# Patient Record
Sex: MALE | Race: WHITE | Employment: OTHER | URBAN - METROPOLITAN AREA
[De-identification: names, ages, dates, MRNs, and addresses within clinical notes are randomized per-mention and may not be internally consistent; named-entity substitution may affect disease eponyms.]

---

## 2017-03-30 ENCOUNTER — HOSPITAL ENCOUNTER (OUTPATIENT)
Dept: GENERAL RADIOLOGY | Age: 72
Discharge: HOME | End: 2017-03-30
Attending: FAMILY MEDICINE | Admitting: FAMILY MEDICINE

## 2017-03-30 DIAGNOSIS — M77.8 TENDINITIS OF WRIST: ICD-10-CM

## 2017-07-06 ENCOUNTER — HOSPITAL ENCOUNTER (OUTPATIENT)
Dept: MRI IMAGING | Age: 72
Discharge: HOME | End: 2017-07-06
Attending: FAMILY MEDICINE | Admitting: FAMILY MEDICINE

## 2017-07-06 DIAGNOSIS — M54.5 LOW BACK PAIN, UNSPECIFIED BACK PAIN LATERALITY, UNSPECIFIED CHRONICITY, WITH SCIATICA PRESENCE UNSPECIFIED: ICD-10-CM

## 2017-07-06 DIAGNOSIS — M54.50 LOW BACK PAIN: ICD-10-CM

## 2017-08-29 ENCOUNTER — HOSPITAL ENCOUNTER (OUTPATIENT)
Dept: OTHER | Age: 72
Discharge: HOME | End: 2017-08-29
Attending: ORTHOPAEDIC SURGERY | Admitting: ORTHOPAEDIC SURGERY

## 2018-08-16 ENCOUNTER — HOSPITAL ENCOUNTER (OUTPATIENT)
Dept: NEUROLOGY | Age: 73
Discharge: HOME | End: 2018-08-16
Attending: FAMILY MEDICINE | Admitting: FAMILY MEDICINE

## 2018-08-24 NOTE — PROCEDURES
[address] [place] [address], [address] [place] [address]                               ELECTROMYOGRAM REPORT    PATIENT NAME: Becki Santiago                      :        1945  MED REC NO:   [id number]                          ROOM:  ACCOUNT NO:   [de-identified]                          ADMIT DATE: 2018  PROVIDER:     Kyara Sanders MD    DATE OF EM2018    REFERRING PROVIDER:  Dr. Germain Cowart. CLINICAL PROBLEMS:  1. Pain and dysesthesia of both feet and legs. 2.  Pain, lower back; status post lumbar spinal surgery 1 year ago. IMPRESSION:  1. Moderate peripheral neuropathy with diffuse neurogenic EMG changes seen  in both lower extremity muscles, more pronounced in the distal muscle  groups. Nerve conduction velocities are reduced. Right as well as left  sural nerve sensory latencies were not obtainable; this is again suggestive  of peripheral neuropathy. 2.  EMG changes are somewhat more prominent in the lower lumbar paraspinal  muscles in L5-S1 region. Associated L5-S1 radiculopathy may need to be  considered.         Seb Vogel MD    D: 2018 17:46:19       T: 2018 17:46:55     [email or web address]  Job#: [id number]     Doc#: [id number]    CC:

## 2018-11-22 ENCOUNTER — APPOINTMENT (OUTPATIENT)
Dept: ULTRASOUND IMAGING | Age: 73
End: 2018-11-22
Payer: MEDICARE

## 2018-11-22 ENCOUNTER — APPOINTMENT (OUTPATIENT)
Dept: CT IMAGING | Age: 73
End: 2018-11-22
Payer: MEDICARE

## 2018-11-22 ENCOUNTER — APPOINTMENT (OUTPATIENT)
Dept: GENERAL RADIOLOGY | Age: 73
End: 2018-11-22
Payer: MEDICARE

## 2018-11-22 ENCOUNTER — HOSPITAL ENCOUNTER (EMERGENCY)
Age: 73
Discharge: HOME | End: 2018-11-23
Attending: EMERGENCY MEDICINE
Payer: MEDICARE

## 2018-11-22 DIAGNOSIS — R07.89 CHEST WALL PAIN: ICD-10-CM

## 2018-11-22 DIAGNOSIS — R07.9 CHEST PAIN, UNSPECIFIED TYPE: Primary | ICD-10-CM

## 2018-11-22 DIAGNOSIS — R06.00 DYSPNEA AND RESPIRATORY ABNORMALITIES: ICD-10-CM

## 2018-11-22 DIAGNOSIS — R06.89 DYSPNEA AND RESPIRATORY ABNORMALITIES: ICD-10-CM

## 2018-11-22 LAB
ALBUMIN SERPL-MCNC: 4.2 GM/DL (ref 3.4–5)
ALP BLD-CCNC: 95 IU/L (ref 40–129)
ALT SERPL-CCNC: 35 U/L (ref 10–40)
ANION GAP SERPL CALCULATED.3IONS-SCNC: 14 MMOL/L (ref 4–16)
AST SERPL-CCNC: 23 IU/L (ref 15–37)
BACTERIA: NEGATIVE /HPF
BASOPHILS ABSOLUTE: 0 K/CU MM
BASOPHILS RELATIVE PERCENT: 0.3 % (ref 0–1)
BILIRUB SERPL-MCNC: 0.1 MG/DL (ref 0–1)
BILIRUBIN URINE: NEGATIVE MG/DL
BLOOD, URINE: ABNORMAL
BUN BLDV-MCNC: 21 MG/DL (ref 6–23)
CALCIUM SERPL-MCNC: 8.5 MG/DL (ref 8.3–10.6)
CHLORIDE BLD-SCNC: 105 MMOL/L (ref 99–110)
CLARITY: CLEAR
CO2: 21 MMOL/L (ref 21–32)
COLOR: ABNORMAL
CREAT SERPL-MCNC: 1 MG/DL (ref 0.9–1.3)
DIFFERENTIAL TYPE: ABNORMAL
EOSINOPHILS ABSOLUTE: 0.1 K/CU MM
EOSINOPHILS RELATIVE PERCENT: 2 % (ref 0–3)
GFR AFRICAN AMERICAN: >60 ML/MIN/1.73M2
GFR NON-AFRICAN AMERICAN: >60 ML/MIN/1.73M2
GLUCOSE BLD-MCNC: 200 MG/DL (ref 70–99)
GLUCOSE, URINE: >500 MG/DL
HCT VFR BLD CALC: 42.2 % (ref 42–52)
HEMOGLOBIN: 13.2 GM/DL (ref 13.5–18)
IMMATURE NEUTROPHIL %: 0.3 % (ref 0–0.43)
KETONES, URINE: NEGATIVE MG/DL
LEUKOCYTE ESTERASE, URINE: ABNORMAL
LIPASE: 25 IU/L (ref 13–60)
LYMPHOCYTES ABSOLUTE: 1.1 K/CU MM
LYMPHOCYTES RELATIVE PERCENT: 17.3 % (ref 24–44)
MCH RBC QN AUTO: 29.1 PG (ref 27–31)
MCHC RBC AUTO-ENTMCNC: 31.3 % (ref 32–36)
MCV RBC AUTO: 93 FL (ref 78–100)
MONOCYTES ABSOLUTE: 0.5 K/CU MM
MONOCYTES RELATIVE PERCENT: 7.5 % (ref 0–4)
MUCUS: ABNORMAL HPF
NITRITE URINE, QUANTITATIVE: NEGATIVE
NUCLEATED RBC %: 0 %
PDW BLD-RTO: 13.3 % (ref 11.7–14.9)
PH, URINE: 5 (ref 5–8)
PLATELET # BLD: 196 K/CU MM (ref 140–440)
PMV BLD AUTO: 10.9 FL (ref 7.5–11.1)
POTASSIUM SERPL-SCNC: 4.1 MMOL/L (ref 3.5–5.1)
PRO-BNP: 137.6 PG/ML
PROTEIN UA: NEGATIVE MG/DL
RBC # BLD: 4.54 M/CU MM (ref 4.6–6.2)
RBC URINE: <1 /HPF (ref 0–3)
SEGMENTED NEUTROPHILS ABSOLUTE COUNT: 4.6 K/CU MM
SEGMENTED NEUTROPHILS RELATIVE PERCENT: 72.6 % (ref 36–66)
SODIUM BLD-SCNC: 140 MMOL/L (ref 135–145)
SPECIFIC GRAVITY UA: 1.02 (ref 1–1.03)
TOTAL CK: 161 IU/L (ref 38–174)
TOTAL IMMATURE NEUTOROPHIL: 0.02 K/CU MM
TOTAL NUCLEATED RBC: 0 K/CU MM
TOTAL PROTEIN: 6.9 GM/DL (ref 6.4–8.2)
TRICHOMONAS: ABNORMAL /HPF
TROPONIN T: <0.01 NG/ML
TROPONIN T: <0.01 NG/ML
UROBILINOGEN, URINE: NORMAL MG/DL (ref 0.2–1)
WBC # BLD: 6.4 K/CU MM (ref 4–10.5)
WBC UA: <1 /HPF (ref 0–2)

## 2018-11-22 PROCEDURE — 6370000000 HC RX 637 (ALT 250 FOR IP): Performed by: EMERGENCY MEDICINE

## 2018-11-22 PROCEDURE — 83690 ASSAY OF LIPASE: CPT

## 2018-11-22 PROCEDURE — 82550 ASSAY OF CK (CPK): CPT

## 2018-11-22 PROCEDURE — 85025 COMPLETE CBC W/AUTO DIFF WBC: CPT

## 2018-11-22 PROCEDURE — 36415 COLL VENOUS BLD VENIPUNCTURE: CPT

## 2018-11-22 PROCEDURE — 80053 COMPREHEN METABOLIC PANEL: CPT

## 2018-11-22 PROCEDURE — 96375 TX/PRO/DX INJ NEW DRUG ADDON: CPT

## 2018-11-22 PROCEDURE — 99285 EMERGENCY DEPT VISIT HI MDM: CPT

## 2018-11-22 PROCEDURE — 71045 X-RAY EXAM CHEST 1 VIEW: CPT

## 2018-11-22 PROCEDURE — 93971 EXTREMITY STUDY: CPT

## 2018-11-22 PROCEDURE — 81001 URINALYSIS AUTO W/SCOPE: CPT

## 2018-11-22 PROCEDURE — 84484 ASSAY OF TROPONIN QUANT: CPT

## 2018-11-22 PROCEDURE — 96374 THER/PROPH/DIAG INJ IV PUSH: CPT

## 2018-11-22 PROCEDURE — 83880 ASSAY OF NATRIURETIC PEPTIDE: CPT

## 2018-11-22 PROCEDURE — 6360000002 HC RX W HCPCS: Performed by: EMERGENCY MEDICINE

## 2018-11-22 PROCEDURE — 71275 CT ANGIOGRAPHY CHEST: CPT

## 2018-11-22 PROCEDURE — 6360000004 HC RX CONTRAST MEDICATION: Performed by: EMERGENCY MEDICINE

## 2018-11-22 PROCEDURE — 2580000003 HC RX 258: Performed by: EMERGENCY MEDICINE

## 2018-11-22 RX ORDER — ONDANSETRON 2 MG/ML
4 INJECTION INTRAMUSCULAR; INTRAVENOUS EVERY 30 MIN PRN
Status: DISCONTINUED | OUTPATIENT
Start: 2018-11-22 | End: 2018-11-23 | Stop reason: HOSPADM

## 2018-11-22 RX ORDER — ASPIRIN 81 MG/1
324 TABLET, CHEWABLE ORAL ONCE
Status: COMPLETED | OUTPATIENT
Start: 2018-11-22 | End: 2018-11-22

## 2018-11-22 RX ORDER — KETOROLAC TROMETHAMINE 30 MG/ML
30 INJECTION, SOLUTION INTRAMUSCULAR; INTRAVENOUS ONCE
Status: COMPLETED | OUTPATIENT
Start: 2018-11-23 | End: 2018-11-22

## 2018-11-22 RX ORDER — METOPROLOL TARTRATE 50 MG/1
25 TABLET, FILM COATED ORAL DAILY
COMMUNITY
End: 2020-10-21

## 2018-11-22 RX ORDER — TIZANIDINE 2 MG/1
2 TABLET ORAL 3 TIMES DAILY
Status: ON HOLD | COMMUNITY
End: 2020-08-26 | Stop reason: ALTCHOICE

## 2018-11-22 RX ORDER — MORPHINE SULFATE 4 MG/ML
4 INJECTION, SOLUTION INTRAMUSCULAR; INTRAVENOUS EVERY 30 MIN PRN
Status: DISCONTINUED | OUTPATIENT
Start: 2018-11-22 | End: 2018-11-23 | Stop reason: HOSPADM

## 2018-11-22 RX ORDER — NITROGLYCERIN 0.4 MG/1
0.4 TABLET SUBLINGUAL EVERY 5 MIN PRN
Status: COMPLETED | OUTPATIENT
Start: 2018-11-22 | End: 2018-11-22

## 2018-11-22 RX ORDER — 0.9 % SODIUM CHLORIDE 0.9 %
10 VIAL (ML) INJECTION
Status: COMPLETED | OUTPATIENT
Start: 2018-11-22 | End: 2018-11-22

## 2018-11-22 RX ADMIN — NITROGLYCERIN 0.4 MG: 0.4 TABLET, ORALLY DISINTEGRATING SUBLINGUAL at 20:03

## 2018-11-22 RX ADMIN — SODIUM CHLORIDE 10 ML: 9 INJECTION, SOLUTION INTRAMUSCULAR; INTRAVENOUS; SUBCUTANEOUS at 22:51

## 2018-11-22 RX ADMIN — MORPHINE SULFATE 4 MG: 4 INJECTION, SOLUTION INTRAMUSCULAR; INTRAVENOUS at 22:31

## 2018-11-22 RX ADMIN — ASPIRIN 81 MG 324 MG: 81 TABLET ORAL at 20:03

## 2018-11-22 RX ADMIN — NITROGLYCERIN 0.4 MG: 0.4 TABLET, ORALLY DISINTEGRATING SUBLINGUAL at 21:28

## 2018-11-22 RX ADMIN — NITROGLYCERIN 0.4 MG: 0.4 TABLET, ORALLY DISINTEGRATING SUBLINGUAL at 20:51

## 2018-11-22 RX ADMIN — MORPHINE SULFATE 4 MG: 4 INJECTION, SOLUTION INTRAMUSCULAR; INTRAVENOUS at 22:03

## 2018-11-22 RX ADMIN — IOPAMIDOL 75 ML: 755 INJECTION, SOLUTION INTRAVENOUS at 22:50

## 2018-11-22 RX ADMIN — ONDANSETRON 4 MG: 2 INJECTION INTRAMUSCULAR; INTRAVENOUS at 22:03

## 2018-11-22 RX ADMIN — KETOROLAC TROMETHAMINE 30 MG: 30 INJECTION, SOLUTION INTRAMUSCULAR at 23:59

## 2018-11-22 ASSESSMENT — PAIN DESCRIPTION - PAIN TYPE: TYPE: ACUTE PAIN

## 2018-11-22 ASSESSMENT — PAIN SCALES - GENERAL
PAINLEVEL_OUTOF10: 9
PAINLEVEL_OUTOF10: 8
PAINLEVEL_OUTOF10: 9
PAINLEVEL_OUTOF10: 7

## 2018-11-22 ASSESSMENT — PAIN DESCRIPTION - LOCATION: LOCATION: CHEST

## 2018-11-22 ASSESSMENT — PAIN DESCRIPTION - ORIENTATION: ORIENTATION: LOWER;LEFT

## 2018-11-22 ASSESSMENT — PAIN DESCRIPTION - DESCRIPTORS: DESCRIPTORS: SHARP

## 2018-11-23 VITALS
HEIGHT: 74 IN | DIASTOLIC BLOOD PRESSURE: 80 MMHG | HEART RATE: 65 BPM | RESPIRATION RATE: 10 BRPM | WEIGHT: 265 LBS | TEMPERATURE: 97.7 F | OXYGEN SATURATION: 98 % | SYSTOLIC BLOOD PRESSURE: 156 MMHG | BODY MASS INDEX: 34.01 KG/M2

## 2018-11-23 RX ORDER — NAPROXEN 500 MG/1
500 TABLET ORAL 2 TIMES DAILY
Qty: 60 TABLET | Refills: 0 | Status: ON HOLD | OUTPATIENT
Start: 2018-11-23 | End: 2020-08-26 | Stop reason: ALTCHOICE

## 2018-11-23 ASSESSMENT — ENCOUNTER SYMPTOMS
ALLERGIC/IMMUNOLOGIC NEGATIVE: 1
EYES NEGATIVE: 1
SHORTNESS OF BREATH: 1
GASTROINTESTINAL NEGATIVE: 1

## 2020-08-26 ENCOUNTER — HOSPITAL ENCOUNTER (INPATIENT)
Age: 75
LOS: 1 days | Discharge: HOME | DRG: 310 | End: 2020-08-27
Attending: EMERGENCY MEDICINE | Admitting: INTERNAL MEDICINE
Payer: MEDICARE

## 2020-08-26 ENCOUNTER — APPOINTMENT (OUTPATIENT)
Dept: ULTRASOUND IMAGING | Age: 75
DRG: 310 | End: 2020-08-26
Payer: MEDICARE

## 2020-08-26 ENCOUNTER — APPOINTMENT (OUTPATIENT)
Dept: GENERAL RADIOLOGY | Age: 75
DRG: 310 | End: 2020-08-26
Payer: MEDICARE

## 2020-08-26 PROBLEM — I48.91 ATRIAL FIBRILLATION WITH RVR (HCC): Status: ACTIVE | Noted: 2020-08-26

## 2020-08-26 PROBLEM — R42 POSTURAL DIZZINESS WITH NEAR SYNCOPE: Status: ACTIVE | Noted: 2020-08-26

## 2020-08-26 PROBLEM — R55 POSTURAL DIZZINESS WITH NEAR SYNCOPE: Status: ACTIVE | Noted: 2020-08-26

## 2020-08-26 PROBLEM — M54.2 ACUTE NECK PAIN: Status: ACTIVE | Noted: 2020-08-26

## 2020-08-26 LAB
ALBUMIN SERPL-MCNC: 4.5 GM/DL (ref 3.4–5)
ALP BLD-CCNC: 80 IU/L (ref 40–129)
ALT SERPL-CCNC: 29 U/L (ref 10–40)
AMPHETAMINES: NEGATIVE
ANION GAP SERPL CALCULATED.3IONS-SCNC: 12 MMOL/L (ref 4–16)
ANION GAP SERPL CALCULATED.3IONS-SCNC: 12 MMOL/L (ref 4–16)
APTT: 31.9 SECONDS (ref 25.1–37.1)
AST SERPL-CCNC: 28 IU/L (ref 15–37)
BARBITURATE SCREEN URINE: NEGATIVE
BASOPHILS ABSOLUTE: 0 K/CU MM
BASOPHILS RELATIVE PERCENT: 0.4 % (ref 0–1)
BENZODIAZEPINE SCREEN, URINE: NEGATIVE
BILIRUB SERPL-MCNC: 0.4 MG/DL (ref 0–1)
BUN BLDV-MCNC: 18 MG/DL (ref 6–23)
BUN BLDV-MCNC: 19 MG/DL (ref 6–23)
CALCIUM SERPL-MCNC: 8.5 MG/DL (ref 8.3–10.6)
CALCIUM SERPL-MCNC: 9 MG/DL (ref 8.3–10.6)
CANNABINOID SCREEN URINE: NEGATIVE
CHLORIDE BLD-SCNC: 103 MMOL/L (ref 99–110)
CHLORIDE BLD-SCNC: 103 MMOL/L (ref 99–110)
CHOLESTEROL: 174 MG/DL
CO2: 23 MMOL/L (ref 21–32)
CO2: 23 MMOL/L (ref 21–32)
COCAINE METABOLITE: NEGATIVE
CREAT SERPL-MCNC: 0.9 MG/DL (ref 0.9–1.3)
CREAT SERPL-MCNC: 1 MG/DL (ref 0.9–1.3)
DIFFERENTIAL TYPE: ABNORMAL
EOSINOPHILS ABSOLUTE: 0.1 K/CU MM
EOSINOPHILS RELATIVE PERCENT: 1 % (ref 0–3)
GFR AFRICAN AMERICAN: >60 ML/MIN/1.73M2
GFR AFRICAN AMERICAN: >60 ML/MIN/1.73M2
GFR NON-AFRICAN AMERICAN: >60 ML/MIN/1.73M2
GFR NON-AFRICAN AMERICAN: >60 ML/MIN/1.73M2
GLUCOSE BLD-MCNC: 140 MG/DL (ref 70–99)
GLUCOSE BLD-MCNC: 207 MG/DL (ref 70–99)
HCT VFR BLD CALC: 39.4 % (ref 42–52)
HCT VFR BLD CALC: 44.9 % (ref 42–52)
HDLC SERPL-MCNC: 44 MG/DL
HEMOGLOBIN: 12.8 GM/DL (ref 13.5–18)
HEMOGLOBIN: 14.8 GM/DL (ref 13.5–18)
IMMATURE NEUTROPHIL %: 0.4 % (ref 0–0.43)
INR BLD: 0.98 INDEX
LDL CHOLESTEROL DIRECT: 104 MG/DL
LIPASE: 27 IU/L (ref 13–60)
LV EF: 55 %
LVEF MODALITY: NORMAL
LYMPHOCYTES ABSOLUTE: 0.9 K/CU MM
LYMPHOCYTES RELATIVE PERCENT: 9.4 % (ref 24–44)
MAGNESIUM: 2.3 MG/DL (ref 1.8–2.4)
MCH RBC QN AUTO: 29.8 PG (ref 27–31)
MCH RBC QN AUTO: 29.9 PG (ref 27–31)
MCHC RBC AUTO-ENTMCNC: 32.5 % (ref 32–36)
MCHC RBC AUTO-ENTMCNC: 33 % (ref 32–36)
MCV RBC AUTO: 90.7 FL (ref 78–100)
MCV RBC AUTO: 91.6 FL (ref 78–100)
MONOCYTES ABSOLUTE: 0.7 K/CU MM
MONOCYTES RELATIVE PERCENT: 7.5 % (ref 0–4)
NUCLEATED RBC %: 0 %
OPIATES, URINE: NEGATIVE
OXYCODONE: NEGATIVE
PDW BLD-RTO: 13.4 % (ref 11.7–14.9)
PDW BLD-RTO: 13.4 % (ref 11.7–14.9)
PHENCYCLIDINE, URINE: NEGATIVE
PLATELET # BLD: 181 K/CU MM (ref 140–440)
PLATELET # BLD: 231 K/CU MM (ref 140–440)
PMV BLD AUTO: 10.9 FL (ref 7.5–11.1)
PMV BLD AUTO: 11.3 FL (ref 7.5–11.1)
POTASSIUM SERPL-SCNC: 3.7 MMOL/L (ref 3.5–5.1)
POTASSIUM SERPL-SCNC: 4.5 MMOL/L (ref 3.5–5.1)
PRO-BNP: 482 PG/ML
PROTHROMBIN TIME: 11.9 SECONDS (ref 11.7–14.5)
RBC # BLD: 4.3 M/CU MM (ref 4.6–6.2)
RBC # BLD: 4.95 M/CU MM (ref 4.6–6.2)
SEGMENTED NEUTROPHILS ABSOLUTE COUNT: 7.9 K/CU MM
SEGMENTED NEUTROPHILS RELATIVE PERCENT: 81.3 % (ref 36–66)
SODIUM BLD-SCNC: 138 MMOL/L (ref 135–145)
SODIUM BLD-SCNC: 138 MMOL/L (ref 135–145)
TOTAL IMMATURE NEUTOROPHIL: 0.04 K/CU MM
TOTAL NUCLEATED RBC: 0 K/CU MM
TOTAL PROTEIN: 6.9 GM/DL (ref 6.4–8.2)
TRIGL SERPL-MCNC: 225 MG/DL
TROPONIN T: 0.04 NG/ML
TROPONIN T: <0.01 NG/ML
TSH HIGH SENSITIVITY: 3.24 UIU/ML (ref 0.27–4.2)
WBC # BLD: 6.3 K/CU MM (ref 4–10.5)
WBC # BLD: 9.7 K/CU MM (ref 4–10.5)

## 2020-08-26 PROCEDURE — 93880 EXTRACRANIAL BILAT STUDY: CPT

## 2020-08-26 PROCEDURE — 93005 ELECTROCARDIOGRAM TRACING: CPT | Performed by: EMERGENCY MEDICINE

## 2020-08-26 PROCEDURE — 85730 THROMBOPLASTIN TIME PARTIAL: CPT

## 2020-08-26 PROCEDURE — 84484 ASSAY OF TROPONIN QUANT: CPT

## 2020-08-26 PROCEDURE — 2140000000 HC CCU INTERMEDIATE R&B

## 2020-08-26 PROCEDURE — 80053 COMPREHEN METABOLIC PANEL: CPT

## 2020-08-26 PROCEDURE — 85610 PROTHROMBIN TIME: CPT

## 2020-08-26 PROCEDURE — 83735 ASSAY OF MAGNESIUM: CPT

## 2020-08-26 PROCEDURE — 93306 TTE W/DOPPLER COMPLETE: CPT

## 2020-08-26 PROCEDURE — 6360000002 HC RX W HCPCS: Performed by: PHYSICIAN ASSISTANT

## 2020-08-26 PROCEDURE — 6370000000 HC RX 637 (ALT 250 FOR IP): Performed by: NURSE PRACTITIONER

## 2020-08-26 PROCEDURE — 85027 COMPLETE CBC AUTOMATED: CPT

## 2020-08-26 PROCEDURE — 71045 X-RAY EXAM CHEST 1 VIEW: CPT

## 2020-08-26 PROCEDURE — 80048 BASIC METABOLIC PNL TOTAL CA: CPT

## 2020-08-26 PROCEDURE — 83721 ASSAY OF BLOOD LIPOPROTEIN: CPT

## 2020-08-26 PROCEDURE — 80307 DRUG TEST PRSMV CHEM ANLYZR: CPT

## 2020-08-26 PROCEDURE — 2580000003 HC RX 258: Performed by: NURSE PRACTITIONER

## 2020-08-26 PROCEDURE — 83690 ASSAY OF LIPASE: CPT

## 2020-08-26 PROCEDURE — 93005 ELECTROCARDIOGRAM TRACING: CPT | Performed by: PHYSICIAN ASSISTANT

## 2020-08-26 PROCEDURE — 36415 COLL VENOUS BLD VENIPUNCTURE: CPT

## 2020-08-26 PROCEDURE — 6360000002 HC RX W HCPCS: Performed by: NURSE PRACTITIONER

## 2020-08-26 PROCEDURE — 84443 ASSAY THYROID STIM HORMONE: CPT

## 2020-08-26 PROCEDURE — 85025 COMPLETE CBC W/AUTO DIFF WBC: CPT

## 2020-08-26 PROCEDURE — 96372 THER/PROPH/DIAG INJ SC/IM: CPT

## 2020-08-26 PROCEDURE — 83880 ASSAY OF NATRIURETIC PEPTIDE: CPT

## 2020-08-26 PROCEDURE — 99285 EMERGENCY DEPT VISIT HI MDM: CPT

## 2020-08-26 PROCEDURE — 80061 LIPID PANEL: CPT

## 2020-08-26 PROCEDURE — 96374 THER/PROPH/DIAG INJ IV PUSH: CPT

## 2020-08-26 RX ORDER — METOPROLOL TARTRATE 50 MG/1
25 TABLET, FILM COATED ORAL 2 TIMES DAILY
Status: CANCELLED | OUTPATIENT
Start: 2020-08-26

## 2020-08-26 RX ORDER — ACETAMINOPHEN 650 MG/1
650 SUPPOSITORY RECTAL EVERY 6 HOURS PRN
Status: DISCONTINUED | OUTPATIENT
Start: 2020-08-26 | End: 2020-08-27 | Stop reason: HOSPADM

## 2020-08-26 RX ORDER — ATORVASTATIN CALCIUM 40 MG/1
40 TABLET, FILM COATED ORAL NIGHTLY
Status: DISCONTINUED | OUTPATIENT
Start: 2020-08-26 | End: 2020-08-27 | Stop reason: HOSPADM

## 2020-08-26 RX ORDER — METOPROLOL TARTRATE 50 MG/1
50 TABLET, FILM COATED ORAL DAILY
Status: DISCONTINUED | OUTPATIENT
Start: 2020-08-26 | End: 2020-08-27 | Stop reason: HOSPADM

## 2020-08-26 RX ORDER — POLYETHYLENE GLYCOL 3350 17 G/17G
17 POWDER, FOR SOLUTION ORAL DAILY PRN
Status: DISCONTINUED | OUTPATIENT
Start: 2020-08-26 | End: 2020-08-27 | Stop reason: HOSPADM

## 2020-08-26 RX ORDER — PROMETHAZINE HYDROCHLORIDE 25 MG/1
12.5 TABLET ORAL EVERY 6 HOURS PRN
Status: DISCONTINUED | OUTPATIENT
Start: 2020-08-26 | End: 2020-08-27 | Stop reason: HOSPADM

## 2020-08-26 RX ORDER — AMLODIPINE BESYLATE 5 MG/1
5 TABLET ORAL DAILY
Status: DISCONTINUED | OUTPATIENT
Start: 2020-08-26 | End: 2020-08-27 | Stop reason: HOSPADM

## 2020-08-26 RX ORDER — FENTANYL CITRATE 50 UG/ML
50 INJECTION, SOLUTION INTRAMUSCULAR; INTRAVENOUS ONCE
Status: COMPLETED | OUTPATIENT
Start: 2020-08-26 | End: 2020-08-26

## 2020-08-26 RX ORDER — HYDRALAZINE HYDROCHLORIDE 20 MG/ML
10 INJECTION INTRAMUSCULAR; INTRAVENOUS EVERY 6 HOURS PRN
Status: DISCONTINUED | OUTPATIENT
Start: 2020-08-26 | End: 2020-08-27 | Stop reason: HOSPADM

## 2020-08-26 RX ORDER — OMEPRAZOLE 20 MG/1
20 CAPSULE, DELAYED RELEASE ORAL DAILY
Status: ON HOLD | COMMUNITY
End: 2021-01-15 | Stop reason: HOSPADM

## 2020-08-26 RX ORDER — HYDROCODONE BITARTRATE AND ACETAMINOPHEN 5; 325 MG/1; MG/1
1 TABLET ORAL EVERY 6 HOURS PRN
Status: CANCELLED | OUTPATIENT
Start: 2020-08-26

## 2020-08-26 RX ORDER — FLUDROCORTISONE ACETATE 0.1 MG/1
0.1 TABLET ORAL DAILY
Status: DISCONTINUED | OUTPATIENT
Start: 2020-08-26 | End: 2020-08-26

## 2020-08-26 RX ORDER — ASPIRIN 81 MG/1
81 TABLET, CHEWABLE ORAL DAILY
Status: DISCONTINUED | OUTPATIENT
Start: 2020-08-27 | End: 2020-08-27 | Stop reason: HOSPADM

## 2020-08-26 RX ORDER — SODIUM CHLORIDE 0.9 % (FLUSH) 0.9 %
10 SYRINGE (ML) INJECTION EVERY 12 HOURS SCHEDULED
Status: DISCONTINUED | OUTPATIENT
Start: 2020-08-26 | End: 2020-08-27 | Stop reason: HOSPADM

## 2020-08-26 RX ORDER — NITROGLYCERIN 0.4 MG/1
0.4 TABLET SUBLINGUAL EVERY 5 MIN PRN
Status: DISCONTINUED | OUTPATIENT
Start: 2020-08-26 | End: 2020-08-27 | Stop reason: HOSPADM

## 2020-08-26 RX ORDER — ONDANSETRON 2 MG/ML
4 INJECTION INTRAMUSCULAR; INTRAVENOUS EVERY 6 HOURS PRN
Status: DISCONTINUED | OUTPATIENT
Start: 2020-08-26 | End: 2020-08-27 | Stop reason: HOSPADM

## 2020-08-26 RX ORDER — TIZANIDINE 2 MG/1
2 TABLET ORAL 3 TIMES DAILY
Status: DISCONTINUED | OUTPATIENT
Start: 2020-08-26 | End: 2020-08-26

## 2020-08-26 RX ORDER — SODIUM CHLORIDE 0.9 % (FLUSH) 0.9 %
10 SYRINGE (ML) INJECTION PRN
Status: DISCONTINUED | OUTPATIENT
Start: 2020-08-26 | End: 2020-08-27 | Stop reason: HOSPADM

## 2020-08-26 RX ORDER — ACETAMINOPHEN 325 MG/1
650 TABLET ORAL EVERY 6 HOURS PRN
Status: DISCONTINUED | OUTPATIENT
Start: 2020-08-26 | End: 2020-08-27 | Stop reason: HOSPADM

## 2020-08-26 RX ORDER — IBUPROFEN 400 MG/1
600 TABLET ORAL
Status: DISCONTINUED | OUTPATIENT
Start: 2020-08-26 | End: 2020-08-27 | Stop reason: HOSPADM

## 2020-08-26 RX ORDER — PANTOPRAZOLE SODIUM 40 MG/1
40 TABLET, DELAYED RELEASE ORAL
Status: DISCONTINUED | OUTPATIENT
Start: 2020-08-27 | End: 2020-08-27 | Stop reason: HOSPADM

## 2020-08-26 RX ORDER — TRAMADOL HYDROCHLORIDE 50 MG/1
50 TABLET ORAL EVERY 4 HOURS PRN
Status: DISCONTINUED | OUTPATIENT
Start: 2020-08-26 | End: 2020-08-27 | Stop reason: HOSPADM

## 2020-08-26 RX ORDER — CITALOPRAM 20 MG/1
20 TABLET ORAL DAILY
Status: DISCONTINUED | OUTPATIENT
Start: 2020-08-26 | End: 2020-08-27 | Stop reason: HOSPADM

## 2020-08-26 RX ADMIN — FENTANYL CITRATE 50 MCG: 50 INJECTION, SOLUTION INTRAMUSCULAR; INTRAVENOUS at 13:41

## 2020-08-26 RX ADMIN — ENOXAPARIN SODIUM 120 MG: 120 INJECTION SUBCUTANEOUS at 20:03

## 2020-08-26 RX ADMIN — IBUPROFEN 600 MG: 400 TABLET ORAL at 18:07

## 2020-08-26 RX ADMIN — AMLODIPINE BESYLATE 5 MG: 5 TABLET ORAL at 18:07

## 2020-08-26 RX ADMIN — ENOXAPARIN SODIUM 120 MG: 120 INJECTION SUBCUTANEOUS at 14:50

## 2020-08-26 RX ADMIN — ATORVASTATIN CALCIUM 40 MG: 40 TABLET, FILM COATED ORAL at 20:03

## 2020-08-26 RX ADMIN — TRAMADOL HYDROCHLORIDE 50 MG: 50 TABLET, FILM COATED ORAL at 20:03

## 2020-08-26 RX ADMIN — SODIUM CHLORIDE, PRESERVATIVE FREE 10 ML: 5 INJECTION INTRAVENOUS at 20:03

## 2020-08-26 ASSESSMENT — PAIN DESCRIPTION - PAIN TYPE
TYPE: ACUTE PAIN
TYPE: ACUTE PAIN

## 2020-08-26 ASSESSMENT — PAIN DESCRIPTION - LOCATION
LOCATION: HEAD
LOCATION: HEAD

## 2020-08-26 ASSESSMENT — PAIN SCALES - GENERAL
PAINLEVEL_OUTOF10: 7
PAINLEVEL_OUTOF10: 3
PAINLEVEL_OUTOF10: 6
PAINLEVEL_OUTOF10: 6
PAINLEVEL_OUTOF10: 8

## 2020-08-26 ASSESSMENT — PAIN DESCRIPTION - DESCRIPTORS: DESCRIPTORS: DULL;HEADACHE

## 2020-08-26 NOTE — ED PROVIDER NOTES
EMERGENCY DEPARTMENT ENCOUNTER        PCP: Conor Darling MD    CHIEF COMPLAINT    Chief Complaint   Patient presents with    Neck Pain    Fatigue    Nausea    Sweats     Of note, this patient was also evaluated by the attending physician, Dr. Scarlett Parker    HPI    Bianca Frazier is a 70 y.o. male who presents to the emergency department today after having a presyncopal episode, dizziness, neck pain, diaphoresis. Onset was earlier this afternoon/morning. 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 He states that he almost passed out but was able to sit down and regain his composure. He then stood up and became dizzy. He then called his wife who took him home, at home he continued not to feel well, finally decided to come to the emergency department. Denies  active chest pain, states that he has no significant history of ischemic heart disease, valvular heart disease or electrical issues. Patient does have a history of hypertension, unstable angina. Obstructive sleep apnea, COPD. REVIEW OF SYSTEMS    Constitutional:  Denies fever, chills. HENT:  Denies sore throat or ear pain   Cardiovascular:  See HPI. Respiratory:    Denies cough or hemoptysis    GI:  Denies abdominal pain, nausea, vomiting, or diarrhea  :  Denies any urinary symptoms  Musculoskeletal:  Denies back pain, no extremity pain, swelling or discoloration.   No pale or cold extremity  Skin:  Denies rash  Neurologic:  Denies changes in vision,  lightheadedness, dizziness, headache, focal weakness or sensory changes   Endocrine:  Denies polyuria or polydypsia   Lymphatic:  Denies swollen glands     All other review of systems are negative  See HPI and nursing notes for additional information         PAST MEDICAL & SURGICAL HISTORY    Past Medical History:   Diagnosis Date    Abnormal MRI, spine     per pt herniated disc    Arthritis     Gastritis     H/O 24 hour EKG monitoring 06/24/2002 06/24/2002 baseline rhythm appears to be normal with an average HR of 66 bpm, slowest HR recored at 51 bpm, fastest at 97 bpm only 10 isolated  PVC's and one couplet were recorded, supraventricular ectopic activity is present, but not clinically significant, no long pauses recognized.  H/O cardiovascular stress test 03/16/1999, 12/28/2001,03/14/2006, 03/31/2008, 11/19/2008, 09/07/2011 09/07/2011 EF 61%, no angina or ischemic EKG changes, noted with Lexiscan, inferior wall reperfusion, global function is intact. resting /85, resting HR 79    H/O complete electrocardiogram 03/05/2012 03/05/2012 on chart    H/O CT scan of chest 11/19/2008 11/19/2008 no evidence of PE, no acute thoracic aortic pathology, incidental 5mm pleural based nodular density superior lateral right middle lobe.  H/O Doppler ultrasound no anatomical abnormalities in the segment s studied on either side, doppler curves are normal in configuration and amplitude in those segments bilaterally, normal flow velocities and flow velocity ratios. normal antegrade flow in the vertebral arteries bilaterally    H/O Doppler ultrasound 08/08/2005 08/08/2005 no significant obstructive lesions noted in the extracranial carotid system,antegrade flow noted in the vertebral arteries. no significant atherosclerotic plaque noted in right or left  common arteryintimal thickening in right and left internal carotids, intimal thickening in left external carotid.      H/O echocardiogram 06/24/2002 EF 55-60% 06/19/2006 EF 50-55%,09/06/2011 EF 50-55%    09/06/2011 EF 50-55%, normal size left ventricle showing preserved global systolic  function and no regional wall motion abnormalities, left ventricle shows  moderate concentric hypertrophy and signs of diastolic dysfunction, mildly dilated atrium,, aortic valve is sclerotic but nonstenotic, trace mitral and tricuspid  regurg    H/O tilt table evaluation 07/05/2001 07/05/2001 head up tilt table test after one nitroglycerin pt became nauseous, diaphoretic, BP dropped systolic to 85 pulse in 79'D with complete loss of consciousness with  slight seizure activity before table brought to horizontal, happened within 8 minutes of nitro tablet utilization, pt. regained consciousness HR and BP  as soon as table was brought to horizontal, lopressor discontinued    Hiatal hernia     Hx of cardiac cath 03/17/1999, 11/19/2008 11/19/2008both the left main and circumflex are without significant disease, RCA is also without significant disease, LV gram shows normal size left ventricular cavity with normal global and regional left ventricular systolic function, no significant CAD, chest pain is probably non cardiac in etiology    Hypertension     Prolonged emergence from general anesthesia     Syncope and collapse     Unspecified sleep apnea     cpap    Wears glasses      Past Surgical History:   Procedure Laterality Date    BACK SURGERY      DIAGNOSTIC CARDIAC CATH LAB PROCEDURE  03/17/1999,11/19/2008 11/19/2008, left main,circumflex, and RCA are without any significant disease, LV gram shows normal size left ventricular cavity with normal global and  regional left ventricular systolic function, pt has no significant CAD.     HERNIA REPAIR      TONSILLECTOMY         CURRENT MEDICATIONS        ALLERGIES    No Known Allergies    SOCIAL & FAMILY HISTORY    Social History     Socioeconomic History    Marital status:      Spouse name: None    Number of children: 2    Years of education: None    Highest education level: None   Occupational History    Occupation: / instructor   Social Needs    Financial resource strain: None    Food insecurity     Worry: None     Inability: None    Transportation needs     Medical: None Non-medical: None   Tobacco Use    Smoking status: Former Smoker     Types: Cigarettes     Last attempt to quit: 1988     Years since quittin.6    Smokeless tobacco: Former User     Types: Chew    Tobacco comment: Quit chewing in 2014   Substance and Sexual Activity    Alcohol use: Yes     Comment: moderate,  coffee two cups daily    Drug use: No    Sexual activity: None   Lifestyle    Physical activity     Days per week: None     Minutes per session: None    Stress: None   Relationships    Social connections     Talks on phone: None     Gets together: None     Attends Jainism service: None     Active member of club or organization: None     Attends meetings of clubs or organizations: None     Relationship status: None    Intimate partner violence     Fear of current or ex partner: None     Emotionally abused: None     Physically abused: None     Forced sexual activity: None   Other Topics Concern    None   Social History Narrative    None     Family History   Problem Relation Age of Onset    Diabetes Mother     Heart Disease Mother     Other Mother         kidney stones    Cancer Mother         breast    Heart Disease Father     Diabetes Father        PHYSICAL EXAM    VITAL SIGNS: BP (!) 176/85   Pulse 74   Temp 97.7 °F (36.5 °C) (Oral)   Resp 16   Ht 6' 2\" (1.88 m)   Wt 260 lb (117.9 kg)   SpO2 100%   BMI 33.38 kg/m²    Constitutional:  Well developed, well nourished, no acute distress   HENT:  Atraumatic, moist mucus membranes  Neck/Lymphatics: supple, no JVD, no swollen nodes  Respiratory:  Nonlabored breathing. Lungs Clear, no retractions. Cardiovascular:  RRR, no murmurs/rubs/gallops. No carotid bruits or murmurs heard in carotids. No JVD  Vascular:   Radial and femoral pulses palpable and reveal no discernible inequality    GI:  Soft, nontender, normal bowel sounds. Evidence of a large vertical surgical scar into the abdomen.   Musculoskeletal:    There is no edema, asymmetry, or calf / thigh tenderness bilaterally. No cyanosis. No cool or pale-appearing limb.   Distal cap refill and pulses intact bilateral upper and lower extremities  Bilateral upper and lower extremity ROM intact without pain or obvious deficit  Integument:  Skin warm and dry, no petechiae   Neurologic:  Alert & oriented, normal speech    - Alert & oriented person, place, time, and situation, no speech difficulties or slurring.  - No obvious gross motor deficits  - Cranial nerves 2-12 grossly intact  - Negative meningeal signs.  - Sensation intact to light touch  - Strength 5/5 in upper and lower extremities bilaterally  - No truncal ataxia  Psych: Pleasant affect, no hallucinations    LABS:  Results for orders placed or performed during the hospital encounter of 08/26/20   CBC auto diff   Result Value Ref Range    WBC 9.7 4.0 - 10.5 K/CU MM    RBC 4.95 4.6 - 6.2 M/CU MM    Hemoglobin 14.8 13.5 - 18.0 GM/DL    Hematocrit 44.9 42 - 52 %    MCV 90.7 78 - 100 FL    MCH 29.9 27 - 31 PG    MCHC 33.0 32.0 - 36.0 %    RDW 13.4 11.7 - 14.9 %    Platelets 839 286 - 669 K/CU MM    MPV 10.9 7.5 - 11.1 FL    Differential Type AUTOMATED DIFFERENTIAL     Segs Relative 81.3 (H) 36 - 66 %    Lymphocytes % 9.4 (L) 24 - 44 %    Monocytes % 7.5 (H) 0 - 4 %    Eosinophils % 1.0 0 - 3 %    Basophils % 0.4 0 - 1 %    Segs Absolute 7.9 K/CU MM    Lymphocytes Absolute 0.9 K/CU MM    Monocytes Absolute 0.7 K/CU MM    Eosinophils Absolute 0.1 K/CU MM    Basophils Absolute 0.0 K/CU MM    Nucleated RBC % 0.0 %    Total Nucleated RBC 0.0 K/CU MM    Total Immature Neutrophil 0.04 K/CU MM    Immature Neutrophil % 0.4 0 - 0.43 %   CMP   Result Value Ref Range    Sodium 138 135 - 145 MMOL/L    Potassium 4.5 3.5 - 5.1 MMOL/L    Chloride 103 99 - 110 mMol/L    CO2 23 21 - 32 MMOL/L    BUN 18 6 - 23 MG/DL    CREATININE 1.0 0.9 - 1.3 MG/DL    Glucose 140 (H) 70 - 99 MG/DL    Calcium 9.0 8.3 - 10.6 MG/DL    Alb 4.5 3.4 - 5.0 GM/DL Total Protein 6.9 6.4 - 8.2 GM/DL    Total Bilirubin 0.4 0.0 - 1.0 MG/DL    ALT 29 10 - 40 U/L    AST 28 15 - 37 IU/L    Alkaline Phosphatase 80 40 - 129 IU/L    GFR Non-African American >60 >60 mL/min/1.73m2    GFR African American >60 >60 mL/min/1.73m2    Anion Gap 12 4 - 16   Troponin   Result Value Ref Range    Troponin T <0.010 <0.01 NG/ML   Brain Natriuretic Peptide   Result Value Ref Range    Pro-.0 (H) <300 PG/ML   Magnesium   Result Value Ref Range    Magnesium 2.3 1.8 - 2.4 mg/dl   Lipase   Result Value Ref Range    Lipase 27 13 - 60 IU/L   TSH without Reflex   Result Value Ref Range    TSH, High Sensitivity 3.240 0.270 - 4.20 uIu/ml   PT - INR   Result Value Ref Range    Protime 11.9 11.7 - 14.5 SECONDS    INR 0.98 INDEX   PTT   Result Value Ref Range    aPTT 31.9 25.1 - 37.1 SECONDS   EKG 12 Lead   Result Value Ref Range    Ventricular Rate 161 BPM    Atrial Rate 153 BPM    QRS Duration 72 ms    Q-T Interval 282 ms    QTc Calculation (Bazett) 461 ms    R Axis 0 degrees    T Axis 113 degrees    Diagnosis       Atrial fibrillation with rapid ventricular response  ST depression, consider subendocardial injury  Abnormal QRS-T angle, consider primary T wave abnormality  Abnormal ECG  When compared with ECG of 22-NOV-2018 19:47,  Atrial fibrillation has replaced Sinus rhythm  Vent.  rate has increased BY  93 BPM  ST now depressed in Anterolateral leads  Nonspecific T wave abnormality no longer evident in Inferior leads     EKG 12 Lead   Result Value Ref Range    Ventricular Rate 64 BPM    Atrial Rate 64 BPM    P-R Interval 176 ms    QRS Duration 80 ms    Q-T Interval 388 ms    QTc Calculation (Bazett) 400 ms    P Axis 0 degrees    R Axis 17 degrees    T Axis 20 degrees    Diagnosis       Normal sinus rhythm  Normal ECG  When compared with ECG of 26-AUG-2020 13:00,  No significant change was found         EKG Interpretation  Please see ED physician's note for EKG interpretation    RADIOLOGY/PROCEDURES Pre-syncope    3. Chest pain, unspecified type    4. Neck pain       Admission to the hospital    Comment: Please note this report has been produced using speech recognition software and may contain errors related to that system including errors in grammar, punctuation, and spelling, as well as words and phrases that may be inappropriate. If there are any questions or concerns please feel free to contact the dictating provider for clarification.       Jarvis Yip 411, PA  08/26/20 7632

## 2020-08-26 NOTE — H&P
History and Physical  Jaycee Lopez [facility]   Internal Medicine Hospitalist      Name:  Uriel Ricci /Age/Sex: 1945  (70 y.o. male)   MRN & CSN:  [id number] & [id number] Admission Date/Time: 2020 12:48 PM   Location:  [place] [address] PCP: Iglesia Nicole MD       Hospital Day: 1      Supervising Physician: Dr. Lemuel Olea     Chief Complaint: Neck Pain; Fatigue; Nausea; and Sweats     Assessment and Plan:   Uriel Ricci is a 70 y.o. morbidly obese male who presents with New onset atrial fibrillation ([address]     New onset Afib with RVR - EKG shows Afib RVR, rate 153, denied h/o Afib, CHADs-VAS score - 2         - with spontaneous conversion to sinus rhythm in ED         - VTach episode, once in ED, ED PA reported to Dr. Marisol Guerra          - admit inpatient, telemetry monitoring         - Cardiology, Dr. Marisol Guerra consulted in ED         - cont home dose Lopressor         - on Lovenox BID         - PT/INR in AM         - pending Echo, rpt ECG     Acute neck pain, ACS r/o - likely r/t Afib vs DDx: musculoskeletal pain, no known h/o CAD. - initial trop neg, Last Echo () EF 55%         - last cardiac cath          - Cardiology onboard         - trend trop         - on ASA, Lipitor, Lopressor         - pending Echo         - NPO after MN         - defer stress test to cardio     Postural dizziness with near syncope, likely r/t above - no chest pain, denied passing out, denied h/o vertigo.         - Neuro checks      - orthostatic BP and pulse         - OT/PT eval and treat         - pending VL dup carotid bilateral         - Fall precautions     H/o HTN - DBP >90         - c/w Norvasc, Lopressor         - prn hydralazine         - monitor BP trends     Morbid obesity - current BMI 33.38         - Health maintenance: exercise, lifestyle modification, and reduced caloric intake              Chronic Illnesses:          - COPD - breathing stable on room air         - THOMAS on CPAP at night - arthritis         - depression      Current diagnosis and plan of management discussed with the patient at the time of admission in lay language who agree to the above plan and disposition of admission for further care. All concerns and questions addressed. Patient assessment and plan in conjunction with supervising physician - Dr. Isidra Bernal, No caffeine, NPO after MN    DVT Prophylaxis [x] Lovenox, []  Heparin, [] SCDs, [] Ambulation  [] Long term AC   GI Prophylaxis [x] PPI,  [] H2 Blocker,  [] Carafate,  [] Diet,  [] No GI prophylaxis, N/A: patient is not under significant medical stress, non-ICU or is receiving a diet/tube feeds   Code Status  Full CODE   Disposition Patient requires continued admission due to New onset atrial fibrillation ([facility] [address] Discharge Plan: Patient plans to return home upon discharge. MDM [] Low, [] Moderate,[x]  High  Patient's risk as above due to:      [x] One or more chronic illnesses with mild exacerbation progression      [] Two or more stable chronic illnesses      [x] Undiagnosed new problem with uncertain prognosis      [] Elective major surgery      []Prescription drug management     History of Present Illness:     Principal Problem: Atrial fibrillation with RVR (HCC)  Genie Hamilton is a 70 y.o. morbidly obese male with past medical history of gastritis, hiatal hernia, COPD, THOMAS on CPAP, hypertension, arthritis, and depression presented to the ED with complaints of neck pain, fatigue, nausea without vomiting, and sweats. Patient reports that he was outside at the [employer] doing some measuring in the ground when he suddenly felt very dizzy worse with positioning followed by pain at the back of his neck, sweat, and nausea without vomiting. He denied history of vertigo, lightheadedness, and spinning sensation. Denied passing out and stated that he sat down the moment he felt dizzy.  Patient denies chest pain, palpitation, fever, chills, cough, and SOB or difficulty breathing. Denies any other symptoms including headache, paresthesias, abdominal pain, diarrhea, constipation, dysuria, hematuria, frequency or urgency, and B/L weakness. Upon interview, the patient provided the history as above. ED Course: Discussed case with ED physician prior to admission. ROS: Ten point ROS reviewed and negative, unless as noted above per HPI. Objective:   No intake or output data in the 24 hours ending 08/26/20 1528     Vitals: Temp/Oral 98.4  Vitals:    08/26/20 1447 08/26/20 1501 08/26/20 1506 08/26/20 1517   BP: (!) 131/95 (!) 148/103 (!) 151/91 (!) 140/91   Pulse: 67 70 64 65   Resp: 16 13 11 20   Temp:       SpO2: 98% 97% 98% 98%   Weight:       Height:         Physical Exam: 08/26/20    GEN  -Awake, alert, appearing morbidly obese male, sitting upright in bed, cooperative, able to give adequate history. Appears given age. EYES -Pupils are equally round. No vision changes. No scleral erythema, discharge, or conjunctivitis. HENT -MM are moist. Oral pharynx without exudates, no evidence of thrush. NECK -Supple, no apparent thyromegaly or masses. RESP -LS CTA equal bilat, no wheezes, rales or rhonchi. Symmetric chest movement. No respiratory distress noted. C/V  -S1/S2 auscultated. RRR without appreciable M/R/G. No JVD or carotid bruits. Peripheral pulses equal bilaterally and palpable. Peripheral pulses equal bilaterally and palpable. No peripheral edema. No reproducible chest wall tenderness. GI  -central obesity, Abdomen is soft, round, non-distended, no significant tenderness. No masses or guarding. + BS in all quadrants. Rectal exam deferred.   -Mendez catheter is not present. LYMPH  -No palpable cervical lymphadenopathy and no hepatosplenomegaly. No petechiae or ecchymoses. MS  -B/L extremities strong muscles strength. Full movements. No gross joint deformities.  No swelling, intact sensation symmetrical.   SKIN  -Normal coloration, left ventricle showing preserved global systolic  function and no regional wall motion abnormalities, left ventricle shows  moderate concentric hypertrophy and signs of diastolic dysfunction, mildly dilated atrium,, aortic valve is sclerotic but nonstenotic, trace mitral and tricuspid  regurg    H/O tilt table evaluation 07/05/2001 07/05/2001 head up tilt table test after one nitroglycerin pt became nauseous, diaphoretic, BP dropped systolic to 85 pulse in 46'C with complete loss of consciousness with  slight seizure activity before table brought to horizontal, happened within 8 minutes of nitro tablet utilization, pt. regained consciousness HR and BP  as soon as table was brought to horizontal, lopressor discontinued    Hiatal hernia     Hx of cardiac cath 03/17/1999, 11/19/2008 11/19/2008both the left main and circumflex are without significant disease, RCA is also without significant disease, LV gram shows normal size left ventricular cavity with normal global and regional left ventricular systolic function, no significant CAD, chest pain is probably non cardiac in etiology    Hypertension     Prolonged emergence from general anesthesia     Syncope and collapse     Unspecified sleep apnea     cpap    Wears glasses      Past Surgery History:  Patient  has a past surgical history that includes hernia repair; Diagnostic Cardiac Cath Lab Procedure (03/17/1999,11/19/2008); Tonsillectomy; and back surgery. Social History:    FAM HX: Assessed: family history includes Cancer in his mother; Diabetes in his father and mother; Heart Disease in his father and mother; Other in his mother.   Soc HX:   Social History     Socioeconomic History    Marital status:      Spouse name: None    Number of children: 2    Years of education: None    Highest education level: None   Occupational History    Occupation: / instructor   Social Needs    Financial resource strain: None    Food insecurity Worry: None     Inability: None    Transportation needs     Medical: None     Non-medical: None   Tobacco Use    Smoking status: Former Smoker     Types: Cigarettes     Last attempt to quit: 1988     Years since quittin.6    Smokeless tobacco: Former User     Types: Chew    Tobacco comment: Quit chewing in 2014   Substance and Sexual Activity    Alcohol use: Yes     Comment: moderate,  coffee two cups daily    Drug use: No    Sexual activity: None   Lifestyle    Physical activity     Days per week: None     Minutes per session: None    Stress: None   Relationships    Social connections     Talks on phone: None     Gets together: None     Attends [employer] service: None     Active member of club or organization: None     Attends meetings of clubs or organizations: None     Relationship status: None    Intimate partner violence     Fear of current or ex partner: None     Emotionally abused: None     Physically abused: None     Forced sexual activity: None   Other Topics Concern    None   Social History Narrative    None     TOBACCO:   reports that he quit smoking about 32 years ago. His smoking use included cigarettes. He has quit using smokeless tobacco.  His smokeless tobacco use included chew. ETOH:   reports current alcohol use. Drugs:  reports no history of drug use. Allergies: No Known Allergies  Medications:   Medications:    Infusions:   PRN Meds:   Prior to Admission Meds:  Prior to Admission medications    Medication Sig Start Date End Date Taking?  Authorizing Provider   naproxen (NAPROSYN) 500 MG tablet Take 1 tablet by mouth 2 times daily 18   Shi Rosen DO   naproxen (NAPROSYN) 500 MG tablet Take 1 tablet by mouth 2 times daily 18   Shi Rosen DO   metoprolol tartrate (LOPRESSOR) 50 MG tablet Take 50 mg by mouth daily Pt does not know the strength    Historical Provider, MD   tiZANidine (ZANAFLEX) 2 MG tablet Take 2 mg by mouth 3 times daily wave abnormality no longer evident in Inferior leads     Current Treatment Team:  Treatment Team: Attending Provider: Mary Forte DO; Registered Nurse: Karan Silva RN; Physician Assistant: SHAMA Baez; Consulting Physician: MD Shilpi Guillen APRN-BC [facility]  8/26/2020 3:28 PM      Electronically signed by MÓNICA Lozano CNP on 8/26/2020 at 3:28 PM

## 2020-08-26 NOTE — ED NOTES
19 second run of V-Tach with conversion to sinus rhythm. Pt denies any symptoms. Dejan SESAY notified and gives verbal order for repeat EKG.      Robert Izaguirre RN  08/26/20 1431

## 2020-08-26 NOTE — ED NOTES
Report called to BLAKE Morfin [facility].      Gweneth Hashimoto, RN  08/26/20 2622

## 2020-08-27 VITALS
BODY MASS INDEX: 32.07 KG/M2 | DIASTOLIC BLOOD PRESSURE: 81 MMHG | TEMPERATURE: 97.6 F | HEIGHT: 74 IN | OXYGEN SATURATION: 97 % | WEIGHT: 249.9 LBS | RESPIRATION RATE: 10 BRPM | SYSTOLIC BLOOD PRESSURE: 168 MMHG | HEART RATE: 53 BPM

## 2020-08-27 LAB
EKG ATRIAL RATE: 153 BPM
EKG ATRIAL RATE: 64 BPM
EKG ATRIAL RATE: 90 BPM
EKG DIAGNOSIS: NORMAL
EKG P AXIS: 0 DEGREES
EKG P AXIS: 7 DEGREES
EKG P-R INTERVAL: 172 MS
EKG P-R INTERVAL: 176 MS
EKG Q-T INTERVAL: 282 MS
EKG Q-T INTERVAL: 336 MS
EKG Q-T INTERVAL: 388 MS
EKG QRS DURATION: 68 MS
EKG QRS DURATION: 72 MS
EKG QRS DURATION: 80 MS
EKG QTC CALCULATION (BAZETT): 400 MS
EKG QTC CALCULATION (BAZETT): 411 MS
EKG QTC CALCULATION (BAZETT): 461 MS
EKG R AXIS: 0 DEGREES
EKG R AXIS: 17 DEGREES
EKG R AXIS: 3 DEGREES
EKG T AXIS: 113 DEGREES
EKG T AXIS: 20 DEGREES
EKG T AXIS: 26 DEGREES
EKG VENTRICULAR RATE: 161 BPM
EKG VENTRICULAR RATE: 64 BPM
EKG VENTRICULAR RATE: 90 BPM
ESTIMATED AVERAGE GLUCOSE: 131 MG/DL
HBA1C MFR BLD: 6.2 % (ref 4.2–6.3)

## 2020-08-27 PROCEDURE — 36415 COLL VENOUS BLD VENIPUNCTURE: CPT

## 2020-08-27 PROCEDURE — 97161 PT EVAL LOW COMPLEX 20 MIN: CPT

## 2020-08-27 PROCEDURE — 93010 ELECTROCARDIOGRAM REPORT: CPT | Performed by: INTERNAL MEDICINE

## 2020-08-27 PROCEDURE — 6360000002 HC RX W HCPCS: Performed by: NURSE PRACTITIONER

## 2020-08-27 PROCEDURE — 6370000000 HC RX 637 (ALT 250 FOR IP): Performed by: NURSE PRACTITIONER

## 2020-08-27 PROCEDURE — 94761 N-INVAS EAR/PLS OXIMETRY MLT: CPT

## 2020-08-27 PROCEDURE — 83036 HEMOGLOBIN GLYCOSYLATED A1C: CPT

## 2020-08-27 PROCEDURE — 2580000003 HC RX 258: Performed by: NURSE PRACTITIONER

## 2020-08-27 PROCEDURE — 97165 OT EVAL LOW COMPLEX 30 MIN: CPT

## 2020-08-27 PROCEDURE — 99222 1ST HOSP IP/OBS MODERATE 55: CPT | Performed by: INTERNAL MEDICINE

## 2020-08-27 RX ORDER — ATORVASTATIN CALCIUM 40 MG/1
40 TABLET, FILM COATED ORAL NIGHTLY
Qty: 30 TABLET | Refills: 0 | Status: SHIPPED | OUTPATIENT
Start: 2020-08-27 | End: 2020-10-21 | Stop reason: SDUPTHER

## 2020-08-27 RX ORDER — ASPIRIN 81 MG/1
81 TABLET, CHEWABLE ORAL DAILY
Qty: 30 TABLET | Refills: 0 | Status: SHIPPED | OUTPATIENT
Start: 2020-08-28

## 2020-08-27 RX ADMIN — ASPIRIN 81 MG CHEWABLE TABLET 81 MG: 81 TABLET CHEWABLE at 10:17

## 2020-08-27 RX ADMIN — METOPROLOL TARTRATE 50 MG: 50 TABLET, FILM COATED ORAL at 10:17

## 2020-08-27 RX ADMIN — IBUPROFEN 600 MG: 400 TABLET ORAL at 12:30

## 2020-08-27 RX ADMIN — ENOXAPARIN SODIUM 120 MG: 120 INJECTION SUBCUTANEOUS at 10:18

## 2020-08-27 RX ADMIN — AMLODIPINE BESYLATE 5 MG: 5 TABLET ORAL at 10:17

## 2020-08-27 RX ADMIN — SODIUM CHLORIDE, PRESERVATIVE FREE 10 ML: 5 INJECTION INTRAVENOUS at 10:18

## 2020-08-27 RX ADMIN — PANTOPRAZOLE SODIUM 40 MG: 40 TABLET, DELAYED RELEASE ORAL at 06:00

## 2020-08-27 RX ADMIN — TRAMADOL HYDROCHLORIDE 50 MG: 50 TABLET, FILM COATED ORAL at 06:03

## 2020-08-27 RX ADMIN — CITALOPRAM HYDROBROMIDE 20 MG: 20 TABLET ORAL at 10:17

## 2020-08-27 RX ADMIN — IBUPROFEN 600 MG: 400 TABLET ORAL at 10:17

## 2020-08-27 ASSESSMENT — PAIN SCALES - GENERAL
PAINLEVEL_OUTOF10: 5
PAINLEVEL_OUTOF10: 8
PAINLEVEL_OUTOF10: 8
PAINLEVEL_OUTOF10: 0
PAINLEVEL_OUTOF10: 0

## 2020-08-27 ASSESSMENT — PAIN DESCRIPTION - LOCATION: LOCATION: HEAD

## 2020-08-27 ASSESSMENT — PAIN DESCRIPTION - PAIN TYPE: TYPE: ACUTE PAIN

## 2020-08-27 NOTE — CONSULTS
limitations):  · Observation:  Pt seated in chair upon arrival, agreeable to therapy. · Vision:  Glasses  · Hearing:  [facility]  · Cardiopulmonary:  No O2 needs. · Cognition:  WFL, see OT note for further evaluation. Musculoskeletal  · ROM R/L:  [facility]  · Strength R/L:  5/5, no impairements in function and endurance noted. · Neuro:  [facility]    Mobility:  · Bed Mobility:  DNT  · Sit <> Stand:  MOD I  · Sitting Balance:  GOOD, no LOB noted. · Standing Balance: GOOD, no unsteadiness or LOB noted. · Gait:  ~400' with no AD, good rene, no unsteadiness or LOB noted, no SOB reported. [facility] 6 Clicks Inpatient Mobility:  AM-PAC Inpatient Mobility Raw Score : 24    Safety: Pt left in chair, call light within reach, RN notified, gait belt used. Assessment:  Pt is a 71 yo male admitted from home for new onset atrial fibrillation. At baseline, pt is independent with bed mobility, transfers and mobility in home and community. Pt currently presents with no deficits in strength, balance or activity tolerance and is functioning at baseline level. Pt does not require continued acute PT services, however, pt would benefit from increased activity with nursing to maintain independent level of function. Recommend discharge to home with assist PRN. Complexity:  Low  Prognosis:  Good, no significant barriers to participation at this time.   Plan Times per week: D/C  Equipment:  None    Goals:  Pt Goal: To return home    Recommendations for NURSING mobility:  Up to chair for all meals, standard commode for toileting, ambulate 3x/day    Time:   Time in:  0943  Time out: 0952  Timed treatment minutes: 0  Total time: 9    Electronically signed by:    Kareem Montalvo PT  8/27/2020, 10:47 AM

## 2020-08-27 NOTE — ED PROVIDER NOTES
%      Weight 08/26/20 1242 260 lb (117.9 kg)      Height 08/26/20 1242 6' 2\" (1.88 m)     My pulse ox interpretation is - normal    General appearance:  Patient is awake, alert, oriented. Following commands and answering questions. GCS is 15. Non-toxic in appearance. Skin:  Warm. Dry. Intact. No rashes, petechiae, purpura. Eye:  Pupils are equal, round, reactive. Extraocular movements intact. No nystagmus. No gaze deviation. Funduscopic exam reveals no papilledema. Disc margins are clear. No Ernie sign is evident. Head, ears, nose, mouth and throat:  Head is normocephalic, atraumatic. No external masses or lesions. No nasal drainage. Pharynx is clear, non-erythematous. Airway is patent. Mucous membranes are moist  Neck:  Supple. No nuchal rigidity. Trachea midline. No masses, thyromegaly or lymphadenopathy. No JVD. No carotid thrills or bruits. On examination of the cervical spine, there is no tenderness to palpation the midline. No step-offs or deformities. Extremity:  No clubbing, cyanosis, or edema. No joint swelling. Normal muscle tone. Full range of motion in extremities. Heart: Tachycardic rate and atrial fibrillation rhythm. Audible S1 & S2. No audible murmurs, rubs, or gallops. Perfusion:  Symmetric peripheral pulses. Brisk capillary refill. Respiratory:  Lungs clear to auscultation bilaterally. No rales, rhonchi or wheezes. Respirations nonlabored. Abdominal:  Soft. Nontender. Non distended. Normal bowel sounds. No midline pulsatile abdominal masses, thrills or bruits. Back:  No CVA tenderness to palpation. No midline tenderness to palpation. Neurological:  Alert and oriented times 3. No focal or lateralizing neurological deficits. No facial droop, slurred speech, aphasia, dysphasia. Cranial nerves II through XII are grossly intact. NIH stroke scale score is 0.  Sensation is intact light touch to 2 point discrimination in the C5-T1 dermatomal distribution of bilateral was performed on 8/26/2020 at [id number] demonstrating ventricular rate of 64 bpm in sinus rhythm. Observations are depressions. No signs of acute ischemia, infarct, high degree heart block. Intervals were all within normal limits. EKG is consistent compared to previous. Patient denies any associated symptoms. Results of laboratory and radiographic data along with differential diagnoses and treatment plan were discussed with the patient. Patient presents with acute presyncope. Symptoms concerning for paroxysmal atrial fibrillation with rapid ventricular response that converted to sinus rhythm with vagal pressure. Case also concerning for acute atypical chest pain. Given his symptoms and risk factors, recommend admission to the hospital for further monitoring and treatment as necessary. Patient is agreeable. Case is discussed with cardiologist on-call Dr. Manish Hernandez. Patient's [id number] score is at least 2. Dr. Manish Hernandez recommends dose of Lovenox. Recommends continuing home dose of lopressor, no recommendations for IV rate control measures as patient is currently in sinus rhythm. Dr. Manish Hernandez is agreeable with consultation upon admission. Patient's last echocardiogram was in 2015 at which time he had left ventricular ejection fraction 55%. Last cardiac cath with interpretation was in 2008 which was essentially unremarkable. Case is discussed with admitting hospitalist who is agreeable to treatment plan accepts admission. Patient is updated at bedside. All questions were answered. 324 mg chewable aspirin is given. Patient is admitted to the hospital in hemodynamically stable condition. Clinical Impressions:  1. Paroxysmal atrial fibrillation (HCC)    2. Pre-syncope    3. Chest pain, unspecified type    4. Neck pain        Procedures:  Vagal conversion of atrial fibrillation with rapid ventricular response to sinus rhythm. Critical Care Note:  Total critical care time provided today was 32 minutes. This excludes seperately billable procedures and family discussion time. Critical care time provided for obtaining history, conducting a physical exam, performing and monitoring interventions, ordering, collecting and interpreting tests, and establishing medical decision-making. There was a potential for life/limb threatening pathology requiring close evaluation and intervention with concern for patient decompensation. All diagnostic, treatment, and disposition decisions were made by myself in conjunction with the advanced practice provider. For all further details of the patient's emergency department visit, please see the advanced practice provider's documentation. Comment: Please note this report has been produced using speech recognition software and may contain errors related to that system including errors in grammar, punctuation, and spelling, as well as words and phrases that may be inappropriate. If there are any questions or concerns please feel free to contact the dictating provider for clarification.      Humaira Colon DO  08/27/20 2101

## 2020-08-27 NOTE — PROGRESS NOTES
Christiano Gold is a 70 y.o. male patient feels baseline    Current Facility-Administered Medications   Medication Dose Route Frequency Provider Last Rate Last Dose    amLODIPine (NORVASC) tablet 5 mg  5 mg Oral Daily MÓNICA Angulo CNP   5 mg at 08/26/20 1807    citalopram (CELEXA) tablet 20 mg  20 mg Oral Daily MÓNICA Anglin - EDGAR        metoprolol tartrate (LOPRESSOR) tablet 50 mg  50 mg Oral Daily MÓNICA Anglin - CNP        traMADol (ULTRAM) tablet 50 mg  50 mg Oral Q4H PRN MÓNICA Angulo - CNP   50 mg at 08/27/20 0603    enoxaparin (LOVENOX) injection 120 mg  1 mg/kg Subcutaneous BID MÓNICA Angulo CNP   120 mg at 08/26/20 2003    sodium chloride flush 0.9 % injection 10 mL  10 mL Intravenous 2 times per day MÓNICA Angulo CNP   10 mL at 08/26/20 2003    sodium chloride flush 0.9 % injection 10 mL  10 mL Intravenous PRN Xiomy Bobo APRN - CNP        acetaminophen (TYLENOL) tablet 650 mg  650 mg Oral Q6H PRN MÓNICA Angulo - CNP        Or    acetaminophen (TYLENOL) suppository 650 mg  650 mg Rectal Q6H PRN MÓNICA Angulo - CNP        polyethylene glycol (GLYCOLAX) packet 17 g  17 g Oral Daily PRN MÓNICA Angulo - EDGAR        promethazine (PHENERGAN) tablet 12.5 mg  12.5 mg Oral Q6H PRN MÓNICA Angulo CNP        Or    ondansetron (ZOFRAN) injection 4 mg  4 mg Intravenous Q6H PRN Xiomy Bobo APRN - CNP        atorvastatin (LIPITOR) tablet 40 mg  40 mg Oral Nightly MÓNICA Angulo - CNP   40 mg at 08/26/20 2003    aspirin chewable tablet 81 mg  81 mg Oral Daily Lexii Salinas APRN - EDGAR        pantoprazole (PROTONIX) tablet 40 mg  40 mg Oral QAM AC MÓNICA Anglin - CNP   40 mg at 08/27/20 0600    nitroGLYCERIN (NITROSTAT) SL tablet 0.4 mg  0.4 mg Sublingual Q5 Min PRN Xiomy King, APRN - CNP        hydrALAZINE (APRESOLINE) injection 10 mg  10 mg Intravenous Q6H PRN Xiomy Vanegaser, APRN - CNP        ibuprofen (ADVIL;MOTRIN) tablet 600 mg  600 mg Oral TID  Lexii Salinas, APRN - CNP   600 mg at 08/26/20 1807     No Known Allergies  Principal Problem:    New onset atrial fibrillation (HCC)  Active Problems:    Postural dizziness with near syncope    Acute neck pain  Resolved Problems:    * No resolved hospital problems. *    Blood pressure (!) 158/84, pulse 73, temperature 98.1 °F (36.7 °C), temperature source Oral, resp. rate 18, height 6' 2\" (1.88 m), weight 249 lb 14.4 oz (113.4 kg), SpO2 100 %. Subjective:  Symptoms:  Stable. Diet:  Adequate intake. Pain:  He reports no pain. Objective:  General Appearance:  Comfortable. Vital signs: (most recent): Blood pressure (!) 168/81, pulse 53, temperature 97.6 °F (36.4 °C), temperature source Oral, resp. rate 10, height 6' 2\" (1.88 m), weight 249 lb 14.4 oz (113.4 kg), SpO2 97 %. Vital signs are normal.    HEENT: Normal HEENT exam.    Lungs:  Normal effort. Heart: Bradycardia. Abdomen: Abdomen is soft. Extremities: Normal range of motion. Neurological: Patient is alert. Pupils:  Pupils are equal, round, and reactive to light. Skin:  Warm.       Assessment & Plan  Afib with RVR now in sinus  -echo 55%  -CXR neg  -UDS neg  -TSH wnl  Orthostatic vasovagal  -carotid doppler neg  Hyperglycemia  -check HbA1c 6.2 and dietary management  HTN  -CCB, BB  Morbid obesity  COPD  THOMAS        Discharge today and add Renetta Ontiveros MD  8/27/2020

## 2020-08-27 NOTE — CONSULTS
CARDIOLOGY CONSULT NOTE   Reason for consultation: afib    Referring physician:  Denna Merlin, MD     Primary care physician: Jazlyn Somers MD      Dear Dr. Angelia Worley  Thanks for the consult. History of present illness:Vaibhav is a 70 y. o.year old who  presents with dizziness, nausea and sweating and found to have afib RVR. HE HAD NORMAL LHC 5 yrs ago,he was on lopressor and florinef for syncope, he has not seen cardiology for a long time. He did not have palpitation or chest pain,. Patient felt dizziness, its intermittent,lasts for few seconds,for one day, its not associated with palpitations,it gets better with sitting,pateint is not sure if its from head movement or arm movement    PATIENT WAS IN AFIB IN ED AND THEN CONVERTED TO SINUS  Chief Complaint   Patient presents with    Neck Pain    Fatigue    Nausea    Sweats     Blood pressure, cholesterol, blood glucose and weight are well controlled. Past medical history:    has a past medical history of Abnormal MRI, spine, Arthritis, Gastritis, H/O 24 hour EKG monitoring, H/O cardiovascular stress test, H/O complete electrocardiogram, H/O CT scan of chest, H/O Doppler ultrasound, H/O Doppler ultrasound, H/O echocardiogram, H/O tilt table evaluation, Hiatal hernia, Hx of cardiac cath, Hypertension, Prolonged emergence from general anesthesia, Syncope and collapse, Unspecified sleep apnea, and Wears glasses. Past surgical history:   has a past surgical history that includes hernia repair; Diagnostic Cardiac Cath Lab Procedure (03/17/1999,11/19/2008); Tonsillectomy; and back surgery. Social History:   reports that he quit smoking about 32 years ago. His smoking use included cigarettes. He has quit using smokeless tobacco.  His smokeless tobacco use included chew. He reports current alcohol use. He reports that he does not use drugs.   Family history:   no family history of CAD, STROKE of DM    No Known Allergies    amLODIPine (NORVASC) tablet 5 mg, Daily  citalopram (CELEXA) tablet 20 mg, Daily  metoprolol tartrate (LOPRESSOR) tablet 50 mg, Daily  traMADol (ULTRAM) tablet 50 mg, Q4H PRN  enoxaparin (LOVENOX) injection 120 mg, BID  sodium chloride flush 0.9 % injection 10 mL, 2 times per day  sodium chloride flush 0.9 % injection 10 mL, PRN  acetaminophen (TYLENOL) tablet 650 mg, Q6H PRN    Or  acetaminophen (TYLENOL) suppository 650 mg, Q6H PRN  polyethylene glycol (GLYCOLAX) packet 17 g, Daily PRN  promethazine (PHENERGAN) tablet 12.5 mg, Q6H PRN    Or  ondansetron (ZOFRAN) injection 4 mg, Q6H PRN  atorvastatin (LIPITOR) tablet 40 mg, Nightly  aspirin chewable tablet 81 mg, Daily  pantoprazole (PROTONIX) tablet 40 mg, QAM AC  nitroGLYCERIN (NITROSTAT) SL tablet 0.4 mg, Q5 Min PRN  hydrALAZINE (APRESOLINE) injection 10 mg, Q6H PRN  ibuprofen (ADVIL;MOTRIN) tablet 600 mg, TID WC      Current Facility-Administered Medications   Medication Dose Route Frequency Provider Last Rate Last Dose    amLODIPine (NORVASC) tablet 5 mg  5 mg Oral Daily Juju Jacklyn, APRN - CNP   5 mg at 08/26/20 1807    citalopram (CELEXA) tablet 20 mg  20 mg Oral Daily Lexii Salinas APRN - CNP        metoprolol tartrate (LOPRESSOR) tablet 50 mg  50 mg Oral Daily MÓNICA Anglin - CNP        traMADol (ULTRAM) tablet 50 mg  50 mg Oral Q4H PRN Juju Bounds, APRN - CNP   50 mg at 08/27/20 0603    enoxaparin (LOVENOX) injection 120 mg  1 mg/kg Subcutaneous BID Juju Bounds, APRN - CNP   120 mg at 08/26/20 2003    sodium chloride flush 0.9 % injection 10 mL  10 mL Intravenous 2 times per day Juju Bounds, APRN - CNP   10 mL at 08/26/20 2003    sodium chloride flush 0.9 % injection 10 mL  10 mL Intravenous PRN Juju Bounds, APRN - CNP        acetaminophen (TYLENOL) tablet 650 mg  650 mg Oral Q6H PRN Juju Bounds, APRN - CNP        Or    acetaminophen (TYLENOL) suppository 650 mg  650 mg Rectal Q6H PRN Juju Bounds, APRN - CNP        polyethylene glycol (GLYCOLAX) packet 17 g  17 g Oral Daily PRN Annetta Rahul, APRN - CNP        promethazine (PHENERGAN) tablet 12.5 mg  12.5 mg Oral Q6H PRN Annetta Kay, APRN - CNP        Or    ondansetron (ZOFRAN) injection 4 mg  4 mg Intravenous Q6H PRN Annetta Kay, APRN - CNP        atorvastatin (LIPITOR) tablet 40 mg  40 mg Oral Nightly Annetta Rahul, APRN - CNP   40 mg at 08/26/20 2003    aspirin chewable tablet 81 mg  81 mg Oral Daily Lexii Salinas, APRN - CNP        pantoprazole (PROTONIX) tablet 40 mg  40 mg Oral QAM AC Lexii Salinas, APRN - CNP   40 mg at 08/27/20 0600    nitroGLYCERIN (NITROSTAT) SL tablet 0.4 mg  0.4 mg Sublingual Q5 Min PRN Annetta Kay, APRN - CNP        hydrALAZINE (APRESOLINE) injection 10 mg  10 mg Intravenous Q6H PRN Lexii Salinas, APRN - CNP        ibuprofen (ADVIL;MOTRIN) tablet 600 mg  600 mg Oral TID WC Lexii Salinas, APRN - CNP   600 mg at 08/26/20 1807     Review of Systems:   · Constitutional: No Fever or Weight Loss   · Eyes: No Decreased Vision  · ENT: No Headaches, Hearing Loss or Vertigo  · Cardiovascular: No chest pain, dyspnea on exertion, palpitations or loss of consciousness  · Respiratory: No cough or wheezing    · Gastrointestinal: No abdominal pain, appetite loss, blood in stools, constipation, diarrhea or heartburn  · Genitourinary: No dysuria, trouble voiding, or hematuria  · Musculoskeletal:  No gait disturbance, weakness or joint complaints  · Integumentary: No rash or pruritis  · Neurological: No TIA or stroke symptoms  · Psychiatric: No anxiety or depression  · Endocrine: No malaise, fatigue or temperature intolerance  · Hematologic/Lymphatic: No bleeding problems, blood clots or swollen lymph nodes  · Allergic/Immunologic: No nasal congestion or hives  All systems negative except as marked.      ·   ·      Physical Examination:    Vitals:    08/27/20 0426   BP: (!) 158/84   Pulse: 73   Resp: 18   Temp: 98.1 °F (36.7 °C)   SpO2:       Wt Readings from Last 3 Encounters:   08/27/20 249 lb 14.4 oz (113.4 kg)   11/22/18 265 lb (120.2 kg)   06/22/17 260 lb (117.9 kg)     Body mass index is 32.09 kg/m². General Appearance:  No distress, conversant    Constitutional:  Well developed, Well nourished, No acute distress, Non-toxic appearance. HENT:  Normocephalic, Atraumatic, Bilateral external ears normal, Oropharynx moist, No oral exudates, Nose normal. Neck- Normal range of motion, No tenderness, Supple, No stridor,no apical-carotid delay, no carotid bruit  Eyes:  PERRL, EOMI, Conjunctiva normal, No discharge. Respiratory:  Normal breath sounds, No respiratory distress, No wheezing, No chest tenderness. ,no use of accessory muscles, diaphragm movement is normal  Cardiovascular: (PMI) apex non displaced,no lifts no thrills, no s3,no s4, Normal heart rate, Normal rhythm, No murmurs, No rubs, No gallops. Carotid arteries pulse and amplitude are normal no bruit, no abdominal bruit noted ( normal abdominal aorta ausculation), femoral arteries pulse and amplitude are normal no bruit, pedal pulses are normal  GI:  Bowel sounds normal, Soft, No tenderness, No masses, No pulsatile masses, no hepatosplenomegally, no bruits  : External genitalia appear normal, No masses or lesions. No discharge. No CVA tenderness. Musculoskeletal:  Intact distal pulses, No edema, No tenderness, No cyanosis, No clubbing. Good range of motion in all major joints. No tenderness to palpation or major deformities noted. Back- No tenderness. Integument:  Warm, Dry, No erythema, No rash. Skin: no rash, no ulcers  Lymphatic:  No lymphadenopathy noted. Neurologic:  Alert & oriented x 3, Normal motor function, Normal sensory function, No focal deficits noted.    Psychiatric:  Affect normal, Judgment normal, Mood normal.   Lab Review   Recent Labs     08/26/20 1942   WBC 6.3   HGB 12.8*   HCT 39.4*         Recent Labs     08/26/20 1942      K 3.7      CO2 23   BUN 19 CREATININE 0.9     Recent Labs     08/26/20  1300   AST 28   ALT 29   BILITOT 0.4   ALKPHOS 80     No results for input(s): TROPONINI in the last 72 hours. Lab Results   Component Value Date    BNP 15 09/06/2011     Lab Results   Component Value Date    INR 0.98 08/26/2020    PROTIME 11.9 08/26/2020         EKG:afibm and then sinus    Chest Xray:    ECHO:nromal lVEF  Labs, echo, meds reviewed  Assessment: 70 y. o.year old with PMH of  has a past medical history of Abnormal MRI, spine, Arthritis, Gastritis, H/O 24 hour EKG monitoring, H/O cardiovascular stress test, H/O complete electrocardiogram, H/O CT scan of chest, H/O Doppler ultrasound, H/O Doppler ultrasound, H/O echocardiogram, H/O tilt table evaluation, Hiatal hernia, Hx of cardiac cath, Hypertension, Prolonged emergence from general anesthesia, Syncope and collapse, Unspecified sleep apnea, and Wears glasses. Recommendations:    1. Dizziness and sweating with afib RVR, NOW IN SINUS, echo is normal LVEDF, HAS dilated LA, CHADs vasc score is 2, will add xarelto and continue lopressor  2. HTN: not controlled, continue lopressor and stop florinef  3. H/o vasovagal syncope, now hypertensive, will d/c florinef for now  All labs, medications and tests reviewed, continue all other medications of all above medical condition listed as is.          Efrain Flores MD, 8/27/2020 7:26 AM

## 2020-08-27 NOTE — DISCHARGE SUMMARY
Physician Discharge Summary     Patient ID:  Nicho Prasad  [id number]  75 y.o.  1945    Admit date: 8/26/2020    Discharge date and time: No discharge date for patient encounter.      Admitting Physician: Fadumo Reza MD     Discharge Physician: ***    Admission Diagnoses: Neck pain [M54.2]  Paroxysmal atrial fibrillation ([address] [I48.0]  Pre-syncope [R55]  Atrial fibrillation with RVR ([address] [I48.91]  Chest pain, unspecified type [R07.9]    Discharge Diagnoses: ***    Admission Condition: {condition:32539}    Discharged Condition: {condition:61663}    Indication for Admission: ***    Hospital Course: ***    Consults: {consultation:77068}    Significant Diagnostic Studies: {diagnostics:42371}    Outstanding Order Results     Date and Time Order Name Status Description    8/26/2020 2214 VL DUP CAROTID BILATERAL Preliminary     8/26/2020 1457 EKG 12 Lead Preliminary     8/26/2020 1300 EKG 12 Lead Preliminary     8/26/2020 1239 EKG 12 Lead Preliminary           Treatments: {Tx:06195}    Discharge Exam:  {exam; complete normal and system select:19832}    Disposition: {disposition:57765}    Patient Instructions:      Medication List      START taking these medications    aspirin 81 MG chewable tablet  Take 1 tablet by mouth daily  Start taking on:  August 28, 2020     atorvastatin 40 MG tablet  Commonly known as:  LIPITOR  Take 1 tablet by mouth nightly     rivaroxaban 20 MG Tabs tablet  Commonly known as:  Xarelto  Take 1 tablet by mouth daily (with breakfast)        CONTINUE taking these medications    amLODIPine 5 MG tablet  Commonly known as:  NORVASC  Take 1 tablet by mouth daily     citalopram 20 MG tablet  Commonly known as:  CELEXA     metoprolol tartrate 50 MG tablet  Commonly known as:  LOPRESSOR     omeprazole 20 MG delayed release capsule  Commonly known as:  PRILOSEC     traMADol 50 MG tablet  Commonly known as:  ULTRAM        STOP taking these medications    diclofenac 75 MG EC tablet  Commonly known as:  VOLTAREN     fludrocortisone 0.1 MG tablet  Commonly known as:  FLORINEF     HYDROcodone-acetaminophen 5-325 MG per tablet  Commonly known as:  NORCO     naproxen 500 MG tablet  Commonly known as:  Naprosyn     tiZANidine 2 MG tablet  Commonly known as:  Sara Armenta           Where to Get Your Medications      You can get these medications from any pharmacy    Bring a paper prescription for each of these medications  · aspirin 81 MG chewable tablet  · atorvastatin 40 MG tablet  · rivaroxaban 20 MG Tabs tablet         Activity: {discharge activity:54621}  Diet: {diet:46417}  Wound Care: {wound care:21661}    Follow-up with *** in {0-10:97535} {units:11}.   Discharge time 30min  Signed:  Paty Rubalcava  8/27/2020  12:59 PM

## 2020-08-29 NOTE — DISCHARGE SUMMARY
Physician Discharge Summary     Patient ID:  Bradley Coreas  [id number]  53 y.o.  1945    Admit date: 8/26/2020    Discharge date and time: 8/27/2020  2:01 PM     Admitting Physician: Mariya Boogie MD     Discharge Physician: Miriam Reynolds    Admission Diagnoses: Neck pain [M54.2]  Paroxysmal atrial fibrillation ([address] [I48.0]  Pre-syncope [R55]  Atrial fibrillation with RVR ([address] [I48.91]  Chest pain, unspecified type [R07.9]    Discharge Diagnoses: Afib with RVR converted to SR with no intervetion                                         Vasovagal orthostasis                                         Hyperglycemia                                          HTN                                         OBesity                                          THOMAS    Admission Condition: good    Discharged Condition: good    Indication for Admission: Afib with RVR    Hospital Course:   70 y.o. morbidly obese male with past medical history of gastritis, hiatal hernia, COPD, THOMAS on CPAP, hypertension, arthritis, and depression presented to the ED with complaints of neck pain, fatigue, nausea without vomiting, and sweats. Patient reports that he was outside at the [employer] doing some measuring in the ground when he suddenly felt very dizzy worse with positioning followed by pain at the back of his neck, sweat, and nausea without vomiting. He denied history of vertigo, lightheadedness, and spinning sensation. Denied passing out and stated that he sat down the moment he felt dizzy. Patient denies chest pain, palpitation, fever, chills, cough, and SOB or difficulty breathing. Denies any other symptoms including headache, paresthesias, abdominal pain, diarrhea, constipation, dysuria, hematuria, frequency or urgency, and B/L weakness. He was admitted for Afib with RVR and then converted spontaneously to SR . He was continued on BB, CC and xarelto added. He had orthostasis and vasovagl and carotid doppler were negative.  Cardiology stopped florinef as had stable blood pressure. However did discuss with pt extensively prior to discharge if his BP drops < 100 or gets dizzy then to restart florinef and he understood and then to call PCP or cardiology. .   Consults: cardiology        Outstanding Order Results     No orders found from 7/28/2020 to 8/27/2020. Discharge Exam:  Vital signs: (most recent): Blood pressure (!) 168/81, pulse 53, temperature 97.6 °F (36.4 °C), temperature source Oral, resp. rate 10, height 6' 2\" (1.88 m), weight 249 lb 14.4 oz (113.4 kg), SpO2 97 %. Vital signs are normal.    HEENT: Normal HEENT exam.    Lungs:  Normal effort. Heart: Bradycardia. Abdomen: Abdomen is soft. Extremities: Normal range of motion. Neurological: Patient is alert. Pupils:  Pupils are equal, round, and reactive to light. Skin:  Warm.       Disposition: home    Patient Instructions:      Medication List      START taking these medications    aspirin 81 MG chewable tablet  Take 1 tablet by mouth daily     atorvastatin 40 MG tablet  Commonly known as:  LIPITOR  Take 1 tablet by mouth nightly     rivaroxaban 20 MG Tabs tablet  Commonly known as:  Xarelto  Take 1 tablet by mouth daily (with breakfast)        CONTINUE taking these medications    amLODIPine 5 MG tablet  Commonly known as:  NORVASC  Take 1 tablet by mouth daily     citalopram 20 MG tablet  Commonly known as:  CELEXA     metoprolol tartrate 50 MG tablet  Commonly known as:  LOPRESSOR     omeprazole 20 MG delayed release capsule  Commonly known as:  PRILOSEC     traMADol 50 MG tablet  Commonly known as:  ULTRAM        STOP taking these medications    diclofenac 75 MG EC tablet  Commonly known as:  VOLTAREN     fludrocortisone 0.1 MG tablet  Commonly known as:  FLORINEF     HYDROcodone-acetaminophen 5-325 MG per tablet  Commonly known as:  NORCO     naproxen 500 MG tablet  Commonly known as:  Naprosyn     tiZANidine 2 MG tablet  Commonly known as:  Nanette Manrique Where to Get Your Medications      You can get these medications from any pharmacy    Bring a paper prescription for each of these medications  · aspirin 81 MG chewable tablet  · atorvastatin 40 MG tablet  · rivaroxaban 20 MG Tabs tablet         Activity: activity as tolerated  Diet: cardiac diet  Wound Care: none needed    Follow-up with PCP  Discharge time 30min.     Ene Hope  8/29/2020  3:53 PM

## 2020-09-01 ENCOUNTER — NURSE ONLY (OUTPATIENT)
Dept: CARDIOLOGY CLINIC | Age: 75
End: 2020-09-01
Payer: MEDICARE

## 2020-09-01 ENCOUNTER — OFFICE VISIT (OUTPATIENT)
Dept: CARDIOLOGY CLINIC | Age: 75
End: 2020-09-01
Payer: MEDICARE

## 2020-09-01 VITALS
SYSTOLIC BLOOD PRESSURE: 134 MMHG | WEIGHT: 257.2 LBS | HEART RATE: 72 BPM | BODY MASS INDEX: 33.01 KG/M2 | DIASTOLIC BLOOD PRESSURE: 70 MMHG | HEIGHT: 74 IN

## 2020-09-01 PROCEDURE — 1036F TOBACCO NON-USER: CPT | Performed by: NURSE PRACTITIONER

## 2020-09-01 PROCEDURE — 3017F COLORECTAL CA SCREEN DOC REV: CPT | Performed by: NURSE PRACTITIONER

## 2020-09-01 PROCEDURE — G8427 DOCREV CUR MEDS BY ELIG CLIN: HCPCS | Performed by: NURSE PRACTITIONER

## 2020-09-01 PROCEDURE — G8417 CALC BMI ABV UP PARAM F/U: HCPCS | Performed by: NURSE PRACTITIONER

## 2020-09-01 PROCEDURE — 1111F DSCHRG MED/CURRENT MED MERGE: CPT | Performed by: NURSE PRACTITIONER

## 2020-09-01 PROCEDURE — 4040F PNEUMOC VAC/ADMIN/RCVD: CPT | Performed by: NURSE PRACTITIONER

## 2020-09-01 PROCEDURE — 93000 ELECTROCARDIOGRAM COMPLETE: CPT | Performed by: NURSE PRACTITIONER

## 2020-09-01 PROCEDURE — 99215 OFFICE O/P EST HI 40 MIN: CPT | Performed by: NURSE PRACTITIONER

## 2020-09-01 PROCEDURE — 1123F ACP DISCUSS/DSCN MKR DOCD: CPT | Performed by: NURSE PRACTITIONER

## 2020-09-01 RX ORDER — TAMSULOSIN HYDROCHLORIDE 0.4 MG/1
1 CAPSULE ORAL 2 TIMES DAILY
COMMUNITY
Start: 2020-08-11

## 2020-09-01 ASSESSMENT — ENCOUNTER SYMPTOMS
CONSTIPATION: 0
SHORTNESS OF BREATH: 0
COUGH: 0
ABDOMINAL PAIN: 0
DIARRHEA: 0
BLOOD IN STOOL: 0
VOMITING: 0
NAUSEA: 0
PHOTOPHOBIA: 0
SINUS PAIN: 0
COLOR CHANGE: 0

## 2020-09-01 NOTE — PROGRESS NOTES
[id number] Score for Atrial Fibrillation Stroke Risk   Risk   Factors  Component Value   C CHF No 0   H HTN No 0   A2 Age >= 76 No,  (80 y.o.) 0   D DM No 0   S2 Prior Stroke/TIA No 0   V Vascular Disease No 0   A Age 74-69 Yes,  (80 y.o.) 1   Sc Sex male 0    [id number]  Score  1   Score last updated 9/1/20 4:11 PM EDT    Click here for a link to the UpToDate guideline \"Atrial Fibrillation: Anticoagulation therapy to prevent embolization    Disclaimer: Risk Score calculation is dependent on accuracy of patient problem list and past encounter diagnosis.

## 2020-09-01 NOTE — PROGRESS NOTES
Applied @ 3:00 pm, 14 day extended holter w/monitor# [id number]  for Dx of irregular heart rate. Educated pt on proper holter usage; how to keep sx diary; & when to bring monitor back to office. Pt voiced understanding. Holter order,including monitor # time started, to front nurse's station in 's in-box.

## 2020-09-01 NOTE — LETTER
Oral Forte  1945  [id number]    Have you had any Chest Pain - No       Have you had any Shortness of Breath - Yes  If Yes - When at rest and on exertion      Have you had any dizziness - Yes  If Yes DO ORTHOSTATIC BP - when do you feel dizzy with position change   How long does it last seconds    Have you had any palpitations - No     Is the patient on any of the following medications - NONE  If Yes DO EKG    Do you have any edema - swelling in NONE    If Yes - CHECK TO SEE IF THE EDEMA IS PITTING      Do you have a surgery or procedure scheduled in the near future - No  If Yes- DO EKG      Ask patient [facility] if [facility] are not [facility]    Check to see if we have an E-MAIL on file for the patient    Check medication list thoroughly!!!  BE SURE TO ASK PATIENT IF THEY NEED MEDICATION REFILLS

## 2020-09-02 ENCOUNTER — TELEPHONE (OUTPATIENT)
Dept: CARDIOLOGY CLINIC | Age: 75
End: 2020-09-02

## 2020-09-02 NOTE — PROGRESS NOTES
[address]  Phone: [phone]    Fax [phone]                  Neeraj Naranjo MD, Serjio Davis MD, [address], MD, MD Clark Santos MD Moise Favorite, MD Marlynn Has, MÓNICA Verdin, MÓNICA Britton, MÓNICA Bailey, APRN    CARDIOLOGY  NOTE      9/1/2020    RE: Bianca Frazier  ([birth year])                               TO:  Dr. Conor Darling MD  The primary cardiologist is Dr. Stanford Dao    CC:   1. Paroxysmal atrial fibrillation [facility])      Patient denies all of the following:  Chest Pain  Palpitations  Shortness of Breath  Edema  Dizziness  Syncope      HPI: Thank you for involving me in taking care of your patient Bianca Frazier, who is a  70y.o. year old male with a history as listed above. Patient is  active and  does exercises regularly. Patient is  compliant with his medications. Patient is here to follow up on his hospitalization where he was found to be in atrial fibrillation RVR. He does not think his HR has been high since being in the ED. HE does state that he is extremely fatigued.      Vitals:    09/01/20 1413   BP: 134/70   Pulse: 72       Current Outpatient Medications   Medication Sig Dispense Refill    Lactobacillus (PROBIOTIC ACIDOPHILUS PO) Take by mouth daily      tamsulosin (FLOMAX) 0.4 MG capsule 1 tablet 2 times daily      apixaban (ELIQUIS) 5 MG TABS tablet Take 1 tablet by mouth 2 times daily 60 tablet 1    aspirin 81 MG chewable tablet Take 1 tablet by mouth daily 30 tablet 0    atorvastatin (LIPITOR) 40 MG tablet Take 1 tablet by mouth nightly 30 tablet 0    omeprazole (PRILOSEC) 20 MG delayed release capsule Take 20 mg by mouth daily      metoprolol tartrate (LOPRESSOR) 50 MG tablet Take 25 mg by mouth daily Pt does not know the strength       citalopram (CELEXA) 20 MG tablet 10 mg       traMADol (ULTRAM) 50 MG tablet        No abnormalities, left ventricle shows  moderate concentric hypertrophy and signs of diastolic dysfunction, mildly dilated atrium,, aortic valve is sclerotic but nonstenotic, trace mitral and tricuspid  regurg    H/O tilt table evaluation 2001 head up tilt table test after one nitroglycerin pt became nauseous, diaphoretic, BP dropped systolic to 85 pulse in 73'Z with complete loss of consciousness with  slight seizure activity before table brought to horizontal, happened within 8 minutes of nitro tablet utilization, pt. regained consciousness HR and BP  as soon as table was brought to horizontal, lopressor discontinued    Hiatal hernia     Hx of cardiac cath 1999, 2008oth the left main and circumflex are without significant disease, RCA is also without significant disease, LV gram shows normal size left ventricular cavity with normal global and regional left ventricular systolic function, no significant CAD, chest pain is probably non cardiac in etiology    Hypertension     Prolonged emergence from general anesthesia     Syncope and collapse     Unspecified sleep apnea     cpap    Wears glasses      Past Surgical History:   Procedure Laterality Date    BACK SURGERY      DIAGNOSTIC CARDIAC CATH LAB PROCEDURE  1999,2008, left main,circumflex, and RCA are without any significant disease, LV gram shows normal size left ventricular cavity with normal global and  regional left ventricular systolic function, pt has no significant CAD.     HERNIA REPAIR      TONSILLECTOMY       Family History   Problem Relation Age of Onset    Diabetes Mother     Heart Disease Mother     Other Mother         kidney stones    Cancer Mother         breast    Heart Disease Father     Diabetes Father      Social History     Tobacco Use    Smoking status: Former Smoker     Types: Cigarettes     Last attempt to quit: 1988     Years since quittin.6    Smokeless tobacco: Former User     Types: Chew    Tobacco comment: Quit chewing in Jan 2014   Substance Use Topics    Alcohol use: Yes     Comment: moderate,  coffee two cups daily        Review of Systems   Constitutional: Positive for fatigue. Negative for fever. HENT: Negative for ear pain and sinus pain. Eyes: Negative for photophobia and visual disturbance. Respiratory: Negative for cough and shortness of breath. Cardiovascular: Positive for palpitations. Negative for chest pain and leg swelling. Gastrointestinal: Negative for abdominal pain, blood in stool, constipation, diarrhea, nausea and vomiting. Endocrine: Negative for polyphagia and polyuria. Genitourinary: Negative for decreased urine volume and difficulty urinating. Musculoskeletal: Negative for arthralgias and gait problem. Skin: Negative for color change and wound. Neurological: Positive for dizziness. Negative for syncope, weakness, light-headedness and headaches. Psychiatric/Behavioral: Negative for agitation. The patient is not hyperactive. Objective:      Physical Exam:  /70 (Site: Right Upper Arm, Position: Sitting, Cuff Size: Large Adult)   Pulse 72   Ht 6' 2\" (1.88 m)   Wt 257 lb 3.2 oz (116.7 kg)   BMI 33.02 kg/m²   Wt Readings from Last 3 Encounters:   09/01/20 257 lb 3.2 oz (116.7 kg)   08/27/20 249 lb 14.4 oz (113.4 kg)   11/22/18 265 lb (120.2 kg)     Body mass index is 33.02 kg/m². Physical Exam  Vitals signs reviewed. Constitutional:       General: He is not in acute distress. Appearance: Normal appearance. He is obese. He is not ill-appearing. HENT:      Head: Normocephalic and atraumatic. Eyes:      Conjunctiva/sclera: Conjunctivae normal.      Pupils: Pupils are equal, round, and reactive to light. Neck:      Musculoskeletal: Neck supple. No muscular tenderness. Vascular: No carotid bruit. Cardiovascular:      Rate and Rhythm: Normal rate and regular rhythm. mother was on it. Eliquis samples given and prescription sent to pharmacy  Discussed with patient PAF and the need to determine how frequently he is atrial fibrillation and if his HR is elevated frequently requiring additional medication. Patient agreeable to wear extended holter monitor. Patient seen, interviewed and examined. Testing was reviewed. Patient is encouraged to exercise even a brisk walk for 30 minutes at least 3 to 4 times a week. Lifestyle and risk factor modificatons discussed. Various goals are discussed and questions answered. Continue current medications. Appropriate prescriptions are addressed. Questions answered and patient verbalizes understanding. Call for any problems, questions, or concerns.     Pt is to follow up in 1 months for Cardiac management    I have spent 45 minutes of face to face time with the patient with more than 50% spent counseling and coordinating care for atrial fibrillation, stroke prevention, and medication adherence    Electronically signed by MÓNICA Ruggiero CNP on 9/1/2020 at 10:56 PM

## 2020-09-02 NOTE — ASSESSMENT & PLAN NOTE
Patient was in the hospital for a few hours in atrial fibrillation RVR then converted back to sinus rhythm without treatment. Patient has been having dizzy spells over the last few years occasionally that he typically rests and has a snack and the dizziness goes away. Patient thought maybe his blood sugar was culprit of dizziness. Reviewed states that he has not been taking his xarelto due to cost of the medication. Encouraged patient to call the office if he has issues with his medication costs as we might be able to assist with paperwork to get medications more affordable. Patient would like to take Eliquis as he is more familiar with that medication as his mother was on it. Eliquis samples given and prescription sent to pharmacy  Discussed with patient PAF and the need to determine how frequently he is atrial fibrillation and if his HR is elevated frequently requiring additional medication. Patient agreeable to wear extended holter monitor.

## 2020-09-10 ENCOUNTER — TELEPHONE (OUTPATIENT)
Dept: CARDIOLOGY CLINIC | Age: 75
End: 2020-09-10

## 2020-09-23 PROCEDURE — 93228 REMOTE 30 DAY ECG REV/REPORT: CPT | Performed by: INTERNAL MEDICINE

## 2020-09-30 ENCOUNTER — TELEPHONE (OUTPATIENT)
Dept: CARDIOLOGY CLINIC | Age: 75
End: 2020-09-30

## 2020-09-30 ENCOUNTER — HOSPITAL ENCOUNTER (OUTPATIENT)
Age: 75
Discharge: HOME | End: 2020-09-30
Payer: MEDICARE

## 2020-09-30 ENCOUNTER — PROCEDURE VISIT (OUTPATIENT)
Dept: CARDIOLOGY CLINIC | Age: 75
End: 2020-09-30
Payer: MEDICARE

## 2020-09-30 ENCOUNTER — HOSPITAL ENCOUNTER (OUTPATIENT)
Age: 75
Setting detail: SPECIMEN
Discharge: HOME | End: 2020-09-30
Payer: MEDICARE

## 2020-09-30 ENCOUNTER — NURSE ONLY (OUTPATIENT)
Dept: CARDIOLOGY CLINIC | Age: 75
End: 2020-09-30

## 2020-09-30 ENCOUNTER — OFFICE VISIT (OUTPATIENT)
Dept: CARDIOLOGY CLINIC | Age: 75
End: 2020-09-30
Payer: MEDICARE

## 2020-09-30 VITALS
DIASTOLIC BLOOD PRESSURE: 80 MMHG | BODY MASS INDEX: 33.09 KG/M2 | HEART RATE: 60 BPM | HEIGHT: 74 IN | WEIGHT: 257.8 LBS | SYSTOLIC BLOOD PRESSURE: 138 MMHG

## 2020-09-30 PROBLEM — I47.29 NSVT (NONSUSTAINED VENTRICULAR TACHYCARDIA) (HCC): Status: ACTIVE | Noted: 2020-09-30

## 2020-09-30 PROBLEM — R06.02 SOB (SHORTNESS OF BREATH): Status: ACTIVE | Noted: 2020-09-30

## 2020-09-30 PROBLEM — R94.39 ABNORMAL NUCLEAR STRESS TEST: Status: ACTIVE | Noted: 2020-09-30

## 2020-09-30 LAB
ABO/RH: NORMAL
ANION GAP SERPL CALCULATED.3IONS-SCNC: 11 MMOL/L (ref 4–16)
ANTIBODY SCREEN: NEGATIVE
APTT: 33.5 SECONDS (ref 25.1–37.1)
BASOPHILS ABSOLUTE: 0 K/CU MM
BASOPHILS RELATIVE PERCENT: 0.6 % (ref 0–1)
BUN BLDV-MCNC: 15 MG/DL (ref 6–23)
CALCIUM SERPL-MCNC: 8.8 MG/DL (ref 8.3–10.6)
CHLORIDE BLD-SCNC: 104 MMOL/L (ref 99–110)
CO2: 25 MMOL/L (ref 21–32)
COMMENT: NORMAL
CREAT SERPL-MCNC: 0.9 MG/DL (ref 0.9–1.3)
DIFFERENTIAL TYPE: ABNORMAL
EOSINOPHILS ABSOLUTE: 0.2 K/CU MM
EOSINOPHILS RELATIVE PERCENT: 3.5 % (ref 0–3)
GFR AFRICAN AMERICAN: >60 ML/MIN/1.73M2
GFR NON-AFRICAN AMERICAN: >60 ML/MIN/1.73M2
GLUCOSE BLD-MCNC: 90 MG/DL (ref 70–99)
HCT VFR BLD CALC: 42.3 % (ref 42–52)
HEMOGLOBIN: 13.7 GM/DL (ref 13.5–18)
IMMATURE NEUTROPHIL %: 0.3 % (ref 0–0.43)
INR BLD: 1.11 INDEX
LV EF: 62 %
LVEF MODALITY: NORMAL
LYMPHOCYTES ABSOLUTE: 1.3 K/CU MM
LYMPHOCYTES RELATIVE PERCENT: 21.3 % (ref 24–44)
MCH RBC QN AUTO: 30 PG (ref 27–31)
MCHC RBC AUTO-ENTMCNC: 32.4 % (ref 32–36)
MCV RBC AUTO: 92.8 FL (ref 78–100)
MONOCYTES ABSOLUTE: 0.7 K/CU MM
MONOCYTES RELATIVE PERCENT: 11.1 % (ref 0–4)
NUCLEATED RBC %: 0 %
PDW BLD-RTO: 13 % (ref 11.7–14.9)
PLATELET # BLD: 185 K/CU MM (ref 140–440)
PMV BLD AUTO: 11.5 FL (ref 7.5–11.1)
POTASSIUM SERPL-SCNC: 4.7 MMOL/L (ref 3.5–5.1)
PROTHROMBIN TIME: 13.4 SECONDS (ref 11.7–14.5)
RBC # BLD: 4.56 M/CU MM (ref 4.6–6.2)
SEGMENTED NEUTROPHILS ABSOLUTE COUNT: 3.9 K/CU MM
SEGMENTED NEUTROPHILS RELATIVE PERCENT: 63.2 % (ref 36–66)
SODIUM BLD-SCNC: 140 MMOL/L (ref 135–145)
TOTAL IMMATURE NEUTOROPHIL: 0.02 K/CU MM
TOTAL NUCLEATED RBC: 0 K/CU MM
WBC # BLD: 6.2 K/CU MM (ref 4–10.5)

## 2020-09-30 PROCEDURE — 1123F ACP DISCUSS/DSCN MKR DOCD: CPT | Performed by: INTERNAL MEDICINE

## 2020-09-30 PROCEDURE — G8417 CALC BMI ABV UP PARAM F/U: HCPCS | Performed by: INTERNAL MEDICINE

## 2020-09-30 PROCEDURE — 93016 CV STRESS TEST SUPVJ ONLY: CPT | Performed by: INTERNAL MEDICINE

## 2020-09-30 PROCEDURE — 1036F TOBACCO NON-USER: CPT | Performed by: INTERNAL MEDICINE

## 2020-09-30 PROCEDURE — A9500 TC99M SESTAMIBI: HCPCS | Performed by: INTERNAL MEDICINE

## 2020-09-30 PROCEDURE — 85610 PROTHROMBIN TIME: CPT

## 2020-09-30 PROCEDURE — U0002 COVID-19 LAB TEST NON-CDC: HCPCS

## 2020-09-30 PROCEDURE — 93017 CV STRESS TEST TRACING ONLY: CPT | Performed by: INTERNAL MEDICINE

## 2020-09-30 PROCEDURE — 93018 CV STRESS TEST I&R ONLY: CPT | Performed by: INTERNAL MEDICINE

## 2020-09-30 PROCEDURE — 99214 OFFICE O/P EST MOD 30 MIN: CPT | Performed by: INTERNAL MEDICINE

## 2020-09-30 PROCEDURE — 4040F PNEUMOC VAC/ADMIN/RCVD: CPT | Performed by: INTERNAL MEDICINE

## 2020-09-30 PROCEDURE — G8427 DOCREV CUR MEDS BY ELIG CLIN: HCPCS | Performed by: INTERNAL MEDICINE

## 2020-09-30 PROCEDURE — 3017F COLORECTAL CA SCREEN DOC REV: CPT | Performed by: INTERNAL MEDICINE

## 2020-09-30 PROCEDURE — 78452 HT MUSCLE IMAGE SPECT MULT: CPT | Performed by: INTERNAL MEDICINE

## 2020-09-30 PROCEDURE — 80048 BASIC METABOLIC PNL TOTAL CA: CPT

## 2020-09-30 PROCEDURE — 86850 RBC ANTIBODY SCREEN: CPT

## 2020-09-30 PROCEDURE — 86900 BLOOD TYPING SEROLOGIC ABO: CPT

## 2020-09-30 PROCEDURE — 36415 COLL VENOUS BLD VENIPUNCTURE: CPT

## 2020-09-30 PROCEDURE — 85025 COMPLETE CBC W/AUTO DIFF WBC: CPT

## 2020-09-30 PROCEDURE — 86901 BLOOD TYPING SEROLOGIC RH(D): CPT

## 2020-09-30 PROCEDURE — 85730 THROMBOPLASTIN TIME PARTIAL: CPT

## 2020-09-30 NOTE — TELEPHONE ENCOUNTER
Patient given results of ABN NM in person during office visit after test and scheduled for C with Dr. Malinda Irby. Abnormal Study. Large area of significant inferior wall Ischemia. Normal LV function. LVEF is 62 %. Supervising physician Dr. Baldomero Car . Recommendations: Left Heart Cath with possible Intervention.

## 2020-09-30 NOTE — LETTER
[address] [address] [address] [id number]  Phone: [phone]  Fax: [phone]    Linda Lawrence MD        September 30, 2020     Darwin Menon, [address] [id number]    Patient: Lucita Sidhu  MR Number: [id number]  YOB: 1945  Date of Visit: 9/30/2020    Dear Dr. Darwin Menon:    Thank you for the request for consultation for [facility] to me for the evaluation of abnormal nuclear stress test. Below are the relevant portions of my assessment and plan of care. If you have questions, please do not hesitate to call me. I look forward to following Lissa Rodriguez along with you.     Sincerely,        Linda Lawrence MD

## 2020-09-30 NOTE — PROGRESS NOTES
OFFICE PROGRESS NOTES      Silvia Jimenez is a 70 y.o. male who has    CHIEF COMPLAINT AS FOLLOWS:  CHEST PAIN: Patient C/O chest pain suggestive of Cardiac ischemia. SOB:  C/O SOB along with chest pain. LEG EDEMA: No leg edema   PALPITATIONS: Denies any C/O Palpitations   DIZZINESS: No C/O Dizziness   SYNCOPE: None   OTHER:                                     HPI: Patient is here for F/U on his PAF & NSVT problems. Has abnormal Cardiolite. Sania Murray has the following history recorded in care path:  Patient Active Problem List    Diagnosis Date Noted    SOB (shortness of breath) 09/30/2020    Abnormal nuclear stress test 09/30/2020    NS[facility] (nonsustained ventricular tachycardia) [facility] 09/30/2020    Atrial fibrillation ([facility] 08/26/2020    Postural dizziness with near syncope 08/26/2020    Acute neck pain 08/26/2020    Unstable angina (HCC) 05/08/2015    Precordial pain     COPD (chronic obstructive pulmonary disease) ([facility] 07/07/2014    THOMAS on CPAP 05/06/2014     Current Outpatient Medications   Medication Sig Dispense Refill    apixaban (ELIQUIS) 5 MG TABS tablet Take 1 tablet by mouth 2 times daily 14 tablet 0    Lactobacillus (PROBIOTIC ACIDOPHILUS PO) Take by mouth daily      tamsulosin (FLOMAX) 0.4 MG capsule 1 tablet 2 times daily      apixaban (ELIQUIS) 5 MG TABS tablet Take 1 tablet by mouth 2 times daily 60 tablet 1    aspirin 81 MG chewable tablet Take 1 tablet by mouth daily 30 tablet 0    atorvastatin (LIPITOR) 40 MG tablet Take 1 tablet by mouth nightly 30 tablet 0    omeprazole (PRILOSEC) 20 MG delayed release capsule Take 20 mg by mouth daily      metoprolol tartrate (LOPRESSOR) 50 MG tablet Take 25 mg by mouth daily Pt does not know the strength       citalopram (CELEXA) 20 MG tablet 10 mg       traMADol (ULTRAM) 50 MG tablet        No current facility-administered medications for this visit.       Allergies: Patient has no known allergies. Past Medical History:   Diagnosis Date    Abnormal MRI, spine     per pt herniated disc    Arthritis     Gastritis     H/O 24 hour EKG monitoring 06/24/2002 06/24/2002 baseline rhythm appears to be normal with an average HR of 66 bpm, slowest HR recored at 51 bpm, fastest at 97 bpm only 10 isolated  PVC's and one couplet were recorded, supraventricular ectopic activity is present, but not clinically significant, no long pauses recognized.  H/O cardiovascular stress test 03/16/1999, 12/28/2001,03/14/2006, 03/31/2008, 11/19/2008, 09/07/2011 09/07/2011 EF 61%, no angina or ischemic EKG changes, noted with Lexiscan, inferior wall reperfusion, global function is intact. resting /85, resting HR 79    H/O complete electrocardiogram 03/05/2012 03/05/2012 on chart    H/O CT scan of chest 11/19/2008 11/19/2008 no evidence of PE, no acute thoracic aortic pathology, incidental 5mm pleural based nodular density superior lateral right middle lobe.  H/O Doppler ultrasound no anatomical abnormalities in the segment s studied on either side, doppler curves are normal in configuration and amplitude in those segments bilaterally, normal flow velocities and flow velocity ratios. normal antegrade flow in the vertebral arteries bilaterally    H/O Doppler ultrasound 08/08/2005 08/08/2005 no significant obstructive lesions noted in the extracranial carotid system,antegrade flow noted in the vertebral arteries. no significant atherosclerotic plaque noted in right or left  common arteryintimal thickening in right and left internal carotids, intimal thickening in left external carotid.      H/O echocardiogram 06/24/2002 EF 55-60% 06/19/2006 EF 50-55%,09/06/2011 EF 50-55%    09/06/2011 EF 50-55%, normal size left ventricle showing preserved global systolic  function and no regional wall motion abnormalities, left ventricle shows  moderate concentric hypertrophy and signs of diastolic dysfunction, mildly dilated atrium,, aortic valve is sclerotic but nonstenotic, trace mitral and tricuspid  regurg    H/O tilt table evaluation 2001 head up tilt table test after one nitroglycerin pt became nauseous, diaphoretic, BP dropped systolic to 85 pulse in 18'E with complete loss of consciousness with  slight seizure activity before table brought to horizontal, happened within 8 minutes of nitro tablet utilization, pt. regained consciousness HR and BP  as soon as table was brought to horizontal, lopressor discontinued    Hiatal hernia     Hx of cardiac cath 1999, 2008oth the left main and circumflex are without significant disease, RCA is also without significant disease, LV gram shows normal size left ventricular cavity with normal global and regional left ventricular systolic function, no significant CAD, chest pain is probably non cardiac in etiology    Hypertension     Prolonged emergence from general anesthesia     Syncope and collapse     Unspecified sleep apnea     cpap    Wears glasses      Past Surgical History:   Procedure Laterality Date    BACK SURGERY      DIAGNOSTIC CARDIAC CATH LAB PROCEDURE  1999,2008, left main,circumflex, and RCA are without any significant disease, LV gram shows normal size left ventricular cavity with normal global and  regional left ventricular systolic function, pt has no significant CAD.     HERNIA REPAIR      TONSILLECTOMY        As reviewed   Family History   Problem Relation Age of Onset    Diabetes Mother     Heart Disease Mother     Other Mother         kidney stones    Cancer Mother         breast    Heart Disease Father     Diabetes Father      Social History     Tobacco Use    Smoking status: Former Smoker     Types: Cigarettes     Last attempt to quit: 1988     Years since quittin.7    Smokeless tobacco: Former User     Types: Chew    Tobacco comment: Quit or organomegaly. Musculoskeletal - No significant edema. No joint deformities. No muscle wasting. Neurologic - Cranial nerves II through XII are grossly intact. There were no gross focal neurologic abnormalities.     Lab Review   Lab Results   Component Value Date    CKTOTAL 161 11/22/2018    CKTOTAL 228 10/05/2011    CKMB 2.8 10/05/2011    TROPONINT 0.043 08/26/2020    TROPONINT <0.010 08/26/2020     BNP:    Lab Results   Component Value Date    BNP 15 09/06/2011    PROBNP 482.0 08/26/2020    PROBNP 137.6 11/22/2018     PT/INR:    Lab Results   Component Value Date    INR 0.98 08/26/2020    INR 1.06 02/05/2013     Lab Results   Component Value Date    LABA1C 6.2 08/27/2020     Lab Results   Component Value Date    WBC 6.3 08/26/2020    WBC 9.7 08/26/2020    HCT 39.4 (L) 08/26/2020    HCT 44.9 08/26/2020    MCV 91.6 08/26/2020    MCV 90.7 08/26/2020     08/26/2020     08/26/2020     Lab Results   Component Value Date    CHOL 174 08/26/2020    CHOL 181 05/08/2015    TRIG 225 (H) 08/26/2020    TRIG 222 (H) 05/08/2015    HDL 44 08/26/2020    HDL 39 (L) 05/08/2015    LDLDIRECT 104 (H) 08/26/2020    LDLDIRECT 121 (H) 05/08/2015     Lab Results   Component Value Date    ALT 29 08/26/2020    ALT 35 11/22/2018    AST 28 08/26/2020    AST 23 11/22/2018     BMP:    Lab Results   Component Value Date     08/26/2020     08/26/2020    K 3.7 08/26/2020    K 4.5 08/26/2020     08/26/2020     08/26/2020    CO2 23 08/26/2020    CO2 23 08/26/2020    BUN 19 08/26/2020    BUN 18 08/26/2020    CREATININE 0.9 08/26/2020    CREATININE 1.0 08/26/2020     CMP:   Lab Results   Component Value Date     08/26/2020     08/26/2020    K 3.7 08/26/2020    K 4.5 08/26/2020     08/26/2020     08/26/2020    CO2 23 08/26/2020    CO2 23 08/26/2020    BUN 19 08/26/2020    BUN 18 08/26/2020    CREATININE 0.9 08/26/2020    CREATININE 1.0 08/26/2020    PROT 6.9 08/26/2020    PROT 6.9 Tolerating current medical regimen wellyes,                                 Does not tolerate medications well due to side effects                                See most recent Lab values in Labs section above. ARRHYTHMIAS: known                                Patient has H/O P. Atrial fibrillation                                He is rate controlled & on anticoagulation. OTHER RELEVANT DIAGNOSIS:as noted in patient's active problem list:  TESTS ORDERED: Left Heart cath possible PCI                                     All previously ordered tests reviewed. MEDICATIONS: CPM   Office f/u after procedure. Primary/secondary prevention is the goal by aggressive risk modification, healthy and therapeutic life style changes for cardiovascular risk reduction. Various goals are discussed and multiple questions answered.

## 2020-09-30 NOTE — PATIENT INSTRUCTIONS
CAD:Yes, has abnormal perfusion showing inferior wall ischemia. He has NSVT on his monitor. clinically stable. Patient is on optimal medical regimen ( see medication list above )  -     CORONARY ARTERY DISEASE: Patient is currently  mildly symptomatic from CAD. - changes in  treatment:   no           - Testing ordered:  yes, see below  [facility] classification: 3  [place] RISK SCORE:  [place] RISK SCORE:  HTN:well controlled on current medical regimen, see list above. - changes in  treatment:   no   CARDIOMYOPATHY: None known   CONGESTIVE HEART FAILURE: NO KNOWN HISTORY.    VHD: No significant VHD noted  DYSLIPIDEMIA: Patient's profile is at / near Goal.no                                HDL is low                                Tolerating current medical regimen wellyes,                                 Does not tolerate medications well due to side effects                                See most recent Lab values in Labs section above. ARRHYTHMIAS: known                                Patient has H/O P. Atrial fibrillation                                He is rate controlled & on anticoagulation. OTHER RELEVANT DIAGNOSIS:as noted in patient's active problem list:  TESTS ORDERED: Left Heart cath possible PCI                                     All previously ordered tests reviewed. MEDICATIONS: CPM   Office f/u after procedure. Primary/secondary prevention is the goal by aggressive risk modification, healthy and therapeutic life style changes for cardiovascular risk reduction. Various goals are discussed and multiple questions answered.

## 2020-09-30 NOTE — TELEPHONE ENCOUNTER
Pt here in office & educated on [facility] for Dx: ABN NM, scheduled for 10/5/20 @ 8 AM, w/arrival @ 6 AM, @ [facility]; risks explained; & consents signed. Pre-admission orders given to pt. COVID testing @ [facility] today Instructions given to pt to:  NPO after midnight night before procedure; Call hospital @ [phone] to pre-register; May take rest of morning meds. am of procedure; & pt voiced understanding. Copies of consent, pre-testing orders, & info. sheet scanned into chart.

## 2020-09-30 NOTE — PROGRESS NOTES
[id number] Score for Atrial Fibrillation Stroke Risk   Risk   Factors  Component Value   C CHF No 0   H HTN No 0   A2 Age >= 76 No,  (80 y.o.) 0   D DM No 0   S2 Prior Stroke/TIA No 0   V Vascular Disease No 0   A Age 74-69 Yes,  (80 y.o.) 1   Sc Sex male 0    [id number]  Score  1   Score last updated 9/30/20 7:11 PM EDT    Click here for a link to the UpToDate guideline \"Atrial Fibrillation: Anticoagulation therapy to prevent embolization    Disclaimer: Risk Score calculation is dependent on accuracy of patient problem list and past encounter diagnosis.

## 2020-10-01 LAB
SARS-COV-2: NOT DETECTED
SOURCE: NORMAL

## 2020-10-05 ENCOUNTER — HOSPITAL ENCOUNTER (OUTPATIENT)
Dept: CARDIAC CATH/INVASIVE PROCEDURES | Age: 75
Discharge: HOME | End: 2020-10-05
Attending: INTERNAL MEDICINE | Admitting: INTERNAL MEDICINE
Payer: MEDICARE

## 2020-10-05 VITALS
HEART RATE: 62 BPM | OXYGEN SATURATION: 100 % | DIASTOLIC BLOOD PRESSURE: 84 MMHG | RESPIRATION RATE: 20 BRPM | TEMPERATURE: 96.3 F | SYSTOLIC BLOOD PRESSURE: 154 MMHG | BODY MASS INDEX: 32.98 KG/M2 | HEIGHT: 74 IN | WEIGHT: 257 LBS

## 2020-10-05 LAB
LV EF: 53 %
LVEF MODALITY: NORMAL

## 2020-10-05 PROCEDURE — 93458 L HRT ARTERY/VENTRICLE ANGIO: CPT

## 2020-10-05 PROCEDURE — 6360000002 HC RX W HCPCS

## 2020-10-05 PROCEDURE — 2709999900 HC NON-CHARGEABLE SUPPLY

## 2020-10-05 PROCEDURE — 93458 L HRT ARTERY/VENTRICLE ANGIO: CPT | Performed by: INTERNAL MEDICINE

## 2020-10-05 PROCEDURE — 2500000003 HC RX 250 WO HCPCS

## 2020-10-05 PROCEDURE — 2580000003 HC RX 258: Performed by: INTERNAL MEDICINE

## 2020-10-05 PROCEDURE — 6370000000 HC RX 637 (ALT 250 FOR IP): Performed by: INTERNAL MEDICINE

## 2020-10-05 PROCEDURE — C1894 INTRO/SHEATH, NON-LASER: HCPCS

## 2020-10-05 PROCEDURE — 6360000004 HC RX CONTRAST MEDICATION

## 2020-10-05 RX ORDER — DIPHENHYDRAMINE HCL 25 MG
25 TABLET ORAL ONCE
Status: COMPLETED | OUTPATIENT
Start: 2020-10-05 | End: 2020-10-05

## 2020-10-05 RX ORDER — SODIUM CHLORIDE 9 MG/ML
INJECTION, SOLUTION INTRAVENOUS CONTINUOUS
Status: DISCONTINUED | OUTPATIENT
Start: 2020-10-05 | End: 2020-10-05 | Stop reason: HOSPADM

## 2020-10-05 RX ORDER — SODIUM CHLORIDE 0.9 % (FLUSH) 0.9 %
10 SYRINGE (ML) INJECTION EVERY 12 HOURS SCHEDULED
Status: DISCONTINUED | OUTPATIENT
Start: 2020-10-05 | End: 2020-10-05 | Stop reason: HOSPADM

## 2020-10-05 RX ORDER — SODIUM CHLORIDE 0.9 % (FLUSH) 0.9 %
10 SYRINGE (ML) INJECTION PRN
Status: DISCONTINUED | OUTPATIENT
Start: 2020-10-05 | End: 2020-10-05 | Stop reason: HOSPADM

## 2020-10-05 RX ORDER — SODIUM CHLORIDE 9 MG/ML
INJECTION, SOLUTION INTRAVENOUS CONTINUOUS
Status: DISCONTINUED | OUTPATIENT
Start: 2020-10-05 | End: 2020-10-05 | Stop reason: SDUPTHER

## 2020-10-05 RX ORDER — ACETAMINOPHEN 325 MG/1
650 TABLET ORAL EVERY 4 HOURS PRN
Status: DISCONTINUED | OUTPATIENT
Start: 2020-10-05 | End: 2020-10-05 | Stop reason: HOSPADM

## 2020-10-05 RX ORDER — DIAZEPAM 5 MG/1
5 TABLET ORAL ONCE
Status: COMPLETED | OUTPATIENT
Start: 2020-10-05 | End: 2020-10-05

## 2020-10-05 RX ADMIN — DIPHENHYDRAMINE HYDROCHLORIDE 25 MG: 25 TABLET ORAL at 06:44

## 2020-10-05 RX ADMIN — SODIUM CHLORIDE: 9 INJECTION, SOLUTION INTRAVENOUS at 06:44

## 2020-10-05 RX ADMIN — DIAZEPAM 5 MG: 5 TABLET ORAL at 06:44

## 2020-10-05 NOTE — H&P
2001 head up tilt table test after one nitroglycerin pt became nauseous, diaphoretic, BP dropped systolic to 85 pulse in 35'U with complete loss of consciousness with  slight seizure activity before table brought to horizontal, happened within 8 minutes of nitro tablet utilization, pt. regained consciousness HR and BP  as soon as table was brought to horizontal, lopressor discontinued    Hiatal hernia     History of nuclear stress test 2020    History of nuclear stress test 2020    History of nuclear stress test 2020    EF 62%, Large area of significant inferior wall ischemia    Hx of cardiac cath 1999, 2008oth the left main and circumflex are without significant disease, RCA is also without significant disease, LV gram shows normal size left ventricular cavity with normal global and regional left ventricular systolic function, no significant CAD, chest pain is probably non cardiac in etiology    Hypertension     Prolonged emergence from general anesthesia     Syncope and collapse     Unspecified sleep apnea     cpap    Wears glasses      Past Surgical History:   Procedure Laterality Date    BACK SURGERY      DIAGNOSTIC CARDIAC CATH LAB PROCEDURE  1999,2008, left main,circumflex, and RCA are without any significant disease, LV gram shows normal size left ventricular cavity with normal global and  regional left ventricular systolic function, pt has no significant CAD.     HERNIA REPAIR      TONSILLECTOMY       Family History   Problem Relation Age of Onset    Diabetes Mother     Heart Disease Mother     Other Mother         kidney stones    Cancer Mother         breast    Heart Disease Father     Diabetes Father      Social History     Tobacco Use    Smoking status: Former Smoker     Types: Cigarettes     Last attempt to quit: 1988     Years since quittin.7    Smokeless tobacco: Former User Types: Chew    Tobacco comment: Quit chewing in Jan 2014   Substance Use Topics    Alcohol use: Yes     Comment: moderate,  coffee two cups daily   Beer on the weekends      Review of systems:  HEENT: Neg  Card:Chest pain   GI;Neg  : Neg  Neuro: Neg  Psych: Neg  Derm: Neg  MS; Neg  All: Documented  Constitutional: Neg    Objective:  /80   Pulse 60   Ht 6' 2\" (1.88 m)   Wt 257 lb 12.8 oz (116.9 kg)   BMI 33.10 kg/m²     Wt Readings from Last 3 Encounters:   09/30/20 257 lb 12.8 oz (116.9 kg)   09/01/20 257 lb 3.2 oz (116.7 kg)   08/27/20 249 lb 14.4 oz (113.4 kg)     GENERAL - Alert, oriented, pleasant, in no apparent distress. HEENT - Unremarkable. Neck - Supple. No jugular venous distention noted. No carotid bruits. Cardiovascular - Normal S1 and S2 without obvious murmur or gallop. Extremities - No cyanosis, clubbing, or significant edema. Pulmonary - No respiratory distress. No wheezes or rales. Abdomen - No masses, tenderness, or organomegaly. Musculoskeletal - No significant edema. Neurologic - Cranial nerves II through XII are grossly intact. There were no gross focal neurologic abnormalities.     Lab Review   Lab Results   Component Value Date    CKTOTAL 161 11/22/2018    CKMB 2.8 10/05/2011    TROPONINI <0.006 10/05/2011     BNP:    Lab Results   Component Value Date    BNP 15 09/06/2011     PT/INR:  No results found for: pickrset [facility]  Lab Results   Component Value Date    LABA1C 6.2 08/27/2020     Lab Results   Component Value Date    CHOL 174 08/26/2020    TRIG 225 (H) 08/26/2020    HDL 44 08/26/2020    LDLDIRECT 104 (H) 08/26/2020     Lab Results   Component Value Date    ALT 29 08/26/2020    AST 28 08/26/2020     BMP:    Lab Results   Component Value Date     09/30/2020    K 4.7 09/30/2020     09/30/2020    CO2 25 09/30/2020    BUN 15 09/30/2020     CMP:   Lab Results   Component Value Date     09/30/2020    K 4.7 09/30/2020     09/30/2020    CO2 25 09/30/2020    BUN 15 09/30/2020    PROT 6.9 08/26/2020    PROT 7.1 02/05/2013     TSH:  No results found for: TSH      Impression:    Patient Active Problem List   Diagnosis    THOMAS on CPAP    COPD (chronic obstructive pulmonary disease) (HCC)    Precordial pain    Unstable angina (HCC)    Atrial fibrillation (HCC)    Postural dizziness with near syncope    Acute neck pain    SOB (shortness of breath)    Abnormal nuclear stress test    NSVT (nonsustained ventricular tachycardia) ([facility] [address]       Plan:  Left Heart Cath possible PCI. Informed consent obtained. ASA & Mallampati 2:2  Further recommendations to be based on Cath findings.

## 2020-10-07 NOTE — TELEPHONE ENCOUNTER
Patient assistance forms refaxed to [employer] as they have not been received by [employer]. Awaiting response.

## 2020-10-08 NOTE — TELEPHONE ENCOUNTER
Spoke with rep at [facility] patient assistance foundation. The application has been received and they will advise once it has been processed. She advised that if we have not heard back to call back in 5 business days.

## 2020-10-21 ENCOUNTER — OFFICE VISIT (OUTPATIENT)
Dept: CARDIOLOGY CLINIC | Age: 75
End: 2020-10-21
Payer: MEDICARE

## 2020-10-21 VITALS
BODY MASS INDEX: 33.65 KG/M2 | HEART RATE: 66 BPM | HEIGHT: 74 IN | WEIGHT: 262.2 LBS | SYSTOLIC BLOOD PRESSURE: 124 MMHG | DIASTOLIC BLOOD PRESSURE: 78 MMHG

## 2020-10-21 PROCEDURE — 3017F COLORECTAL CA SCREEN DOC REV: CPT | Performed by: INTERNAL MEDICINE

## 2020-10-21 PROCEDURE — 1036F TOBACCO NON-USER: CPT | Performed by: INTERNAL MEDICINE

## 2020-10-21 PROCEDURE — 99214 OFFICE O/P EST MOD 30 MIN: CPT | Performed by: INTERNAL MEDICINE

## 2020-10-21 PROCEDURE — G8427 DOCREV CUR MEDS BY ELIG CLIN: HCPCS | Performed by: INTERNAL MEDICINE

## 2020-10-21 PROCEDURE — G8417 CALC BMI ABV UP PARAM F/U: HCPCS | Performed by: INTERNAL MEDICINE

## 2020-10-21 PROCEDURE — 1123F ACP DISCUSS/DSCN MKR DOCD: CPT | Performed by: INTERNAL MEDICINE

## 2020-10-21 PROCEDURE — 4040F PNEUMOC VAC/ADMIN/RCVD: CPT | Performed by: INTERNAL MEDICINE

## 2020-10-21 PROCEDURE — G8484 FLU IMMUNIZE NO ADMIN: HCPCS | Performed by: INTERNAL MEDICINE

## 2020-10-21 RX ORDER — ATORVASTATIN CALCIUM 40 MG/1
40 TABLET, FILM COATED ORAL NIGHTLY
Qty: 30 TABLET | Refills: 0 | Status: SHIPPED | OUTPATIENT
Start: 2020-10-21 | End: 2020-11-24

## 2020-10-21 RX ORDER — GABAPENTIN 300 MG/1
300 CAPSULE ORAL 3 TIMES DAILY
COMMUNITY

## 2020-10-21 RX ORDER — AMLODIPINE BESYLATE 5 MG/1
5 TABLET ORAL DAILY
COMMUNITY
End: 2020-10-21

## 2020-10-21 RX ORDER — METOPROLOL SUCCINATE 50 MG/1
50 TABLET, EXTENDED RELEASE ORAL DAILY
Qty: 30 TABLET | Refills: 3 | Status: ON HOLD | OUTPATIENT
Start: 2020-10-21 | End: 2020-11-09 | Stop reason: HOSPADM

## 2020-10-21 NOTE — PROGRESS NOTES
citalopram (CELEXA) 20 MG tablet 10 mg       traMADol (ULTRAM) 50 MG tablet        No current facility-administered medications for this visit. Allergies: Patient has no known allergies. Past Medical History:   Diagnosis Date    Abnormal MRI, spine     per pt herniated disc    Arthritis     Gastritis     H/O 24 hour EKG monitoring 06/24/2002 06/24/2002 baseline rhythm appears to be normal with an average HR of 66 bpm, slowest HR recored at 51 bpm, fastest at 97 bpm only 10 isolated  PVC's and one couplet were recorded, supraventricular ectopic activity is present, but not clinically significant, no long pauses recognized.  H/O cardiovascular stress test 03/16/1999, 12/28/2001,03/14/2006, 03/31/2008, 11/19/2008, 09/07/2011 09/07/2011 EF 61%, no angina or ischemic EKG changes, noted with Lexiscan, inferior wall reperfusion, global function is intact. resting /85, resting HR 79    H/O complete electrocardiogram 03/05/2012 03/05/2012 on chart    H/O CT scan of chest 11/19/2008 11/19/2008 no evidence of PE, no acute thoracic aortic pathology, incidental 5mm pleural based nodular density superior lateral right middle lobe.  H/O Doppler ultrasound no anatomical abnormalities in the segment s studied on either side, doppler curves are normal in configuration and amplitude in those segments bilaterally, normal flow velocities and flow velocity ratios. normal antegrade flow in the vertebral arteries bilaterally    H/O Doppler ultrasound 08/08/2005 08/08/2005 no significant obstructive lesions noted in the extracranial carotid system,antegrade flow noted in the vertebral arteries. no significant atherosclerotic plaque noted in right or left  common arteryintimal thickening in right and left internal carotids, intimal thickening in left external carotid.      H/O echocardiogram 06/24/2002 EF 55-60% 06/19/2006 EF 50-55%,09/06/2011 EF 50-55%    09/06/2011 EF 50-55%, normal size left ventricle showing preserved global systolic  function and no regional wall motion abnormalities, left ventricle shows  moderate concentric hypertrophy and signs of diastolic dysfunction, mildly dilated atrium,, aortic valve is sclerotic but nonstenotic, trace mitral and tricuspid  regurg    H/O tilt table evaluation 07/05/2001 07/05/2001 head up tilt table test after one nitroglycerin pt became nauseous, diaphoretic, BP dropped systolic to 85 pulse in 50'O with complete loss of consciousness with  slight seizure activity before table brought to horizontal, happened within 8 minutes of nitro tablet utilization, pt. regained consciousness HR and BP  as soon as table was brought to horizontal, lopressor discontinued    Hiatal hernia     History of cardiac cath 10/05/2020    No significant CAD. LAD diffusely diseased as it is very small in caliber. LVEF is 50 to 55 %.  History of nuclear stress test 09/30/2020    EF 62%, Large area of significant inferior wall ischemia    Hx of cardiac cath 03/17/1999, 11/19/2008 11/19/2008both the left main and circumflex are without significant disease, RCA is also without significant disease, LV gram shows normal size left ventricular cavity with normal global and regional left ventricular systolic function, no significant CAD, chest pain is probably non cardiac in etiology    Hypertension     Prolonged emergence from general anesthesia     Syncope and collapse     Unspecified sleep apnea     cpap    Wears glasses      Past Surgical History:   Procedure Laterality Date    BACK SURGERY      DIAGNOSTIC CARDIAC CATH LAB PROCEDURE  03/17/1999,11/19/2008 11/19/2008, left main,circumflex, and RCA are without any significant disease, LV gram shows normal size left ventricular cavity with normal global and  regional left ventricular systolic function, pt has no significant CAD.     HERNIA REPAIR      TONSILLECTOMY        As reviewed   Family History   Problem Relation Age of Onset    Diabetes Mother     Heart Disease Mother     Other Mother         kidney stones    Cancer Mother         breast    Heart Disease Father     Diabetes Father      Social History     Tobacco Use    Smoking status: Former Smoker     Types: Cigarettes     Last attempt to quit: 1988     Years since quittin.8    Smokeless tobacco: Former User     Types: Chew    Tobacco comment: Quit chewing in 2014   Substance Use Topics    Alcohol use: Yes     Comment: moderate,  coffee two cups daily   Beer on the weekends      Review of Systems:    Constitutional: Negative for diaphoresis and fatigue  Psychological:Negative for anxiety or depression  HENT: Negative for headaches, nasal congestion, sinus pain or vertigo  Eyes: Negative for visual disturbance. Endocrine: Negative for polydipsia/polyuria  Respiratory: Negative for shortness of breath  Cardiovascular: Negative for chest pain, dyspnea on exertion, claudication, edema, irregular heartbeat, murmur, palpitations or shortness of breath  Gastrointestinal: Negative for abdominal pain or heartburn  Genito-Urinary: Negative for urinary frequency/urgency  Musculoskeletal: Negative for muscle pain, muscular weakness, negative for pain in arm and leg or swelling in foot and leg  Neurological: Negative for dizziness, headaches, memory loss, numbness/tingling, visual changes, syncope  Dermatological: Negative for rash    Objective:  /78   Pulse 66   Ht 6' 2\" (1.88 m)   Wt 262 lb 3.2 oz (118.9 kg)   BMI 33.66 kg/m²   Wt Readings from Last 3 Encounters:   10/21/20 262 lb 3.2 oz (118.9 kg)   10/05/20 257 lb (116.6 kg)   20 257 lb 12.8 oz (116.9 kg)     Body mass index is 33.66 kg/m². No flowsheet data found. Vitals:    10/21/20 0902   BP: 124/78   Pulse: 66   Weight: 262 lb 3.2 oz (118.9 kg)   Height: 6' 2\" (1.88 m)      GENERAL - Alert, oriented, pleasant, in no apparent distress.   EYES: No jaundice, no conjunctival pallor. SKIN: It is warm & dry. No rashes. No Echhymosis    HEENT - No clinically significant abnormalities seen. Neck - Supple. No jugular venous distention noted. No carotid bruits. Cardiovascular - Normal S1 and S2 without obvious murmur or gallop. Extremities - No cyanosis, clubbing, or significant edema. Pulmonary - No respiratory distress. No wheezes or rales. Abdomen - No masses, tenderness, or organomegaly. Musculoskeletal - No significant edema. No joint deformities. No muscle wasting. Neurologic - Cranial nerves II through XII are grossly intact. There were no gross focal neurologic abnormalities.     Lab Review   Lab Results   Component Value Date    CKTOTAL 161 11/22/2018    CKTOTAL 228 10/05/2011    CKMB 2.8 10/05/2011    TROPONINT 0.043 08/26/2020    TROPONINT <0.010 08/26/2020     BNP:    Lab Results   Component Value Date    BNP 15 09/06/2011    PROBNP 482.0 08/26/2020    PROBNP 137.6 11/22/2018     PT/INR:    Lab Results   Component Value Date    INR 1.11 09/30/2020    INR 0.98 08/26/2020     Lab Results   Component Value Date    LABA1C 6.2 08/27/2020     Lab Results   Component Value Date    WBC 6.2 09/30/2020    WBC 6.3 08/26/2020    HCT 42.3 09/30/2020    HCT 39.4 (L) 08/26/2020    MCV 92.8 09/30/2020    MCV 91.6 08/26/2020     09/30/2020     08/26/2020     Lab Results   Component Value Date    CHOL 174 08/26/2020    CHOL 181 05/08/2015    TRIG 225 (H) 08/26/2020    TRIG 222 (H) 05/08/2015    HDL 44 08/26/2020    HDL 39 (L) 05/08/2015    LDLDIRECT 104 (H) 08/26/2020    LDLDIRECT 121 (H) 05/08/2015     Lab Results   Component Value Date    ALT 29 08/26/2020    ALT 35 11/22/2018    AST 28 08/26/2020    AST 23 11/22/2018     BMP:    Lab Results   Component Value Date     09/30/2020     08/26/2020    K 4.7 09/30/2020    K 3.7 08/26/2020     09/30/2020     08/26/2020    CO2 25 09/30/2020    CO2 23 08/26/2020    BUN 15 09/30/2020    BUN 19 08/26/2020    CREATININE 0.9 09/30/2020    CREATININE 0.9 08/26/2020     CMP:   Lab Results   Component Value Date     09/30/2020     08/26/2020    K 4.7 09/30/2020    K 3.7 08/26/2020     09/30/2020     08/26/2020    CO2 25 09/30/2020    CO2 23 08/26/2020    BUN 15 09/30/2020    BUN 19 08/26/2020    CREATININE 0.9 09/30/2020    CREATININE 0.9 08/26/2020    PROT 6.9 08/26/2020    PROT 6.9 11/22/2018    PROT 7.1 02/05/2013    PROT 6.9 02/10/2012     TSH:    Lab Results   Component Value Date    TSHHS 3.240 08/26/2020       QUALITY MEASURES REVIEWED:  1.CAD:Patient is taking anti platelet agent:Yes  2. DYSLIPIDEMIA: Patient is on cholesterol lowering medication:Yes  3. Beta-Blocker therapy for CAD, if prior Myocardial Infarction:Yes  4. Atrial fibrillation & anticoagulation therapy Yes  5. Discussed weight management strategies. Impression:    1. THOMAS on CPAP    2. Paroxysmal atrial fibrillation ([facility])    3. SOB (shortness of breath)    4. NSVT (nonsustained ventricular tachycardia) ([facility])       Patient Active Problem List   Diagnosis Code    THOMAS on CPAP G47.33, Z99.89    COPD (chronic obstructive pulmonary disease) ([facility]) J44.9    Precordial pain R07.2    Unstable angina ([facility]) I20.0    Atrial fibrillation ([facility]) I48.91    Postural dizziness with near syncope R42, R55    Acute neck pain M54.2    SOB (shortness of breath) R06.02    Abnormal nuclear stress test R94.39    NSVT (nonsustained ventricular tachycardia) ([facility]) I47.2       Assessment & Plan:    CAD: None significant except LAD is small possibly diffusely diseased. HTN:well controlled on current medical regimen, see list above.               - changes in  treatment:   no   CARDIOMYOPATHY: None known   CONGESTIVE HEART FAILURE: NO KNOWN HISTORY.    VHD: No significant VHD noted  DYSLIPIDEMIA: Patient's profile is at / near Goal.no                                HDL is low                                Tolerating current medical regimen wellyes,                                 Does not tolerate medications well due to side effects                                See most recent Lab values in Labs section above. ARRHYTHMIAS: known                                Patient has H/O P. Atrial fibrillation                                He is rate controlled & on anticoagulation. OTHER RELEVANT DIAGNOSIS:as noted in patient's active problem list:  TESTS ORDERED:None this visit                                     All previously ordered tests reviewed. MEDICATIONS: discontinue Amlodipine. Increase Toprol XL to 50 mg daily. Office f/u three months. Primary/secondary prevention is the goal by aggressive risk modification, healthy and therapeutic life style changes for cardiovascular risk reduction.  Various goals are discussed and multiple questions answered.

## 2020-10-21 NOTE — LETTER
[facility]  1945  [id number]    Have you had any Chest Pain - No      Have you had any Shortness of Breath - Yes  If Yes - When on exertion    Have you had any dizziness - No      Have you had any palpitations - Yes  If Yes DO EKG - Do you feel your heart fluttering  How long does it last - minutes       Do you have any edema - No    Do you have a surgery or procedure scheduled in the near future - No

## 2020-10-21 NOTE — LETTER
[facility]  [address] [id number]  Phone: [phone]  Fax: [phone]    Rhianna Segal MD        October 21, 2020     Naeem Orozco, [address]  [address] [place] [id number]    Patient: Asia Lozano  MR Number: [id number]  YOB: 1945  Date of Visit: 10/21/2020    Dear Dr. Naeem Orozco:    Thank you for the request for consultation for Destiny Pandya to me for the evaluation of PAF. Below are the relevant portions of my assessment and plan of care. If you have questions, please do not hesitate to call me. I look forward to following Judy Gandhi along with you.     Sincerely,        Rhianna Segal MD

## 2020-10-21 NOTE — PROGRESS NOTES
[id number] Score for Atrial Fibrillation Stroke Risk   Risk   Factors  Component Value   C CHF No 0   H HTN No 0   A2 Age >= 75 No,  (80 y.o.) 0   D DM No 0   S2 Prior Stroke/TIA No 0   V Vascular Disease No 0   A Age 74-69 Yes,  (80 y.o.) 1   Sc Sex male 0    [id number]  Score  1   Score last updated 10/21/20 8:59 AM EDT

## 2020-10-21 NOTE — PATIENT INSTRUCTIONS
CAD: None significant except LAD is small possibly diffusely diseased. HTN:well controlled on current medical regimen, see list above. - changes in  treatment:   no   CARDIOMYOPATHY: None known   CONGESTIVE HEART FAILURE: NO KNOWN HISTORY.    VHD: No significant VHD noted  DYSLIPIDEMIA: Patient's profile is at / near Goal.no                                HDL is low                                Tolerating current medical regimen wellyes,                                 Does not tolerate medications well due to side effects                                See most recent Lab values in Labs section above. ARRHYTHMIAS: known                                Patient has H/O P. Atrial fibrillation                                He is rate controlled & on anticoagulation. OTHER RELEVANT DIAGNOSIS:as noted in patient's active problem list:  TESTS ORDERED:None this visit                                     All previously ordered tests reviewed. MEDICATIONS: discontinue Amlodipine. Increase Toprol XL to 50 mg daily. Office f/u three months. Primary/secondary prevention is the goal by aggressive risk modification, healthy and therapeutic life style changes for cardiovascular risk reduction. Various goals are discussed and multiple questions answered.

## 2020-10-22 NOTE — TELEPHONE ENCOUNTER
Spoke with Rep at [facility] patient assistance.   They have received all information and will forward it on to the final determination team.

## 2020-11-05 ENCOUNTER — TELEPHONE (OUTPATIENT)
Dept: CARDIOLOGY CLINIC | Age: 75
End: 2020-11-05

## 2020-11-05 NOTE — TELEPHONE ENCOUNTER
Patient has been denied for patient assistance as he has not met his out of pocket expense for the year. Patient will need samples and can reapply in 2021 once out of pocket expense has been met.

## 2020-11-05 NOTE — TELEPHONE ENCOUNTER
Patient requests samples of Eliquis as he has been denied for patient assistance. Patient requests via note that he dropped off and requests a call back once ready. [phone]. Eliquis 5 mg BID.

## 2020-11-07 ENCOUNTER — APPOINTMENT (OUTPATIENT)
Dept: GENERAL RADIOLOGY | Age: 75
DRG: 309 | End: 2020-11-07
Payer: MEDICARE

## 2020-11-07 ENCOUNTER — HOSPITAL ENCOUNTER (INPATIENT)
Age: 75
LOS: 2 days | Discharge: HOME | DRG: 309 | End: 2020-11-09
Attending: EMERGENCY MEDICINE | Admitting: HOSPITALIST
Payer: MEDICARE

## 2020-11-07 PROBLEM — I48.91 A-FIB (HCC): Status: ACTIVE | Noted: 2020-11-07

## 2020-11-07 LAB
ALBUMIN SERPL-MCNC: 4.5 GM/DL (ref 3.4–5)
ALP BLD-CCNC: 101 IU/L (ref 40–129)
ALT SERPL-CCNC: 31 U/L (ref 10–40)
ANION GAP SERPL CALCULATED.3IONS-SCNC: 16 MMOL/L (ref 4–16)
APTT: 34.5 SECONDS (ref 25.1–37.1)
AST SERPL-CCNC: 24 IU/L (ref 15–37)
BACTERIA: NEGATIVE /HPF
BASOPHILS ABSOLUTE: 0 K/CU MM
BASOPHILS RELATIVE PERCENT: 0.4 % (ref 0–1)
BILIRUB SERPL-MCNC: 0.3 MG/DL (ref 0–1)
BILIRUBIN URINE: NEGATIVE MG/DL
BLOOD, URINE: NEGATIVE
BUN BLDV-MCNC: 26 MG/DL (ref 6–23)
CALCIUM SERPL-MCNC: 9.1 MG/DL (ref 8.3–10.6)
CHLORIDE BLD-SCNC: 98 MMOL/L (ref 99–110)
CLARITY: CLEAR
CO2: 24 MMOL/L (ref 21–32)
COLOR: YELLOW
CREAT SERPL-MCNC: 1.2 MG/DL (ref 0.9–1.3)
DIFFERENTIAL TYPE: ABNORMAL
EKG ATRIAL RATE: 110 BPM
EKG ATRIAL RATE: 202 BPM
EKG DIAGNOSIS: NORMAL
EKG DIAGNOSIS: NORMAL
EKG Q-T INTERVAL: 264 MS
EKG Q-T INTERVAL: 278 MS
EKG QRS DURATION: 70 MS
EKG QRS DURATION: 72 MS
EKG QTC CALCULATION (BAZETT): 398 MS
EKG QTC CALCULATION (BAZETT): 459 MS
EKG R AXIS: -13 DEGREES
EKG R AXIS: -8 DEGREES
EKG T AXIS: -10 DEGREES
EKG T AXIS: 179 DEGREES
EKG VENTRICULAR RATE: 137 BPM
EKG VENTRICULAR RATE: 164 BPM
EOSINOPHILS ABSOLUTE: 0.1 K/CU MM
EOSINOPHILS RELATIVE PERCENT: 0.7 % (ref 0–3)
ESTIMATED AVERAGE GLUCOSE: 140 MG/DL
GFR AFRICAN AMERICAN: >60 ML/MIN/1.73M2
GFR NON-AFRICAN AMERICAN: 59 ML/MIN/1.73M2
GLUCOSE BLD-MCNC: 209 MG/DL (ref 70–99)
GLUCOSE, URINE: 50 MG/DL
HBA1C MFR BLD: 6.5 % (ref 4.2–6.3)
HCT VFR BLD CALC: 50.5 % (ref 42–52)
HEMOGLOBIN: 15.8 GM/DL (ref 13.5–18)
IMMATURE NEUTROPHIL %: 0.6 % (ref 0–0.43)
INR BLD: 1.11 INDEX
KETONES, URINE: NEGATIVE MG/DL
LACTATE: 2.9 MMOL/L (ref 0.4–2)
LEUKOCYTE ESTERASE, URINE: NEGATIVE
LYMPHOCYTES ABSOLUTE: 2.1 K/CU MM
LYMPHOCYTES RELATIVE PERCENT: 20.5 % (ref 24–44)
MAGNESIUM: 2.1 MG/DL (ref 1.8–2.4)
MCH RBC QN AUTO: 29.2 PG (ref 27–31)
MCHC RBC AUTO-ENTMCNC: 31.3 % (ref 32–36)
MCV RBC AUTO: 93.2 FL (ref 78–100)
MONOCYTES ABSOLUTE: 1.1 K/CU MM
MONOCYTES RELATIVE PERCENT: 11 % (ref 0–4)
MUCUS: ABNORMAL HPF
NITRITE URINE, QUANTITATIVE: NEGATIVE
NUCLEATED RBC %: 0 %
PDW BLD-RTO: 13.5 % (ref 11.7–14.9)
PH, URINE: 5 (ref 5–8)
PLATELET # BLD: 311 K/CU MM (ref 140–440)
PMV BLD AUTO: 11.1 FL (ref 7.5–11.1)
POTASSIUM SERPL-SCNC: 3.8 MMOL/L (ref 3.5–5.1)
PRO-BNP: 4306 PG/ML
PROTEIN UA: NEGATIVE MG/DL
PROTHROMBIN TIME: 13.4 SECONDS (ref 11.7–14.5)
RBC # BLD: 5.42 M/CU MM (ref 4.6–6.2)
RBC URINE: ABNORMAL /HPF (ref 0–3)
SEGMENTED NEUTROPHILS ABSOLUTE COUNT: 6.9 K/CU MM
SEGMENTED NEUTROPHILS RELATIVE PERCENT: 66.8 % (ref 36–66)
SODIUM BLD-SCNC: 138 MMOL/L (ref 135–145)
SPECIFIC GRAVITY UA: 1.02 (ref 1–1.03)
SQUAMOUS EPITHELIAL: <1 /HPF
TOTAL CK: 78 IU/L (ref 38–174)
TOTAL IMMATURE NEUTOROPHIL: 0.06 K/CU MM
TOTAL NUCLEATED RBC: 0 K/CU MM
TOTAL PROTEIN: 7.6 GM/DL (ref 6.4–8.2)
TRICHOMONAS: ABNORMAL /HPF
TROPONIN T: <0.01 NG/ML
TROPONIN T: <0.01 NG/ML
TSH HIGH SENSITIVITY: 3.23 UIU/ML (ref 0.27–4.2)
UROBILINOGEN, URINE: NORMAL MG/DL (ref 0.2–1)
WBC # BLD: 10.3 K/CU MM (ref 4–10.5)
WBC UA: ABNORMAL /HPF (ref 0–2)

## 2020-11-07 PROCEDURE — 2580000003 HC RX 258: Performed by: EMERGENCY MEDICINE

## 2020-11-07 PROCEDURE — 2500000003 HC RX 250 WO HCPCS: Performed by: EMERGENCY MEDICINE

## 2020-11-07 PROCEDURE — 99223 1ST HOSP IP/OBS HIGH 75: CPT | Performed by: INTERNAL MEDICINE

## 2020-11-07 PROCEDURE — 93010 ELECTROCARDIOGRAM REPORT: CPT | Performed by: INTERNAL MEDICINE

## 2020-11-07 PROCEDURE — 83036 HEMOGLOBIN GLYCOSYLATED A1C: CPT

## 2020-11-07 PROCEDURE — 85610 PROTHROMBIN TIME: CPT

## 2020-11-07 PROCEDURE — 2140000000 HC CCU INTERMEDIATE R&B

## 2020-11-07 PROCEDURE — 99285 EMERGENCY DEPT VISIT HI MDM: CPT

## 2020-11-07 PROCEDURE — APPNB30 APP NON BILLABLE TIME 0-30 MINS: Performed by: NURSE PRACTITIONER

## 2020-11-07 PROCEDURE — 80053 COMPREHEN METABOLIC PANEL: CPT

## 2020-11-07 PROCEDURE — 85025 COMPLETE CBC W/AUTO DIFF WBC: CPT

## 2020-11-07 PROCEDURE — 83605 ASSAY OF LACTIC ACID: CPT

## 2020-11-07 PROCEDURE — 2500000003 HC RX 250 WO HCPCS: Performed by: HOSPITALIST

## 2020-11-07 PROCEDURE — 93005 ELECTROCARDIOGRAM TRACING: CPT | Performed by: EMERGENCY MEDICINE

## 2020-11-07 PROCEDURE — 84443 ASSAY THYROID STIM HORMONE: CPT

## 2020-11-07 PROCEDURE — 83880 ASSAY OF NATRIURETIC PEPTIDE: CPT

## 2020-11-07 PROCEDURE — 71045 X-RAY EXAM CHEST 1 VIEW: CPT

## 2020-11-07 PROCEDURE — 94761 N-INVAS EAR/PLS OXIMETRY MLT: CPT

## 2020-11-07 PROCEDURE — 96375 TX/PRO/DX INJ NEW DRUG ADDON: CPT

## 2020-11-07 PROCEDURE — 6370000000 HC RX 637 (ALT 250 FOR IP): Performed by: EMERGENCY MEDICINE

## 2020-11-07 PROCEDURE — 83735 ASSAY OF MAGNESIUM: CPT

## 2020-11-07 PROCEDURE — 85730 THROMBOPLASTIN TIME PARTIAL: CPT

## 2020-11-07 PROCEDURE — 82550 ASSAY OF CK (CPK): CPT

## 2020-11-07 PROCEDURE — 36415 COLL VENOUS BLD VENIPUNCTURE: CPT

## 2020-11-07 PROCEDURE — 96374 THER/PROPH/DIAG INJ IV PUSH: CPT

## 2020-11-07 PROCEDURE — 84484 ASSAY OF TROPONIN QUANT: CPT

## 2020-11-07 PROCEDURE — 6370000000 HC RX 637 (ALT 250 FOR IP): Performed by: HOSPITALIST

## 2020-11-07 PROCEDURE — 2580000003 HC RX 258: Performed by: HOSPITALIST

## 2020-11-07 PROCEDURE — 81001 URINALYSIS AUTO W/SCOPE: CPT

## 2020-11-07 RX ORDER — TAMSULOSIN HYDROCHLORIDE 0.4 MG/1
0.4 CAPSULE ORAL DAILY
Status: DISCONTINUED | OUTPATIENT
Start: 2020-11-07 | End: 2020-11-09 | Stop reason: HOSPADM

## 2020-11-07 RX ORDER — PANTOPRAZOLE SODIUM 40 MG/1
40 TABLET, DELAYED RELEASE ORAL
Status: DISCONTINUED | OUTPATIENT
Start: 2020-11-08 | End: 2020-11-09 | Stop reason: HOSPADM

## 2020-11-07 RX ORDER — GABAPENTIN 300 MG/1
300 CAPSULE ORAL 3 TIMES DAILY
Status: DISCONTINUED | OUTPATIENT
Start: 2020-11-07 | End: 2020-11-09 | Stop reason: HOSPADM

## 2020-11-07 RX ORDER — 0.9 % SODIUM CHLORIDE 0.9 %
1000 INTRAVENOUS SOLUTION INTRAVENOUS ONCE
Status: COMPLETED | OUTPATIENT
Start: 2020-11-07 | End: 2020-11-07

## 2020-11-07 RX ORDER — PROMETHAZINE HYDROCHLORIDE 25 MG/1
12.5 TABLET ORAL EVERY 6 HOURS PRN
Status: DISCONTINUED | OUTPATIENT
Start: 2020-11-07 | End: 2020-11-09 | Stop reason: HOSPADM

## 2020-11-07 RX ORDER — FENTANYL CITRATE 50 UG/ML
25 INJECTION, SOLUTION INTRAMUSCULAR; INTRAVENOUS
Status: DISCONTINUED | OUTPATIENT
Start: 2020-11-07 | End: 2020-11-09 | Stop reason: HOSPADM

## 2020-11-07 RX ORDER — DILTIAZEM HYDROCHLORIDE 5 MG/ML
10 INJECTION INTRAVENOUS ONCE
Status: COMPLETED | OUTPATIENT
Start: 2020-11-07 | End: 2020-11-07

## 2020-11-07 RX ORDER — ASPIRIN 81 MG/1
81 TABLET, CHEWABLE ORAL DAILY
Status: DISCONTINUED | OUTPATIENT
Start: 2020-11-07 | End: 2020-11-09 | Stop reason: HOSPADM

## 2020-11-07 RX ORDER — METOPROLOL SUCCINATE 50 MG/1
50 TABLET, EXTENDED RELEASE ORAL DAILY
Status: DISCONTINUED | OUTPATIENT
Start: 2020-11-08 | End: 2020-11-09

## 2020-11-07 RX ORDER — ONDANSETRON 2 MG/ML
4 INJECTION INTRAMUSCULAR; INTRAVENOUS EVERY 6 HOURS PRN
Status: DISCONTINUED | OUTPATIENT
Start: 2020-11-07 | End: 2020-11-09 | Stop reason: HOSPADM

## 2020-11-07 RX ORDER — POLYETHYLENE GLYCOL 3350 17 G/17G
17 POWDER, FOR SOLUTION ORAL DAILY PRN
Status: DISCONTINUED | OUTPATIENT
Start: 2020-11-07 | End: 2020-11-09 | Stop reason: HOSPADM

## 2020-11-07 RX ORDER — ONDANSETRON 2 MG/ML
4 INJECTION INTRAMUSCULAR; INTRAVENOUS EVERY 30 MIN PRN
Status: DISCONTINUED | OUTPATIENT
Start: 2020-11-07 | End: 2020-11-09 | Stop reason: HOSPADM

## 2020-11-07 RX ORDER — ACETAMINOPHEN 650 MG/1
650 SUPPOSITORY RECTAL EVERY 6 HOURS PRN
Status: DISCONTINUED | OUTPATIENT
Start: 2020-11-07 | End: 2020-11-09 | Stop reason: HOSPADM

## 2020-11-07 RX ORDER — ATORVASTATIN CALCIUM 40 MG/1
40 TABLET, FILM COATED ORAL NIGHTLY
Status: DISCONTINUED | OUTPATIENT
Start: 2020-11-07 | End: 2020-11-09 | Stop reason: HOSPADM

## 2020-11-07 RX ORDER — ASPIRIN 81 MG/1
324 TABLET, CHEWABLE ORAL ONCE
Status: COMPLETED | OUTPATIENT
Start: 2020-11-07 | End: 2020-11-07

## 2020-11-07 RX ORDER — SODIUM CHLORIDE 0.9 % (FLUSH) 0.9 %
10 SYRINGE (ML) INJECTION PRN
Status: DISCONTINUED | OUTPATIENT
Start: 2020-11-07 | End: 2020-11-09 | Stop reason: HOSPADM

## 2020-11-07 RX ORDER — METOPROLOL TARTRATE 5 MG/5ML
5 INJECTION INTRAVENOUS ONCE
Status: COMPLETED | OUTPATIENT
Start: 2020-11-07 | End: 2020-11-07

## 2020-11-07 RX ORDER — DILTIAZEM HYDROCHLORIDE 5 MG/ML
10 INJECTION INTRAVENOUS ONCE
Status: DISCONTINUED | OUTPATIENT
Start: 2020-11-07 | End: 2020-11-07

## 2020-11-07 RX ORDER — SODIUM CHLORIDE 0.9 % (FLUSH) 0.9 %
10 SYRINGE (ML) INJECTION EVERY 12 HOURS SCHEDULED
Status: DISCONTINUED | OUTPATIENT
Start: 2020-11-07 | End: 2020-11-09 | Stop reason: HOSPADM

## 2020-11-07 RX ORDER — ACETAMINOPHEN 325 MG/1
650 TABLET ORAL EVERY 6 HOURS PRN
Status: DISCONTINUED | OUTPATIENT
Start: 2020-11-07 | End: 2020-11-09 | Stop reason: HOSPADM

## 2020-11-07 RX ORDER — TRAMADOL HYDROCHLORIDE 50 MG/1
50 TABLET ORAL EVERY 6 HOURS PRN
Status: DISCONTINUED | OUTPATIENT
Start: 2020-11-07 | End: 2020-11-09 | Stop reason: HOSPADM

## 2020-11-07 RX ADMIN — METOPROLOL TARTRATE 5 MG: 5 INJECTION, SOLUTION INTRAVENOUS at 06:45

## 2020-11-07 RX ADMIN — DEXTROSE MONOHYDRATE 5 MG/HR: 50 INJECTION, SOLUTION INTRAVENOUS at 07:14

## 2020-11-07 RX ADMIN — SODIUM CHLORIDE, PRESERVATIVE FREE 10 ML: 5 INJECTION INTRAVENOUS at 21:14

## 2020-11-07 RX ADMIN — GABAPENTIN 300 MG: 300 CAPSULE ORAL at 21:13

## 2020-11-07 RX ADMIN — ATORVASTATIN CALCIUM 40 MG: 40 TABLET, FILM COATED ORAL at 21:14

## 2020-11-07 RX ADMIN — ASPIRIN 81 MG CHEWABLE TABLET 324 MG: 81 TABLET CHEWABLE at 06:41

## 2020-11-07 RX ADMIN — SODIUM CHLORIDE 1000 ML: 9 INJECTION, SOLUTION INTRAVENOUS at 07:30

## 2020-11-07 RX ADMIN — APIXABAN 5 MG: 5 TABLET, FILM COATED ORAL at 21:13

## 2020-11-07 RX ADMIN — DEXTROSE MONOHYDRATE 10 MG/HR: 50 INJECTION, SOLUTION INTRAVENOUS at 17:17

## 2020-11-07 RX ADMIN — DILTIAZEM HYDROCHLORIDE 10 MG: 5 INJECTION INTRAVENOUS at 07:06

## 2020-11-07 RX ADMIN — SODIUM CHLORIDE 1000 ML: 9 INJECTION, SOLUTION INTRAVENOUS at 06:39

## 2020-11-07 ASSESSMENT — ENCOUNTER SYMPTOMS
EYES NEGATIVE: 1
ALLERGIC/IMMUNOLOGIC NEGATIVE: 1
SHORTNESS OF BREATH: 1
NAUSEA: 1

## 2020-11-07 ASSESSMENT — PAIN SCALES - GENERAL
PAINLEVEL_OUTOF10: 5
PAINLEVEL_OUTOF10: 0

## 2020-11-07 NOTE — ED NOTES
Assumed care of patient, he is resting, IV Cardizem infusing, no distress noted.        Ro Vanessa, RN  11/07/20 Jemima Valladares, BLAKE  11/07/20 0905

## 2020-11-07 NOTE — ED NOTES
Patient took his morning medications prior to coming to the ED this morning.        Jaci Robb RN  11/07/20 1012

## 2020-11-07 NOTE — ED PROVIDER NOTES
[facility]      TRIAGE CHIEF COMPLAINT:   Fatigue; Shortness of Breath; and Other (fluttering in his chest)      [place]:  Cassandra Payne is a 70 y.o. male that presents with complaint of worsening chest pain shortness of breath palpitations for the past couple days. History of A. fib he is on Eliquis he has been off and started back yesterday. States that his chest pain comes and goes left side it does not radiate anywhere else. Denies any fevers vomiting he has had some nausea some diaphoresis fatigue. No abdominal pain no swelling no rash. EKG on arrival read ST elevation MI patient just had a heart cath in October denies other questions or concerns. States no stents at that time. REVIEW OF SYSTEMS:  At least 10 systems reviewed and otherwise acutely negative except as in the [address] Review of Systems   Constitutional: Positive for diaphoresis and fatigue. HENT: Negative. Eyes: Negative. Respiratory: Positive for shortness of breath. Cardiovascular: Positive for chest pain and palpitations. Gastrointestinal: Positive for nausea. Endocrine: Negative. Genitourinary: Negative. Musculoskeletal: Negative. Skin: Negative. Allergic/Immunologic: Negative. Neurological: Negative. Hematological: Negative. Psychiatric/Behavioral: Negative. All other systems reviewed and are negative. Past Medical History:   Diagnosis Date    Abnormal MRI, spine     per pt herniated disc    Arthritis     Gastritis     H/O 24 hour EKG monitoring 06/24/2002 06/24/2002 baseline rhythm appears to be normal with an average HR of 66 bpm, slowest HR recored at 51 bpm, fastest at 97 bpm only 10 isolated  PVC's and one couplet were recorded, supraventricular ectopic activity is present, but not clinically significant, no long pauses recognized.     H/O cardiovascular stress test 03/16/1999, 12/28/2001,03/14/2006, 03/31/2008, 11/19/2008, 09/07/2011 09/07/2011 EF 61%, no angina or ischemic EKG changes, noted with Lexiscan, inferior wall reperfusion, global function is intact. resting /85, resting HR 79    H/O complete electrocardiogram 03/05/2012 03/05/2012 on chart    H/O CT scan of chest 11/19/2008 11/19/2008 no evidence of PE, no acute thoracic aortic pathology, incidental 5mm pleural based nodular density superior lateral right middle lobe.  H/O Doppler ultrasound no anatomical abnormalities in the segment s studied on either side, doppler curves are normal in configuration and amplitude in those segments bilaterally, normal flow velocities and flow velocity ratios. normal antegrade flow in the vertebral arteries bilaterally    H/O Doppler ultrasound 08/08/2005 08/08/2005 no significant obstructive lesions noted in the extracranial carotid system,antegrade flow noted in the vertebral arteries. no significant atherosclerotic plaque noted in right or left  common arteryintimal thickening in right and left internal carotids, intimal thickening in left external carotid.      H/O echocardiogram 06/24/2002 EF 55-60% 06/19/2006 EF 50-55%,09/06/2011 EF 50-55%    09/06/2011 EF 50-55%, normal size left ventricle showing preserved global systolic  function and no regional wall motion abnormalities, left ventricle shows  moderate concentric hypertrophy and signs of diastolic dysfunction, mildly dilated atrium,, aortic valve is sclerotic but nonstenotic, trace mitral and tricuspid  regurg    H/O tilt table evaluation 07/05/2001 07/05/2001 head up tilt table test after one nitroglycerin pt became nauseous, diaphoretic, BP dropped systolic to 85 pulse in 79'X with complete loss of consciousness with  slight seizure activity before table brought to horizontal, happened within 8 minutes of nitro tablet utilization, pt. regained consciousness HR and BP  as soon as table was brought to horizontal, lopressor discontinued    Hiatal hernia     History of cardiac cath 10/05/2020    No significant CAD. LAD diffusely diseased as it is very small in caliber. LVEF is 50 to 55 %.  History of nuclear stress test 2020    EF 62%, Large area of significant inferior wall ischemia    Hx of cardiac cath 1999, 2008oth the left main and circumflex are without significant disease, RCA is also without significant disease, LV gram shows normal size left ventricular cavity with normal global and regional left ventricular systolic function, no significant CAD, chest pain is probably non cardiac in etiology    Hypertension     Prolonged emergence from general anesthesia     Syncope and collapse     Unspecified sleep apnea     cpap    Wears glasses      Past Surgical History:   Procedure Laterality Date    BACK SURGERY      DIAGNOSTIC CARDIAC CATH LAB PROCEDURE  1999,2008, left main,circumflex, and RCA are without any significant disease, LV gram shows normal size left ventricular cavity with normal global and  regional left ventricular systolic function, pt has no significant CAD.     HERNIA REPAIR      TONSILLECTOMY       Family History   Problem Relation Age of Onset    Diabetes Mother     Heart Disease Mother     Other Mother         kidney stones    Cancer Mother         breast    Heart Disease Father     Diabetes Father      Social History     Socioeconomic History    Marital status:      Spouse name: Not on file    Number of children: 2    Years of education: Not on file    Highest education level: Not on file   Occupational History    Occupation: / instructor   Social Needs    Financial resource strain: Not on file    Food insecurity     Worry: Not on file     Inability: Not on file    Transportation needs     Medical: Not on file     Non-medical: Not on file   Tobacco Use    Smoking status: Former Smoker     Types: Cigarettes     Last attempt to quit: 1988     Years since quittin.8  Smokeless tobacco: Former User     Types: Chew    Tobacco comment: Quit chewing in Jan 2014   Substance and Sexual Activity    Alcohol use: Yes     Comment: moderate,  coffee two cups daily   Beer on the weekends    Drug use: No    Sexual activity: Not on file   Lifestyle    Physical activity     Days per week: Not on file     Minutes per session: Not on file    Stress: Not on file   Relationships    Social connections     Talks on phone: Not on file     Gets together: Not on file     Attends Mosque service: Not on file     Active member of club or organization: Not on file     Attends meetings of clubs or organizations: Not on file     Relationship status: Not on file    Intimate partner violence     Fear of current or ex partner: Not on file     Emotionally abused: Not on file     Physically abused: Not on file     Forced sexual activity: Not on file   Other Topics Concern    Not on file   Social History Narrative    Not on file     Current Facility-Administered Medications   Medication Dose Route Frequency Provider Last Rate Last Dose    0.9 % sodium chloride bolus  1,000 mL Intravenous Once Ladene Tiffanie, DO 1,000 mL/hr at 11/07/20 0639 1,000 mL at 11/07/20 0639    0.9 % sodium chloride bolus  1,000 mL Intravenous Once Ladene Tiffanie, DO        fentaNYL (SUBLIMAZE) injection 25 mcg  25 mcg Intravenous Q1H PRN Ladene Tiffanie, DO        ondansetron (ZOFRAN) injection 4 mg  4 mg Intravenous Q30 Min PRN Ladene Tiffanie, DO        dilTIAZem 100 mg in dextrose 5 % 100 mL infusion (ADD-[place])  5 mg/hr Intravenous Continuous Ladene Tiffanie, DO 5 mL/hr at 11/07/20 0714 5 mg/hr at 11/07/20 4464     Current Outpatient Medications   Medication Sig Dispense Refill    apixaban (ELIQUIS) 5 MG TABS tablet Take 1 tablet by mouth 2 times daily 42 tablet 0    gabapentin (NEURONTIN) 300 MG capsule Take 300 mg by mouth 3 times daily.       atorvastatin (LIPITOR) 40 MG tablet Take 1 tablet by mouth nightly 30 tablet 0    metoprolol succinate (TOPROL XL) 50 MG extended release tablet Take 1 tablet by mouth daily 30 tablet 3    Lactobacillus (PROBIOTIC ACIDOPHILUS PO) Take by mouth daily      tamsulosin (FLOMAX) 0.4 MG capsule 1 tablet 2 times daily      apixaban (ELIQUIS) 5 MG TABS tablet Take 1 tablet by mouth 2 times daily 60 tablet 1    aspirin 81 MG chewable tablet Take 1 tablet by mouth daily 30 tablet 0    omeprazole (PRILOSEC) 20 MG delayed release capsule Take 20 mg by mouth daily      citalopram (CELEXA) 20 MG tablet 10 mg       traMADol (ULTRAM) 50 MG tablet         No Known Allergies  Current Facility-Administered Medications   Medication Dose Route Frequency Provider Last Rate Last Dose    0.9 % sodium chloride bolus  1,000 mL Intravenous Once [employer], DO 1,000 mL/hr at 11/07/20 0639 1,000 mL at 11/07/20 0639    0.9 % sodium chloride bolus  1,000 mL Intravenous Once [employer] ponUp, DO        fentaNYL (SUBLIMAZE) injection 25 mcg  25 mcg Intravenous Q1H PRN [employer]axis, DO        ondansetron (ZOFRAN) injection 4 mg  4 mg Intravenous Q30 Min PRN [employer] ponUp, DO        dilTIAZem 100 mg in dextrose 5 % 100 mL infusion (ADD-[place])  5 mg/hr Intravenous Continuous Visteon ponUp, DO 5 mL/hr at 11/07/20 0714 5 mg/hr at 11/07/20 5326     Current Outpatient Medications   Medication Sig Dispense Refill    apixaban (ELIQUIS) 5 MG TABS tablet Take 1 tablet by mouth 2 times daily 42 tablet 0    gabapentin (NEURONTIN) 300 MG capsule Take 300 mg by mouth 3 times daily.       atorvastatin (LIPITOR) 40 MG tablet Take 1 tablet by mouth nightly 30 tablet 0    metoprolol succinate (TOPROL XL) 50 MG extended release tablet Take 1 tablet by mouth daily 30 tablet 3    Lactobacillus (PROBIOTIC ACIDOPHILUS PO) Take by mouth daily      tamsulosin (FLOMAX) 0.4 MG capsule 1 tablet 2 times daily      apixaban (ELIQUIS) 5 MG TABS tablet Take 1 tablet by mouth 2 times daily 60 tablet 1    aspirin 81 MG chewable tablet Take 1 tablet by mouth daily 30 tablet 0    omeprazole (PRILOSEC) 20 MG delayed release capsule Take 20 mg by mouth daily      citalopram (CELEXA) 20 MG tablet 10 mg       traMADol (ULTRAM) 50 MG tablet          Nursing Notes Reviewed    VITAL SIGNS:  ED Triage Vitals   Enc Vitals Group      BP       Pulse       Resp       Temp       Temp src       SpO2       Weight       Height       Head Circumference       Peak Flow       Pain Score       Pain Loc       Pain Edu? Excl. in [address]? PHYSICAL EXAM:  Physical Exam  Vitals signs and nursing note reviewed. Constitutional:       General: He is not in acute distress. Appearance: He is well-developed and well-groomed. He is ill-appearing. He is not toxic-appearing or diaphoretic. HENT:      Head: Normocephalic and atraumatic. Right Ear: External ear normal.      Left Ear: External ear normal.      Nose: No congestion or rhinorrhea. Eyes:      General: No scleral icterus. Right eye: No discharge. Left eye: No discharge. Extraocular Movements: Extraocular movements intact. Conjunctiva/sclera: Conjunctivae normal.   Neck:      Musculoskeletal: Normal range of motion. No neck rigidity. Cardiovascular:      Rate and Rhythm: Regular rhythm. Tachycardia present. Pulses: Normal pulses. Heart sounds: Normal heart sounds. No murmur. No friction rub. No gallop. Pulmonary:      Effort: Pulmonary effort is normal. No tachypnea or respiratory distress. Breath sounds: Normal breath sounds. No stridor. No wheezing, rhonchi or rales. Abdominal:      General: Bowel sounds are normal. There is no distension. There are no signs of injury. Palpations: Abdomen is soft. There is no mass or pulsatile mass. Tenderness: There is no abdominal tenderness. There is no guarding or rebound. Negative signs include Smith's sign, Rovsing's sign and McBurney's sign.       Hernia: No hernia is present. Musculoskeletal: Normal range of motion. General: No swelling, tenderness, deformity or signs of injury. Right lower leg: No edema. Left lower leg: No edema. Skin:     General: Skin is warm. Coloration: Skin is not jaundiced or pale. Findings: No bruising, erythema, lesion or rash. Neurological:      General: No focal deficit present. Mental Status: He is alert and oriented to person, place, and time. GCS: GCS eye subscore is 4. GCS verbal subscore is 5. GCS motor subscore is 6. Cranial Nerves: Cranial nerves are intact. No cranial nerve deficit, dysarthria or facial asymmetry. Sensory: Sensation is intact. Motor: Motor function is intact. No weakness, tremor, atrophy, abnormal muscle tone or seizure activity. Coordination: Coordination is intact. Psychiatric:         Mood and Affect: Mood normal.         Behavior: Behavior normal. Behavior is cooperative. Thought Content:  Thought content normal.         Judgment: Judgment normal.           I have reviewed andinterpreted all of the currently available lab results from this visit (if applicable):    Results for orders placed or performed during the hospital encounter of 11/07/20   CBC Auto Differential   Result Value Ref Range    WBC 10.3 4.0 - 10.5 K/CU MM    RBC 5.42 4.6 - 6.2 M/CU MM    Hemoglobin 15.8 13.5 - 18.0 GM/DL    Hematocrit 50.5 42 - 52 %    MCV 93.2 78 - 100 FL    MCH 29.2 27 - 31 PG    MCHC 31.3 (L) 32.0 - 36.0 %    RDW 13.5 11.7 - 14.9 %    Platelets 558 192 - 365 K/CU MM    MPV 11.1 7.5 - 11.1 FL    Differential Type AUTOMATED DIFFERENTIAL     Segs Relative 66.8 (H) 36 - 66 %    Lymphocytes % 20.5 (L) 24 - 44 %    Monocytes % 11.0 (H) 0 - 4 %    Eosinophils % 0.7 0 - 3 %    Basophils % 0.4 0 - 1 %    Segs Absolute 6.9 K/CU MM    Lymphocytes Absolute 2.1 K/CU MM    Monocytes Absolute 1.1 K/CU MM    Eosinophils Absolute 0.1 K/CU MM    Basophils Absolute 0.0 K/CU MM Nucleated RBC % 0.0 %    Total Nucleated RBC 0.0 K/CU MM    Total Immature Neutrophil 0.06 K/CU MM    Immature Neutrophil % 0.6 (H) 0 - 0.43 %   CMP   Result Value Ref Range    Sodium 138 135 - 145 MMOL/L    Potassium 3.8 3.5 - 5.1 MMOL/L    Chloride 98 (L) 99 - 110 mMol/L    CO2 24 21 - 32 MMOL/L    BUN 26 (H) 6 - 23 MG/DL    CREATININE 1.2 0.9 - 1.3 MG/DL    Glucose 209 (H) 70 - 99 MG/DL    Calcium 9.1 8.3 - 10.6 MG/DL    Alb 4.5 3.4 - 5.0 GM/DL    Total Protein 7.6 6.4 - 8.2 GM/DL    Total Bilirubin 0.3 0.0 - 1.0 MG/DL    ALT 31 10 - 40 U/L    AST 24 15 - 37 IU/L    Alkaline Phosphatase 101 40 - 129 IU/L    GFR Non- 59 (L) >60 mL/min/1.73m2    GFR African American >60 >60 mL/min/1.73m2    Anion Gap 16 4 - 16   Lactic Acid, Plasma   Result Value Ref Range    Lactate 2.9 (HH) 0.4 - 2.0 mMOL/L   Troponin   Result Value Ref Range    Troponin T <0.010 <0.01 NG/ML   CK   Result Value Ref Range    Total CK 78 38 - 174 IU/L   Brain Natriuretic Peptide   Result Value Ref Range    Pro-BNP 4,306 (H) <300 PG/ML   TSH without Reflex   Result Value Ref Range    TSH, High Sensitivity 3.230 0.270 - 4.20 uIu/ml   Magnesium   Result Value Ref Range    Magnesium 2.1 1.8 - 2.4 mg/dl   Protime/INR & PTT   Result Value Ref Range    Protime 13.4 11.7 - 14.5 SECONDS    INR 1.11 INDEX    aPTT 34.5 25.1 - 37.1 SECONDS   EKG 12 Lead   Result Value Ref Range    Ventricular Rate 137 BPM    Atrial Rate 110 BPM    QRS Duration 72 ms    Q-T Interval 264 ms    QTc Calculation (Bazett) 398 ms    R Axis -8 degrees    T Axis -10 degrees    Diagnosis       Atrial fibrillation with rapid ventricular response  Abnormal ECG  When compared with ECG of 07-NOV-2020 06:19,  Atrial fibrillation has replaced Sinus rhythm  ST no longer depressed in Anterior leads  Nonspecific T wave abnormality, worse in Inferior leads          Radiographs (if obtained):  [] The following radiograph was interpreted by myself in the absence of a radiologist:  [x] Radiologist's Report Reviewed:    CXR    EKG (if obtained): (All EKG's are interpreted by myself in the absence of a cardiologist)    12 lead EKG per my interpretation #1:  Supraventricular tachycardia 164 (STEMI but I don't think so)  Axis is   Normal  QTc is  459  There is no specific T wave changes appreciated. There is specific ST wave changes appreciated. ST depression V2 through V6  Prior EKG to compare with was not available     12 lead EKG per my interpretation #2:  Atrial Fibrillation 137  Axis is   Left axis deviation  QTc is  398  There is no specific T wave changes appreciated. There is no specific ST wave changes appreciated. Prior EKG to compare with was available and different than previous      Total critical care time today provided was 31 minutes. This excludes seperately billable procedure. Critical care time provided for reviewing labs, images, giving fluids, oxygen, lopressor, cardizem, consulting cardiology, consulting medicine that required close evaluation and/or intervention with concern for patient decompensation. MDM:    Here chest pain shortness of breath palpitations nausea lightheadedness. Again symptoms started past couple days getting worse. He has a history of A. fib he is on Eliquis he was off for couple weeks but then started back yesterday. He appears well on arrival he is tachycardic in the 160 range it is bouncing around EKG read SVT, STEMI on arrival I did call   A STEMI alert patient does appear well I did send EKG to  cardiology who agrees this is not a STEMI likely rate related would like me to give Lopressor or Cardizem for possible A. fib RVR or SVT. Patient will be given fluids pain nausea medicine oxygen will try Lopressor repeat EKG will admit to hospital medicine patient otherwise is well-appearing work-up performed patient given Lopressor which did work temporarily but back into A. fib RVR. Second EKG does show A. fib.   Patient put on Cardizem bolus and drip doing much better. Given fluids work-up otherwise negative will admit patient to hospital medicine again I did talk to cardiology and hospital medicine seems stable. CLINICAL IMPRESSION:  Final diagnoses:   Chest pain, unspecified type   Dyspnea, unspecified type   Palpitations   Nausea   Atrial fibrillation with RVR ([address]       (Please note that portions of this note may have been completed with a voice recognition program. Efforts were made to edit the dictations but occasionally words aremis-transcribed.)    DISPOSITION REFERRAL (if applicable):  No follow-up provider specified.     DISPOSITION MEDICATIONS (if applicable):  New Prescriptions    No medications on file          Annabel Saman, [facility],   11/07/20 2780

## 2020-11-07 NOTE — CONSULTS
CARDIOLOGY CONSULT NOTE     Reason for consultation:  Atrial fib with RVR     Referring physician:  Claudio Peck MD     Primary care physician: Jaquan Lopez MD      Dear  Dr. Claudio Peck MD   Thanks for the consult. Chief Complaints :  Chief Complaint   Patient presents with    Fatigue    Shortness of Breath    Other     fluttering in his chest        History of present illness:Vaibhav is a 70 y. o.year old gentleman with a history of hypertension and atrial fibrillation who presents with dizziness and lightheadedness. He notes that yesterday when he was getting up walking around he felt a little dizzy and lightheaded. He felt that his balance was off. He checked his heart rate and noticed it would range anywhere between 45 up to the 60s. He well-balanced diet. He did get up this morning take get a shower to get ready for work and felt again lightheaded and dizziness and decided to present to emergency room. EKG in the emergency room showed atrial flutter with RVR, hence cardiology was consulted. Patient was admitted back in August and noted to be in atrial fib. At that time he converted to sinus rhythm. Echocardiogram  showed mild LVH mildly dilated LA. Left ventricular systolic function was normal estimated at 55%      Stress test September 30, 2020 showed large area of significant inferior wall ischemia. He underwent a left heart catheterization with Dr. Jessica Chaudhry. Heart cath showed no significant CAD. The LAD was diffusely diseased and is described is a very small caliber vessel.   EF was noted to 50 to 55%      Past medical history:    has a past medical history of Abnormal MRI, spine, Arthritis, Gastritis, H/O 24 hour EKG monitoring, H/O cardiovascular stress test, H/O complete electrocardiogram, H/O CT scan of chest, H/O Doppler ultrasound, H/O Doppler ultrasound, H/O echocardiogram, H/O tilt table evaluation, Hiatal hernia, History of cardiac cath, History of nuclear stress test, Hx of cardiac cath, Hypertension, Prolonged emergence from general anesthesia, Syncope and collapse, Unspecified sleep apnea, and Wears glasses. Past surgical history:   has a past surgical history that includes hernia repair; Diagnostic Cardiac Cath Lab Procedure (03/17/1999,11/19/2008); Tonsillectomy; and back surgery. Social History:   reports that he quit smoking about 32 years ago. His smoking use included cigarettes. He has quit using smokeless tobacco.  His smokeless tobacco use included chew. He reports current alcohol use. He reports that he does not use drugs.   Family history:   no family history of CAD, STROKE of DM at early age    No Known Allergies    fentaNYL (SUBLIMAZE) injection 25 mcg, Q1H PRN  ondansetron (ZOFRAN) injection 4 mg, Q30 Min PRN  dilTIAZem 100 mg in dextrose 5 % 100 mL infusion (ADD-[place]), Continuous  apixaban (ELIQUIS) tablet 5 mg, BID  aspirin chewable tablet 81 mg, Daily  atorvastatin (LIPITOR) tablet 40 mg, Nightly  gabapentin (NEURONTIN) capsule 300 mg, TID  [START ON 11/8/2020] metoprolol succinate (TOPROL XL) extended release tablet 50 mg, Daily  [START ON 11/8/2020] pantoprazole (PROTONIX) tablet 40 mg, QAM AC  tamsulosin (FLOMAX) capsule 0.4 mg, Daily  traMADol (ULTRAM) tablet 50 mg, Q6H PRN  sodium chloride flush 0.9 % injection 10 mL, 2 times per day  sodium chloride flush 0.9 % injection 10 mL, PRN  acetaminophen (TYLENOL) tablet 650 mg, Q6H PRN    Or  acetaminophen (TYLENOL) suppository 650 mg, Q6H PRN  polyethylene glycol (GLYCOLAX) packet 17 g, Daily PRN  promethazine (PHENERGAN) tablet 12.5 mg, Q6H PRN    Or  ondansetron (ZOFRAN) injection 4 mg, Q6H PRN      Current Facility-Administered Medications   Medication Dose Route Frequency Provider Last Rate Last Dose    fentaNYL (SUBLIMAZE) injection 25 mcg  25 mcg Intravenous Q1H PRN Kiran Reynolds DO        ondansetron (ZOFRAN) injection 4 mg  4 mg Intravenous Q30 Min PRN Kiran Reynolds DO        dilTIAZem 100 mg in dextrose 5 % 100 mL infusion (ADD-[place])  5 mg/hr Intravenous Continuous Render MD Sebastian 10 mL/hr at 11/07/20 0900 10 mg/hr at 11/07/20 0900    apixaban (ELIQUIS) tablet 5 mg  5 mg Oral BID Render MD Sebastian        aspirin chewable tablet 81 mg  81 mg Oral Daily Render MD Sebastian        atorvastatin (LIPITOR) tablet 40 mg  40 mg Oral Nightly Render MD Sebastian        gabapentin (NEURONTIN) capsule 300 mg  300 mg Oral TID Render MD Sebastian       [facility] [START ON 11/8/2020] metoprolol succinate (TOPROL XL) extended release tablet 50 mg  50 mg Oral Daily Render MD Sebastian       [facility] [START ON 11/8/2020] pantoprazole (PROTONIX) tablet 40 mg  40 mg Oral QAM AC Render MD Sebastian        tamsulosin (FLOMAX) capsule 0.4 mg  0.4 mg Oral Daily Render MD Sebastian        traMADol (ULTRAM) tablet 50 mg  50 mg Oral Q6H PRN Render MD Sebastian        sodium chloride flush 0.9 % injection 10 mL  10 mL Intravenous 2 times per day Render MD Sebastian        sodium chloride flush 0.9 % injection 10 mL  10 mL Intravenous PRN Render MD Sebastian        acetaminophen (TYLENOL) tablet 650 mg  650 mg Oral Q6H PRN Render MD Sebastian        Or    acetaminophen (TYLENOL) suppository 650 mg  650 mg Rectal Q6H PRN Render MD Sebastian        polyethylene glycol (GLYCOLAX) packet 17 g  17 g Oral Daily PRN Render MD Sebastian        promethazine (PHENERGAN) tablet 12.5 mg  12.5 mg Oral Q6H PRN Render MD Sebastian        Or    ondansetron (ZOFRAN) injection 4 mg  4 mg Intravenous Q6H PRN Render MD Sebastian         Review of Systems:   · Constitutional: No Fever or Weight Loss   · Eyes: No Decreased Vision  · ENT: No Headaches, Hearing Loss or Vertigo  · Cardiovascular: As per HPI  · Respiratory: As per HPI  · Gastrointestinal: No abdominal pain, appetite loss, blood in stools, constipation, diarrhea or heartburn  · Genitourinary: No dysuria, trouble voiding, or hematuria  · Musculoskeletal:  No gait disturbance, +weakness  · Integumentary: No rash or pruritis  · Neurological: No TIA or stroke symptoms- dizzy lightheaded   · Psychiatric: No anxiety or depression  · Endocrine: No malaise, fatigue or temperature intolerance  · Hematologic/Lymphatic: No bleeding problems, blood clots or swollen lymph nodes  · Allergic/Immunologic: No nasal congestion or hives  All systems negative except as marked. Physical Examination:    Vitals:    11/07/20 1000 11/07/20 1044 11/07/20 1100 11/07/20 1131   BP: (!) 128/94  123/80    Pulse: 91  113    Resp: 17 19 16    Temp:    97.5 °F (36.4 °C)   TempSrc:    Oral   SpO2: 95% 96% 95%    Weight:    253 lb (114.8 kg)   Height:    6' 2\" (1.88 m)       General Appearance:  No distress, conversant  Constitutional:  Well developed, Well nourished, No acute distress, Non-toxic appearance. HENT:  Normocephalic, Atraumatic, Bilateral external ears normal, Oropharynx moist, No oral exudates, Nose normal.   Neck- trachea is midline    Lymphatics: no palpable lymph nodes  Eyes:  PERRL, Conjunctiva normal, No discharge. Respiratory:  Normal breath sounds, No respiratory distress, No wheezing, No chest tenderness, no use of accessory muscles  Cardiovascular: (PMI):  IRRR, S1 and S2 audible, no murmur appreciated, JVD not noted,  apex non displaced, no lifts, no thrills, edema to lower legs Absent   Abdomen /GI: Soft, No tenderness, No masses, No gross visceromegaly   :  No costovertebral angle tenderness   Musculoskeletal:   no tenderness, no deformities.  Back- no tenderness  Integument:  Well hydrated, no rash   Lymphatic:  No lymphadenopathy noted   Neurologic:  Alert & oriented x 3, CN 2-12 grossly normal      Medical decision making and Data review:    Lab Review   Recent Labs     11/07/20  0630   WBC 10.3   HGB 15.8   HCT 50.5         Recent Labs     11/07/20  0630      K 3.8   CL 98*   CO2 24   BUN 26*   CREATININE 1.2     Recent Labs     11/07/20  0630   AST 24   ALT 31   BILITOT 0.3   ALKPHOS 101     Recent Labs     11/07/20  0637   TROPONINT <0.010 Recent Labs     11/07/20  0630   PROBNP 4,306*     Lab Results   Component Value Date    INR 1.11 11/07/2020    PROTIME 13.4 11/07/2020       EKG:atrial fib with RVR     ECHO: 08/26/2020  Summary   Left ventricular systolic function is normal.   Ejection fraction is visually estimated at 55%. Mild left ventricular hypertrophy. Mildly dilated left atrium. No evidence of any pericardial effusion    Chest Xray:  Xr Chest Portable    Result Date: 11/7/2020  EXAMINATION: ONE XRAY VIEW OF THE CHEST 11/7/2020 6:44 am COMPARISON: Chest radiograph August 26, 2020 HISTORY: ORDERING SYSTEM PROVIDED HISTORY: CP/dyspnea TECHNOLOGIST PROVIDED HISTORY: Reason for exam:->CP/dyspnea Reason for Exam: CP/dyspnea Acuity: Unknown Type of Exam: Initial FINDINGS: The lungs are without acute focal process. There is no effusion or pneumothorax. The cardiomediastinal silhouette is without acute process. The osseous structures are without acute process. No acute process. All labs, medications and tests reviewed by myself including data  from outside source , patient and available family . Continue all other medications of all above medical condition listed as is. Impression:  Active Problems:    A-fib (HCC)  Resolved Problems:    * No resolved hospital problems. *      Assessment: 70 y. o.year old with PMH of  has a past medical history of Abnormal MRI, spine, Arthritis, Gastritis, H/O 24 hour EKG monitoring, H/O cardiovascular stress test, H/O complete electrocardiogram, H/O CT scan of chest, H/O Doppler ultrasound, H/O Doppler ultrasound, H/O echocardiogram, H/O tilt table evaluation, Hiatal hernia, History of cardiac cath, History of nuclear stress test, Hx of cardiac cath, Hypertension, Prolonged emergence from general anesthesia, Syncope and collapse, Unspecified sleep apnea, and Wears glasses. Plan and Recommendations:  1.  Atrial fib with RVR started on Cardizem drip  Patient has a history of having atrial fib.  He has not taken his Eliquis for the last week to week and a half. We will continue with rate control at this time, can consider MARIBEL with cardioversion if patient does not convert -continue with beta-blocker  2. Dizziness-check orthostatics; may be secondary to A. fib RVR  3. Hypertension- controlled   4. CAD- no significant disease left heart cath LAD small vessel with possible diffuse disease- medical management - ASA and statin  5. THOMAS- recommend to use CPAP    [id number] Score 2 based on age and sex        Thank you  much for consult and giving us the opportunity in contributing in the care of this patient. Please feel free to call me for any questions.        Elsa Brown, APRN - CNP, 11/7/2020 1:58 PM

## 2020-11-07 NOTE — PROGRESS NOTES
Physician Progress Note      PATIENT:               Lety Barnes  CSN #:                  [id number]  :                       1945  ADMIT DATE:       2020 6:26 AM  DISCH DATE:  RESPONDING  PROVIDER #:        Felipe Vital MD          QUERY TEXT:    Hospitalists,    Patient admitted with chest pain & palpitation, noted to have atrial   fibrillation. If possible, please document in progress notes and discharge   summary further specificity regarding the type of atrial fibrillation: The medical record reflects the following:  Risk Factors: CAD  Clinical Indicators: documentation of a-fib w/ RVR  Treatment: Cardizem drip, Eliquis    Thank you,  Uriah Simmons RN CDS    Chronic: nonspecific term that could be referring to paroxysmal, persistent,   or permanent  Longstanding persistent: persistent and continuous, lasting > 1 year. Paroxysmal - self-terminating or intermittent; resolves with or without   intervention within 7 days of onset; may recur with various frequency. Persistent - Fails to terminate within 7 days; Often requires meds or   cardioversion to restore to NSR. Permanent - longstanding & persistent; Medication has been ineffective in   restoring NSR &/or cardioversion is contraindicated    Definitions per MS-DRG Training Guide and Quick Reference Guide, [facility] 112 5   Diseases and Disorders of the Circulatory System; 2019; K121. Software content   from the K121? Advanced CDI Transformation Program  Options provided:  -- Paroxysmal Atrial Fibrillation  -- Longstanding Persistent Atrial Fibrillation  -- Permanent Atrial Fibrillation  -- Persistent Atrial Fibrillation  -- Chronic Atrial Fibrillation, unspecified  -- Other - I will add my own diagnosis  -- Disagree - Not applicable / Not valid  -- Disagree - Clinically unable to determine / Unknown  -- Refer to Clinical Documentation Reviewer    PROVIDER RESPONSE TEXT:    This patient has paroxysmal atrial fibrillation.     Query created by: Jackye Favre on 11/7/2020 11:51 AM      Electronically signed by:  Neal Lua MD 11/7/2020 11:54 AM

## 2020-11-07 NOTE — ED TRIAGE NOTES
Pt presents to ED c/o SOB, fluttering in his chest, and fatigue. Pt states this has been going on for the past couple days. Pt rates pain 3/10. Pt is alert and oriented.

## 2020-11-07 NOTE — CONSULTS
CARDIOLOGY CONSULT NOTE   Reason for consultation:  afib    Referring physician:  Nancy Cotter MD     Primary care physician: Katraina Hansen MD      Dear  Dr. Nancy Cotter MD   Thanks for the consult. Chief Complaints :  Chief Complaint   Patient presents with    Fatigue    Shortness of Breath    Other     fluttering in his chest        History of present illness:Vaibhav is a 70 y. o.year old who presents with primarily complaining of feeling dizzy lightheaded heart fluttering he was noted to be in atrial fibrillation in the emergency department heart rate up to 160s 170s. He says at home he was getting lightheaded and dizzy. He does have history of atrial fibrillation but did miss his anticoagulation due to nonavailability recently. Cardiac cath last month showed diffusely small LAD but no significant obstructive disease. Echo shows normal EF with no valvular disease  Past medical history:    has a past medical history of Abnormal MRI, spine, Arthritis, Gastritis, H/O 24 hour EKG monitoring, H/O cardiovascular stress test, H/O complete electrocardiogram, H/O CT scan of chest, H/O Doppler ultrasound, H/O Doppler ultrasound, H/O echocardiogram, H/O tilt table evaluation, Hiatal hernia, History of cardiac cath, History of nuclear stress test, Hx of cardiac cath, Hypertension, Prolonged emergence from general anesthesia, Syncope and collapse, Unspecified sleep apnea, and Wears glasses. Past surgical history:   has a past surgical history that includes hernia repair; Diagnostic Cardiac Cath Lab Procedure (03/17/1999,11/19/2008); Tonsillectomy; and back surgery. Social History:   reports that he quit smoking about 32 years ago. His smoking use included cigarettes. He has quit using smokeless tobacco.  His smokeless tobacco use included chew. He reports current alcohol use. He reports that he does not use drugs.   Family history:   no family history of CAD, STROKE of DM at early age    No Known Allergies    fentaNYL (SUBLIMAZE) injection 25 mcg, Q1H PRN  ondansetron (ZOFRAN) injection 4 mg, Q30 Min PRN  dilTIAZem 100 mg in dextrose 5 % 100 mL infusion (ADD-[place]), Continuous  apixaban (ELIQUIS) tablet 5 mg, BID  aspirin chewable tablet 81 mg, Daily  atorvastatin (LIPITOR) tablet 40 mg, Nightly  gabapentin (NEURONTIN) capsule 300 mg, TID  [START ON 11/8/2020] metoprolol succinate (TOPROL XL) extended release tablet 50 mg, Daily  [START ON 11/8/2020] pantoprazole (PROTONIX) tablet 40 mg, QAM AC  tamsulosin (FLOMAX) capsule 0.4 mg, Daily  traMADol (ULTRAM) tablet 50 mg, Q6H PRN  sodium chloride flush 0.9 % injection 10 mL, 2 times per day  sodium chloride flush 0.9 % injection 10 mL, PRN  acetaminophen (TYLENOL) tablet 650 mg, Q6H PRN    Or  acetaminophen (TYLENOL) suppository 650 mg, Q6H PRN  polyethylene glycol (GLYCOLAX) packet 17 g, Daily PRN  promethazine (PHENERGAN) tablet 12.5 mg, Q6H PRN    Or  ondansetron (ZOFRAN) injection 4 mg, Q6H PRN      Current Facility-Administered Medications   Medication Dose Route Frequency Provider Last Rate Last Dose    fentaNYL (SUBLIMAZE) injection 25 mcg  25 mcg Intravenous Q1H PRN [place], DO        ondansetron (ZOFRAN) injection 4 mg  4 mg Intravenous Q30 Min PRN [place], DO        dilTIAZem 100 mg in dextrose 5 % 100 mL infusion (ADD-[place])  5 mg/hr Intravenous Continuous Yaniv Craft MD 10 mL/hr at 11/07/20 1717 10 mg/hr at 11/07/20 1717    apixaban (ELIQUIS) tablet 5 mg  5 mg Oral BID Yaniv Craft MD        aspirin chewable tablet 81 mg  81 mg Oral Daily Yaniv Craft MD        atorvastatin (LIPITOR) tablet 40 mg  40 mg Oral Nightly Yaniv Craft MD        gabapentin (NEURONTIN) capsule 300 mg  300 mg Oral TID MD Flory Acosta [START ON 11/8/2020] metoprolol succinate (TOPROL XL) extended release tablet 50 mg  50 mg Oral Daily Yaniv Craft MD        [START ON 11/8/2020] pantoprazole (PROTONIX) tablet 40 mg  40 mg Oral MARCO Lechuga distress, conversant    Constitutional:  Well developed, Well nourished, No acute distress, Non-toxic appearance. HENT:  Normocephalic, Atraumatic, Bilateral external ears normal, Oropharynx moist, No oral exudates, Nose normal. Neck- Normal range of motion, No tenderness, Supple, No stridor,no apical-carotid delay  Lymphatics : no palpable lymph nodes  Eyes:  PERRL, EOMI, Conjunctiva normal, No discharge. Respiratory:  Normal breath sounds, No respiratory distress, No wheezing, No chest tenderness. ,no use of accessory muscles, crackles Absent   Cardiovascular: (PMI) apex non displaced,no lifts no thrills, ankle swelling Absent  , 1+, s1 and s2 audible,Murmur. Absent , JVD not noted    Abdomen /GI:  Bowel sounds normal, Soft, No tenderness, No masses, No gross visceromegaly   :  No costovertebral angle tenderness   Musculoskeletal:  No edema, no tenderness, no deformities.  Back- no tenderness  Integument:  Well hydrated, no rash   Lymphatic:  No lymphadenopathy noted   Neurologic:  Alert & oriented x 3, CN 2-12 normal, normal motor function, normal sensory function, no focal deficits noted           Medical decision making and Data review:    Lab Review   Recent Labs     11/07/20  0630   WBC 10.3   HGB 15.8   HCT 50.5         Recent Labs     11/07/20  0630      K 3.8   CL 98*   CO2 24   BUN 26*   CREATININE 1.2     Recent Labs     11/07/20  0630   AST 24   ALT 31   BILITOT 0.3   ALKPHOS 101     Recent Labs     11/07/20  0637 11/07/20  1359   TROPONINT <0.010 <0.010       Recent Labs     11/07/20  0630   PROBNP 4,306*     Lab Results   Component Value Date    INR 1.11 11/07/2020    PROTIME 13.4 11/07/2020       EKG: (reviewed by myself)    ECHO:(reviewed by myself)    Chest Xray:(reviewed by myself)  Xr Chest Portable    Result Date: 11/7/2020  EXAMINATION: ONE XRAY VIEW OF THE CHEST 11/7/2020 6:44 am COMPARISON: Chest radiograph August 26, 2020 HISTORY: ORDERING SYSTEM PROVIDED HISTORY: CP/dyspnea TECHNOLOGIST PROVIDED HISTORY: Reason for exam:->CP/dyspnea Reason for Exam: CP/dyspnea Acuity: Unknown Type of Exam: Initial FINDINGS: The lungs are without acute focal process. There is no effusion or pneumothorax. The cardiomediastinal silhouette is without acute process. The osseous structures are without acute process. No acute process. All labs, medications and tests reviewed by myself including data  from outside source , patient and available family . Continue all other medications of all above medical condition listed as is. Impression:  Active Problems:    A-fib (HCC)  Resolved Problems:    * No resolved hospital problems. *      Assessment: 70 y. o.year old with PMH of  has a past medical history of Abnormal MRI, spine, Arthritis, Gastritis, H/O 24 hour EKG monitoring, H/O cardiovascular stress test, H/O complete electrocardiogram, H/O CT scan of chest, H/O Doppler ultrasound, H/O Doppler ultrasound, H/O echocardiogram, H/O tilt table evaluation, Hiatal hernia, History of cardiac cath, History of nuclear stress test, Hx of cardiac cath, Hypertension, Prolonged emergence from general anesthesia, Syncope and collapse, Unspecified sleep apnea, and Wears glasses. Plan and Recommendations:    Start Cardizem drip will titrate accordingly restart Eliquis has been missing doses at home he will need AMRIBEL cardioversion if he does not convert to sinus rhythm  Check orthostatic for dizziness  Mild coronary artery disease continue medical management  DVT prophylaxis if no contraindication  6. Dyslipidemia: continue statins           Thank you  much for consult and giving us the opportunity in contributing in the care of this patient. Please feel free to call me for any questions.        Placido Aguilar MD, 11/7/2020 5:55 PM

## 2020-11-07 NOTE — H&P
Department of Internal Medicine  Hospitalist  Attending History and Physical      CHIEF COMPLAINT:  Lightheadedness    Reason for Admission: Afib with RVR    History Obtained From:  patient    HISTORY OF PRESENT ILLNESS:      The patient is a 70 y.o. male with significant past medical history of CAD, Afib with recent cath 10/5 and sowed no significant CAD. He states he has been feeling fluttering of his heart since Thursday on and off and yesterday started to get lightheaded with nausea. No chest pain. Has a loose cough but no fevers. He recently restarted his eliquis yesterday as was waiting for these meds as was out for a week. He did take his BB. Past Medical History:        Diagnosis Date    Abnormal MRI, spine     per pt herniated disc    Arthritis     Gastritis     H/O 24 hour EKG monitoring 06/24/2002 06/24/2002 baseline rhythm appears to be normal with an average HR of 66 bpm, slowest HR recored at 51 bpm, fastest at 97 bpm only 10 isolated  PVC's and one couplet were recorded, supraventricular ectopic activity is present, but not clinically significant, no long pauses recognized.  H/O cardiovascular stress test 03/16/1999, 12/28/2001,03/14/2006, 03/31/2008, 11/19/2008, 09/07/2011 09/07/2011 EF 61%, no angina or ischemic EKG changes, noted with Lexiscan, inferior wall reperfusion, global function is intact. resting /85, resting HR 79    H/O complete electrocardiogram 03/05/2012 03/05/2012 on chart    H/O CT scan of chest 11/19/2008 11/19/2008 no evidence of PE, no acute thoracic aortic pathology, incidental 5mm pleural based nodular density superior lateral right middle lobe.  H/O Doppler ultrasound no anatomical abnormalities in the segment s studied on either side, doppler curves are normal in configuration and amplitude in those segments bilaterally, normal flow velocities and flow velocity ratios.  normal antegrade flow in the vertebral arteries bilaterally    H/O Doppler ultrasound 08/08/2005 08/08/2005 no significant obstructive lesions noted in the extracranial carotid system,antegrade flow noted in the vertebral arteries. no significant atherosclerotic plaque noted in right or left  common arteryintimal thickening in right and left internal carotids, intimal thickening in left external carotid.  H/O echocardiogram 06/24/2002 EF 55-60% 06/19/2006 EF 50-55%,09/06/2011 EF 50-55%    09/06/2011 EF 50-55%, normal size left ventricle showing preserved global systolic  function and no regional wall motion abnormalities, left ventricle shows  moderate concentric hypertrophy and signs of diastolic dysfunction, mildly dilated atrium,, aortic valve is sclerotic but nonstenotic, trace mitral and tricuspid  regurg    H/O tilt table evaluation 07/05/2001 07/05/2001 head up tilt table test after one nitroglycerin pt became nauseous, diaphoretic, BP dropped systolic to 85 pulse in 20'A with complete loss of consciousness with  slight seizure activity before table brought to horizontal, happened within 8 minutes of nitro tablet utilization, pt. regained consciousness HR and BP  as soon as table was brought to horizontal, lopressor discontinued    Hiatal hernia     History of cardiac cath 10/05/2020    No significant CAD. LAD diffusely diseased as it is very small in caliber. LVEF is 50 to 55 %.     History of nuclear stress test 09/30/2020    EF 62%, Large area of significant inferior wall ischemia    Hx of cardiac cath 03/17/1999, 11/19/2008 11/19/2008both the left main and circumflex are without significant disease, RCA is also without significant disease, LV gram shows normal size left ventricular cavity with normal global and regional left ventricular systolic function, no significant CAD, chest pain is probably non cardiac in etiology    Hypertension     Prolonged emergence from general anesthesia     Syncope and collapse     Unspecified sleep apnea     cpap    Wears glasses      Past Surgical History:        Procedure Laterality Date    BACK SURGERY      DIAGNOSTIC CARDIAC CATH LAB PROCEDURE  03/17/1999,11/19/2008 11/19/2008, left main,circumflex, and RCA are without any significant disease, LV gram shows normal size left ventricular cavity with normal global and  regional left ventricular systolic function, pt has no significant CAD.  HERNIA REPAIR      TONSILLECTOMY       I  Medications Prior to Admission:    No current facility-administered medications on file prior to encounter. Current Outpatient Medications on File Prior to Encounter   Medication Sig Dispense Refill    apixaban (ELIQUIS) 5 MG TABS tablet Take 1 tablet by mouth 2 times daily 42 tablet 0    gabapentin (NEURONTIN) 300 MG capsule Take 300 mg by mouth 3 times daily.  atorvastatin (LIPITOR) 40 MG tablet Take 1 tablet by mouth nightly 30 tablet 0    metoprolol succinate (TOPROL XL) 50 MG extended release tablet Take 1 tablet by mouth daily 30 tablet 3    Lactobacillus (PROBIOTIC ACIDOPHILUS PO) Take by mouth daily      tamsulosin (FLOMAX) 0.4 MG capsule 1 tablet 2 times daily      apixaban (ELIQUIS) 5 MG TABS tablet Take 1 tablet by mouth 2 times daily 60 tablet 1    aspirin 81 MG chewable tablet Take 1 tablet by mouth daily 30 tablet 0    omeprazole (PRILOSEC) 20 MG delayed release capsule Take 20 mg by mouth daily      citalopram (CELEXA) 20 MG tablet 10 mg       traMADol (ULTRAM) 50 MG tablet            Allergies:  Patient has no known allergies.     Social History:   Social History     Socioeconomic History    Marital status:      Spouse name: Not on file    Number of children: 2    Years of education: Not on file    Highest education level: Not on file   Occupational History    Occupation: / instructor   Social Needs    Financial resource strain: Not on file    Food insecurity     Worry: Not on file     Inability: Not on file   [employer] needs Medical: Not on file     Non-medical: Not on file   Tobacco Use    Smoking status: Former Smoker     Types: Cigarettes     Last attempt to quit: 1988     Years since quittin.8    Smokeless tobacco: Former User     Types: Chew    Tobacco comment: Quit chewing in 2014   Substance and Sexual Activity    Alcohol use: Yes     Comment: moderate,  coffee two cups daily   Beer on the weekends    Drug use: No    Sexual activity: Not on file   Lifestyle    Physical activity     Days per week: Not on file     Minutes per session: Not on file    Stress: Not on file   Relationships    Social connections     Talks on phone: Not on file     Gets together: Not on file     Attends Jew service: Not on file     Active member of club or organization: Not on file     Attends meetings of clubs or organizations: Not on file     Relationship status: Not on file    Intimate partner violence     Fear of current or ex partner: Not on file     Emotionally abused: Not on file     Physically abused: Not on file     Forced sexual activity: Not on file   Other Topics Concern    Not on file   Social History Narrative    Not on file         Family History:   Family History   Problem Relation Age of Onset    Diabetes Mother     Heart Disease Mother     Other Mother         kidney stones    Cancer Mother         breast    Heart Disease Father     Diabetes Father        REVIEW OF SYSTEMS:  CONSTITUTIONAL:  positive for  fatigue  EYES:  negative  HEENT:  negative  RESPIRATORY:  positive for  dry cough  CARDIOVASCULAR:  positive for  palpitations  GASTROINTESTINAL:  negative  ALLERGIC/IMMUNOLOGIC:  negative  ENDOCRINE:  negative  MUSCULOSKELETAL:  negative  NEUROLOGICAL:  negative  BEHAVIOR/PSYCH:  negative  PHYSICAL EXAM:    Vitals:  /86   Pulse 168   Temp 97.9 °F (36.6 °C) (Oral)   Resp 13   Ht 6' 2\" (1.88 m)   Wt 260 lb (117.9 kg)   SpO2 98%   BMI 33.38 kg/m²     CONSTITUTIONAL:  awake  EYES:  lids and lashes normal  ENT:  normocepalic, without obvious abnormality  LUNGS:  No increased work of breathing, good air exchange, clear to auscultation bilaterally, no crackles or wheezing  CARDIOVASCULAR:  Tachy irregular and on cardiazem drip  ABDOMEN:  No scars, normal bowel sounds, soft, non-distended, non-tender, no masses palpated, no hepatosplenomegally  MUSCULOSKELETAL:  full range of motion noted  NEUROLOGIC:  Awake, alert, oriented to name, place and time. Cranial nerves II-XII are grossly intact. Motor is 5 out of 5 bilaterally. Benedetta Ou   SKIN:  no bruising or bleeding    DATA:  CXR NAD   AFib with RVR  ASSESSMENT AND PLAN:    aFib with RVR  -cardiazem drip, eliquis  -BB and start tomorrow or later today  TSH wnl  -serial trop  Hyperglycemia  -check HbA1c  CAD  -ASA, statin

## 2020-11-08 LAB
ANION GAP SERPL CALCULATED.3IONS-SCNC: 15 MMOL/L (ref 4–16)
BUN BLDV-MCNC: 19 MG/DL (ref 6–23)
CALCIUM SERPL-MCNC: 8.5 MG/DL (ref 8.3–10.6)
CHLORIDE BLD-SCNC: 105 MMOL/L (ref 99–110)
CO2: 20 MMOL/L (ref 21–32)
CREAT SERPL-MCNC: 0.7 MG/DL (ref 0.9–1.3)
GFR AFRICAN AMERICAN: >60 ML/MIN/1.73M2
GFR NON-AFRICAN AMERICAN: >60 ML/MIN/1.73M2
GLUCOSE BLD-MCNC: 131 MG/DL (ref 70–99)
HCT VFR BLD CALC: 45.7 % (ref 42–52)
HEMOGLOBIN: 14.8 GM/DL (ref 13.5–18)
INR BLD: 1.36 INDEX
MCH RBC QN AUTO: 30.3 PG (ref 27–31)
MCHC RBC AUTO-ENTMCNC: 32.4 % (ref 32–36)
MCV RBC AUTO: 93.5 FL (ref 78–100)
PDW BLD-RTO: 13.4 % (ref 11.7–14.9)
PLATELET # BLD: 211 K/CU MM (ref 140–440)
PMV BLD AUTO: 11 FL (ref 7.5–11.1)
POTASSIUM SERPL-SCNC: 4.6 MMOL/L (ref 3.5–5.1)
PROTHROMBIN TIME: 16.5 SECONDS (ref 11.7–14.5)
RBC # BLD: 4.89 M/CU MM (ref 4.6–6.2)
SODIUM BLD-SCNC: 140 MMOL/L (ref 135–145)
WBC # BLD: 7.1 K/CU MM (ref 4–10.5)

## 2020-11-08 PROCEDURE — 6370000000 HC RX 637 (ALT 250 FOR IP): Performed by: INTERNAL MEDICINE

## 2020-11-08 PROCEDURE — 99233 SBSQ HOSP IP/OBS HIGH 50: CPT | Performed by: INTERNAL MEDICINE

## 2020-11-08 PROCEDURE — 80048 BASIC METABOLIC PNL TOTAL CA: CPT

## 2020-11-08 PROCEDURE — 2500000003 HC RX 250 WO HCPCS: Performed by: HOSPITALIST

## 2020-11-08 PROCEDURE — 2580000003 HC RX 258: Performed by: NURSE PRACTITIONER

## 2020-11-08 PROCEDURE — 85027 COMPLETE CBC AUTOMATED: CPT

## 2020-11-08 PROCEDURE — 94761 N-INVAS EAR/PLS OXIMETRY MLT: CPT

## 2020-11-08 PROCEDURE — 36415 COLL VENOUS BLD VENIPUNCTURE: CPT

## 2020-11-08 PROCEDURE — 6370000000 HC RX 637 (ALT 250 FOR IP): Performed by: HOSPITALIST

## 2020-11-08 PROCEDURE — 85610 PROTHROMBIN TIME: CPT

## 2020-11-08 PROCEDURE — 6360000002 HC RX W HCPCS: Performed by: NURSE PRACTITIONER

## 2020-11-08 PROCEDURE — 94660 CPAP INITIATION&MGMT: CPT

## 2020-11-08 PROCEDURE — 2580000003 HC RX 258: Performed by: HOSPITALIST

## 2020-11-08 PROCEDURE — 2140000000 HC CCU INTERMEDIATE R&B

## 2020-11-08 PROCEDURE — APPSS45 APP SPLIT SHARED TIME 31-45 MINUTES: Performed by: NURSE PRACTITIONER

## 2020-11-08 PROCEDURE — 2500000003 HC RX 250 WO HCPCS: Performed by: INTERNAL MEDICINE

## 2020-11-08 RX ORDER — UREA 10 %
2 LOTION (ML) TOPICAL NIGHTLY PRN
Status: DISCONTINUED | OUTPATIENT
Start: 2020-11-08 | End: 2020-11-09 | Stop reason: HOSPADM

## 2020-11-08 RX ORDER — LORAZEPAM 0.5 MG/1
0.5 TABLET ORAL EVERY 6 HOURS PRN
Status: DISCONTINUED | OUTPATIENT
Start: 2020-11-08 | End: 2020-11-09 | Stop reason: HOSPADM

## 2020-11-08 RX ADMIN — ATORVASTATIN CALCIUM 40 MG: 40 TABLET, FILM COATED ORAL at 20:34

## 2020-11-08 RX ADMIN — DEXTROSE MONOHYDRATE 5 MG/HR: 50 INJECTION, SOLUTION INTRAVENOUS at 23:32

## 2020-11-08 RX ADMIN — GABAPENTIN 300 MG: 300 CAPSULE ORAL at 20:34

## 2020-11-08 RX ADMIN — GABAPENTIN 300 MG: 300 CAPSULE ORAL at 10:55

## 2020-11-08 RX ADMIN — TAMSULOSIN HYDROCHLORIDE 0.4 MG: 0.4 CAPSULE ORAL at 10:55

## 2020-11-08 RX ADMIN — DEXTROSE MONOHYDRATE 10 MG/HR: 50 INJECTION, SOLUTION INTRAVENOUS at 10:54

## 2020-11-08 RX ADMIN — METOPROLOL SUCCINATE 50 MG: 50 TABLET, EXTENDED RELEASE ORAL at 10:55

## 2020-11-08 RX ADMIN — Medication 2 MG: at 23:51

## 2020-11-08 RX ADMIN — SODIUM CHLORIDE, PRESERVATIVE FREE 10 ML: 5 INJECTION INTRAVENOUS at 20:35

## 2020-11-08 RX ADMIN — APIXABAN 5 MG: 5 TABLET, FILM COATED ORAL at 20:34

## 2020-11-08 RX ADMIN — AMIODARONE HYDROCHLORIDE 1 MG/MIN: 50 INJECTION, SOLUTION INTRAVENOUS at 14:29

## 2020-11-08 RX ADMIN — AMIODARONE HYDROCHLORIDE 0.5 MG/MIN: 50 INJECTION, SOLUTION INTRAVENOUS at 23:32

## 2020-11-08 RX ADMIN — LORAZEPAM 0.5 MG: 0.5 TABLET ORAL at 14:05

## 2020-11-08 RX ADMIN — ASPIRIN 81 MG: 81 TABLET, CHEWABLE ORAL at 10:55

## 2020-11-08 RX ADMIN — AMIODARONE HYDROCHLORIDE 150 MG: 50 INJECTION, SOLUTION INTRAVENOUS at 14:05

## 2020-11-08 RX ADMIN — TRAMADOL HYDROCHLORIDE 50 MG: 50 TABLET, FILM COATED ORAL at 04:06

## 2020-11-08 RX ADMIN — PANTOPRAZOLE SODIUM 40 MG: 40 TABLET, DELAYED RELEASE ORAL at 06:20

## 2020-11-08 RX ADMIN — GABAPENTIN 300 MG: 300 CAPSULE ORAL at 14:05

## 2020-11-08 RX ADMIN — DEXTROSE MONOHYDRATE 10 MG/HR: 50 INJECTION, SOLUTION INTRAVENOUS at 01:46

## 2020-11-08 RX ADMIN — APIXABAN 5 MG: 5 TABLET, FILM COATED ORAL at 10:55

## 2020-11-08 ASSESSMENT — PAIN SCALES - GENERAL
PAINLEVEL_OUTOF10: 0
PAINLEVEL_OUTOF10: 5
PAINLEVEL_OUTOF10: 0

## 2020-11-08 ASSESSMENT — PAIN DESCRIPTION - DESCRIPTORS: DESCRIPTORS: HEADACHE

## 2020-11-08 ASSESSMENT — PAIN DESCRIPTION - PAIN TYPE: TYPE: ACUTE PAIN

## 2020-11-08 ASSESSMENT — PAIN DESCRIPTION - LOCATION: LOCATION: HEAD

## 2020-11-08 NOTE — PROGRESS NOTES
Hospitalist Progress Note      Name:  Kim Garrett /Age/Sex: 1945  (70 y.o. male)   MRN & CSN:  [id number] & [id number] Admission Date/Time: 2020  6:26 AM   Location:  [facility]/3117 PCP: Mckenzie Mccoy MD         Hospital Day: 2    Assessment and Plan:   82-year-old male with a history of A. fib with a presented in A. fib with RVR. A. Fib  Coronary artery disease  - Continue Cardizem drip, Metoprolol XL  - Continue Statin/ASA  - Continue Eliquis  - Cardiology following, appreciate recs; possible MARIBEL/CV tomorrow    BPH  - Continue Tamsulosin    History of THOMAS  - CPAP ordered at night    Diet DIET CARDIAC;   DVT Prophylaxis [] Lovenox, []  Heparin, [] SCDs, [] Ambulation   GI Prophylaxis [] PPI,  [] H2 Blocker,  [] Carafate,  [] Diet/Tube Feeds   Code Status Full Code   Disposition Patient requires continued admission due to afib   MDM [] Low, [x] Moderate,[]  High  Patient's risk as above due to      History of Present Illness:     States that he has palpitations and his heart rate increases when he gets up using the bathroom. Denies any episodes of syncope or chest pain currently no shortness of breath fevers or chills. Objective: Intake/Output Summary (Last 24 hours) at 2020 1032  Last data filed at 2020 0649  Gross per 24 hour   Intake 950 ml   Output 850 ml   Net 100 ml      Vitals:   Vitals:    20 0649   BP:    Pulse: 84   Resp: 13   Temp:    SpO2:      Physical Exam:   GEN Awake male, sitting upright in bed in no apparent distress. Appears given age. EYES Pupils are equally round. No scleral erythema, discharge, or conjunctivitis. HENT Mucous membranes are moist.   NECK Supple, no apparent thyromegaly or masses. RESP Clear to auscultation, no wheezes, rales or rhonchi. Symmetric chest movement while on room air. CARDIO/VASC Irregular  MSK No gross joint deformities. SKIN Normal coloration, warm, dry.   NEURO Cranial nerves appear grossly intact  PSYCH Awake, alert, oriented x 4. Affect appropriate.     Medications:   Medications:    apixaban  5 mg Oral BID    aspirin  81 mg Oral Daily    atorvastatin  40 mg Oral Nightly    gabapentin  300 mg Oral TID    metoprolol succinate  50 mg Oral Daily    pantoprazole  40 mg Oral QAM AC    tamsulosin  0.4 mg Oral Daily    sodium chloride flush  10 mL Intravenous 2 times per day      Infusions:    dilTIAZem 10 mg/hr (11/08/20 0720)     PRN Meds: fentanNYL, 25 mcg, Q1H PRN  ondansetron, 4 mg, Q30 Min PRN  traMADol, 50 mg, Q6H PRN  sodium chloride flush, 10 mL, PRN  acetaminophen, 650 mg, Q6H PRN    Or  acetaminophen, 650 mg, Q6H PRN  polyethylene glycol, 17 g, Daily PRN  promethazine, 12.5 mg, Q6H PRN    Or  ondansetron, 4 mg, Q6H PRN          Electronically signed by Vijay James MD on 11/8/2020 at 10:32 AM

## 2020-11-08 NOTE — PLAN OF CARE
Problem: Pain:  Goal: Pain level will decrease  Description: Pain level will decrease  11/8/2020 1248 by Loli Mendes RN  Outcome: Ongoing  11/8/2020 1248 by Loli Mendes RN  Outcome: Ongoing  11/8/2020 0009 by Osman Hernandez RN  Outcome: Ongoing  Goal: Control of acute pain  Description: Control of acute pain  11/8/2020 1248 by Loli Mendes RN  Outcome: Ongoing  11/8/2020 1248 by Loli Mendes RN  Outcome: Ongoing  11/8/2020 0009 by Osman Hernandez RN  Outcome: Ongoing  Goal: Patient's pain/discomfort is manageable  Description: Patient's pain/discomfort is manageable  11/8/2020 1248 by Loli Mendes RN  Outcome: Ongoing  11/8/2020 1248 by Loli Mendes RN  Outcome: Ongoing     Problem: Infection:  Goal: Will remain free from infection  Description: Will remain free from infection  11/8/2020 1248 by Loli Mendes RN  Outcome: Ongoing  11/8/2020 1248 by Loli Mendes RN  Outcome: Ongoing     Problem: Safety:  Goal: Free from accidental physical injury  Description: Free from accidental physical injury  11/8/2020 1248 by Loli Mendes RN  Outcome: Ongoing  11/8/2020 1248 by Loli Mendes RN  Outcome: Ongoing  Goal: Free from intentional harm  Description: Free from intentional harm  11/8/2020 1248 by Loli Mendes RN  Outcome: Ongoing  11/8/2020 1248 by Loli Mendes RN  Outcome: Ongoing     Problem: Daily Care:  Goal: Daily care needs are met  Description: Daily care needs are met  11/8/2020 1248 by Loli Mendes RN  Outcome: Ongoing  11/8/2020 1248 by Loli Mendes RN  Outcome: Ongoing     Problem: Skin Integrity:  Goal: Skin integrity will stabilize  Description: Skin integrity will stabilize  11/8/2020 1248 by Loli Mendes RN  Outcome: Ongoing  11/8/2020 1248 by Loli Mendes RN  Outcome: Ongoing     Problem: Discharge Planning:  Goal: Patients continuum of care needs are met  Description: Patients continuum of care needs are met  11/8/2020 1248 by Loli Mendes RN  Outcome: Ongoing  11/8/2020 1248 by Binu Camejo RN  Outcome: Ongoing

## 2020-11-08 NOTE — PROGRESS NOTES
Notified hospitalist that pt states when he presses on his left chest or takes a deep breath he feels flickering of pain. This has been going on since Thursday. Sometimes the pain rates between a 7-8/10 and comes and goes.

## 2020-11-08 NOTE — PROGRESS NOTES
has a past surgical history that includes hernia repair; Diagnostic Cardiac Cath Lab Procedure (03/17/1999,11/19/2008); Tonsillectomy; and back surgery. Social History:   reports that he quit smoking about 32 years ago. His smoking use included cigarettes. He has quit using smokeless tobacco.  His smokeless tobacco use included chew. He reports current alcohol use. He reports that he does not use drugs. Family history:  family history includes Cancer in his mother; Diabetes in his father and mother; Heart Disease in his father and mother; Other in his mother. Allergies   Allergen Reactions    Ambien [Zolpidem] Other (See Comments)     Makes pt loopy       Review of Systems:   All 14 systems were reviewed and are negative  Except for the positive findings which are documented     /72   Pulse 85   Temp 97.8 °F (36.6 °C) (Oral)   Resp 18   Ht 6' 2\" (1.88 m)   Wt 249 lb (112.9 kg)   SpO2 97%   BMI 31.97 kg/m²       Intake/Output Summary (Last 24 hours) at 11/8/2020 1203  Last data filed at 11/8/2020 9023  Gross per 24 hour   Intake 650 ml   Output 850 ml   Net -200 ml       Physical Exam:  Constitutional:  Well developed, Well nourished, No acute distress  HENT:  Normocephalic, Atraumatic, Bilateral external ears normal,  Nose normal.   Neck- trachea is midline  Eyes:  PEERL, Conjunctiva normal  Respiratory:  Normal breath sounds, No respiratory distress, No wheezing, No chest tenderness. Cardiovascular:  IRRR, , no murmurs appreciated, No rubs appreciated, No gallops appreciated, JVP not elevated  Abdomen/GI:  Soft, No tenderness  Musculoskeletal:  Intact distal pulses, no edema to lower legs,  No tenderness, No cyanosis, No clubbing. Integument:  Warm, Dry  Lymphatic:  No lymphadenopathy noted.    Neurologic:  Alert & oriented  Psychiatric:  Affect and Mood :pleasant     Medications:    amiodarone  150 mg Intravenous Once    apixaban  5 mg Oral BID    aspirin  81 mg Oral Daily    atorvastatin 40 mg Oral Nightly    gabapentin  300 mg Oral TID    metoprolol succinate  50 mg Oral Daily    pantoprazole  40 mg Oral QAM AC    tamsulosin  0.4 mg Oral Daily    sodium chloride flush  10 mL Intravenous 2 times per day      amiodarone      Followed by    amiodarone       melatonin, LORazepam, fentanNYL, ondansetron, traMADol, sodium chloride flush, acetaminophen **OR** acetaminophen, polyethylene glycol, promethazine **OR** ondansetron    Lab Data:  CBC:   Recent Labs     11/07/20 0630 11/08/20 0422   WBC 10.3 7.1   HGB 15.8 14.8   HCT 50.5 45.7   MCV 93.2 93.5    211     BMP:   Recent Labs     11/07/20 0630 11/08/20 0422    140   K 3.8 4.6   CL 98* 105   CO2 24 20*   BUN 26* 19   CREATININE 1.2 0.7*     PT/INR:   Recent Labs     11/07/20 0630 11/08/20 0422   PROTIME 13.4 16.5*   INR 1.11 1.36     BNP:    Recent Labs     11/07/20 0630   PROBNP 4,306*     TROPONIN:   Recent Labs     11/07/20  0637 11/07/20  1359   TROPONINT <0.010 <0.010        ECHO :       NM Myocardial Spect:       Impression:  Active Problems:    Chest pain    A-fib (HCC)  Resolved Problems:    * No resolved hospital problems. *       All labs, medications and tests reviewed by myself, continue all other medications of all above medical condition listed as is except for changes mentioned above. Thank you   Please call with questions. Electronically signed by MÓNICA Vila CNP on 11/8/2020 at 12:03 PM   [facility]    I have seen ,spoken to  and examined this patient personally, independently of the nurse practitioner. I have reviewed the hospital care given to date and reviewed all pertinent labs and imaging. The plan was developed mutually at the time of the visit with the patient,  NP   and myself. I have spoken with patient, nursing staff and provided written and verbal instructions . The above note has been reviewed and I agree with the assessment, diagnosis, and treatment plan with changes made by me as follows     HPI:  I have reviewed the above HPI  And agree with above   Didier Gee is a 70 y. o.year old who and presents with had concerns including Fatigue; Shortness of Breath; and Other (fluttering in his chest). Chief Complaint   Patient presents with    Fatigue    Shortness of Breath    Other     fluttering in his chest     Please review addendum/changes made to note above   Interval history: Rate controlled but in A. fib    Physical Exam:  General:  Awake, alert, NAD  Head:normal  Eye:normal  Neck:  No JVD   Chest:  Clear to auscultation, respiration easy  Cardiovascular:  S1 and S2 audible, No added heart sounds, No signs of ankle edema, or volume overload, No evidence of JVD, No crackles  Abdomen:   nontender  Extremities:  No  edema  Pulses; palpable  Neuro: grossly normal  Alternates and risk of the procedure were dicussed in detail  Patient is in agreement to proceed  Mallampati is 2 ASA is 3    MEDICAL DECISION MAKING;    I agree with the above plan, which was planned by myself and discussed with NP. Plan MARIBEL cardioversion in a.m.   Start amiodarone bunny Lobo MD [facility] 11/08/20

## 2020-11-08 NOTE — PLAN OF CARE
Problem: Pain:  Goal: Pain level will decrease  Description: Pain level will decrease  11/8/2020 1248 by Hi Maddox RN  Outcome: Ongoing  11/8/2020 0009 by Angelina Juan RN  Outcome: Ongoing  Goal: Control of acute pain  Description: Control of acute pain  11/8/2020 1248 by Hi Maddox RN  Outcome: Ongoing  11/8/2020 0009 by Angelina Juan RN  Outcome: Ongoing  Goal: Patient's pain/discomfort is manageable  Description: Patient's pain/discomfort is manageable  Outcome: Ongoing     Problem: Infection:  Goal: Will remain free from infection  Description: Will remain free from infection  Outcome: Ongoing     Problem: Safety:  Goal: Free from accidental physical injury  Description: Free from accidental physical injury  Outcome: Ongoing  Goal: Free from intentional harm  Description: Free from intentional harm  Outcome: Ongoing     Problem: Daily Care:  Goal: Daily care needs are met  Description: Daily care needs are met  Outcome: Ongoing     Problem: Skin Integrity:  Goal: Skin integrity will stabilize  Description: Skin integrity will stabilize  Outcome: Ongoing     Problem: Discharge Planning:  Goal: Patients continuum of care needs are met  Description: Patients continuum of care needs are met  Outcome: Ongoing

## 2020-11-09 VITALS
HEART RATE: 78 BPM | OXYGEN SATURATION: 97 % | RESPIRATION RATE: 19 BRPM | SYSTOLIC BLOOD PRESSURE: 132 MMHG | TEMPERATURE: 97.8 F | HEIGHT: 74 IN | BODY MASS INDEX: 31.95 KG/M2 | DIASTOLIC BLOOD PRESSURE: 99 MMHG | WEIGHT: 249 LBS

## 2020-11-09 PROBLEM — I48.91 A-FIB (HCC): Status: RESOLVED | Noted: 2020-11-07 | Resolved: 2020-11-09

## 2020-11-09 LAB
ANION GAP SERPL CALCULATED.3IONS-SCNC: 13 MMOL/L (ref 4–16)
BASOPHILS ABSOLUTE: 0 K/CU MM
BASOPHILS RELATIVE PERCENT: 0.3 % (ref 0–1)
BUN BLDV-MCNC: 19 MG/DL (ref 6–23)
CALCIUM SERPL-MCNC: 8.5 MG/DL (ref 8.3–10.6)
CHLORIDE BLD-SCNC: 103 MMOL/L (ref 99–110)
CO2: 20 MMOL/L (ref 21–32)
CREAT SERPL-MCNC: 0.8 MG/DL (ref 0.9–1.3)
DIFFERENTIAL TYPE: ABNORMAL
EKG ATRIAL RATE: 65 BPM
EKG DIAGNOSIS: NORMAL
EKG P AXIS: -18 DEGREES
EKG P-R INTERVAL: 194 MS
EKG Q-T INTERVAL: 406 MS
EKG QRS DURATION: 76 MS
EKG QTC CALCULATION (BAZETT): 422 MS
EKG R AXIS: -9 DEGREES
EKG T AXIS: -7 DEGREES
EKG VENTRICULAR RATE: 65 BPM
EOSINOPHILS ABSOLUTE: 0 K/CU MM
EOSINOPHILS RELATIVE PERCENT: 0.4 % (ref 0–3)
GFR AFRICAN AMERICAN: >60 ML/MIN/1.73M2
GFR NON-AFRICAN AMERICAN: >60 ML/MIN/1.73M2
GLUCOSE BLD-MCNC: 148 MG/DL (ref 70–99)
HCT VFR BLD CALC: 45.2 % (ref 42–52)
HEMOGLOBIN: 14.7 GM/DL (ref 13.5–18)
IMMATURE NEUTROPHIL %: 1 % (ref 0–0.43)
LYMPHOCYTES ABSOLUTE: 1 K/CU MM
LYMPHOCYTES RELATIVE PERCENT: 13.5 % (ref 24–44)
MCH RBC QN AUTO: 30 PG (ref 27–31)
MCHC RBC AUTO-ENTMCNC: 32.5 % (ref 32–36)
MCV RBC AUTO: 92.2 FL (ref 78–100)
MONOCYTES ABSOLUTE: 0.5 K/CU MM
MONOCYTES RELATIVE PERCENT: 7.3 % (ref 0–4)
NUCLEATED RBC %: 0 %
PDW BLD-RTO: 13.3 % (ref 11.7–14.9)
PLATELET # BLD: 224 K/CU MM (ref 140–440)
PMV BLD AUTO: 10.8 FL (ref 7.5–11.1)
POTASSIUM SERPL-SCNC: 4 MMOL/L (ref 3.5–5.1)
RBC # BLD: 4.9 M/CU MM (ref 4.6–6.2)
SEGMENTED NEUTROPHILS ABSOLUTE COUNT: 5.6 K/CU MM
SEGMENTED NEUTROPHILS RELATIVE PERCENT: 77.5 % (ref 36–66)
SODIUM BLD-SCNC: 136 MMOL/L (ref 135–145)
TOTAL IMMATURE NEUTOROPHIL: 0.07 K/CU MM
TOTAL NUCLEATED RBC: 0 K/CU MM
WBC # BLD: 7.2 K/CU MM (ref 4–10.5)

## 2020-11-09 PROCEDURE — 94761 N-INVAS EAR/PLS OXIMETRY MLT: CPT

## 2020-11-09 PROCEDURE — 92960 CARDIOVERSION ELECTRIC EXT: CPT | Performed by: INTERNAL MEDICINE

## 2020-11-09 PROCEDURE — 93325 DOPPLER ECHO COLOR FLOW MAPG: CPT | Performed by: INTERNAL MEDICINE

## 2020-11-09 PROCEDURE — 7100000001 HC PACU RECOVERY - ADDTL 15 MIN

## 2020-11-09 PROCEDURE — 80048 BASIC METABOLIC PNL TOTAL CA: CPT

## 2020-11-09 PROCEDURE — 93010 ELECTROCARDIOGRAM REPORT: CPT | Performed by: INTERNAL MEDICINE

## 2020-11-09 PROCEDURE — 5A2204Z RESTORATION OF CARDIAC RHYTHM, SINGLE: ICD-10-PCS | Performed by: INTERNAL MEDICINE

## 2020-11-09 PROCEDURE — 36415 COLL VENOUS BLD VENIPUNCTURE: CPT

## 2020-11-09 PROCEDURE — 85025 COMPLETE CBC W/AUTO DIFF WBC: CPT

## 2020-11-09 PROCEDURE — 93312 ECHO TRANSESOPHAGEAL: CPT | Performed by: INTERNAL MEDICINE

## 2020-11-09 PROCEDURE — 6370000000 HC RX 637 (ALT 250 FOR IP): Performed by: HOSPITALIST

## 2020-11-09 PROCEDURE — 2700000000 HC OXYGEN THERAPY PER DAY

## 2020-11-09 PROCEDURE — 7100000000 HC PACU RECOVERY - FIRST 15 MIN

## 2020-11-09 PROCEDURE — 93312 ECHO TRANSESOPHAGEAL: CPT

## 2020-11-09 PROCEDURE — 99024 POSTOP FOLLOW-UP VISIT: CPT | Performed by: INTERNAL MEDICINE

## 2020-11-09 PROCEDURE — 92960 CARDIOVERSION ELECTRIC EXT: CPT

## 2020-11-09 PROCEDURE — 93005 ELECTROCARDIOGRAM TRACING: CPT | Performed by: INTERNAL MEDICINE

## 2020-11-09 RX ORDER — METOPROLOL SUCCINATE 50 MG/1
100 TABLET, EXTENDED RELEASE ORAL DAILY
Status: DISCONTINUED | OUTPATIENT
Start: 2020-11-10 | End: 2020-11-09 | Stop reason: HOSPADM

## 2020-11-09 RX ORDER — METOPROLOL SUCCINATE 100 MG/1
100 TABLET, EXTENDED RELEASE ORAL DAILY
Qty: 60 TABLET | Refills: 3 | Status: SHIPPED | OUTPATIENT
Start: 2020-11-10 | End: 2020-11-25

## 2020-11-09 RX ADMIN — METOPROLOL SUCCINATE 50 MG: 50 TABLET, EXTENDED RELEASE ORAL at 10:46

## 2020-11-09 RX ADMIN — ASPIRIN 81 MG: 81 TABLET, CHEWABLE ORAL at 10:46

## 2020-11-09 RX ADMIN — TAMSULOSIN HYDROCHLORIDE 0.4 MG: 0.4 CAPSULE ORAL at 10:46

## 2020-11-09 RX ADMIN — GABAPENTIN 300 MG: 300 CAPSULE ORAL at 10:46

## 2020-11-09 RX ADMIN — APIXABAN 5 MG: 5 TABLET, FILM COATED ORAL at 10:46

## 2020-11-09 ASSESSMENT — PAIN SCALES - GENERAL
PAINLEVEL_OUTOF10: 0

## 2020-11-09 NOTE — DISCHARGE SUMMARY
Discharge Summary    Name:  Sirena Clinton /Age/Sex:   [de-identified]70 y.o. male)   MRN & CSN:  [id number] & [id number] Admission Date/Time: 2020  6:26 AM   Attending:  Eri Waldron MD Discharging Physician: Eri Waldron MD     Hospital Course: This is a 70 male presenting with A. fib with RVR requiring Cardizem as well as amiodarone drip. Patient was cardioverted during the course of his hospitalization and was eventually transitioned to oral metoprolol along with Eliquis. MARIBEL did not demonstrate any clots and cardioversion resulted in normal sinus rhythm. Patient was discharged with metoprolol as well as Eliquis and instructed to follow-up with cardiology and PCP within 2 weeks. A. Fib  Coronary artery disease  - s/p cardioversion  - d/c with Metoprolol and Eliquis  - F/u with Cardiology in 2 weeks     BPH  - Continue Tamsulosin     History of THOMAS  - CPAP ordered at night    The patient expressed appropriate understanding of and agreement with the discharge recommendations, medications, and plan. Consults this admission:  IP CONSULT TO HOSPITALIST  IP CONSULT TO CARDIOLOGY    Discharge Instruction:   Follow up appointments: PCP, Cardiology  Primary care physician:  within 2 weeks    Diet:  regular diet   Activity: activity as tolerated  Disposition: Discharged to:   [x]Home, []C, []SNF, []Acute Rehab, []Hospice   Condition on discharge: Stable    Discharge Medications:      [facility] Medication Instructions BETSEY:[id number]    Printed on:20 3790   Medication Information                      apixaban (ELIQUIS) 5 MG TABS tablet  Take 1 tablet by mouth 2 times daily             aspirin 81 MG chewable tablet  Take 1 tablet by mouth daily             atorvastatin (LIPITOR) 40 MG tablet  Take 1 tablet by mouth nightly             citalopram (CELEXA) 20 MG tablet  10 mg              gabapentin (NEURONTIN) 300 MG capsule  Take 300 mg by mouth 3 times daily. Lactobacillus (PROBIOTIC ACIDOPHILUS PO)  Take by mouth daily             metoprolol succinate (TOPROL XL) 100 MG extended release tablet  Take 1 tablet by mouth daily             omeprazole (PRILOSEC) 20 MG delayed release capsule  Take 20 mg by mouth daily             tamsulosin (FLOMAX) 0.4 MG capsule  1 tablet 2 times daily             traMADol (ULTRAM) 50 MG tablet                   Objective Findings at Discharge:   BP (!) 132/99   Pulse 78   Temp 97.8 °F (36.6 °C) (Oral)   Resp 19   Ht 6' 2\" (1.88 m)   Wt 249 lb (112.9 kg)   SpO2 97%   BMI 31.97 kg/m²            PHYSICAL EXAM  GEN Awake male, sitting upright in bed in no apparent distress. Appears given age. EYES Pupils are equally round. No scleral erythema, discharge, or conjunctivitis. HENT Mucous membranes are moist.   NECK Supple, no apparent thyromegaly or masses. RESP Clear to auscultation, no wheezes, rales or rhonchi. Symmetric chest movement while on room air. CARDIO/VASC S1/S2 auscultated. Regular rate without appreciable murmurs, rubs, or gallops. s.  MSK No gross joint deformities. SKIN Normal coloration, warm, dry. NEURO Cranial nerves appear grossly intact, normal speech, no lateralizing weakness. PSYCH Awake, alert, oriented x 4. Affect appropriate. BMP/CBC  Recent Labs     11/07/20  0630 11/08/20  0422 11/09/20  0404    140 136   K 3.8 4.6 4.0   CL 98* 105 103   CO2 24 20* 20*   BUN 26* 19 19   CREATININE 1.2 0.7* 0.8*   WBC 10.3 7.1 7.2   HCT 50.5 45.7 45.2    211 224       IMAGING:  CXR:  FINDINGS:    The lungs are without acute focal process.  There is no effusion or    pneumothorax. The cardiomediastinal silhouette is without acute process. The    osseous structures are without acute process.         Discharge Time of 35 minutes    Electronically signed by Marlin Inman MD on 11/9/2020 at 1:46 PM

## 2020-11-09 NOTE — PROGRESS NOTES
Cardiology Progress Note     Today's Plan stop Cardizem stop amiodarone    Admit Date:  11/7/2020    Consult reason/ Seen today for: atrial fib with RVR    Subjective and  Overnight Events: Cardioverted into sinus rhythm today    Telemetry-atrial fibrillation    Assessment / Plan / Recommendation:     1. Paroxysmal afib: S/p MARIBEL cardioversion today CHADs score is 2 ( age and sex), continue with anticoagulation with eliquis. He continues to be in atrial fib -Will discontinue Cardizem drip. And  amiodarone. 2. Dizziness-orthostatics are negative-may be secondary to A. fib RVR-continue to monitor  3. Increase metoprolol 200 mg daily  4. CAD-Cath 10/05/2020- mild disease of LAD-no chest pain-troponins negative x 2-continue with statin and aspirin  5. THOMAS-recommended use of CPAP-   6. Follow-up in office discharge      History of Presenting Illness:    Chief complain on admission : 70 y. o.year old who is admitted for  Chief Complaint   Patient presents with    Fatigue    Shortness of Breath    Other     fluttering in his chest        Past medical history:    has a past medical history of Abnormal MRI, spine, Arthritis, Gastritis, H/O 24 hour EKG monitoring, H/O cardiovascular stress test, H/O complete electrocardiogram, H/O CT scan of chest, H/O Doppler ultrasound, H/O Doppler ultrasound, H/O echocardiogram, H/O tilt table evaluation, Hiatal hernia, History of cardiac cath, History of nuclear stress test, Hx of cardiac cath, Hypertension, Prolonged emergence from general anesthesia, Syncope and collapse, Unspecified sleep apnea, and Wears glasses. Past surgical history:   has a past surgical history that includes hernia repair; Diagnostic Cardiac Cath Lab Procedure (03/17/1999,11/19/2008); Tonsillectomy; and back surgery. Social History:   reports that he quit smoking about 32 years ago. His smoking use included cigarettes.  He has quit using smokeless tobacco.  His smokeless tobacco use included chew. He reports current alcohol use. He reports that he does not use drugs. Family history:  family history includes Cancer in his mother; Diabetes in his father and mother; Heart Disease in his father and mother; Other in his mother. Allergies   Allergen Reactions    Ambien [Zolpidem] Other (See Comments)     Makes pt loopy       Review of Systems:   All 14 systems were reviewed and are negative  Except for the positive findings which are documented     BP (!) 140/69   Pulse 68   Temp 97.5 °F (36.4 °C) (Oral)   Resp 16   Ht 6' 2\" (1.88 m)   Wt 249 lb (112.9 kg)   SpO2 99%   BMI 31.97 kg/m²       Intake/Output Summary (Last 24 hours) at 11/9/2020 1210  Last data filed at 11/9/2020 0600  Gross per 24 hour   Intake 1510.25 ml   Output 2450 ml   Net -939.75 ml       Physical Exam:  Constitutional:  Well developed, Well nourished, No acute distress  HENT:  Normocephalic, Atraumatic, Bilateral external ears normal,  Nose normal.   Neck- trachea is midline  Eyes:  PEERL, Conjunctiva normal  Respiratory:  Normal breath sounds, No respiratory distress, No wheezing, No chest tenderness. Cardiovascular:  IRRR, , no murmurs appreciated, No rubs appreciated, No gallops appreciated, JVP not elevated  Abdomen/GI:  Soft, No tenderness  Musculoskeletal:  Intact distal pulses, no edema to lower legs,  No tenderness, No cyanosis, No clubbing. Integument:  Warm, Dry  Lymphatic:  No lymphadenopathy noted.    Neurologic:  Alert & oriented  Psychiatric:  Affect and Mood :pleasant     Medications:    [START ON 11/10/2020] metoprolol succinate  100 mg Oral Daily    apixaban  5 mg Oral BID    aspirin  81 mg Oral Daily    atorvastatin  40 mg Oral Nightly    gabapentin  300 mg Oral TID    pantoprazole  40 mg Oral QAM AC    tamsulosin  0.4 mg Oral Daily    sodium chloride flush  10 mL Intravenous 2 times per day       melatonin, LORazepam, fentanNYL,

## 2020-11-17 NOTE — PROGRESS NOTES
Physician Progress Note      PATIENT:               Lamonte Oliveros  CSN #:                  [id number]  :                       1945  ADMIT DATE:       2020 6:26 AM  DISCH DATE:        2020 3:47 PM  RESPONDING  PROVIDER #:        Gonzalo Aguirre MD          QUERY TEXT:    Hospitalists,    Pt admitted with atrial fibrillation. Pt noted to have abnormal creatnine. If   possible, please document in the progress notes and discharge summary if you   are evaluating and/or treating any of the following: The medical record reflects the following:  Risk Factors: atrial fib  Clinical Indicators: creatinine 0.7-1.2  Treatment: none specific    Thank you,  Ann Valenzuela RN CDS  Options provided:  -- Acute kidney failure  -- Acute kidney failure with acute tubular necrosis  -- Acute kidney injury  -- Other - I will add my own diagnosis  -- Disagree - Not applicable / Not valid  -- Disagree - Clinically unable to determine / Unknown  -- Refer to Clinical Documentation Reviewer    PROVIDER RESPONSE TEXT:    This patient has an Acute kidney injury.     Query created by: Silvina Osullivan on 2020 10:20 AM      Electronically signed by:  Gonzalo Aguirre MD 2020 4:42 PM

## 2020-11-25 ENCOUNTER — OFFICE VISIT (OUTPATIENT)
Dept: CARDIOLOGY CLINIC | Age: 75
End: 2020-11-25
Payer: MEDICARE

## 2020-11-25 VITALS
WEIGHT: 250.6 LBS | BODY MASS INDEX: 33.21 KG/M2 | SYSTOLIC BLOOD PRESSURE: 142 MMHG | DIASTOLIC BLOOD PRESSURE: 74 MMHG | HEART RATE: 47 BPM | HEIGHT: 73 IN

## 2020-11-25 PROBLEM — I10 ESSENTIAL HYPERTENSION: Status: ACTIVE | Noted: 2020-11-25

## 2020-11-25 PROBLEM — R53.83 OTHER FATIGUE: Status: ACTIVE | Noted: 2020-11-25

## 2020-11-25 PROCEDURE — G8417 CALC BMI ABV UP PARAM F/U: HCPCS | Performed by: NURSE PRACTITIONER

## 2020-11-25 PROCEDURE — G8484 FLU IMMUNIZE NO ADMIN: HCPCS | Performed by: NURSE PRACTITIONER

## 2020-11-25 PROCEDURE — 1036F TOBACCO NON-USER: CPT | Performed by: NURSE PRACTITIONER

## 2020-11-25 PROCEDURE — 93000 ELECTROCARDIOGRAM COMPLETE: CPT | Performed by: NURSE PRACTITIONER

## 2020-11-25 PROCEDURE — 4040F PNEUMOC VAC/ADMIN/RCVD: CPT | Performed by: NURSE PRACTITIONER

## 2020-11-25 PROCEDURE — 1111F DSCHRG MED/CURRENT MED MERGE: CPT | Performed by: NURSE PRACTITIONER

## 2020-11-25 PROCEDURE — 1123F ACP DISCUSS/DSCN MKR DOCD: CPT | Performed by: NURSE PRACTITIONER

## 2020-11-25 PROCEDURE — G8427 DOCREV CUR MEDS BY ELIG CLIN: HCPCS | Performed by: NURSE PRACTITIONER

## 2020-11-25 PROCEDURE — 3017F COLORECTAL CA SCREEN DOC REV: CPT | Performed by: NURSE PRACTITIONER

## 2020-11-25 PROCEDURE — 99214 OFFICE O/P EST MOD 30 MIN: CPT | Performed by: NURSE PRACTITIONER

## 2020-11-25 RX ORDER — METOPROLOL SUCCINATE 50 MG/1
50 TABLET, EXTENDED RELEASE ORAL DAILY
Qty: 30 TABLET | Refills: 2 | Status: SHIPPED | OUTPATIENT
Start: 2020-11-25 | End: 2020-12-11 | Stop reason: DRUGHIGH

## 2020-11-25 RX ORDER — ATORVASTATIN CALCIUM 40 MG/1
40 TABLET, FILM COATED ORAL NIGHTLY
Qty: 30 TABLET | Refills: 5 | Status: SHIPPED | OUTPATIENT
Start: 2020-11-25 | End: 2020-12-23 | Stop reason: ALTCHOICE

## 2020-11-25 RX ORDER — BACLOFEN 5 MG/1
5 TABLET ORAL EVERY 8 HOURS PRN
COMMUNITY
Start: 2020-11-16 | End: 2022-02-15

## 2020-11-25 RX ORDER — AMLODIPINE BESYLATE 2.5 MG/1
2.5 TABLET ORAL DAILY
Qty: 30 TABLET | Refills: 3 | Status: SHIPPED | OUTPATIENT
Start: 2020-11-25 | End: 2020-11-25 | Stop reason: SINTOL

## 2020-11-25 RX ORDER — HYDRALAZINE HYDROCHLORIDE 10 MG/1
10 TABLET, FILM COATED ORAL 2 TIMES DAILY
Qty: 90 TABLET | Refills: 3 | Status: SHIPPED | OUTPATIENT
Start: 2020-11-25 | End: 2021-04-19

## 2020-11-25 NOTE — ASSESSMENT & PLAN NOTE
Currently elevated BP and bradycardic, decrease Toprol to 50 mg daily. Will start on 10 mg of Hydralazine. Will attempt to stabilize heart rate and managing blood pressure, monitor for reflex tachycardia given recent A. fib RVR. Patient was previously on amlodipine 2.5 mg daily and Toprol 50 mg daily which she did not tolerate causing hypotension.

## 2020-11-25 NOTE — ASSESSMENT & PLAN NOTE
Currently on Eliquis 5 mg twice daily for anticoagulation. Currently bradycardic heart rate 47 we will decrease Toprol to 50 mg daily. Patient previously had episodes of dizziness resulting in discontinuing of amlodipine.

## 2020-11-25 NOTE — LETTER
Rudi Forte  1945  [id number]    Have you had any Chest Pain that is not new? - No      ? DO EKG IF: Patient has a Heart Rate above 100 or below 40     CAD (Coronary Artery Disease) patient should have one on file every 6 months        Have you had any Shortness of Breath - Yes  If Yes - When at rest and on exertion    Have you had any dizziness - Yes  If Yes DO ORTHOSTATIC BP - when do you feel dizzy walking   How long does it last .30  seconds     ? Sitting wait 5 minutes do supine (laying down) wait 5 minutes then do standing - log each in \"vitals\" area in Epic  ? Be sure to ask what symptoms they are having if they get dizzy while completing ortho stats such as room spinning, nausea, etc.    Have you had any palpitations that are not new?  - Yes  If Yes DO EKG - Do you feel your heart fluttering  How long does it last - .45  seconds     Is the patient on any of the following medications -    Do you have any edema - swelling in No        Do you have a surgery or procedure scheduled in the near future - No

## 2020-11-25 NOTE — PROGRESS NOTES
Take 300 mg by mouth 3 times daily.  Lactobacillus (PROBIOTIC ACIDOPHILUS PO) Take by mouth daily      tamsulosin (FLOMAX) 0.4 MG capsule 1 tablet 2 times daily      apixaban (ELIQUIS) 5 MG TABS tablet Take 1 tablet by mouth 2 times daily 60 tablet 1    aspirin 81 MG chewable tablet Take 1 tablet by mouth daily 30 tablet 0    omeprazole (PRILOSEC) 20 MG delayed release capsule Take 20 mg by mouth daily      citalopram (CELEXA) 20 MG tablet 10 mg       traMADol (ULTRAM) 50 MG tablet        No current facility-administered medications for this visit. Allergies: Ambien [zolpidem]  Past Medical History:   Diagnosis Date    Abnormal MRI, spine     per pt herniated disc    Arthritis     Gastritis     H/O 24 hour EKG monitoring 06/24/2002 06/24/2002 baseline rhythm appears to be normal with an average HR of 66 bpm, slowest HR recored at 51 bpm, fastest at 97 bpm only 10 isolated  PVC's and one couplet were recorded, supraventricular ectopic activity is present, but not clinically significant, no long pauses recognized.  H/O cardiovascular stress test 03/16/1999, 12/28/2001,03/14/2006, 03/31/2008, 11/19/2008, 09/07/2011 09/07/2011 EF 61%, no angina or ischemic EKG changes, noted with Lexiscan, inferior wall reperfusion, global function is intact. resting /85, resting HR 79    H/O complete electrocardiogram 03/05/2012 03/05/2012 on chart    H/O CT scan of chest 11/19/2008 11/19/2008 no evidence of PE, no acute thoracic aortic pathology, incidental 5mm pleural based nodular density superior lateral right middle lobe.  H/O Doppler ultrasound no anatomical abnormalities in the segment s studied on either side, doppler curves are normal in configuration and amplitude in those segments bilaterally, normal flow velocities and flow velocity ratios.  normal antegrade flow in the vertebral arteries bilaterally    H/O Doppler ultrasound 08/08/2005 08/08/2005 no significant obstructive lesions noted in the extracranial carotid system,antegrade flow noted in the vertebral arteries. no significant atherosclerotic plaque noted in right or left  common arteryintimal thickening in right and left internal carotids, intimal thickening in left external carotid.  H/O echocardiogram 06/24/2002 EF 55-60% 06/19/2006 EF 50-55%,09/06/2011 EF 50-55%    09/06/2011 EF 50-55%, normal size left ventricle showing preserved global systolic  function and no regional wall motion abnormalities, left ventricle shows  moderate concentric hypertrophy and signs of diastolic dysfunction, mildly dilated atrium,, aortic valve is sclerotic but nonstenotic, trace mitral and tricuspid  regurg    H/O tilt table evaluation 07/05/2001 07/05/2001 head up tilt table test after one nitroglycerin pt became nauseous, diaphoretic, BP dropped systolic to 85 pulse in 17'Q with complete loss of consciousness with  slight seizure activity before table brought to horizontal, happened within 8 minutes of nitro tablet utilization, pt. regained consciousness HR and BP  as soon as table was brought to horizontal, lopressor discontinued    Hiatal hernia     History of cardiac cath 10/05/2020    No significant CAD. LAD diffusely diseased as it is very small in caliber. LVEF is 50 to 55 %.     History of nuclear stress test 09/30/2020    EF 62%, Large area of significant inferior wall ischemia    Hx of cardiac cath 03/17/1999, 11/19/2008 11/19/2008both the left main and circumflex are without significant disease, RCA is also without significant disease, LV gram shows normal size left ventricular cavity with normal global and regional left ventricular systolic function, no significant CAD, chest pain is probably non cardiac in etiology    Hypertension     Prolonged emergence from general anesthesia     Syncope and collapse     Unspecified sleep apnea     cpap    Wears glasses      Past Surgical History:   Procedure Laterality Date    Physical Exam:  BP (!) 142/74   Pulse (!) 47   Ht 6' 1\" (1.854 m)   Wt 250 lb 9.6 oz (113.7 kg)   BMI 33.06 kg/m²   Wt Readings from Last 3 Encounters:   11/25/20 250 lb 9.6 oz (113.7 kg)   11/08/20 249 lb (112.9 kg)   10/21/20 262 lb 3.2 oz (118.9 kg)     Body mass index is 33.06 kg/m². GENERAL - Alert, oriented, pleasant, in no apparent distress. Head unremarkable  Eyes - pupils equal and reactive to light - bilateral conjunctiva are pink: sclera are white   ENT - external ears intact, nose is intact:  tongue is midline pink and moist  Neck - Supple. No jugular venous distention noted. No carotid bruits appreciated. Cardiovascular - Normal S1 and S2:  murmur appreciated No, No gallop. Regular rate- No,  rhythm regular-Yes. Extremities - No cyanosis, clubbing, no edema to lower legs. Pulmonary - No respiratory distress. No wheezes or rales. Chest is clear  Pulses: Bilateral radial and pedal pulses normal  Abdomen -  Soft no tenderness, non distended   Musculoskeletal - Normal movement of all extremities   Neurologic - alert and oriented: There are no gross focal neurologic abnormalities. Skin-  No rash: No echymosis   Affect- normal mood and pleasant       DATA:  Lab Results   Component Value Date    CKTOTAL 78 11/07/2020    CKMB 2.8 10/05/2011    TROPONINI <0.006 10/05/2011     BNP:    Lab Results   Component Value Date    BNP 15 09/06/2011     PT/INR:  No results found for: PTINR  Lab Results   Component Value Date    LABA1C 6.5 (H) 11/07/2020    LABA1C 6.2 08/27/2020     Lab Results   Component Value Date    CHOL 174 08/26/2020    TRIG 225 (H) 08/26/2020    HDL 44 08/26/2020    LDLDIRECT 104 (H) 08/26/2020     Lab Results   Component Value Date    ALT 31 11/07/2020    AST 24 11/07/2020     TSH:  No results found for: TSH    Echo:8/26/2020  Left ventricular systolic function is normal.   Ejection fraction is visually estimated at 55%. Mild left ventricular hypertrophy.    Mildly dilated left atrium. No evidence of any pericardial effusion. Echo:11/9/2020  No evidence of thrombus within the left atrial appendage. Rouleaux flow noted in left atrium. Dilated left atrium. Mild Prolapse of the posterior mitral valve leaflet with Mild mitral   regurgitation is present. Negative bubble study; no evidence of PFO or ASD. The patient was shocked once using 200 synchronized joules and was   successfully converted to sinus rhythm. Stress Test:10/5/2020  1. No significant CAD. ? LAD diffusely diseased as it is very small in caliber. 2. Normal LV systolic function. LVEF is 50 to 55 %. The 10-year ASCVD risk score (Lorna Brink, et al., 2013) is: 25.5%    Values used to calculate the score:      Age: 70 years      Sex: Male      Is Non- : No      Diabetic: No      Tobacco smoker: No      Systolic Blood Pressure: 028 mmHg      Is BP treated: No      HDL Cholesterol: 44 MG/DL      Total Cholesterol: 174 MG/DL    Assessment/ Plan:     Atrial fibrillation (HCC)  Currently on Eliquis 5 mg twice daily for anticoagulation. Currently bradycardic heart rate 47 we will decrease Toprol to 50 mg daily. Patient previously had episodes of dizziness resulting in discontinuing of amlodipine. Essential hypertension  Currently elevated BP and bradycardic, decrease Toprol to 50 mg daily. Will start on 10 mg of Hydralazine. Will attempt to stabilize heart rate and managing blood pressure, monitor for reflex tachycardia given recent A. fib RVR. Patient was previously on amlodipine 2.5 mg daily and Toprol 50 mg daily which she did not tolerate causing hypotension. THOMAS on CPAP  Wears CPAP at night    Other fatigue  Bradycardia, decrease Toprol to 50 mg XL. EKG and BP check in 2 weeks      Patient seen, interviewed and examined. Testing was reviewed. Patient is encouraged to exercise even a brisk walk for 30 minutes at least 3 to 4 times a week.   Lifestyle and risk factor modificatons discussed. Various goals are discussed and questions answered. Continue current medications. Appropriate prescriptions are addressed. Questions answered and patient verbalizes understanding. Call for any problems, questions, or concerns. Pt is to follow up in 2 weeks for Cardiac management    Electronically signed by Valdo Marie. MÓNICA Durbin CNP on 11/25/2020 at 4:03 PM      Patient's BP is mildly elevated on 100 mg daily of Toprol. If we are reducing Toprol to 50 mg  Suggest add Hydralazine 10 mg BID for control of BP & adjust as needed.

## 2020-12-01 ENCOUNTER — OFFICE VISIT (OUTPATIENT)
Dept: CARDIOLOGY CLINIC | Age: 75
End: 2020-12-01
Payer: MEDICARE

## 2020-12-01 ENCOUNTER — TELEPHONE (OUTPATIENT)
Dept: CARDIOLOGY CLINIC | Age: 75
End: 2020-12-01

## 2020-12-01 VITALS
HEIGHT: 73 IN | SYSTOLIC BLOOD PRESSURE: 118 MMHG | HEART RATE: 60 BPM | DIASTOLIC BLOOD PRESSURE: 68 MMHG | BODY MASS INDEX: 33.72 KG/M2 | WEIGHT: 254.4 LBS

## 2020-12-01 PROCEDURE — 93000 ELECTROCARDIOGRAM COMPLETE: CPT | Performed by: INTERNAL MEDICINE

## 2020-12-01 PROCEDURE — 4040F PNEUMOC VAC/ADMIN/RCVD: CPT | Performed by: INTERNAL MEDICINE

## 2020-12-01 PROCEDURE — 99214 OFFICE O/P EST MOD 30 MIN: CPT | Performed by: INTERNAL MEDICINE

## 2020-12-01 PROCEDURE — G8417 CALC BMI ABV UP PARAM F/U: HCPCS | Performed by: INTERNAL MEDICINE

## 2020-12-01 PROCEDURE — 1111F DSCHRG MED/CURRENT MED MERGE: CPT | Performed by: INTERNAL MEDICINE

## 2020-12-01 PROCEDURE — 1123F ACP DISCUSS/DSCN MKR DOCD: CPT | Performed by: INTERNAL MEDICINE

## 2020-12-01 PROCEDURE — 1036F TOBACCO NON-USER: CPT | Performed by: INTERNAL MEDICINE

## 2020-12-01 PROCEDURE — 3017F COLORECTAL CA SCREEN DOC REV: CPT | Performed by: INTERNAL MEDICINE

## 2020-12-01 PROCEDURE — G8484 FLU IMMUNIZE NO ADMIN: HCPCS | Performed by: INTERNAL MEDICINE

## 2020-12-01 PROCEDURE — G8427 DOCREV CUR MEDS BY ELIG CLIN: HCPCS | Performed by: INTERNAL MEDICINE

## 2020-12-01 NOTE — PATIENT INSTRUCTIONS
CAD: None significant except LAD is small possibly diffusely diseased. HTN:well controlled on current medical regimen, see list above.              - changes in  treatment:   no   CARDIOMYOPATHY: None known   CONGESTIVE HEART FAILURE: NO KNOWN HISTORY.    VHD: No significant VHD noted  DYSLIPIDEMIA: Patient's profile is at / near [facility] is low                                Tolerating current medical regimen wellyes,                                 [facility] not tolerate medications well due to side effects                                See most recent Lab values in Labs section above. ARRHYTHMIAS: known                                LGLVZLV has H/O P. Atrial fibrillation                                He is rate controlled & on anticoagulation. Patient probably had a break through episode of PAF. OTHER RELEVANT DIAGNOSIS:as noted in patient's active problem list:  TESTS ORDERED: EKG in 3 days                                     All previously ordered tests reviewed.  MEDICATIONS: CPM + add Multaq to the regimen. Patient to see  Daya Greene.   Office f/u three months. Primary/secondary prevention is the goal by aggressive risk modification, healthy and therapeutic life style changes for cardiovascular risk reduction. Various goals are discussed and multiple questions answered.

## 2020-12-01 NOTE — PROGRESS NOTES
OFFICE PROGRESS NOTES      Farrukh Diallo is a 70 y.o. male who has    CHIEF COMPLAINT AS FOLLOWS:  CHEST PAIN: No C/O chest pain .   SOB:  C/O SOB as noted below. Mariaa Conley EDEMA: No leg edema   PALPITATIONS: C/O Palpitations & when he gets them becomes SOB.  DIZZINESS: No C/O Dizziness   SYNCOPE: None   OTHER:                                     HPI: Patient is here for F/U on his PAF, HTN & Dyslipidemia problems. Cassandra Payne has the following history recorded in care path:  Patient Active Problem List    Diagnosis Date Noted    Essential hypertension 11/25/2020    Other fatigue 11/25/2020    SOB (shortness of breath) 09/30/2020    Abnormal nuclear stress test 09/30/2020    NSVT (nonsustained ventricular tachycardia) ([facility] 09/30/2020    Atrial fibrillation ([facility] 08/26/2020    Postural dizziness with near syncope 08/26/2020    Acute neck pain 08/26/2020    Unstable angina (HCC) 05/08/2015    COPD (chronic obstructive pulmonary disease) ([facility] 07/07/2014    THOMAS on CPAP 05/06/2014     Current Outpatient Medications   Medication Sig Dispense Refill    dronedarone hcl (MULTAQ) 400 MG TABS Take 1 tablet by mouth 2 times daily (with meals) 60 tablet 5    atorvastatin (LIPITOR) 40 MG tablet Take 1 tablet by mouth nightly 30 tablet 5    Baclofen (LIORESAL) 5 MG tablet Take 5 mg by mouth every 8 hours as needed      metoprolol succinate (TOPROL XL) 50 MG extended release tablet Take 1 tablet by mouth daily 30 tablet 2    hydrALAZINE (APRESOLINE) 10 MG tablet Take 1 tablet by mouth 2 times daily 90 tablet 3    gabapentin (NEURONTIN) 300 MG capsule Take 300 mg by mouth 3 times daily.       Lactobacillus (PROBIOTIC ACIDOPHILUS PO) Take by mouth daily      tamsulosin (FLOMAX) 0.4 MG capsule 1 tablet 2 times daily      apixaban (ELIQUIS) 5 MG TABS tablet Take 1 tablet by mouth 2 times daily 60 tablet 1    aspirin 81 MG chewable tablet Take 1 tablet by mouth daily 30 tablet 0    omeprazole (PRILOSEC) 20 MG delayed release capsule Take 20 mg by mouth daily      citalopram (CELEXA) 20 MG tablet 10 mg       traMADol (ULTRAM) 50 MG tablet        No current facility-administered medications for this visit. Allergies: Ambien [zolpidem]  Past Medical History:   Diagnosis Date    Abnormal MRI, spine     per pt herniated disc    Arthritis     Gastritis     H/O 24 hour EKG monitoring 06/24/2002 06/24/2002 baseline rhythm appears to be normal with an average HR of 66 bpm, slowest HR recored at 51 bpm, fastest at 97 bpm only 10 isolated  PVC's and one couplet were recorded, supraventricular ectopic activity is present, but not clinically significant, no long pauses recognized.  H/O cardiovascular stress test 03/16/1999, 12/28/2001,03/14/2006, 03/31/2008, 11/19/2008, 09/07/2011 09/07/2011 EF 61%, no angina or ischemic EKG changes, noted with Lexiscan, inferior wall reperfusion, global function is intact. resting /85, resting HR 79    H/O complete electrocardiogram 03/05/2012 03/05/2012 on chart    H/O CT scan of chest 11/19/2008 11/19/2008 no evidence of PE, no acute thoracic aortic pathology, incidental 5mm pleural based nodular density superior lateral right middle lobe.  H/O Doppler ultrasound no anatomical abnormalities in the segment s studied on either side, doppler curves are normal in configuration and amplitude in those segments bilaterally, normal flow velocities and flow velocity ratios. normal antegrade flow in the vertebral arteries bilaterally    H/O Doppler ultrasound 08/08/2005 08/08/2005 no significant obstructive lesions noted in the extracranial carotid system,antegrade flow noted in the vertebral arteries. no significant atherosclerotic plaque noted in right or left  common arteryintimal thickening in right and left internal carotids, intimal thickening in left external carotid.      H/O echocardiogram 06/24/2002 EF 55-60% 06/19/2006 EF 50-55%,09/06/2011 EF 50-55%    09/06/2011 EF 50-55%, normal size left ventricle showing preserved global systolic  function and no regional wall motion abnormalities, left ventricle shows  moderate concentric hypertrophy and signs of diastolic dysfunction, mildly dilated atrium,, aortic valve is sclerotic but nonstenotic, trace mitral and tricuspid  regurg    H/O tilt table evaluation 07/05/2001 07/05/2001 head up tilt table test after one nitroglycerin pt became nauseous, diaphoretic, BP dropped systolic to 85 pulse in 25'R with complete loss of consciousness with  slight seizure activity before table brought to horizontal, happened within 8 minutes of nitro tablet utilization, pt. regained consciousness HR and BP  as soon as table was brought to horizontal, lopressor discontinued    Hiatal hernia     History of cardiac cath 10/05/2020    No significant CAD. LAD diffusely diseased as it is very small in caliber. LVEF is 50 to 55 %.  History of nuclear stress test 09/30/2020    EF 62%, Large area of significant inferior wall ischemia    Hx of cardiac cath 03/17/1999, 11/19/2008 11/19/2008both the left main and circumflex are without significant disease, RCA is also without significant disease, LV gram shows normal size left ventricular cavity with normal global and regional left ventricular systolic function, no significant CAD, chest pain is probably non cardiac in etiology    Hypertension     Prolonged emergence from general anesthesia     Syncope and collapse     Unspecified sleep apnea     cpap    Wears glasses      Past Surgical History:   Procedure Laterality Date    BACK SURGERY      DIAGNOSTIC CARDIAC CATH LAB PROCEDURE  03/17/1999,11/19/2008 11/19/2008, left main,circumflex, and RCA are without any significant disease, LV gram shows normal size left ventricular cavity with normal global and  regional left ventricular systolic function, pt has no significant CAD.    DANILO Jerry TONSILLECTOMY        As reviewed   Family History   Problem Relation Age of Onset    Diabetes Mother     Heart Disease Mother     Other Mother         kidney stones    Cancer Mother         breast    Heart Disease Father     Diabetes Father      Social History     Tobacco Use    Smoking status: Former Smoker     Types: Cigarettes     Last attempt to quit: 1988     Years since quittin.9    Smokeless tobacco: Former User     Types: Chew    Tobacco comment: Quit chewing in 2014   Substance Use Topics    Alcohol use: Yes     Comment: moderate, decaff coffee two cups daily   Beer on the weekends      Review of Systems:    Constitutional: Negative for diaphoresis and fatigue  Psychological:Negative for anxiety or depression  HENT: Negative for headaches, nasal congestion, sinus pain or vertigo  Eyes: Negative for visual disturbance. Endocrine: Negative for polydipsia/polyuria  Respiratory: Negative for shortness of breath  Cardiovascular: Negative for chest pain, dyspnea on exertion, claudication, edema, irregular heartbeat, murmur, palpitations or shortness of breath  Gastrointestinal: Negative for abdominal pain or heartburn  Genito-Urinary: Negative for urinary frequency/urgency  Musculoskeletal: Negative for muscle pain, muscular weakness, negative for pain in arm and leg or swelling in foot and leg  Neurological: Negative for dizziness, headaches, memory loss, numbness/tingling, visual changes, syncope  Dermatological: Negative for rash    Objective:  /68 (Position: Standing)   Pulse 60   Ht 6' 1\" (1.854 m)   Wt 254 lb 6.4 oz (115.4 kg)   BMI 33.56 kg/m²   Wt Readings from Last 3 Encounters:   20 254 lb 6.4 oz (115.4 kg)   20 250 lb 9.6 oz (113.7 kg)   20 249 lb (112.9 kg)     Body mass index is 33.56 kg/m². No flowsheet data found.     Vitals:    20 1534 20 1542 20 1543   BP: 122/70 120/70 118/68   Position: Sitting Supine Standing   Pulse: 62 63 11/09/2020     11/08/2020    K 4.0 11/09/2020    K 4.6 11/08/2020     11/09/2020     11/08/2020    CO2 20 11/09/2020    CO2 20 11/08/2020    BUN 19 11/09/2020    BUN 19 11/08/2020    CREATININE 0.8 11/09/2020    CREATININE 0.7 11/08/2020     CMP:   Lab Results   Component Value Date     11/09/2020     11/08/2020    K 4.0 11/09/2020    K 4.6 11/08/2020     11/09/2020     11/08/2020    CO2 20 11/09/2020    CO2 20 11/08/2020    BUN 19 11/09/2020    BUN 19 11/08/2020    CREATININE 0.8 11/09/2020    CREATININE 0.7 11/08/2020    PROT 7.6 11/07/2020    PROT 6.9 08/26/2020    PROT 7.1 02/05/2013    PROT 6.9 02/10/2012     TSH:    Lab Results   Component Value Date    TSH 3.230 11/07/2020    TSHHS 3.240 08/26/2020       QUALITY MEASURES REVIEWED:  1.CAD:Patient is taking anti platelet agent:Yes  2. DYSLIPIDEMIA: Patient is on cholesterol lowering medication:Yes  3. Beta-Blocker therapy for CAD, if prior Myocardial Infarction:Yes  4. Atrial fibrillation & anticoagulation therapy Yes  5. Discussed weight management strategies. Impression:    1. Paroxysmal atrial fibrillation (HCC)    2. THOMAS on CPAP    3. NSVT (nonsustained ventricular tachycardia) ([facility]    4. Essential hypertension       Patient Active Problem List   Diagnosis Code    THOMAS on CPAP G47.33, Z99.89    COPD (chronic obstructive pulmonary disease) ([facility] J44.9    Unstable angina ([facility]) I20.0    Atrial fibrillation ([facility]) I48.91    Postural dizziness with near syncope R42, R55    Acute neck pain M54.2    SOB (shortness of breath) R06.02    Abnormal nuclear stress test R94.39    NSVT (nonsustained ventricular tachycardia) ([facility]) I47.2    Essential hypertension I10    Other fatigue R53.83       Assessment & Plan:    CAD: None significant except LAD is small possibly diffusely diseased.   HTN:well controlled on current medical regimen, see list above.              - changes in  treatment:   no   CARDIOMYOPATHY: None

## 2020-12-01 NOTE — LETTER
[facility]  [address] [address]  Phone: [phone]  Fax: [phone]    Andreas Cowart MD        December 1, 2020     Giselle Prince, [address] [address] [address]    Patient: Juan Stage  MR Number: [id number]  YOB: 1945  Date of Visit: 12/1/2020    Dear Dr. Giselle Prince:    Thank you for the request for consultation for Maxwell Gallo to me for the evaluation of PAF. Below are the relevant portions of my assessment and plan of care. If you have questions, please do not hesitate to call me. I look forward to following Levon Knapp along with you.     Sincerely,        Andreas Cowart MD

## 2020-12-01 NOTE — TELEPHONE ENCOUNTER
Patient called stated that his heart rate  Has been   All over the place this morning bouncing from 52 to 80   To 102

## 2020-12-02 ENCOUNTER — TELEPHONE (OUTPATIENT)
Dept: CARDIOLOGY CLINIC | Age: 75
End: 2020-12-02

## 2020-12-07 ENCOUNTER — TELEPHONE (OUTPATIENT)
Dept: CARDIOLOGY CLINIC | Age: 75
End: 2020-12-07

## 2020-12-11 ENCOUNTER — NURSE ONLY (OUTPATIENT)
Dept: CARDIOLOGY CLINIC | Age: 75
End: 2020-12-11
Payer: MEDICARE

## 2020-12-11 VITALS
SYSTOLIC BLOOD PRESSURE: 114 MMHG | HEIGHT: 73 IN | WEIGHT: 251 LBS | HEART RATE: 50 BPM | DIASTOLIC BLOOD PRESSURE: 70 MMHG | BODY MASS INDEX: 33.27 KG/M2

## 2020-12-11 PROCEDURE — 93000 ELECTROCARDIOGRAM COMPLETE: CPT | Performed by: NURSE PRACTITIONER

## 2020-12-11 RX ORDER — METOPROLOL SUCCINATE 50 MG/1
25 TABLET, EXTENDED RELEASE ORAL DAILY
Qty: 30 TABLET | Refills: 2 | Status: SHIPPED
Start: 2020-12-11 | End: 2021-02-25

## 2020-12-11 NOTE — PROGRESS NOTES
Patient is here today for EKG. Patient is being followed for bradycardia and atrial fibrillation.   He does have an appointment with Dr. Stiven Mercedes on 12/23  Patient complaining of lightheaded and dizziness  Heart rate 50  Will decrease Toprol-XL to 25 mg daily  Patient to follow-up with Dr. Stiven Mercedes as scheduled

## 2020-12-11 NOTE — TELEPHONE ENCOUNTER
Per Dr Jessica Rodriguez, Dr Susana Anderson can address multaq alternative at [facility]

## 2020-12-23 ENCOUNTER — INITIAL CONSULT (OUTPATIENT)
Dept: CARDIOLOGY CLINIC | Age: 75
End: 2020-12-23
Payer: MEDICARE

## 2020-12-23 VITALS
TEMPERATURE: 96.7 F | HEART RATE: 67 BPM | BODY MASS INDEX: 33.16 KG/M2 | WEIGHT: 258.4 LBS | DIASTOLIC BLOOD PRESSURE: 64 MMHG | HEIGHT: 74 IN | SYSTOLIC BLOOD PRESSURE: 118 MMHG | RESPIRATION RATE: 11 BRPM | OXYGEN SATURATION: 97 %

## 2020-12-23 PROCEDURE — G8484 FLU IMMUNIZE NO ADMIN: HCPCS | Performed by: INTERNAL MEDICINE

## 2020-12-23 PROCEDURE — 4040F PNEUMOC VAC/ADMIN/RCVD: CPT | Performed by: INTERNAL MEDICINE

## 2020-12-23 PROCEDURE — 3017F COLORECTAL CA SCREEN DOC REV: CPT | Performed by: INTERNAL MEDICINE

## 2020-12-23 PROCEDURE — G8427 DOCREV CUR MEDS BY ELIG CLIN: HCPCS | Performed by: INTERNAL MEDICINE

## 2020-12-23 PROCEDURE — 1036F TOBACCO NON-USER: CPT | Performed by: INTERNAL MEDICINE

## 2020-12-23 PROCEDURE — 1123F ACP DISCUSS/DSCN MKR DOCD: CPT | Performed by: INTERNAL MEDICINE

## 2020-12-23 PROCEDURE — 99205 OFFICE O/P NEW HI 60 MIN: CPT | Performed by: INTERNAL MEDICINE

## 2020-12-23 PROCEDURE — G8417 CALC BMI ABV UP PARAM F/U: HCPCS | Performed by: INTERNAL MEDICINE

## 2020-12-23 PROCEDURE — 93000 ELECTROCARDIOGRAM COMPLETE: CPT | Performed by: INTERNAL MEDICINE

## 2020-12-23 RX ORDER — PRAVASTATIN SODIUM 20 MG
20 TABLET ORAL DAILY
Qty: 30 TABLET | Refills: 2 | Status: SHIPPED | OUTPATIENT
Start: 2020-12-30 | End: 2021-03-01

## 2020-12-23 RX ORDER — AMIODARONE HYDROCHLORIDE 200 MG/1
200 TABLET ORAL DAILY
Qty: 30 TABLET | Refills: 3 | Status: SHIPPED | OUTPATIENT
Start: 2020-12-23 | End: 2021-03-17

## 2020-12-23 NOTE — PROGRESS NOTES
09/07/2011 EF 61%, no angina or ischemic EKG changes, noted with Lexiscan, inferior wall reperfusion, global function is intact. resting /85, resting HR 79    H/O complete electrocardiogram 03/05/2012 03/05/2012 on chart    H/O CT scan of chest 11/19/2008 11/19/2008 no evidence of PE, no acute thoracic aortic pathology, incidental 5mm pleural based nodular density superior lateral right middle lobe.  H/O Doppler ultrasound no anatomical abnormalities in the segment s studied on either side, doppler curves are normal in configuration and amplitude in those segments bilaterally, normal flow velocities and flow velocity ratios. normal antegrade flow in the vertebral arteries bilaterally    H/O Doppler ultrasound 08/08/2005 08/08/2005 no significant obstructive lesions noted in the extracranial carotid system,antegrade flow noted in the vertebral arteries. no significant atherosclerotic plaque noted in right or left  common arteryintimal thickening in right and left internal carotids, intimal thickening in left external carotid.      H/O echocardiogram 06/24/2002 EF 55-60% 06/19/2006 EF 50-55%,09/06/2011 EF 50-55%    09/06/2011 EF 50-55%, normal size left ventricle showing preserved global systolic  function and no regional wall motion abnormalities, left ventricle shows  moderate concentric hypertrophy and signs of diastolic dysfunction, mildly dilated atrium,, aortic valve is sclerotic but nonstenotic, trace mitral and tricuspid  regurg    H/O tilt table evaluation 07/05/2001 07/05/2001 head up tilt table test after one nitroglycerin pt became nauseous, diaphoretic, BP dropped systolic to 85 pulse in 38'L with complete loss of consciousness with  slight seizure activity before table brought to horizontal, happened within 8 minutes of nitro tablet utilization, pt. regained consciousness HR and BP  as soon as table was brought to horizontal, lopressor discontinued    Hiatal hernia  metoprolol succinate (TOPROL XL) 50 MG extended release tablet Take 0.5 tablets by mouth daily 30 tablet 2    apixaban (ELIQUIS) 5 MG TABS tablet Take 1 tablet by mouth 2 times daily 28 tablet 5    dronedarone hcl (MULTAQ) 400 MG TABS Take 1 tablet by mouth 2 times daily (with meals) 60 tablet 5    atorvastatin (LIPITOR) 40 MG tablet Take 1 tablet by mouth nightly 30 tablet 5    Baclofen (LIORESAL) 5 MG tablet Take 5 mg by mouth every 8 hours as needed      hydrALAZINE (APRESOLINE) 10 MG tablet Take 1 tablet by mouth 2 times daily 90 tablet 3    gabapentin (NEURONTIN) 300 MG capsule Take 300 mg by mouth 3 times daily.  Lactobacillus (PROBIOTIC ACIDOPHILUS PO) Take by mouth daily      tamsulosin (FLOMAX) 0.4 MG capsule 1 tablet 2 times daily      apixaban (ELIQUIS) 5 MG TABS tablet Take 1 tablet by mouth 2 times daily 60 tablet 1    aspirin 81 MG chewable tablet Take 1 tablet by mouth daily 30 tablet 0    omeprazole (PRILOSEC) 20 MG delayed release capsule Take 20 mg by mouth daily      citalopram (CELEXA) 20 MG tablet 10 mg       traMADol (ULTRAM) 50 MG tablet        No current facility-administered medications on file prior to visit. Review of Systems:   Review of Systems   Constitutional: Positive for fatigue. Negative for activity change, chills and fever. HENT: Negative for congestion, ear pain and tinnitus. Eyes: Negative for photophobia, pain and visual disturbance. Respiratory: Positive for shortness of breath. Negative for cough, chest tightness and wheezing. Cardiovascular: Positive for palpitations. Negative for chest pain and leg swelling. Gastrointestinal: Negative for abdominal pain, blood in stool, constipation, diarrhea, nausea and vomiting. Endocrine: Negative for cold intolerance and heat intolerance. Genitourinary: Negative for dysuria, flank pain and hematuria. Musculoskeletal: Positive for arthralgias. Negative for back pain, myalgias and neck stiffness. Skin: Negative for color change and rash. Allergic/Immunologic: Negative for food allergies. Neurological: Positive for dizziness. Negative for syncope, numbness and headaches. Hematological: Does not bruise/bleed easily. Psychiatric/Behavioral: Negative for agitation, behavioral problems and confusion. Examination:      Vitals:    12/23/20 0822   BP: 118/64   Pulse: 67   Resp: 11   Temp: Comment: AFEBRILE   SpO2: 97%   Weight: 258 lb 6.4 oz (117.2 kg)   Height: 6' 2\" (1.88 m)        Body mass index is 33.18 kg/m². Physical Exam  Constitutional:       General: He is not in acute distress. Appearance: He is well-developed. HENT:      Head: Normocephalic and atraumatic. Eyes:      Pupils: Pupils are equal, round, and reactive to light. Neck:      Musculoskeletal: Normal range of motion. Vascular: No JVD. Cardiovascular:      Rate and Rhythm: Normal rate and regular rhythm. Heart sounds: No murmur. No friction rub. Pulmonary:      Effort: Pulmonary effort is normal. No respiratory distress. Breath sounds: Normal breath sounds. No wheezing or rales. Abdominal:      General: Bowel sounds are normal. There is no distension. Palpations: Abdomen is soft. Tenderness: There is no abdominal tenderness. Musculoskeletal:         General: No tenderness. Skin:     General: Skin is warm and dry. Neurological:      Mental Status: He is alert and oriented to person, place, and time. Cranial Nerves: No cranial nerve deficit.                CBC:   Lab Results   Component Value Date    WBC 7.2 11/09/2020    HGB 14.7 11/09/2020    HCT 45.2 11/09/2020     11/09/2020     Lipids:  Lab Results   Component Value Date    CHOL 174 08/26/2020    TRIG 225 (H) 08/26/2020    HDL 44 08/26/2020    LDLDIRECT 104 (H) 08/26/2020     PT/INR:   Lab Results Component Value Date    INR 1.36 11/08/2020        BMP:    Lab Results   Component Value Date     11/09/2020    K 4.0 11/09/2020     11/09/2020    CO2 20 (L) 11/09/2020    BUN 19 11/09/2020     CMP:   Lab Results   Component Value Date    AST 24 11/07/2020    PROT 7.6 11/07/2020    BILITOT 0.3 11/07/2020    ALKPHOS 101 11/07/2020     TSH:  No results found for: TSH    EKGINTERPRETATION - EKG Interpretation:  Sinus rhythm      IMPRESSION / RECOMMENDATIONS:     1. PAF  2. Cad  3. Copd  4. THOMAS on CPAP  5. Obesity BMI 33    Patient reports tiredness and weakness and intermittent dizziness with palpitations  She had PAF in few months ago and spontaneously converted and again had in November had cardioversion. Patient was on Multaq but it is getting expensive and still having symptoms so he stopped Multaq as he cannot afford it anymore    Discussed with the patient about ablation option. Discussed risks with procedure in detail and also outcomes and success from the procedure discussed in detail with the patient     Discussed about other antiarrhythmic option which include sotalol or Tikosyn or amiodarone. But patient also has coronary artery disease (mild) so cannot be on Rythmol or flecainide    Patient does not want to stay in the hospital so we started low-dose amiodarone every day till ablation    Wants to consider ablation    Patient is also on citalopram so we need to consider using antiarrhythmic only for a limited amount of time because there is a risk of QT prolongation    Patient also reports about proximal muscle pain since starting statin so I am going to give a statin holiday for a week and reassess the patient in a week to see if the pain resolves.   As anyway patient is going to be on amiodarone we will change her statin to pravastatin but do not start it before a week    Patient wants to consider ablation risks discussed with ablation in detail and patient agreeable with the plan Thanks again for allowing me to participate in care of this patient. Please call me if you have any questions. With best regards. Daren Dinh MD, 12/23/2020 8:51 AM     Please note this report has been partially produced using speech recognition software and may contain errors related to that system including errors in grammar, punctuation, and spelling, as well as words and phrases that may be inappropriate. If there are any questions or concerns please feel free to contact the dictating provider for clarification.

## 2020-12-23 NOTE — LETTER
[facility]     Dr. Kelly Rincon     PROCEDURE: Atrial Fibrillation/Atrial Flutter Ablation    Date of Procedure: 2021  Time: 1130  Arrival Time: 930    Patient Name: Marii Neff   : 1945   MRN# [id number]    HOSPITAL: [facility])    Call to Pre-[place] at [phone]  2 days before your procedure    x Please have blood work and chest-x-ray done 2021 at [facility], [address] [place] [address] or [facility].   x Please do not have anything by mouth after midnight prior to or 8 hours before the procedure.  x You may take your medications with a sip of water in the morning before your procedure or take them with you unless listed below. X Hold Eliquis the evening dose the day before procedure () and the morning dose of the procedure (.)    X Hold all Blood Pressure Medications morning of procedure  X Please have COVID-19 Test done on 2021. You will need to Quarantine yourself until procedure. Patient Signature:  _______________________  Staff: Ania Sargent     Staff Given Instructions:_______________________________                                  [facility]     Dr. Kelly [facility]incon     PROCEDURE: Atrial Fibrillation/Atrial Flutter Ablation with transesophageal echocardiogram    Date of Procedure: 2021 Time: [address] Time: 930    Patient Name: Marii Neff   : 1945   MRN# [id number]    Day of Procedure Cath Lab Holding area Pre op Orders:    ? YOU HAVE MY PERMISSION TO TALK TO THE PATIENT-PLEASE   ? NPO  ? IF ORDERED FOR AFIB PATIENTS ONLY CARDIAC CT SCAN ANGIO (PULMONARY MAPPING)   ? Anesthesia  ? MARIBEL with Anesthesia  ? IV peripheral saline lock x 2 sites  (at least one in preferred left arm).   ? Type & Screen STAT on arrival.  ? ANTICOUGLATION:   Hold Eliquis evening dose the night before the procedure and the morning dose of the procedure ? If taking Coumadin, PT INR  STAT on arrival day of procedure. ? Insert Wright catheter (male patients >>please use coude wright catheter). ? Female patients <=48years of age >> urine HCG test.  ? Diabetic patients >> Accu check Blood sugar check. ? Document home medications in EPIC and include date and time of last dose. ? Notify Dr. Sallye Dandy of abnormal lab results. ? Chest Prep> Clip hair anterior chest and posterior back (clavicle to umbilicus). ? Groin Prep> Clip hair bilateral groins for femoral access (medial). Physician Signature:_______________________________Date:____________Time:_____                             [facility] ([facility]) Informed Consent for Anesthesia/Sedation, Surgery, Invasive Procedures, and other High-risk Interventions and Medication use     *This consent is applicable for 30 days following patient signature*    Procedure(s)   IVaibhav authorize, Dr. Jadyn Trimble   and the associate(s) or assistant(s) of his/her choice, to perform the following procedure(s): Atrial Fibrillation/Atrial Flutter Ablation with transesophageal echocardiogram     I know that unexpected conditions may require additional or different procedures than those above. I authorize the above named practitioner(s) perform these as necessary and desirable. This is based on the practitioners professional judgment. The above named practitioner has discussed the above procedure(s) with me, including:  ? Potential benefits, including likelihood of success of the procedure(s) goals  ? Risks  ? Side effects, risk of death, and risk of infection  ? Any potential problems that might occur during recuperation or healing post-procedure  ? Reasonable alternatives  ? Risks of NOT performing the procedure(s)    I acknowledge that no warranty or guarantee has been made to the results the procedure(s). I consent to the above named practitioner(s) providing additional services to me as deemed reasonable and necessary, including but not limited to:    ? Use of medications for anesthesia or sedation. ? All anesthesia and sedation carry risks. My practitioner has discussed my anticipated anesthesia and/or sedation and the risks of using, risk of not using, benefits, side effects, and alternatives. ? Use of pathology  ? I authorize [facility] ([facility]) to dispose of tissues, specimens or organs when pathology is complete. ? Use of radiology  ? A contrast agent may be required for radiology procedures. My practitioner has advised me of the risks of using, risks of not using, benefits, side effects, and alternatives. ? Observers or use of photography, video/audio recording, or televising of the procedure(s). This is for medical, scientific, or educational purposes. This includes appropriate portions of my body. My identity will not be revealed. ? I consent to release of my social security number and other identifying information to [employer] (FDA), and the supplier/, if I receive tissue, a device, or implant. This is to track the tissue, device, or implant for defect, recall, infection, etc.     ? Use of blood and/or blood products, if needed, through my hospital stay. My practitioner has advised me of the risks of using, risks of not using, benefits, side effects, and alternatives. ___ I do NOT want Blood or Blood products given. (Complete separate  refusal form)    Code Status (julio cesar one):  ___ I do NOT HAVE a DNR order. I am a Full-code.   I will receive CPR, intubation,  chest compressions, medications, and/or other life saving measures if I have a  cardiac or respiratory arrest.    ___ I have a Do Not Resuscitate (DNR)order.   (julio cesar one below) ___  I rescind my DNR for surgery and immediate post-operative period through Phase 2 recovery. This means, for that time period, I will be a Full-code and receive CPR, intubation, chest compressions, medications, and/or other life saving measures, if I have a cardiac or respiratory arrest.    ___ I WANT to keep my DNR in effect during my procedure(s) and immediate post-operative recovery period through Phase 2 recovery. (Complete separate refusal form)     This form has been fully explained to me. I understand its contents. Patients Signature: ___________________________Date: ________  Time: ________    If patient unable to sign, has engaged the [address] [place] [address], is a minor, or has a court-appointed Guardian:  [facility] Representative Name (Print):  ____________________________________      Relationship (Knik one):    Guardian   Parent    Spouse    HCPOA   Child   Sibling  Next-of-Kin Friend    Patients Representative Signature: _______________________________________              Date: ______________  Time: __________    An  was used.  name/ID: _________________________________      [facility]) Witness________________________  Date: ________   Time: _________    Physician/Practitioner _______________________  Date: ________   Time: _________        Revision 2017      Barb Cruz     PATIENT NAME:    Karolynn Fruits                               :   1945    PROCEDURE: Atrial Fibrillation/Atrial Flutter Ablation with transesophageal echocardiogram        DATE OF PROCEDURE: 2021    DIAGNOSIS:  I48.11, Z01.810, Z79.0                     X MAG    X   PHOS       X   BMP                     X CBC     X    PT    X   PTT                    X     Chest x-Ray PA & Lateral View      ? PLEASE CALL ABNORMAL RESULTS TO THE REQUESTING PHYSICIAN? ATTENTION PATIENTS:  You do not have to fast for the lab work.

## 2020-12-24 ENCOUNTER — TELEPHONE (OUTPATIENT)
Dept: CARDIOLOGY CLINIC | Age: 75
End: 2020-12-24

## 2020-12-24 NOTE — TELEPHONE ENCOUNTER
Called the patient to let him know that his CT Cardiac is schedule for January 4,2021 @ [facility] arrival time 10:00 am and test time 10:30 am NPO at midnight and wear a mask / patient stated that he will be there for this appointment

## 2020-12-28 ASSESSMENT — ENCOUNTER SYMPTOMS
VOMITING: 0
WHEEZING: 0
BLOOD IN STOOL: 0
BACK PAIN: 0
PHOTOPHOBIA: 0
ABDOMINAL PAIN: 0
CHEST TIGHTNESS: 0
DIARRHEA: 0
CONSTIPATION: 0
COLOR CHANGE: 0
EYE PAIN: 0
SHORTNESS OF BREATH: 1
NAUSEA: 0
COUGH: 0

## 2020-12-30 ENCOUNTER — TELEPHONE (OUTPATIENT)
Dept: CARDIOLOGY CLINIC | Age: 75
End: 2020-12-30

## 2020-12-30 NOTE — TELEPHONE ENCOUNTER
Patient called he just found out his Daughter is positive for covid   He will need to r/s cardiac ct , please call he is to have a   Procedure 1/14 has some questions

## 2020-12-30 NOTE — TELEPHONE ENCOUNTER
Spoke with patient. He states he was exposed to his daughter, who later found out she has COVID, on 12/25. He denies any COVID symptoms at this time. 14 day quarantine will be up on 1/8/21. Advised patient that A-Fib ablation procedure can still move forward for 1/14 as long as his COVID screening on 1/11/21 comes back negative. Patient will have cardiac CT rescheduled with Glenis SHIELDS Patient also states he held Lipitor per 's order, and he is still having leg pain. Per Concetta Landrum, if patient still has leg pain after holding Lipitor, than the leg pain is not coming from his cholesterol medication. Patient is to switch Lipitor to Pravastatin to avoid interaction with Amiodarone. Patient voiced understanding.

## 2020-12-30 NOTE — TELEPHONE ENCOUNTER
The patient is in [place] and won't be able to come in on 1/4/2021 .  It has been r/s till 1/11/2021 @ 11:00 and the patient stated that he will be there at [facility] for his scan for Alex Cooper

## 2021-01-07 NOTE — TELEPHONE ENCOUNTER
Patient called requesting samples of Eliquis, patient was advised that samples should be ready for  by tomorrow afternoon, please call with any problems at ph# [phone]. Cara Merrill

## 2021-01-11 ENCOUNTER — NURSE ONLY (OUTPATIENT)
Dept: CARDIOLOGY CLINIC | Age: 76
End: 2021-01-11
Payer: MEDICARE

## 2021-01-11 ENCOUNTER — HOSPITAL ENCOUNTER (OUTPATIENT)
Dept: CT IMAGING | Age: 76
Discharge: HOME | End: 2021-01-11
Payer: MEDICARE

## 2021-01-11 ENCOUNTER — HOSPITAL ENCOUNTER (OUTPATIENT)
Age: 76
Discharge: HOME | End: 2021-01-11
Payer: MEDICARE

## 2021-01-11 ENCOUNTER — HOSPITAL ENCOUNTER (OUTPATIENT)
Dept: GENERAL RADIOLOGY | Age: 76
Discharge: HOME | End: 2021-01-11
Payer: MEDICARE

## 2021-01-11 ENCOUNTER — HOSPITAL ENCOUNTER (OUTPATIENT)
Age: 76
Setting detail: SPECIMEN
Discharge: HOME | End: 2021-01-11
Payer: MEDICARE

## 2021-01-11 VITALS — TEMPERATURE: 96.4 F

## 2021-01-11 DIAGNOSIS — I48.0 PAROXYSMAL ATRIAL FIBRILLATION (HCC): ICD-10-CM

## 2021-01-11 DIAGNOSIS — I48.91 ATRIAL FIBRILLATION, UNSPECIFIED TYPE (HCC): ICD-10-CM

## 2021-01-11 DIAGNOSIS — Z01.810 PRE-OPERATIVE CARDIOVASCULAR EXAMINATION: ICD-10-CM

## 2021-01-11 DIAGNOSIS — Z79.01 LONG TERM (CURRENT) USE OF ANTICOAGULANTS: ICD-10-CM

## 2021-01-11 LAB
ANION GAP SERPL CALCULATED.3IONS-SCNC: 12 MMOL/L (ref 4–16)
APTT: 39.5 SECONDS (ref 25.1–37.1)
BUN BLDV-MCNC: 14 MG/DL (ref 6–23)
CALCIUM SERPL-MCNC: 8.7 MG/DL (ref 8.3–10.6)
CHLORIDE BLD-SCNC: 103 MMOL/L (ref 99–110)
CO2: 22 MMOL/L (ref 21–32)
CREAT SERPL-MCNC: 0.9 MG/DL (ref 0.9–1.3)
GFR AFRICAN AMERICAN: >60 ML/MIN/1.73M2
GFR NON-AFRICAN AMERICAN: >60 ML/MIN/1.73M2
GLUCOSE BLD-MCNC: 86 MG/DL (ref 70–99)
HCT VFR BLD CALC: 43.3 % (ref 42–52)
HEMOGLOBIN: 14.1 GM/DL (ref 13.5–18)
INR BLD: 1.22 INDEX
MAGNESIUM: 2 MG/DL (ref 1.8–2.4)
MCH RBC QN AUTO: 29.6 PG (ref 27–31)
MCHC RBC AUTO-ENTMCNC: 32.6 % (ref 32–36)
MCV RBC AUTO: 90.8 FL (ref 78–100)
PDW BLD-RTO: 13.8 % (ref 11.7–14.9)
PHOSPHORUS: 2.4 MG/DL (ref 2.5–4.9)
PLATELET # BLD: 229 K/CU MM (ref 140–440)
PMV BLD AUTO: 10.8 FL (ref 7.5–11.1)
POTASSIUM SERPL-SCNC: 4.3 MMOL/L (ref 3.5–5.1)
PROTHROMBIN TIME: 14.8 SECONDS (ref 11.7–14.5)
RBC # BLD: 4.77 M/CU MM (ref 4.6–6.2)
SODIUM BLD-SCNC: 137 MMOL/L (ref 135–145)
WBC # BLD: 5.5 K/CU MM (ref 4–10.5)

## 2021-01-11 PROCEDURE — U0002 COVID-19 LAB TEST NON-CDC: HCPCS

## 2021-01-11 PROCEDURE — 75572 CT HRT W/3D IMAGE: CPT

## 2021-01-11 PROCEDURE — 6360000004 HC RX CONTRAST MEDICATION: Performed by: INTERNAL MEDICINE

## 2021-01-11 PROCEDURE — 80048 BASIC METABOLIC PNL TOTAL CA: CPT

## 2021-01-11 PROCEDURE — 99211 OFF/OP EST MAY X REQ PHY/QHP: CPT | Performed by: INTERNAL MEDICINE

## 2021-01-11 PROCEDURE — 85610 PROTHROMBIN TIME: CPT

## 2021-01-11 PROCEDURE — 36415 COLL VENOUS BLD VENIPUNCTURE: CPT

## 2021-01-11 PROCEDURE — 83735 ASSAY OF MAGNESIUM: CPT

## 2021-01-11 PROCEDURE — 85027 COMPLETE CBC AUTOMATED: CPT

## 2021-01-11 PROCEDURE — 71046 X-RAY EXAM CHEST 2 VIEWS: CPT

## 2021-01-11 PROCEDURE — 85730 THROMBOPLASTIN TIME PARTIAL: CPT

## 2021-01-11 PROCEDURE — 84100 ASSAY OF PHOSPHORUS: CPT

## 2021-01-11 RX ADMIN — IOPAMIDOL 120 ML: 755 INJECTION, SOLUTION INTRAVENOUS at 13:22

## 2021-01-11 NOTE — PROGRESS NOTES
Patient informed of instructions and guidance for performing test.  Patient was wearing a mask and provided with gloves and tissues. Patient performed COVID nasal swab for 10 seconds in each nostril. This RN present in the room, 6 feet away. Patient dropped swab in  labeled collection tube. Collection tube and lab order placed in plastic bag. Bag placed in biohazard bag and placed in fridge.  called for . Patient here in office and educated on A-Fib/Flutter ablation, schedule for 1/14/21 @ 1130, with arrival @ 0930, @ [facility]; risk explained; and consents signed. Also copy of orders given for labs and CXR due 1/11/21 at [facility]. Instruction given to patient to :  NPO after midnight the night before procedure; call hospital at [phone] to pre-register. Hold ALL blood pressure medications morning of procedure. Hold Eliquis the evening dose night before procedure and Morning dose of the procedure. Patient voiced understanding. Copies of consent & info scanned in chart.

## 2021-01-11 NOTE — LETTER
January 11, 2021     [address] [id number]      Dear Parag Wallace: Thank you for enrolling in [address] Please follow the instructions below to securely access your online medical record. [employer] allows you to send messages to your doctor, view your test results, renew your prescriptions, schedule appointments, and more. How Do I Sign Up? 1. In your Internet browser, go to [email or web address]  2. Click on the Sign Up Now link in the Sign In box. You will see the New Member Sign Up page. 3. Enter your [employer] Access Code exactly as it appears below. You will not need to use this code after youve completed the sign-up process. If you do not sign up before the expiration date, you must request a new code. [employer] Access Code: [email or web address]  Activation Code Expiration: 1/25/2021  8:34 AM  Enter your Social Security Number ([id number]) and Date of Birth (mm/dd/yyyy) as indicated and click Submit. You will be taken to the next sign-up page. 4. Create a [employer] ID. This will be your [employer] login ID and cannot be changed, so think of one that is secure and easy to remember. 5. Create a [employer] password. You can change your password at any time. 6. Enter your Password Reset Question and Answer. This can be used at a later time if you forget your password. 7. Enter your e-mail address. You will receive e-mail notification when new information is available in [address] 8. Click Sign Up. You can now view your medical record. Additional Information  If you have questions, please contact the physician practice where you receive care. Remember, [employer] is NOT to be used for urgent needs. For medical emergencies, dial 911. For questions regarding your [employer] account call [phone]. If you have a clinical question, please call your doctor's office.

## 2021-01-12 ENCOUNTER — TELEPHONE (OUTPATIENT)
Dept: CARDIOLOGY CLINIC | Age: 76
End: 2021-01-12

## 2021-01-13 ENCOUNTER — TELEPHONE (OUTPATIENT)
Dept: CARDIOLOGY CLINIC | Age: 76
End: 2021-01-13

## 2021-01-13 LAB
SARS-COV-2: NOT DETECTED
SOURCE: NORMAL

## 2021-01-13 NOTE — TELEPHONE ENCOUNTER
Left message for patient we cancelled Dr. Castorena Floor appointment on 1/18. Patient is now scheduled for 1-21 @ 0930 with Gisel Bond CNP for follow up ablation. Also, patient can pick disability forms up next week they will be completed on Friday.

## 2021-01-14 ENCOUNTER — HOSPITAL ENCOUNTER (OUTPATIENT)
Dept: CARDIAC CATH/INVASIVE PROCEDURES | Age: 76
Discharge: HOME | End: 2021-01-15
Attending: INTERNAL MEDICINE | Admitting: INTERNAL MEDICINE
Payer: MEDICARE

## 2021-01-14 ENCOUNTER — ANESTHESIA (OUTPATIENT)
Dept: CARDIAC CATH/INVASIVE PROCEDURES | Age: 76
End: 2021-01-14
Payer: MEDICARE

## 2021-01-14 ENCOUNTER — APPOINTMENT (OUTPATIENT)
Dept: GENERAL RADIOLOGY | Age: 76
End: 2021-01-14
Attending: INTERNAL MEDICINE
Payer: MEDICARE

## 2021-01-14 ENCOUNTER — ANESTHESIA EVENT (OUTPATIENT)
Dept: CARDIAC CATH/INVASIVE PROCEDURES | Age: 76
End: 2021-01-14
Payer: MEDICARE

## 2021-01-14 VITALS
DIASTOLIC BLOOD PRESSURE: 70 MMHG | OXYGEN SATURATION: 96 % | RESPIRATION RATE: 7 BRPM | SYSTOLIC BLOOD PRESSURE: 126 MMHG | TEMPERATURE: 97.1 F

## 2021-01-14 PROBLEM — Z98.890 S/P ABLATION OF ATRIAL FIBRILLATION: Status: ACTIVE | Noted: 2021-01-14

## 2021-01-14 PROBLEM — Z86.79 S/P ABLATION OF ATRIAL FIBRILLATION: Status: ACTIVE | Noted: 2021-01-14

## 2021-01-14 LAB
ABO/RH: NORMAL
ACTIVATED CLOTTING TIME, LOW RANGE: 139 SEC
ACTIVATED CLOTTING TIME, LOW RANGE: 158 SEC
ACTIVATED CLOTTING TIME, LOW RANGE: 204 SEC
ACTIVATED CLOTTING TIME, LOW RANGE: 290 SEC
ACTIVATED CLOTTING TIME, LOW RANGE: 397 SEC
ACTIVATED CLOTTING TIME, LOW RANGE: 398 SEC
ANTIBODY SCREEN: NEGATIVE
BASE EXCESS MIXED: 0.4 (ref 0–1.2)
BASE EXCESS: ABNORMAL (ref 0–3.3)
CO2: 27 MMOL/L (ref 21–32)
GLUCOSE BLD-MCNC: 115 MG/DL (ref 70–99)
HCO3 ARTERIAL: 25.7 MMOL/L (ref 18–23)
HCT VFR BLD CALC: 40 % (ref 42–52)
HEMOGLOBIN: 13.5 GM/DL (ref 13.5–18)
O2 SATURATION: 99.3 % (ref 96–97)
PCO2 ARTERIAL: 42.8 MMHG (ref 32–45)
PH BLOOD: 7.39 (ref 7.34–7.45)
PO2 ARTERIAL: 150.2 MMHG (ref 75–100)
POC CALCIUM: 1.16 MMOL/L (ref 1.12–1.32)
POC CHLORIDE: 107 MMOL/L (ref 98–109)
POC CREATININE: 0.8 MG/DL (ref 0.9–1.3)
POTASSIUM SERPL-SCNC: 4.2 MMOL/L (ref 3.5–4.5)
SODIUM BLD-SCNC: 142 MMOL/L (ref 138–146)
SOURCE, BLOOD GAS: ABNORMAL

## 2021-01-14 PROCEDURE — 93662 INTRACARDIAC ECG (ICE): CPT | Performed by: INTERNAL MEDICINE

## 2021-01-14 PROCEDURE — C1894 INTRO/SHEATH, NON-LASER: HCPCS

## 2021-01-14 PROCEDURE — 93325 DOPPLER ECHO COLOR FLOW MAPG: CPT | Performed by: INTERNAL MEDICINE

## 2021-01-14 PROCEDURE — 2709999900 HC NON-CHARGEABLE SUPPLY

## 2021-01-14 PROCEDURE — 2580000003 HC RX 258: Performed by: INTERNAL MEDICINE

## 2021-01-14 PROCEDURE — 93312 ECHO TRANSESOPHAGEAL: CPT | Performed by: INTERNAL MEDICINE

## 2021-01-14 PROCEDURE — C1732 CATH, EP, DIAG/ABL, 3D/VECT: HCPCS

## 2021-01-14 PROCEDURE — 2580000003 HC RX 258

## 2021-01-14 PROCEDURE — 6360000002 HC RX W HCPCS

## 2021-01-14 PROCEDURE — C1730 CATH, EP, 19 OR FEW ELECT: HCPCS

## 2021-01-14 PROCEDURE — 2500000003 HC RX 250 WO HCPCS

## 2021-01-14 PROCEDURE — 86901 BLOOD TYPING SEROLOGIC RH(D): CPT

## 2021-01-14 PROCEDURE — 86850 RBC ANTIBODY SCREEN: CPT

## 2021-01-14 PROCEDURE — 86900 BLOOD TYPING SEROLOGIC ABO: CPT

## 2021-01-14 PROCEDURE — 93613 INTRACARDIAC EPHYS 3D MAPG: CPT | Performed by: INTERNAL MEDICINE

## 2021-01-14 PROCEDURE — 93005 ELECTROCARDIOGRAM TRACING: CPT | Performed by: INTERNAL MEDICINE

## 2021-01-14 PROCEDURE — 7100000001 HC PACU RECOVERY - ADDTL 15 MIN

## 2021-01-14 PROCEDURE — 93662 INTRACARDIAC ECG (ICE): CPT

## 2021-01-14 PROCEDURE — 93656 COMPRE EP EVAL ABLTJ ATR FIB: CPT | Performed by: INTERNAL MEDICINE

## 2021-01-14 PROCEDURE — 6370000000 HC RX 637 (ALT 250 FOR IP): Performed by: INTERNAL MEDICINE

## 2021-01-14 PROCEDURE — 94010 BREATHING CAPACITY TEST: CPT

## 2021-01-14 PROCEDURE — 94150 VITAL CAPACITY TEST: CPT

## 2021-01-14 PROCEDURE — 71045 X-RAY EXAM CHEST 1 VIEW: CPT

## 2021-01-14 PROCEDURE — 3700000000 HC ANESTHESIA ATTENDED CARE

## 2021-01-14 PROCEDURE — 93623 PRGRMD STIMJ&PACG IV RX NFS: CPT | Performed by: INTERNAL MEDICINE

## 2021-01-14 PROCEDURE — 94761 N-INVAS EAR/PLS OXIMETRY MLT: CPT

## 2021-01-14 PROCEDURE — C1733 CATH, EP, OTHR THAN COOL-TIP: HCPCS

## 2021-01-14 PROCEDURE — 93655 ICAR CATH ABLTJ DSCRT ARRHYT: CPT

## 2021-01-14 PROCEDURE — 85347 COAGULATION TIME ACTIVATED: CPT

## 2021-01-14 PROCEDURE — C1753 CATH, INTRAVAS ULTRASOUND: HCPCS

## 2021-01-14 PROCEDURE — 2720000010 HC SURG SUPPLY STERILE

## 2021-01-14 PROCEDURE — 93655 ICAR CATH ABLTJ DSCRT ARRHYT: CPT | Performed by: INTERNAL MEDICINE

## 2021-01-14 PROCEDURE — 93613 INTRACARDIAC EPHYS 3D MAPG: CPT

## 2021-01-14 PROCEDURE — 3700000001 HC ADD 15 MINUTES (ANESTHESIA)

## 2021-01-14 PROCEDURE — 93308 TTE F-UP OR LMTD: CPT

## 2021-01-14 PROCEDURE — 7100000000 HC PACU RECOVERY - FIRST 15 MIN

## 2021-01-14 PROCEDURE — 93657 TX L/R ATRIAL FIB ADDL: CPT

## 2021-01-14 PROCEDURE — 93312 ECHO TRANSESOPHAGEAL: CPT

## 2021-01-14 PROCEDURE — 93656 COMPRE EP EVAL ABLTJ ATR FIB: CPT

## 2021-01-14 RX ORDER — DEXAMETHASONE SODIUM PHOSPHATE 4 MG/ML
INJECTION, SOLUTION INTRA-ARTICULAR; INTRALESIONAL; INTRAMUSCULAR; INTRAVENOUS; SOFT TISSUE PRN
Status: DISCONTINUED | OUTPATIENT
Start: 2021-01-14 | End: 2021-01-14 | Stop reason: SDUPTHER

## 2021-01-14 RX ORDER — PROPOFOL 10 MG/ML
INJECTION, EMULSION INTRAVENOUS PRN
Status: DISCONTINUED | OUTPATIENT
Start: 2021-01-14 | End: 2021-01-14 | Stop reason: SDUPTHER

## 2021-01-14 RX ORDER — HYDROCODONE BITARTRATE AND ACETAMINOPHEN 5; 325 MG/1; MG/1
2 TABLET ORAL PRN
Status: DISCONTINUED | OUTPATIENT
Start: 2021-01-14 | End: 2021-01-14

## 2021-01-14 RX ORDER — PROTAMINE SULFATE 10 MG/ML
INJECTION, SOLUTION INTRAVENOUS PRN
Status: DISCONTINUED | OUTPATIENT
Start: 2021-01-14 | End: 2021-01-14 | Stop reason: SDUPTHER

## 2021-01-14 RX ORDER — HYDROMORPHONE HCL 110MG/55ML
0.5 PATIENT CONTROLLED ANALGESIA SYRINGE INTRAVENOUS EVERY 5 MIN PRN
Status: DISCONTINUED | OUTPATIENT
Start: 2021-01-14 | End: 2021-01-14

## 2021-01-14 RX ORDER — AMIODARONE HYDROCHLORIDE 200 MG/1
200 TABLET ORAL DAILY
Status: DISCONTINUED | OUTPATIENT
Start: 2021-01-15 | End: 2021-01-15 | Stop reason: HOSPADM

## 2021-01-14 RX ORDER — CITALOPRAM 20 MG/1
10 TABLET ORAL DAILY
Status: DISCONTINUED | OUTPATIENT
Start: 2021-01-15 | End: 2021-01-15 | Stop reason: HOSPADM

## 2021-01-14 RX ORDER — PRAVASTATIN SODIUM 20 MG
20 TABLET ORAL DAILY
Status: DISCONTINUED | OUTPATIENT
Start: 2021-01-14 | End: 2021-01-15 | Stop reason: HOSPADM

## 2021-01-14 RX ORDER — SODIUM CHLORIDE 9 MG/ML
INJECTION, SOLUTION INTRAVENOUS CONTINUOUS PRN
Status: DISCONTINUED | OUTPATIENT
Start: 2021-01-14 | End: 2021-01-14 | Stop reason: SDUPTHER

## 2021-01-14 RX ORDER — LIDOCAINE HYDROCHLORIDE 20 MG/ML
INJECTION, SOLUTION EPIDURAL; INFILTRATION; INTRACAUDAL; PERINEURAL PRN
Status: DISCONTINUED | OUTPATIENT
Start: 2021-01-14 | End: 2021-01-14 | Stop reason: SDUPTHER

## 2021-01-14 RX ORDER — HYDROCODONE BITARTRATE AND ACETAMINOPHEN 5; 325 MG/1; MG/1
1 TABLET ORAL PRN
Status: DISCONTINUED | OUTPATIENT
Start: 2021-01-14 | End: 2021-01-14

## 2021-01-14 RX ORDER — ASPIRIN 81 MG/1
81 TABLET, CHEWABLE ORAL DAILY
Status: DISCONTINUED | OUTPATIENT
Start: 2021-01-14 | End: 2021-01-15 | Stop reason: HOSPADM

## 2021-01-14 RX ORDER — ACETAMINOPHEN 325 MG/1
650 TABLET ORAL EVERY 8 HOURS PRN
Status: DISCONTINUED | OUTPATIENT
Start: 2021-01-14 | End: 2021-01-15 | Stop reason: HOSPADM

## 2021-01-14 RX ORDER — SODIUM CHLORIDE 0.9 % (FLUSH) 0.9 %
10 SYRINGE (ML) INJECTION PRN
Status: DISCONTINUED | OUTPATIENT
Start: 2021-01-14 | End: 2021-01-15 | Stop reason: HOSPADM

## 2021-01-14 RX ORDER — PROMETHAZINE HYDROCHLORIDE 25 MG/ML
6.25 INJECTION, SOLUTION INTRAMUSCULAR; INTRAVENOUS
Status: DISCONTINUED | OUTPATIENT
Start: 2021-01-14 | End: 2021-01-14

## 2021-01-14 RX ORDER — MEPERIDINE HYDROCHLORIDE 25 MG/ML
12.5 INJECTION INTRAMUSCULAR; INTRAVENOUS; SUBCUTANEOUS EVERY 5 MIN PRN
Status: DISCONTINUED | OUTPATIENT
Start: 2021-01-14 | End: 2021-01-14

## 2021-01-14 RX ORDER — LABETALOL HYDROCHLORIDE 5 MG/ML
5 INJECTION, SOLUTION INTRAVENOUS EVERY 10 MIN PRN
Status: DISCONTINUED | OUTPATIENT
Start: 2021-01-14 | End: 2021-01-14

## 2021-01-14 RX ORDER — TRAMADOL HYDROCHLORIDE 50 MG/1
50 TABLET ORAL EVERY 6 HOURS PRN
Status: DISCONTINUED | OUTPATIENT
Start: 2021-01-14 | End: 2021-01-15 | Stop reason: HOSPADM

## 2021-01-14 RX ORDER — PANTOPRAZOLE SODIUM 40 MG/1
40 TABLET, DELAYED RELEASE ORAL
Status: DISCONTINUED | OUTPATIENT
Start: 2021-01-14 | End: 2021-01-15 | Stop reason: HOSPADM

## 2021-01-14 RX ORDER — SODIUM CHLORIDE 0.9 % (FLUSH) 0.9 %
10 SYRINGE (ML) INJECTION EVERY 12 HOURS SCHEDULED
Status: DISCONTINUED | OUTPATIENT
Start: 2021-01-14 | End: 2021-01-15 | Stop reason: HOSPADM

## 2021-01-14 RX ORDER — MAGNESIUM SULFATE IN WATER 40 MG/ML
2 INJECTION, SOLUTION INTRAVENOUS PRN
Status: DISCONTINUED | OUTPATIENT
Start: 2021-01-14 | End: 2021-01-15 | Stop reason: HOSPADM

## 2021-01-14 RX ORDER — HYDRALAZINE HYDROCHLORIDE 10 MG/1
10 TABLET, FILM COATED ORAL 2 TIMES DAILY
Status: DISCONTINUED | OUTPATIENT
Start: 2021-01-14 | End: 2021-01-15 | Stop reason: HOSPADM

## 2021-01-14 RX ORDER — TAMSULOSIN HYDROCHLORIDE 0.4 MG/1
0.4 CAPSULE ORAL 2 TIMES DAILY
Status: DISCONTINUED | OUTPATIENT
Start: 2021-01-14 | End: 2021-01-15 | Stop reason: HOSPADM

## 2021-01-14 RX ORDER — GABAPENTIN 300 MG/1
300 CAPSULE ORAL 3 TIMES DAILY
Status: DISCONTINUED | OUTPATIENT
Start: 2021-01-14 | End: 2021-01-15 | Stop reason: HOSPADM

## 2021-01-14 RX ORDER — DIPHENHYDRAMINE HYDROCHLORIDE 50 MG/ML
12.5 INJECTION INTRAMUSCULAR; INTRAVENOUS
Status: DISCONTINUED | OUTPATIENT
Start: 2021-01-14 | End: 2021-01-14

## 2021-01-14 RX ORDER — BACLOFEN 10 MG/1
5 TABLET ORAL EVERY 8 HOURS PRN
Status: DISCONTINUED | OUTPATIENT
Start: 2021-01-14 | End: 2021-01-15 | Stop reason: HOSPADM

## 2021-01-14 RX ORDER — HYDRALAZINE HYDROCHLORIDE 20 MG/ML
5 INJECTION INTRAMUSCULAR; INTRAVENOUS EVERY 10 MIN PRN
Status: DISCONTINUED | OUTPATIENT
Start: 2021-01-14 | End: 2021-01-14

## 2021-01-14 RX ORDER — ROCURONIUM BROMIDE 10 MG/ML
INJECTION, SOLUTION INTRAVENOUS PRN
Status: DISCONTINUED | OUTPATIENT
Start: 2021-01-14 | End: 2021-01-14 | Stop reason: SDUPTHER

## 2021-01-14 RX ORDER — FENTANYL CITRATE 50 UG/ML
50 INJECTION, SOLUTION INTRAMUSCULAR; INTRAVENOUS EVERY 5 MIN PRN
Status: DISCONTINUED | OUTPATIENT
Start: 2021-01-14 | End: 2021-01-14

## 2021-01-14 RX ORDER — FENTANYL CITRATE 50 UG/ML
INJECTION, SOLUTION INTRAMUSCULAR; INTRAVENOUS PRN
Status: DISCONTINUED | OUTPATIENT
Start: 2021-01-14 | End: 2021-01-14 | Stop reason: SDUPTHER

## 2021-01-14 RX ORDER — METOPROLOL SUCCINATE 25 MG/1
25 TABLET, EXTENDED RELEASE ORAL DAILY
Status: DISCONTINUED | OUTPATIENT
Start: 2021-01-14 | End: 2021-01-15 | Stop reason: HOSPADM

## 2021-01-14 RX ADMIN — ROCURONIUM BROMIDE 10 MG: 10 INJECTION INTRAVENOUS at 10:46

## 2021-01-14 RX ADMIN — ROCURONIUM BROMIDE 10 MG: 10 INJECTION INTRAVENOUS at 11:27

## 2021-01-14 RX ADMIN — TAMSULOSIN HYDROCHLORIDE 0.4 MG: 0.4 CAPSULE ORAL at 22:40

## 2021-01-14 RX ADMIN — Medication 10 ML: at 22:41

## 2021-01-14 RX ADMIN — ROCURONIUM BROMIDE 10 MG: 10 INJECTION INTRAVENOUS at 12:04

## 2021-01-14 RX ADMIN — ASPIRIN 81 MG: 81 TABLET, CHEWABLE ORAL at 17:01

## 2021-01-14 RX ADMIN — PHENYLEPHRINE HYDROCHLORIDE 30 MCG/MIN: 10 INJECTION INTRAVENOUS at 10:53

## 2021-01-14 RX ADMIN — ROCURONIUM BROMIDE 10 MG: 10 INJECTION INTRAVENOUS at 12:26

## 2021-01-14 RX ADMIN — LIDOCAINE HYDROCHLORIDE 100 MG: 20 INJECTION, SOLUTION EPIDURAL; INFILTRATION; INTRACAUDAL; PERINEURAL at 10:11

## 2021-01-14 RX ADMIN — PROTAMINE SULFATE 20 MG: 10 INJECTION, SOLUTION INTRAVENOUS at 12:40

## 2021-01-14 RX ADMIN — METOPROLOL SUCCINATE 25 MG: 25 TABLET, EXTENDED RELEASE ORAL at 17:01

## 2021-01-14 RX ADMIN — PRAVASTATIN SODIUM 20 MG: 20 TABLET ORAL at 17:02

## 2021-01-14 RX ADMIN — FENTANYL CITRATE 50 MCG: 50 INJECTION INTRAMUSCULAR; INTRAVENOUS at 10:11

## 2021-01-14 RX ADMIN — ACETAMINOPHEN 650 MG: 325 TABLET ORAL at 17:02

## 2021-01-14 RX ADMIN — PROPOFOL 20 MG: 10 INJECTION, EMULSION INTRAVENOUS at 10:13

## 2021-01-14 RX ADMIN — SUGAMMADEX 234 MG: 100 INJECTION, SOLUTION INTRAVENOUS at 12:58

## 2021-01-14 RX ADMIN — PROTAMINE SULFATE 10 MG: 10 INJECTION, SOLUTION INTRAVENOUS at 12:52

## 2021-01-14 RX ADMIN — DEXAMETHASONE SODIUM PHOSPHATE 4 MG: 4 INJECTION, SOLUTION INTRAMUSCULAR; INTRAVENOUS at 10:28

## 2021-01-14 RX ADMIN — GABAPENTIN 300 MG: 300 CAPSULE ORAL at 22:40

## 2021-01-14 RX ADMIN — HYDRALAZINE HYDROCHLORIDE 10 MG: 10 TABLET, FILM COATED ORAL at 22:39

## 2021-01-14 RX ADMIN — PROPOFOL 100 MG: 10 INJECTION, EMULSION INTRAVENOUS at 10:11

## 2021-01-14 RX ADMIN — ROCURONIUM BROMIDE 50 MG: 10 INJECTION INTRAVENOUS at 10:12

## 2021-01-14 RX ADMIN — ROCURONIUM BROMIDE 10 MG: 10 INJECTION INTRAVENOUS at 11:43

## 2021-01-14 RX ADMIN — GABAPENTIN 300 MG: 300 CAPSULE ORAL at 17:01

## 2021-01-14 RX ADMIN — APIXABAN 5 MG: 5 TABLET, FILM COATED ORAL at 22:39

## 2021-01-14 RX ADMIN — ROCURONIUM BROMIDE 10 MG: 10 INJECTION INTRAVENOUS at 11:05

## 2021-01-14 RX ADMIN — SODIUM CHLORIDE: 900 INJECTION INTRAVENOUS at 10:05

## 2021-01-14 RX ADMIN — PANTOPRAZOLE SODIUM 40 MG: 40 TABLET, DELAYED RELEASE ORAL at 17:02

## 2021-01-14 RX ADMIN — FENTANYL CITRATE 50 MCG: 50 INJECTION INTRAMUSCULAR; INTRAVENOUS at 13:03

## 2021-01-14 ASSESSMENT — PULMONARY FUNCTION TESTS
PIF_VALUE: 18
PIF_VALUE: 18
PIF_VALUE: 15
PIF_VALUE: 18
PIF_VALUE: 19
PIF_VALUE: 18
PIF_VALUE: 16
PIF_VALUE: 18
PIF_VALUE: 15
PIF_VALUE: 18
PIF_VALUE: 16
PIF_VALUE: 16
PIF_VALUE: 17
PIF_VALUE: 17
PIF_VALUE: 18
PIF_VALUE: 2
PIF_VALUE: 17
PIF_VALUE: 1
PIF_VALUE: 21
PIF_VALUE: 16
PIF_VALUE: 15
PIF_VALUE: 17
PIF_VALUE: 14
PIF_VALUE: 17
PIF_VALUE: 2
PIF_VALUE: 17
PIF_VALUE: 18
PIF_VALUE: 16
PIF_VALUE: 17
PIF_VALUE: 16
PIF_VALUE: 16
PIF_VALUE: 18
PIF_VALUE: 19
PIF_VALUE: 17
PIF_VALUE: 16
PIF_VALUE: 17
PIF_VALUE: 17
PIF_VALUE: 16
PIF_VALUE: 1
PIF_VALUE: 17
PIF_VALUE: 19
PIF_VALUE: 16
PIF_VALUE: 19
PIF_VALUE: 17
PIF_VALUE: 16
PIF_VALUE: 20
PIF_VALUE: 19
PIF_VALUE: 1
PIF_VALUE: 18
PIF_VALUE: 17
PIF_VALUE: 1
PIF_VALUE: 16
PIF_VALUE: 17
PIF_VALUE: 15
PIF_VALUE: 16
PIF_VALUE: 21
PIF_VALUE: 21
PIF_VALUE: 17
PIF_VALUE: 18
PIF_VALUE: 15
PIF_VALUE: 18
PIF_VALUE: 1
PIF_VALUE: 18
PIF_VALUE: 16
PIF_VALUE: 18
PIF_VALUE: 16
PIF_VALUE: 18
PIF_VALUE: 18
PIF_VALUE: 21
PIF_VALUE: 15
PIF_VALUE: 21
PIF_VALUE: 18
PIF_VALUE: 17
PIF_VALUE: 1
PIF_VALUE: 17
PIF_VALUE: 1
PIF_VALUE: 19
PIF_VALUE: 20
PIF_VALUE: 17
PIF_VALUE: 16
PIF_VALUE: 21
PIF_VALUE: 18
PIF_VALUE: 14
PIF_VALUE: 16
PIF_VALUE: 17
PIF_VALUE: 16
PIF_VALUE: 4
PIF_VALUE: 18
PIF_VALUE: 16
PIF_VALUE: 1
PIF_VALUE: 16
PIF_VALUE: 17
PIF_VALUE: 18

## 2021-01-14 ASSESSMENT — ENCOUNTER SYMPTOMS
EYE PAIN: 0
ABDOMINAL PAIN: 0
BLOOD IN STOOL: 0
CHEST TIGHTNESS: 0
WHEEZING: 0
SHORTNESS OF BREATH: 1
PHOTOPHOBIA: 0
CONSTIPATION: 0
DIARRHEA: 0
VOMITING: 0
NAUSEA: 0
COLOR CHANGE: 0
BACK PAIN: 0
COUGH: 0

## 2021-01-14 NOTE — PROCEDURES
Jennifer General   70 y.o., male  1945 1/14/2021    Attending: Chema Perez MD    Sedation : Anesthesia                        Indication:     Pre-atrial fibrillation ablation                                                                                                                                                     After obtaining the informed cosent. Pt brought to EP lab. Sedation per anesthesia . After proper sedation MARIBEL performed. US Doppler was used to assess abnormalities where appropriate. Post procedure pt is stable. Findings:  LV=  Appears low normal LVEF  LA=  MILD DILATED  TIMUR= NO CLOTS  RV= normal  RA=  normal  IAS= Normal  Bubble study=not donefor PFO or ASD  AV= TRICUSPID, no significant regurgitation or stenosis  MV= trivial regurgitation, no stenosis  TV=trivial regurgitation  PV= no significant regurgitation,   Aorta=mild stable plaque noted  PUL CECI FLOW=systolic blunting noted.      Impression:  No TIMUR clot    Plan:  Proceed with ablation

## 2021-01-14 NOTE — PROGRESS NOTES
1312 patient received from Ep lab monitor and alarms on.  Report received from [facility].   650 ekg done at bedside  1350 echo done at bedside    1410 filipe pressure complete bedrest start for 6 hours patient aware of the time  [phone] 90 wife updated on room number    1450 tolerating ice chips denies any pain at this time   1455 report given to [facility] prior to transport   1504 transferred to room 3123 via bed and monitor per Myla LOZANO and Nithya Mcghee transporter

## 2021-01-14 NOTE — OP NOTE
PROCEDURES PERFORMED:    1. Comprehensive electrophysiologic evaluation including transseptal catheterization, insertion and repositioning of multiple electrode catheters with induction or attempted induction of an arrhythmia including left or right atrial pacing/recording when necessary, right ventricular pacing/recording when necessary, and His bundle recording when necessary with intracardiac catheter ablation of atrial fibrillation by pulmonary vein isolation  2. Intracardiac electrophysiologic three-dimensional mapping  3. Transseptal puncture through an intact septum with measurement of RA and LA pressures. 4. Intracardiac echocardiography. 5. Add on ablation - Cavotricuspid isthmus ablation for typical atrial flutter ( SVT ablation)  6. Drug testing      ATTENDING: Belle Goodpasture, MD.    COMPLICATIONS: None. ESTIMATED BLOOD LOSS: 30cc    ANESTHESIA: General with intubation    MEDICATIONS USED FOR INDUCTION/TESTING: Adenosine    PREOPERATIVE DIAGNOSIS: Paroxysmal Atrial fibrillation and Paroxysmal atrial flutter    POSTOPERATIVE DIAGNOSIS: Paroxysmal Atrial fibrillation sp Pulmonary vein isolation and Typical atrial flutter ablation. INDICATIONS FOR PROCEDURE: Patient with hx of symptomatic Paroxysmal Atrial fibrillation and Paroxysmal atrial flutter despite medical therapy here for Ablation       DETAILS OF PROCEDURE:     The patient was brought to the electrophysiology laboratory in stable condition. The patient was in a fasting, nonsedated state. The risks, benefits and alternatives of the procedure were discussed with the patient and his family. The risks including, but not limited to, the risks of vascular injury, bleeding, infection, radiation exposure, injury to cardiac and surrounding structures (including esophageal and phrenic nerve injury in addition to pulmonary vein stenosis), injury to the normal conduction system of the heart, stroke, myocardial infarction and death were discussed. Written informed consent was obtained and placed on the chart. A timeout protocol was completed to identify the patient and the procedure being performed. MARIBEL was performed prior to the ablation procedure to assure the absence of any intracardiac thrombi or other contraindications to proceeding with ablation. The full MARIBEL report is dictated separately. The patient was prepped and draped in a sterile fashion. Lidocaine was administered to the right and left groin. Using a modified Seldinger technique, venous access was obtained and sheaths were placed as detailed below. Catheters were then placed under fluoroscopic guidance as detailed below. SHEATH AND CATHETER PLACEMENT:  Location  Sheath  Catheter  site    Left femoral vein  7F  Cord Project Roach 6F Decapolar catheter  Coronary sinus    Left femoral vein  11F  Quadripolar catheter      Intracardiac Echo with sound technology  Right atrium    Right femoral vein  8F Short and SRO sheath D-F curve Smart touch irrigated [employer] Ablation catheter      Pentaray catheter  Pulmonay veins, Left atrium, RA, RV, Cavotricuspid isthmus      LA mapping    Right femoral vein  8F Short  Quadripolar catheter  RV      A small caliber arterial line in the radial artery was obtained by the anesthesia service. Comprehensive EP study    Patient presented in sinus rhythm so we performed Comprehensive EP study    WY: 195 ms  QRS: 100 ms  QT: 468 ms    AH : 85 ms  HV : 46 ms    Sinus node function is normal     AVWCL : 380 ms  VAWCL: 400 ms    AVNERP : 600 / less than 260 ms; 500/300ms  AERP: 600 / 260 ms; 500/230ms    VAERP : 600/320  VERP : 600/230ms    Arrhythmia induction    Programmed stimulation was performed. Short atrial flutter / fib episodes were induced but did not sustain - so we decided to perform PV isolation and CTI ablation. Intracardiac echocardiography:     A baseline intracardiac echocardiography study was performed.  During the procedure, intracardiac echocardiography was used for transseptal puncture, monitoring of catheter placement during radiofrequency lesions, and monitoring for complications. Three-dimensional electroanatomical mapping:    A three-dimensional electroanatomical mapping: Using the [employer] CARTO 3 three-dimensional electroanatomical map, a shell map of the left atrium and the pulmonary veins was created by dragging the ablation catheter and the pentaray catheter in different areas of the left atrium and in the pulmonary veins. The map was used for aiding the navigation process and to reduce fluoroscopy use. It was also used to guide ablation lesions and to julio cesar those lesions. Trans-septal puncture:     During transseptal access, she was in /23 rhythm,  an 0.032 inch J-tipped guidewire was advanced through the 8-Greek sheath in the right femoral vein under fluoroscopic guidance. An SRO sheath and dilator were advanced over the guidewire into the superior vena cava under fluoroscopic guidance. The guidewire was removed. A [place]rough 1 needle was advanced through the dilator until the tip was slightly behind the tip of the dilator. This system was withdrawn until the apparatus was in contact with the foramen ovale. Transseptal access was obtained. Under fluoroscopic, hemodynamic ((Mean RA pressure 5 cm and mean LA pressure on transseptal was 12 mm) and ultrasound guidance, the left atrium was cannulated and sheath advanced. The dilator and needle were removed. Intracardiac echocardiography demonstrated no acute complications. Heparin was given to the patient to keep ACT at around 350sec through out procedure with ACT checks every 15 minutes       The pentaray catheter was sequentially positioned in the ostium of each of the pulmonary veins under fluoroscopic, electroanatomic, electrophysiologic and ultrasound guidance.      Examination of the Pentaray catheter signals demonstrated conduction of electrical activity in all the pulmonary veins ( left common pulmonary veins and two right pulmonary veins). Ablation:     The Smart touch ablation catheter was advanced into position near the right-sided pulmonary veins. A wide circumferential ablation line was performed to encompass the ostia of both the right superior and right inferior pulmonary veins. The settings for ablation were 35 rawls with a contact force ranging between  3 gm/sec to 40 gm/ sec was given per lesion with assessment of signal reduction at the site of ablation, impedance drop, Visitag ablation mapping and also FTI upto 450 and Ablation index of upto 450 on posterior wall and upto 550 on anterior wall    Esophageal temperature was monitored through out the procedure with movement of the esophageal temperature probe. Max temp reached during whole procedure on esophageal temp probe was 37.6 C    Ablation lesions were continued until a large circumference ablation line was created, both anterior and posterior to the right-sided pulmonary veins. After ablation was completed, entrance and exit block from each of the veins was checked and confirmed. A wide circumference ablation line was similarly done around the left-common pulmonary vein - including ridge (between left pulmonary vein and TIMUR was ablated. Exit and entrance block from the left-sided pulmonary veins was then checked and confirmed. Following ablation, we confirmed entrance block at each vessel again. Pacing was also performed from each pole of the pentaray catheter at 10 milliamps, 2 milliseconds to assess for exit block. Both entrance and exit block were confirmed. Drug Infusion :     Adenosine was given to assess for spontaneous activation of PVP and also to assess dormant conduction from the PV. Adenosine was given at each vein and exit block from Pulmonary vein were confirmed and entrance and exit block confirmed.     Post adenosine pacing was performed and  No arrhythmia was induced    Typical atrial flutter ablation:    Patient was previously noted to have typical atrial flutter too so we decided to perform cavotricuspid isthmus ablation at the same time. SRO / Ramp sheath and Ablation catheter was then was advanced into position along the ventricular septal aspect of the cavotricuspid isthmus under fluoroscopic guidance. Ablation:  Radiofrequency lesions were delivered in a linear fashion until bidirectional block was achieved - 35W was  for 30-45 second per lesion with assessment of signal reduction at the site of ablation, impedance drop, Visitag ablation mapping and also FTI of around 400 -and Ablation index of upto 550 . Patient was paced from proximal Coronary sinus during ablation and isthmus block was achieve while ablation. Pacing was performed from the proximal CS and lateral right atrium respectively to confirm bidirectional block. Bidirectional block was confirmed. Post ablation pacing was performed and no arrhythmia was induced    Rapid atrial stimulation did not induce arrhythmia either. Intracardiac echocardiography was used to document the absence of any significant effusion or any other complication. Catheters were removed under fluoroscopic guidance. The patient was extubated and transferred to recovery. Sheaths were pulled and manual compression performed to achieve hemostasis in recovery  . There was no bleeding noted from any of the access sites. SUMMARY:    Successful isolation of the pulmonary veins for ablation of paroxysmal atrial fibrillation  Successful ablation of cavo-tricuspid isthmus for typical atrial flutter ablation    RECOMMENDATIONS:  1. Admit to the floor  2. Call Lety Rawls if the patient becomes hypotensive, febrile, unstable or if there is a change in mental status. 3.  Resume anticoagulation as recommended. 4. Bed rest x6 hours with legs straight after sheaths are removed. 5. Continue current medications.   6. Follow up in the electrophysiology clinic in three weeks.

## 2021-01-14 NOTE — ANESTHESIA POSTPROCEDURE EVALUATION
Department of Anesthesiology  Postprocedure Note    Patient: Arthor Duane  MRN: [id number]  YOB: 1945  Date of evaluation: 1/14/2021  Time:  1:16 PM     Procedure Summary     Date: 01/14/21 Room / Location: [address] [place] [address] Cath Lab    Anesthesia Start: 6163 Anesthesia Stop:     Procedure: 1200 [facility] EP AFIB ABLATION W ANESTH Diagnosis:       Paroxysmal atrial fibrillation      S/P ablation of atrial fibrillation    Scheduled Providers:  Responsible Provider: Mirza Hughes DO    Anesthesia Type: general ASA Status: 3          Anesthesia Type: No value filed. Olivier Phase I: Olivier Score: 10    Olivier Phase II:      Last vitals: Reviewed and per EMR flowsheets.        Anesthesia Post Evaluation    Patient location during evaluation: PACU  Patient participation: complete - patient participated  Level of consciousness: awake and alert  Pain score: 0  Airway patency: patent  Nausea & Vomiting: no nausea and no vomiting  Complications: no  Cardiovascular status: hemodynamically stable and blood pressure returned to baseline  Respiratory status: acceptable, spontaneous ventilation, nonlabored ventilation and face mask  Hydration status: stable

## 2021-01-14 NOTE — ANESTHESIA PROCEDURE NOTES
Arterial Line:    An arterial line was placed in the OR for the following indication(s): continuous blood pressure monitoring and blood sampling needed. A 20 gauge (size), 1 and 3/4 inch (length), (type) catheter was placed, Seldinger technique not used, into the right radial artery, secured by tape and Tegaderm. Anesthesia type: General    Events:  patient tolerated procedure well with no complications and EBL < 5mL.   1/14/2021 10:17 AM1/14/2021 10:24 AM  Anesthesiologist: Kandy Nyhan, DO  Resident/CRNA: MÓNICA Triana - CRNA  Performed: Resident/CRNA   Preanesthetic Checklist  Completed: patient identified, IV checked, site marked, risks and benefits discussed, surgical consent, monitors and equipment checked, pre-op evaluation, timeout performed, anesthesia consent given, oxygen available and patient being monitored

## 2021-01-14 NOTE — H&P
Electrophysiology h&P Note      Reason for consultation: Atrial fibrillation    Chief complaint : Fluttering, shortness of breath    Referring physician:       Primary care physician: Diana Hernandez MD      History of Present Illness: Today visit (01/14/21)    Patient here for atrial fibrillation ablation. No change in H&P noted from previous clinic visit. Previous visit:     Patient is a 79-year-old male with a history of COPD, obstructive sleep apnea on CPAP, paroxysmal atrial fibrillation referred to us by Dr. Shukri Colón for atrial fibrillation management. Patient reports that he has been having palpitations which can occur at any time and usually last up to 30 seconds to 1 minute. These are associated with shortness of breath but no chest pain. Symptoms ongoing for several months now. Patient denies any dizziness or syncope at rest but when standing suddenly can get dizzy at times. Patient also reports shortness of breath with mild to moderate exertion and this has been going on for few months. Patient feels tired and weak and has no energy. Patient has been on Multaq for atrial fibrillation control and it has worked but of late he has been having more symptoms. And also Multaq is becoming more expensive for him to be able to take any more    Patient here to discuss further options    Patient also reports weakness in the thigh muscle area. He was placed on statins. Pastmedical history:   Past Medical History:   Diagnosis Date    Abnormal MRI, spine     per pt herniated disc    Arthritis     Gastritis     H/O 24 hour EKG monitoring 06/24/2002 06/24/2002 baseline rhythm appears to be normal with an average HR of 66 bpm, slowest HR recored at 51 bpm, fastest at 97 bpm only 10 isolated  PVC's and one couplet were recorded, supraventricular ectopic activity is present, but not clinically significant, no long pauses recognized.     H/O as soon as table was brought to horizontal, lopressor discontinued    Hiatal hernia     History of cardiac cath 10/05/2020    No significant CAD. LAD diffusely diseased as it is very small in caliber. LVEF is 50 to 55 %.  History of nuclear stress test 09/30/2020    EF 62%, Large area of significant inferior wall ischemia    Hx of cardiac cath 03/17/1999, 11/19/2008 11/19/2008both the left main and circumflex are without significant disease, RCA is also without significant disease, LV gram shows normal size left ventricular cavity with normal global and regional left ventricular systolic function, no significant CAD, chest pain is probably non cardiac in etiology    Hypertension     Prolonged emergence from general anesthesia     Syncope and collapse     Unspecified sleep apnea     cpap    Wears glasses        Surgical history :   Past Surgical History:   Procedure Laterality Date    BACK SURGERY      DIAGNOSTIC CARDIAC CATH LAB PROCEDURE  03/17/1999,11/19/2008 11/19/2008, left main,circumflex, and RCA are without any significant disease, LV gram shows normal size left ventricular cavity with normal global and  regional left ventricular systolic function, pt has no significant CAD.  HERNIA REPAIR      TONSILLECTOMY         Family history:   Family History   Problem Relation Age of Onset    Diabetes Mother     Heart Disease Mother     Other Mother         kidney stones    Cancer Mother         breast    Heart Disease Father     Diabetes Father        Social history :  reports that he quit smoking about 33 years ago. His smoking use included cigarettes. He has quit using smokeless tobacco.  His smokeless tobacco use included chew. He reports current alcohol use. He reports that he does not use drugs. Allergies   Allergen Reactions    Ambien [Zolpidem] Other (See Comments)     Makes pt loopy       No current facility-administered medications on file prior to encounter.       Current Outpatient Medications on File Prior to Encounter   Medication Sig Dispense Refill    apixaban (ELIQUIS) 5 MG TABS tablet Take 1 tablet by mouth 2 times daily 42 tablet 0    amiodarone (CORDARONE) 200 MG tablet Take 1 tablet by mouth daily 30 tablet 3    pravastatin (PRAVACHOL) 20 MG tablet Take 1 tablet by mouth daily 30 tablet 2    metoprolol succinate (TOPROL XL) 50 MG extended release tablet Take 0.5 tablets by mouth daily 30 tablet 2    Baclofen (LIORESAL) 5 MG tablet Take 5 mg by mouth every 8 hours as needed      hydrALAZINE (APRESOLINE) 10 MG tablet Take 1 tablet by mouth 2 times daily 90 tablet 3    gabapentin (NEURONTIN) 300 MG capsule Take 300 mg by mouth 3 times daily.  tamsulosin (FLOMAX) 0.4 MG capsule 1 tablet 2 times daily      apixaban (ELIQUIS) 5 MG TABS tablet Take 1 tablet by mouth 2 times daily 60 tablet 1    aspirin 81 MG chewable tablet Take 1 tablet by mouth daily 30 tablet 0    omeprazole (PRILOSEC) 20 MG delayed release capsule Take 20 mg by mouth daily      citalopram (CELEXA) 20 MG tablet 10 mg       traMADol (ULTRAM) 50 MG tablet       Lactobacillus (PROBIOTIC ACIDOPHILUS PO) Take by mouth daily         Review of Systems:   Review of Systems   Constitutional: Positive for fatigue. Negative for activity change, chills and fever. HENT: Negative for congestion, ear pain and tinnitus. Eyes: Negative for photophobia, pain and visual disturbance. Respiratory: Positive for shortness of breath. Negative for cough, chest tightness and wheezing. Cardiovascular: Positive for palpitations. Negative for chest pain and leg swelling. Gastrointestinal: Negative for abdominal pain, blood in stool, constipation, diarrhea, nausea and vomiting. Endocrine: Negative for cold intolerance and heat intolerance. Genitourinary: Negative for dysuria, flank pain and hematuria. Musculoskeletal: Positive for arthralgias. Negative for back pain, myalgias and neck stiffness.    Skin: Negative for color change and rash. Allergic/Immunologic: Negative for food allergies. Neurological: Positive for dizziness. Negative for syncope, numbness and headaches. Hematological: Does not bruise/bleed easily. Psychiatric/Behavioral: Negative for agitation, behavioral problems and confusion. Examination:      Vitals:    01/14/21 0921   Pulse: 65   Resp: 16   Temp: 96.3 °F (35.7 °C)   TempSrc: Tympanic   SpO2: 98%   Weight: 258 lb (117 kg)   Height: 6' 2\" (1.88 m)        Body mass index is 33.13 kg/m². Physical Exam  Constitutional:       General: He is not in acute distress. Appearance: He is well-developed. HENT:      Head: Normocephalic and atraumatic. Eyes:      Pupils: Pupils are equal, round, and reactive to light. Neck:      Musculoskeletal: Normal range of motion. Vascular: No JVD. Cardiovascular:      Rate and Rhythm: Normal rate and regular rhythm. Heart sounds: No murmur. No friction rub. Pulmonary:      Effort: Pulmonary effort is normal. No respiratory distress. Breath sounds: Normal breath sounds. No wheezing or rales. Abdominal:      General: Bowel sounds are normal. There is no distension. Palpations: Abdomen is soft. Tenderness: There is no abdominal tenderness. Musculoskeletal:         General: No tenderness. Skin:     General: Skin is warm and dry. Neurological:      Mental Status: He is alert and oriented to person, place, and time. Cranial Nerves: No cranial nerve deficit.                CBC:   Lab Results   Component Value Date    WBC 5.5 01/11/2021    HGB 14.1 01/11/2021    HCT 43.3 01/11/2021     01/11/2021     Lipids:  Lab Results   Component Value Date    CHOL 174 08/26/2020    TRIG 225 (H) 08/26/2020    HDL 44 08/26/2020    LDLDIRECT 104 (H) 08/26/2020     PT/INR:   Lab Results   Component Value Date    INR 1.22 01/11/2021        BMP:    Lab Results   Component Value Date     01/11/2021    K 4.3 01/11/2021     01/11/2021    CO2 22 01/11/2021    BUN 14 01/11/2021     CMP:   Lab Results   Component Value Date    AST 24 11/07/2020    PROT 7.6 11/07/2020    BILITOT 0.3 11/07/2020    ALKPHOS 101 11/07/2020     TSH:  No results found for: TSH    EKGINTERPRETATION - EKG Interpretation:  Sinus rhythm      IMPRESSION / RECOMMENDATIONS:     1. PAF  2. Cad  3. Copd  4. THOMAS on CPAP  5. Obesity BMI 33    Patient reports tiredness and weakness and intermittent dizziness with palpitations  She had PAF in few months ago and spontaneously converted and again had in November had cardioversion. Patient was on Multaq but it is getting expensive and still having symptoms so he stopped Multaq as he cannot afford it anymore    Discussed with the patient about ablation option. Discussed risks with procedure in detail and also outcomes and success from the procedure discussed in detail with the patient     Discussed about other antiarrhythmic option which include sotalol or Tikosyn or amiodarone. But patient also has coronary artery disease (mild) so cannot be on Rythmol or flecainide    Patient does not want to stay in the hospital so we started low-dose amiodarone every day till ablation    Wants to consider ablation    Patient is also on citalopram so we need to consider using antiarrhythmic only for a limited amount of time because there is a risk of QT prolongation    Patient wants to consider ablation risks discussed with ablation in detail and patient agreeable with the plan    Thanks again for allowing me to participate in care of this patient. Please call me if you have any questions. With best regards. Jana Bey MD, 1/14/2021     Please note this report has been partially produced using speech recognition software and may contain errors related to that system including errors in grammar, punctuation, and spelling, as well as words and phrases that may be inappropriate.  If there are any questions

## 2021-01-14 NOTE — PROGRESS NOTES
Patient instructed and educated on [employer]. Patient able to do 2750 ml. Vital capacity. Patient's goal is 3300ml.  Electronically signed by Tere Nieves RCP on 1/14/2021 at 4:32 PM

## 2021-01-14 NOTE — PROGRESS NOTES
Received pt from EP lab. Alert and oriented. Denies pain. Reports a little soreness in his chest. No distress noted. No skin issues or skin breakdown as verified by this nurse and VIJAY Greene RN

## 2021-01-14 NOTE — ANESTHESIA PRE PROCEDURE
Department of Anesthesiology  Preprocedure Note       Name:  Rodolfo Garcia   Age:  70 y.o.  :  1945                                          MRN:  [id number]         Date:  2021      Surgeon: * No surgeons listed *    Procedure: * No procedures listed *    Medications prior to admission:   Prior to Admission medications    Medication Sig Start Date End Date Taking? Authorizing Provider   apixaban (ELIQUIS) 5 MG TABS tablet Take 1 tablet by mouth 2 times daily 21  Yes Kavita Hook MD   amiodarone (CORDARONE) 200 MG tablet Take 1 tablet by mouth daily 20  Yes Bony Holt MD   pravastatin (PRAVACHOL) 20 MG tablet Take 1 tablet by mouth daily 20  Yes Bony Holt MD   metoprolol succinate (TOPROL XL) 50 MG extended release tablet Take 0.5 tablets by mouth daily 20  Yes Tanja Reddy APRN - CNP   Baclofen (LIORESAL) 5 MG tablet Take 5 mg by mouth every 8 hours as needed 20  Yes Historical Provider, MD   hydrALAZINE (APRESOLINE) 10 MG tablet Take 1 tablet by mouth 2 times daily 20  Yes Miko Durbin APRN - CNP   gabapentin (NEURONTIN) 300 MG capsule Take 300 mg by mouth 3 times daily. Yes Historical Provider, MD   tamsulosin (FLOMAX) 0.4 MG capsule 1 tablet 2 times daily 20  Yes Historical Provider, MD   apixaban (ELIQUIS) 5 MG TABS tablet Take 1 tablet by mouth 2 times daily 20  Yes Ramon Rodrigez APRN - CNP   aspirin 81 MG chewable tablet Take 1 tablet by mouth daily 20  Yes Saeid Smith MD   omeprazole (PRILOSEC) 20 MG delayed release capsule Take 20 mg by mouth daily   Yes Historical Provider, MD   citalopram (CELEXA) 20 MG tablet 10 mg  14  Yes Historical Provider, MD   traMADol Kristopher Salmeron) 50 MG tablet  14  Yes Historical Provider, MD   Lactobacillus (PROBIOTIC ACIDOPHILUS PO) Take by mouth daily    Historical Provider, MD       Current medications:    No current facility-administered medications for this encounter. 08/08/2005 no significant obstructive lesions noted in the extracranial carotid system,antegrade flow noted in the vertebral arteries. no significant atherosclerotic plaque noted in right or left  common arteryintimal thickening in right and left internal carotids, intimal thickening in left external carotid.  H/O echocardiogram 06/24/2002 EF 55-60% 06/19/2006 EF 50-55%,09/06/2011 EF 50-55%    09/06/2011 EF 50-55%, normal size left ventricle showing preserved global systolic  function and no regional wall motion abnormalities, left ventricle shows  moderate concentric hypertrophy and signs of diastolic dysfunction, mildly dilated atrium,, aortic valve is sclerotic but nonstenotic, trace mitral and tricuspid  regurg    H/O tilt table evaluation 07/05/2001 07/05/2001 head up tilt table test after one nitroglycerin pt became nauseous, diaphoretic, BP dropped systolic to 85 pulse in 45'O with complete loss of consciousness with  slight seizure activity before table brought to horizontal, happened within 8 minutes of nitro tablet utilization, pt. regained consciousness HR and BP  as soon as table was brought to horizontal, lopressor discontinued    Hiatal hernia     History of cardiac cath 10/05/2020    No significant CAD. LAD diffusely diseased as it is very small in caliber. LVEF is 50 to 55 %.     History of nuclear stress test 09/30/2020    EF 62%, Large area of significant inferior wall ischemia    Hx of cardiac cath 03/17/1999, 11/19/2008 11/19/2008both the left main and circumflex are without significant disease, RCA is also without significant disease, LV gram shows normal size left ventricular cavity with normal global and regional left ventricular systolic function, no significant CAD, chest pain is probably non cardiac in etiology    Hypertension     Prolonged emergence from general anesthesia     Syncope and collapse     Unspecified sleep apnea     cpap    Wears glasses PROT 7.6 11/07/2020    PROT 7.1 02/05/2013    CALCIUM 8.7 01/11/2021    BILITOT 0.3 11/07/2020    ALKPHOS 101 11/07/2020    AST 24 11/07/2020    ALT 31 11/07/2020       POC Tests: No results for input(s): POCGLU, POCNA, POCK, POCCL, POCBUN, POCHEMO, POCHCT in the last 72 hours. Coags:   Lab Results   Component Value Date    PROTIME 14.8 01/11/2021    PROTIME 11.3 10/05/2011    INR 1.22 01/11/2021    APTT 39.5 01/11/2021       HCG (If Applicable): No results found for: PREGTESTUR, PREGSERUM, HCG, HCGQUANT     ABGs: No results found for: PHART, PO2ART, FXE4CGQ, XNV5FVP, BEART, [id number]     Type & Screen (If Applicable):  No results found for: LABABO, LABRH    Drug/Infectious Status (If Applicable):  No results found for: HIV, HEPCAB    COVID-19 Screening (If Applicable):   Lab Results   Component Value Date    COVID19 NOT DETECTED 01/11/2021         Anesthesia Evaluation  Patient summary reviewed history of anesthetic complications (hx prolonged emergence from [place]): Airway: Mallampati: II  TM distance: >3 FB   Neck ROM: full  Mouth opening: > = 3 FB Dental:    (+) upper dentures and lower dentures      Pulmonary:   (+) COPD:  sleep apnea: on CPAP,                             Cardiovascular:  Exercise tolerance: poor (<4 METS),   (+) hypertension:, angina:, dysrhythmias: atrial fibrillation and SVT,          Beta Blocker:  Order written         Neuro/Psych:               GI/Hepatic/Renal:   (+) hiatal hernia,           Endo/Other:    (+) : arthritis: OA., .                 Abdominal:           Vascular:                                        Anesthesia Plan      general     ASA 3       Induction: intravenous. MIPS: Postoperative opioids intended and Prophylactic antiemetics administered. Anesthetic plan and risks discussed with patient. Plan discussed with CRNA.                   Fabiana Lobato DO   1/14/2021

## 2021-01-15 ENCOUNTER — TELEPHONE (OUTPATIENT)
Dept: CARDIOLOGY CLINIC | Age: 76
End: 2021-01-15

## 2021-01-15 VITALS
SYSTOLIC BLOOD PRESSURE: 137 MMHG | OXYGEN SATURATION: 96 % | DIASTOLIC BLOOD PRESSURE: 81 MMHG | HEART RATE: 60 BPM | RESPIRATION RATE: 16 BRPM | WEIGHT: 256.6 LBS | HEIGHT: 74 IN | TEMPERATURE: 97.9 F | BODY MASS INDEX: 32.93 KG/M2

## 2021-01-15 LAB
ALBUMIN SERPL-MCNC: 3.8 GM/DL (ref 3.4–5)
ANION GAP SERPL CALCULATED.3IONS-SCNC: 12 MMOL/L (ref 4–16)
BUN BLDV-MCNC: 15 MG/DL (ref 6–23)
CALCIUM SERPL-MCNC: 8.2 MG/DL (ref 8.3–10.6)
CHLORIDE BLD-SCNC: 103 MMOL/L (ref 99–110)
CO2: 22 MMOL/L (ref 21–32)
CREAT SERPL-MCNC: 0.9 MG/DL (ref 0.9–1.3)
GFR AFRICAN AMERICAN: >60 ML/MIN/1.73M2
GFR NON-AFRICAN AMERICAN: >60 ML/MIN/1.73M2
GLUCOSE BLD-MCNC: 135 MG/DL (ref 70–99)
HCT VFR BLD CALC: 40.9 % (ref 42–52)
HEMOGLOBIN: 13.1 GM/DL (ref 13.5–18)
MAGNESIUM: 2.1 MG/DL (ref 1.8–2.4)
MCH RBC QN AUTO: 29.7 PG (ref 27–31)
MCHC RBC AUTO-ENTMCNC: 32 % (ref 32–36)
MCV RBC AUTO: 92.7 FL (ref 78–100)
PDW BLD-RTO: 14 % (ref 11.7–14.9)
PHOSPHORUS: 3 MG/DL (ref 2.5–4.9)
PLATELET # BLD: 212 K/CU MM (ref 140–440)
PMV BLD AUTO: 10.4 FL (ref 7.5–11.1)
POTASSIUM SERPL-SCNC: 4.1 MMOL/L (ref 3.5–5.1)
RBC # BLD: 4.41 M/CU MM (ref 4.6–6.2)
SODIUM BLD-SCNC: 137 MMOL/L (ref 135–145)
WBC # BLD: 8.9 K/CU MM (ref 4–10.5)

## 2021-01-15 PROCEDURE — 36415 COLL VENOUS BLD VENIPUNCTURE: CPT

## 2021-01-15 PROCEDURE — 85027 COMPLETE CBC AUTOMATED: CPT

## 2021-01-15 PROCEDURE — 99217 PR OBSERVATION CARE DISCHARGE MANAGEMENT: CPT | Performed by: NURSE PRACTITIONER

## 2021-01-15 PROCEDURE — 80069 RENAL FUNCTION PANEL: CPT

## 2021-01-15 PROCEDURE — 93010 ELECTROCARDIOGRAM REPORT: CPT | Performed by: INTERNAL MEDICINE

## 2021-01-15 PROCEDURE — 94761 N-INVAS EAR/PLS OXIMETRY MLT: CPT

## 2021-01-15 PROCEDURE — 83735 ASSAY OF MAGNESIUM: CPT

## 2021-01-15 PROCEDURE — 84100 ASSAY OF PHOSPHORUS: CPT

## 2021-01-15 PROCEDURE — 6370000000 HC RX 637 (ALT 250 FOR IP): Performed by: INTERNAL MEDICINE

## 2021-01-15 RX ORDER — PANTOPRAZOLE SODIUM 40 MG/1
40 TABLET, DELAYED RELEASE ORAL DAILY
Qty: 30 TABLET | Refills: 0 | Status: SHIPPED | OUTPATIENT
Start: 2021-01-15

## 2021-01-15 RX ADMIN — CITALOPRAM HYDROBROMIDE 10 MG: 20 TABLET ORAL at 10:57

## 2021-01-15 RX ADMIN — TAMSULOSIN HYDROCHLORIDE 0.4 MG: 0.4 CAPSULE ORAL at 10:56

## 2021-01-15 RX ADMIN — GABAPENTIN 300 MG: 300 CAPSULE ORAL at 10:57

## 2021-01-15 RX ADMIN — APIXABAN 5 MG: 5 TABLET, FILM COATED ORAL at 10:57

## 2021-01-15 RX ADMIN — ASPIRIN 81 MG: 81 TABLET, CHEWABLE ORAL at 10:58

## 2021-01-15 RX ADMIN — AMIODARONE HYDROCHLORIDE 200 MG: 200 TABLET ORAL at 10:56

## 2021-01-15 RX ADMIN — HYDRALAZINE HYDROCHLORIDE 10 MG: 10 TABLET, FILM COATED ORAL at 10:58

## 2021-01-15 RX ADMIN — PRAVASTATIN SODIUM 20 MG: 20 TABLET ORAL at 10:57

## 2021-01-15 RX ADMIN — METOPROLOL SUCCINATE 25 MG: 25 TABLET, EXTENDED RELEASE ORAL at 10:56

## 2021-01-15 NOTE — TELEPHONE ENCOUNTER
Patient advise FMLA forms are completed. Patient still owes $35 fee so these are not been faxed. Patient aware.

## 2021-01-15 NOTE — CARE COORDINATION
CM in to see Pt to discuss discharge planning. Pt from home with wife and plans to return. Pt wife will provide transportation at discharge. Pt has no DME, works, and drives. Pt has insurance, pcp, and can afford medication. Pt denies any needs at this time. CM available if needs arise.

## 2021-01-18 LAB
EKG ATRIAL RATE: 70 BPM
EKG DIAGNOSIS: NORMAL
EKG P AXIS: 81 DEGREES
EKG P-R INTERVAL: 184 MS
EKG Q-T INTERVAL: 396 MS
EKG QRS DURATION: 80 MS
EKG QTC CALCULATION (BAZETT): 427 MS
EKG R AXIS: 2 DEGREES
EKG T AXIS: 30 DEGREES
EKG VENTRICULAR RATE: 70 BPM

## 2021-01-21 ENCOUNTER — OFFICE VISIT (OUTPATIENT)
Dept: CARDIOLOGY CLINIC | Age: 76
End: 2021-01-21
Payer: MEDICARE

## 2021-01-21 VITALS
DIASTOLIC BLOOD PRESSURE: 66 MMHG | SYSTOLIC BLOOD PRESSURE: 122 MMHG | HEART RATE: 70 BPM | HEIGHT: 74 IN | WEIGHT: 261 LBS | BODY MASS INDEX: 33.5 KG/M2

## 2021-01-21 DIAGNOSIS — Z86.79 S/P ABLATION OF ATRIAL FLUTTER: ICD-10-CM

## 2021-01-21 DIAGNOSIS — G47.33 OSA ON CPAP: ICD-10-CM

## 2021-01-21 DIAGNOSIS — E66.9 OBESITY (BMI 30.0-34.9): ICD-10-CM

## 2021-01-21 DIAGNOSIS — Z86.79 S/P ABLATION OF ATRIAL FIBRILLATION: Primary | ICD-10-CM

## 2021-01-21 DIAGNOSIS — Z98.890 S/P ABLATION OF ATRIAL FIBRILLATION: Primary | ICD-10-CM

## 2021-01-21 DIAGNOSIS — Z98.890 S/P ABLATION OF ATRIAL FLUTTER: ICD-10-CM

## 2021-01-21 DIAGNOSIS — Z99.89 OSA ON CPAP: ICD-10-CM

## 2021-01-21 DIAGNOSIS — I10 ESSENTIAL HYPERTENSION: ICD-10-CM

## 2021-01-21 DIAGNOSIS — I48.0 PAROXYSMAL ATRIAL FIBRILLATION (HCC): ICD-10-CM

## 2021-01-21 PROBLEM — E66.811 OBESITY (BMI 30.0-34.9): Status: ACTIVE | Noted: 2021-01-21

## 2021-01-21 PROCEDURE — 1036F TOBACCO NON-USER: CPT | Performed by: NURSE PRACTITIONER

## 2021-01-21 PROCEDURE — 99214 OFFICE O/P EST MOD 30 MIN: CPT | Performed by: NURSE PRACTITIONER

## 2021-01-21 PROCEDURE — G8484 FLU IMMUNIZE NO ADMIN: HCPCS | Performed by: NURSE PRACTITIONER

## 2021-01-21 PROCEDURE — 4040F PNEUMOC VAC/ADMIN/RCVD: CPT | Performed by: NURSE PRACTITIONER

## 2021-01-21 PROCEDURE — G8427 DOCREV CUR MEDS BY ELIG CLIN: HCPCS | Performed by: NURSE PRACTITIONER

## 2021-01-21 PROCEDURE — 93000 ELECTROCARDIOGRAM COMPLETE: CPT | Performed by: NURSE PRACTITIONER

## 2021-01-21 PROCEDURE — G8417 CALC BMI ABV UP PARAM F/U: HCPCS | Performed by: NURSE PRACTITIONER

## 2021-01-21 PROCEDURE — 3017F COLORECTAL CA SCREEN DOC REV: CPT | Performed by: NURSE PRACTITIONER

## 2021-01-21 PROCEDURE — 1123F ACP DISCUSS/DSCN MKR DOCD: CPT | Performed by: NURSE PRACTITIONER

## 2021-01-21 RX ORDER — OMEPRAZOLE 40 MG/1
CAPSULE, DELAYED RELEASE ORAL
COMMUNITY
Start: 2020-12-20 | End: 2021-01-21

## 2021-01-21 ASSESSMENT — ENCOUNTER SYMPTOMS
EYE REDNESS: 0
ABDOMINAL PAIN: 0
DIARRHEA: 0
BLOOD IN STOOL: 0
COUGH: 0
COLOR CHANGE: 0
CONSTIPATION: 0
VOMITING: 0
NAUSEA: 0
EYE ITCHING: 0
SHORTNESS OF BREATH: 1
WHEEZING: 0
BACK PAIN: 0
CHEST TIGHTNESS: 0

## 2021-01-21 NOTE — PATIENT INSTRUCTIONS
Please be informed that if you contact our office outside of normal business hours the physician on call cannot help with any scheduling or rescheduling issues, procedure instruction questions or any type of medication issue. We advise you for any urgent/emergency that you go to the nearest emergency room!     PLEASE CALL OUR OFFICE DURING NORMAL BUSINESS HOURS    Monday - Friday   8 am to 5 pm    [place]: [phone]    [place]:  [phone]

## 2021-01-21 NOTE — LETTER
Dr. Valentin   6/95/2081  [id number]    Have you had any Chest Pain that is not new? - No    Have you had any Shortness of Breath - Yes  If Yes - When on exertion    Have you had any dizziness - No  Have you had any palpitations that are not new?  - No  Do you have any edema - swelling in No    Do you have a surgery or procedure scheduled in the near future - No

## 2021-01-21 NOTE — PROGRESS NOTES
per pt herniated disc    Arthritis     Gastritis     H/O 24 hour EKG monitoring 06/24/2002 06/24/2002 baseline rhythm appears to be normal with an average HR of 66 bpm, slowest HR recored at 51 bpm, fastest at 97 bpm only 10 isolated  PVC's and one couplet were recorded, supraventricular ectopic activity is present, but not clinically significant, no long pauses recognized.  H/O cardiovascular stress test 03/16/1999, 12/28/2001,03/14/2006, 03/31/2008, 11/19/2008, 09/07/2011 09/07/2011 EF 61%, no angina or ischemic EKG changes, noted with Lexiscan, inferior wall reperfusion, global function is intact. resting /85, resting HR 79    H/O complete electrocardiogram 03/05/2012 03/05/2012 on chart    H/O CT scan of chest 11/19/2008 11/19/2008 no evidence of PE, no acute thoracic aortic pathology, incidental 5mm pleural based nodular density superior lateral right middle lobe.  H/O Doppler ultrasound no anatomical abnormalities in the segment s studied on either side, doppler curves are normal in configuration and amplitude in those segments bilaterally, normal flow velocities and flow velocity ratios. normal antegrade flow in the vertebral arteries bilaterally    H/O Doppler ultrasound 08/08/2005 08/08/2005 no significant obstructive lesions noted in the extracranial carotid system,antegrade flow noted in the vertebral arteries. no significant atherosclerotic plaque noted in right or left  common arteryintimal thickening in right and left internal carotids, intimal thickening in left external carotid.      H/O echocardiogram 06/24/2002 EF 55-60% 06/19/2006 EF 50-55%,09/06/2011 EF 50-55% 09/06/2011 EF 50-55%, normal size left ventricle showing preserved global systolic  function and no regional wall motion abnormalities, left ventricle shows  moderate concentric hypertrophy and signs of diastolic dysfunction, mildly dilated atrium,, aortic valve is sclerotic but nonstenotic, trace mitral and tricuspid  regurg    H/O tilt table evaluation 07/05/2001 07/05/2001 head up tilt table test after one nitroglycerin pt became nauseous, diaphoretic, BP dropped systolic to 85 pulse in 37'C with complete loss of consciousness with  slight seizure activity before table brought to horizontal, happened within 8 minutes of nitro tablet utilization, pt. regained consciousness HR and BP  as soon as table was brought to horizontal, lopressor discontinued    Hiatal hernia     History of cardiac cath 10/05/2020    No significant CAD. LAD diffusely diseased as it is very small in caliber. LVEF is 50 to 55 %.     History of nuclear stress test 09/30/2020    EF 62%, Large area of significant inferior wall ischemia    Hx of cardiac cath 03/17/1999, 11/19/2008 11/19/2008both the left main and circumflex are without significant disease, RCA is also without significant disease, LV gram shows normal size left ventricular cavity with normal global and regional left ventricular systolic function, no significant CAD, chest pain is probably non cardiac in etiology    Hypertension     Prolonged emergence from general anesthesia     Syncope and collapse     Unspecified sleep apnea     cpap    Wears glasses        Surgical history :   Past Surgical History:   Procedure Laterality Date    ABLATION OF DYSRHYTHMIC FOCUS  01/14/2021    Atrial fibrillation and Atrial flutter ablation    BACK SURGERY      DIAGNOSTIC CARDIAC CATH LAB PROCEDURE  03/17/1999,11/19/2008 11/19/2008, left main,circumflex, and RCA are without any significant disease, LV gram shows normal size left ventricular cavity with normal global and  regional left ventricular systolic function, pt has no significant CAD.  HERNIA REPAIR      TONSILLECTOMY         Family history:   Family History   Problem Relation Age of Onset    Diabetes Mother     Heart Disease Mother     Other Mother         kidney stones    Cancer Mother         breast    Heart Disease Father     Diabetes Father        Social history :  reports that he quit smoking about 33 years ago. His smoking use included cigarettes. He has quit using smokeless tobacco.  His smokeless tobacco use included chew. He reports current alcohol use. He reports that he does not use drugs. Allergies   Allergen Reactions    Ambien [Zolpidem] Other (See Comments)     Makes pt loopy       Current Outpatient Medications on File Prior to Visit   Medication Sig Dispense Refill    pantoprazole (PROTONIX) 40 MG tablet Take 1 tablet by mouth daily 30 tablet 0    amiodarone (CORDARONE) 200 MG tablet Take 1 tablet by mouth daily 30 tablet 3    pravastatin (PRAVACHOL) 20 MG tablet Take 1 tablet by mouth daily 30 tablet 2    metoprolol succinate (TOPROL XL) 50 MG extended release tablet Take 0.5 tablets by mouth daily 30 tablet 2    Baclofen (LIORESAL) 5 MG tablet Take 5 mg by mouth every 8 hours as needed      hydrALAZINE (APRESOLINE) 10 MG tablet Take 1 tablet by mouth 2 times daily 90 tablet 3    gabapentin (NEURONTIN) 300 MG capsule Take 300 mg by mouth 3 times daily.       Lactobacillus (PROBIOTIC ACIDOPHILUS PO) Take by mouth daily      tamsulosin (FLOMAX) 0.4 MG capsule 1 tablet 2 times daily      apixaban (ELIQUIS) 5 MG TABS tablet Take 1 tablet by mouth 2 times daily 60 tablet 1    aspirin 81 MG chewable tablet Take 1 tablet by mouth daily 30 tablet 0    citalopram (CELEXA) 20 MG tablet 10 mg       traMADol (ULTRAM) 50 MG tablet No current facility-administered medications on file prior to visit. Review of Systems:   Review of Systems   Constitutional: Negative for activity change, chills, fatigue and fever. HENT: Negative for congestion, ear pain and tinnitus. Eyes: Negative for redness and itching. Respiratory: Positive for shortness of breath. Negative for cough, chest tightness and wheezing. Cardiovascular: Negative for chest pain, palpitations and leg swelling. Gastrointestinal: Negative for abdominal pain, blood in stool, constipation, diarrhea, nausea and vomiting. Endocrine: Negative for cold intolerance and heat intolerance. Genitourinary: Negative for dysuria, flank pain and hematuria. Musculoskeletal: Positive for arthralgias. Negative for back pain, myalgias and neck stiffness. Skin: Negative for color change, pallor, rash and wound. Allergic/Immunologic: Negative for food allergies. Neurological: Negative for dizziness, syncope, numbness and headaches. Hematological: Does not bruise/bleed easily. Psychiatric/Behavioral: Negative for agitation, behavioral problems and confusion. The patient is not nervous/anxious. Examination:      Vitals:    01/21/21 0938   BP: 122/66   Pulse: 70   Weight: 261 lb (118.4 kg)   Height: 6' 2\" (1.88 m)        Body mass index is 33.51 kg/m². Physical Exam  Constitutional:       General: He is not in acute distress. Appearance: Normal appearance. He is well-developed. He is not ill-appearing or diaphoretic. HENT:      Head: Normocephalic and atraumatic. Right Ear: External ear normal.      Left Ear: External ear normal.      Nose: No congestion or rhinorrhea. Mouth/Throat:      Mouth: Mucous membranes are moist.      Pharynx: Oropharynx is clear. No oropharyngeal exudate. Eyes:      General:         Right eye: No discharge. Left eye: No discharge.       Conjunctiva/sclera: Conjunctivae normal. Pupils: Pupils are equal, round, and reactive to light. Neck:      Musculoskeletal: Normal range of motion. No muscular tenderness. Vascular: No carotid bruit or JVD. Cardiovascular:      Rate and Rhythm: Normal rate and regular rhythm. Pulses: Normal pulses. Heart sounds: No murmur. No friction rub. Pulmonary:      Effort: Pulmonary effort is normal. No respiratory distress. Breath sounds: Normal breath sounds. No wheezing, rhonchi or rales. Abdominal:      General: Bowel sounds are normal. There is no distension. Palpations: Abdomen is soft. Tenderness: There is no abdominal tenderness. Musculoskeletal:         General: No tenderness. Right lower leg: No edema. Left lower leg: No edema. Skin:     General: Skin is warm and dry. Findings: Bruising (bilateral femoral groin site soft nontender minimal bruising no hematoma) present. Neurological:      Mental Status: He is alert and oriented to person, place, and time. Cranial Nerves: No cranial nerve deficit. Psychiatric:         Mood and Affect: Mood normal.         Thought Content:  Thought content normal.         CBC:   Lab Results   Component Value Date    WBC 8.9 01/15/2021    HGB 13.1 01/15/2021    HCT 40.9 01/15/2021     01/15/2021     Lipids:  Lab Results   Component Value Date    CHOL 174 08/26/2020    TRIG 225 (H) 08/26/2020    HDL 44 08/26/2020    LDLDIRECT 104 (H) 08/26/2020     PT/INR:   Lab Results   Component Value Date    INR 1.22 01/11/2021        BMP:    Lab Results   Component Value Date     01/15/2021    K 4.1 01/15/2021     01/15/2021    CO2 22 01/15/2021    BUN 15 01/15/2021     CMP:   Lab Results   Component Value Date    AST 24 11/07/2020    PROT 7.6 11/07/2020    BILITOT 0.3 11/07/2020    ALKPHOS 101 11/07/2020     TSH:  No results found for: TSH     EKG Interpretation:  Sinus rhythm      IMPRESSION / RECOMMENDATIONS: Status post ablation atrial fibrillation  Status post  ablation atrial flutter  shortness of breath  PAF  Hypertension  THOMAS on CPAP  Obesity BMI 33    Patient overall feeling well   EKG obtained patient in sinus rhythm heart rate 70  Denies episodes of atrial fibrillation since discharge  Continue amiodarone 200 mg daily    CHADS2 score 3-hypertension, vascular disease, age    Shortness of breath-occurring intermittently with exertion. Echo post procedure negative for pericardial effusion. Patient would like to wait on possible follow-up echo as he says the shortness of breath is gradually improving. Patient instructed that if shortness of breath does not improve please call before next office visit and we can do echo. Patient voiced understanding    Vitals:    01/21/21 0938   BP: 122/66   Pulse: 70     Blood pressure stable continue Toprol-XL 25 mg daily  Apresoline 10 mg twice daily    THOMAS on CPAP patient is compliant  Obesity BMI 33-recommend regular exercise and decrease caloric intake      Thanks again for allowing me to participate in care of this patient. Please call me if you have any questions. With best regards. Kaela Cage, MÓNICA - CNP, 1/21/2021     Please note this report has been partially produced using speech recognition software and may contain errors related to that system including errors in grammar, punctuation, and spelling, as well as words and phrases that may be inappropriate. If there are any questions or concerns please feel free to contact the dictating provider for clarification.

## 2021-01-26 ENCOUNTER — TELEPHONE (OUTPATIENT)
Dept: CARDIOLOGY CLINIC | Age: 76
End: 2021-01-26

## 2021-01-26 NOTE — LETTER
[facility]  [address] [address]  Phone: [phone]  Fax: [phone]    MÓNICA Cervantes CNP        January 26, 2021     Patient: Eagle Rangel   YOB: 1945   Date of Visit: 1/26/2021       To Whom It May Concern: It is my medical opinion that Angelita Lehman may return to work with no restrictions. If you have any questions or concerns, please don't hesitate to call.     Sincerely,        MÓNICA Cervantes CNP

## 2021-02-01 NOTE — TELEPHONE ENCOUNTER
Patient called requesting samples of Eliquis, patient was advised that samples should be ready for  by tomorrow afternoon, please call with any problems at ph# [phone].

## 2021-02-08 ENCOUNTER — TELEPHONE (OUTPATIENT)
Dept: CARDIOLOGY CLINIC | Age: 76
End: 2021-02-08

## 2021-02-08 NOTE — TELEPHONE ENCOUNTER
Left message for Dr Fernandez Net office. Patient had afib ablation 1/14/21.  Cannot hold blood thinner for 3 months

## 2021-02-08 NOTE — TELEPHONE ENCOUNTER
Cardiologist: Dr. Orly Chapman  Surgeon: Dr. Nikko Casarez  Surgery: Epidural Steroid Inj. Anesthesia:   Date: 2/9/21  FAX# [phone]  # [phone]    Last OV 1/21/2021 w/Paula      Status post ablation atrial fibrillation  Status post  ablation atrial flutter  shortness of breath  PAF  Hypertension  THOMAS on CPAP  Obesity BMI 33     Patient overall feeling well   EKG obtained patient in sinus rhythm heart rate 70  Denies episodes of atrial fibrillation since discharge  Continue amiodarone 200 mg daily     CHADS2 score 3-hypertension, vascular disease, age     Shortness of breath-occurring intermittently with exertion. Echo post procedure negative for pericardial effusion. Patient would like to wait on possible follow-up echo as he says the shortness of breath is gradually improving. Patient instructed that if shortness of breath does not improve please call before next office visit and we can do echo. Patient voiced understanding        NM-9/30/2020  Abnormal Study.    Large area of significant inferior wall Ischemia.    Normal LV function. LVEF is 62 %. Echo- 1/14/2021   This is a limited echocardiogram.   Left ventricular systolic function is normal.   Ejection fraction is visually estimated at 50-55%. No evidence of any pericardial effusion. CATH-10/5/2020   1. No significant CAD. ? LAD diffusely diseased as it is very small in caliber. 2. Normal LV systolic function. LVEF is 50 to 55 %.       Eliquis    Aspirin

## 2021-02-24 NOTE — TELEPHONE ENCOUNTER
Pt called and needs samples of Eliquis. Next ov is 2/21.    Will  on tomorrow  after 2 PM. Pt is out

## 2021-02-25 DIAGNOSIS — I10 ESSENTIAL HYPERTENSION: ICD-10-CM

## 2021-02-25 DIAGNOSIS — I48.0 PAROXYSMAL ATRIAL FIBRILLATION (HCC): ICD-10-CM

## 2021-02-25 RX ORDER — METOPROLOL SUCCINATE 50 MG/1
TABLET, EXTENDED RELEASE ORAL
Qty: 30 TABLET | Refills: 3 | Status: SHIPPED | OUTPATIENT
Start: 2021-02-25 | End: 2021-04-26

## 2021-02-26 ENCOUNTER — OFFICE VISIT (OUTPATIENT)
Dept: CARDIOLOGY CLINIC | Age: 76
End: 2021-02-26
Payer: MEDICARE

## 2021-02-26 VITALS
HEIGHT: 74 IN | WEIGHT: 259.6 LBS | BODY MASS INDEX: 33.32 KG/M2 | DIASTOLIC BLOOD PRESSURE: 76 MMHG | HEART RATE: 67 BPM | SYSTOLIC BLOOD PRESSURE: 138 MMHG

## 2021-02-26 DIAGNOSIS — Z86.79 S/P ABLATION OF ATRIAL FLUTTER: ICD-10-CM

## 2021-02-26 DIAGNOSIS — I48.0 PAROXYSMAL ATRIAL FIBRILLATION (HCC): ICD-10-CM

## 2021-02-26 DIAGNOSIS — R06.02 SOB (SHORTNESS OF BREATH): ICD-10-CM

## 2021-02-26 DIAGNOSIS — I10 ESSENTIAL HYPERTENSION: ICD-10-CM

## 2021-02-26 DIAGNOSIS — Z86.79 STATUS POST ABLATION OF ATRIAL FIBRILLATION: Primary | ICD-10-CM

## 2021-02-26 DIAGNOSIS — Z98.890 STATUS POST ABLATION OF ATRIAL FIBRILLATION: Primary | ICD-10-CM

## 2021-02-26 DIAGNOSIS — Z98.890 S/P ABLATION OF ATRIAL FLUTTER: ICD-10-CM

## 2021-02-26 DIAGNOSIS — E66.9 OBESITY (BMI 30.0-34.9): ICD-10-CM

## 2021-02-26 DIAGNOSIS — G47.33 OSA ON CPAP: ICD-10-CM

## 2021-02-26 DIAGNOSIS — Z99.89 OSA ON CPAP: ICD-10-CM

## 2021-02-26 PROCEDURE — 99214 OFFICE O/P EST MOD 30 MIN: CPT | Performed by: NURSE PRACTITIONER

## 2021-02-26 PROCEDURE — 1036F TOBACCO NON-USER: CPT | Performed by: NURSE PRACTITIONER

## 2021-02-26 PROCEDURE — 93000 ELECTROCARDIOGRAM COMPLETE: CPT | Performed by: NURSE PRACTITIONER

## 2021-02-26 PROCEDURE — G8427 DOCREV CUR MEDS BY ELIG CLIN: HCPCS | Performed by: NURSE PRACTITIONER

## 2021-02-26 PROCEDURE — 4040F PNEUMOC VAC/ADMIN/RCVD: CPT | Performed by: NURSE PRACTITIONER

## 2021-02-26 PROCEDURE — 3017F COLORECTAL CA SCREEN DOC REV: CPT | Performed by: NURSE PRACTITIONER

## 2021-02-26 PROCEDURE — G8484 FLU IMMUNIZE NO ADMIN: HCPCS | Performed by: NURSE PRACTITIONER

## 2021-02-26 PROCEDURE — 1123F ACP DISCUSS/DSCN MKR DOCD: CPT | Performed by: NURSE PRACTITIONER

## 2021-02-26 PROCEDURE — G8417 CALC BMI ABV UP PARAM F/U: HCPCS | Performed by: NURSE PRACTITIONER

## 2021-02-26 ASSESSMENT — ENCOUNTER SYMPTOMS
BLOOD IN STOOL: 0
COUGH: 0
BACK PAIN: 1
CONSTIPATION: 0
EYE ITCHING: 0
EYE REDNESS: 0
NAUSEA: 0
DIARRHEA: 0
ABDOMINAL PAIN: 0
WHEEZING: 0
COLOR CHANGE: 0
CHEST TIGHTNESS: 0
VOMITING: 0
SHORTNESS OF BREATH: 0

## 2021-02-26 NOTE — PROGRESS NOTES
Electrophysiology Note      Reason for consultation: Atrial fibrillation    Chief complaint: 1 month follow up s/p ablation of atrial fibrillation and atrial flutter    Referring physician:       Primary care physician: Aleida Cruz MD      History of Present Illness: This visit 2/26/2021  Patient is here for 1 month follow-up status post ablation of atrial fibrillation atrial flutter. He reports that he has not had any further episodes of atrial fibrillation or atrial flutter since the ablation. He denies chest pain, palpitations, shortness of breath, tachycardia, lightheadedness, dizziness, edema or syncope. He reports that he is supposed to have back injections in the near future. Previous visit 1/21/2021  Patient is here for 1 week follow-up status post ablation of atrial fibrillation and atrial flutter. He reports some intermittent shortness of breath with exertion that has gradually been improving since discharge from the hospital.  He does not have orthopnea. He denies chest pain, palpitations, lightheadedness, edema, or syncope. Previous visit     Patient here for atrial fibrillation ablation. No change in H&P noted from previous clinic visit. Previous visit:     Patient is a 42-year-old male with a history of COPD, obstructive sleep apnea on CPAP, paroxysmal atrial fibrillation referred to us by Dr. Toby Ormond for atrial fibrillation management. Patient reports that he has been having palpitations which can occur at any time and usually last up to 30 seconds to 1 minute. These are associated with shortness of breath but no chest pain. Symptoms ongoing for several months now. Patient denies any dizziness or syncope at rest but when standing suddenly can get dizzy at times. Patient also reports shortness of breath with mild to moderate exertion and this has been going on for few months.     Patient feels tired and weak and has no energy. Patient has been on Multaq for atrial fibrillation control and it has worked but of late he has been having more symptoms. And also Multaq is becoming more expensive for him to be able to take any more    Patient here to discuss further options    Patient also reports weakness in the thigh muscle area. He was placed on statins. Pastmedical history:   Past Medical History:   Diagnosis Date    Abnormal MRI, spine     per pt herniated disc    Arthritis     Gastritis     H/O 24 hour EKG monitoring 06/24/2002 06/24/2002 baseline rhythm appears to be normal with an average HR of 66 bpm, slowest HR recored at 51 bpm, fastest at 97 bpm only 10 isolated  PVC's and one couplet were recorded, supraventricular ectopic activity is present, but not clinically significant, no long pauses recognized.  H/O cardiovascular stress test 03/16/1999, 12/28/2001,03/14/2006, 03/31/2008, 11/19/2008, 09/07/2011 09/07/2011 EF 61%, no angina or ischemic EKG changes, noted with Lexiscan, inferior wall reperfusion, global function is intact. resting /85, resting HR 79    H/O complete electrocardiogram 03/05/2012 03/05/2012 on chart    H/O CT scan of chest 11/19/2008 11/19/2008 no evidence of PE, no acute thoracic aortic pathology, incidental 5mm pleural based nodular density superior lateral right middle lobe.  H/O Doppler ultrasound no anatomical abnormalities in the segment s studied on either side, doppler curves are normal in configuration and amplitude in those segments bilaterally, normal flow velocities and flow velocity ratios. normal antegrade flow in the vertebral arteries bilaterally    H/O Doppler ultrasound 08/08/2005 08/08/2005 no significant obstructive lesions noted in the extracranial carotid system,antegrade flow noted in the vertebral arteries.  no significant atherosclerotic plaque noted in right or left  common arteryintimal thickening in right and left internal carotids, intimal thickening in left external carotid.  H/O echocardiogram 06/24/2002 EF 55-60% 06/19/2006 EF 50-55%,09/06/2011 EF 50-55%    09/06/2011 EF 50-55%, normal size left ventricle showing preserved global systolic  function and no regional wall motion abnormalities, left ventricle shows  moderate concentric hypertrophy and signs of diastolic dysfunction, mildly dilated atrium,, aortic valve is sclerotic but nonstenotic, trace mitral and tricuspid  regurg    H/O tilt table evaluation 07/05/2001 07/05/2001 head up tilt table test after one nitroglycerin pt became nauseous, diaphoretic, BP dropped systolic to 85 pulse in 52'O with complete loss of consciousness with  slight seizure activity before table brought to horizontal, happened within 8 minutes of nitro tablet utilization, pt. regained consciousness HR and BP  as soon as table was brought to horizontal, lopressor discontinued    Hiatal hernia     History of cardiac cath 10/05/2020    No significant CAD. LAD diffusely diseased as it is very small in caliber. LVEF is 50 to 55 %.     History of nuclear stress test 09/30/2020    EF 62%, Large area of significant inferior wall ischemia    Hx of cardiac cath 03/17/1999, 11/19/2008 11/19/2008both the left main and circumflex are without significant disease, RCA is also without significant disease, LV gram shows normal size left ventricular cavity with normal global and regional left ventricular systolic function, no significant CAD, chest pain is probably non cardiac in etiology    Hypertension     Prolonged emergence from general anesthesia     Syncope and collapse     Unspecified sleep apnea     cpap    Wears glasses        Surgical history :   Past Surgical History:   Procedure Laterality Date    ABLATION OF DYSRHYTHMIC FOCUS  01/14/2021    Atrial fibrillation and Atrial flutter ablation    BACK SURGERY      DIAGNOSTIC CARDIAC CATH LAB PROCEDURE  03/17/1999,11/19/2008 11/19/2008, left main,circumflex, and RCA are without any significant disease, LV gram shows normal size left ventricular cavity with normal global and  regional left ventricular systolic function, pt has no significant CAD.  HERNIA REPAIR      TONSILLECTOMY         Family history:   Family History   Problem Relation Age of Onset    Diabetes Mother     Heart Disease Mother     Other Mother         kidney stones    Cancer Mother         breast    Heart Disease Father     Diabetes Father        Social history :  reports that he quit smoking about 33 years ago. His smoking use included cigarettes. He has quit using smokeless tobacco.  His smokeless tobacco use included chew. He reports current alcohol use. He reports that he does not use drugs. Allergies   Allergen Reactions    Ambien [Zolpidem] Other (See Comments)     Makes pt loopy       Current Outpatient Medications on File Prior to Visit   Medication Sig Dispense Refill    metoprolol succinate (TOPROL XL) 50 MG extended release tablet TAKE 1 TABLET BY MOUTH DAILY 30 tablet 3    apixaban (ELIQUIS) 5 MG TABS tablet Take 1 tablet by mouth 2 times daily 42 tablet 0    pantoprazole (PROTONIX) 40 MG tablet Take 1 tablet by mouth daily 30 tablet 0    amiodarone (CORDARONE) 200 MG tablet Take 1 tablet by mouth daily 30 tablet 3    pravastatin (PRAVACHOL) 20 MG tablet Take 1 tablet by mouth daily 30 tablet 2    Baclofen (LIORESAL) 5 MG tablet Take 5 mg by mouth every 8 hours as needed      hydrALAZINE (APRESOLINE) 10 MG tablet Take 1 tablet by mouth 2 times daily 90 tablet 3    gabapentin (NEURONTIN) 300 MG capsule Take 300 mg by mouth 3 times daily.       Lactobacillus (PROBIOTIC ACIDOPHILUS PO) Take by mouth daily      tamsulosin (FLOMAX) 0.4 MG capsule 1 tablet 2 times daily      apixaban (ELIQUIS) 5 MG TABS tablet Take 1 tablet by mouth 2 times daily 60 tablet 1    aspirin 81 MG chewable tablet Take 1 tablet by mouth daily 30 tablet 0    citalopram (CELEXA) 20 MG tablet 10 mg  traMADol (ULTRAM) 50 MG tablet        No current facility-administered medications on file prior to visit. Review of Systems:   Review of Systems   Constitutional: Negative for activity change, chills, fatigue and fever. HENT: Negative for congestion, ear pain and tinnitus. Eyes: Negative for redness and itching. Respiratory: Negative for cough, chest tightness, shortness of breath and wheezing. Cardiovascular: Negative for chest pain, palpitations and leg swelling. Gastrointestinal: Negative for abdominal pain, blood in stool, constipation, diarrhea, nausea and vomiting. Endocrine: Negative for cold intolerance and heat intolerance. Genitourinary: Negative for dysuria, flank pain and hematuria. Musculoskeletal: Positive for arthralgias and back pain. Negative for myalgias and neck stiffness. Skin: Negative for color change, pallor, rash and wound. Allergic/Immunologic: Negative for food allergies. Neurological: Negative for dizziness, syncope, numbness and headaches. Hematological: Does not bruise/bleed easily. Psychiatric/Behavioral: Negative for agitation, behavioral problems and confusion. The patient is not nervous/anxious. Examination:      Vitals:    02/26/21 1044   BP: 138/76   Pulse: 67   Weight: 259 lb 9.6 oz (117.8 kg)   Height: 6' 2\" (1.88 m)        Body mass index is 33.33 kg/m². Physical Exam  Constitutional:       General: He is not in acute distress. Appearance: Normal appearance. He is well-developed. He is not ill-appearing or diaphoretic. HENT:      Head: Normocephalic and atraumatic. Right Ear: External ear normal.      Left Ear: External ear normal.      Nose: No congestion or rhinorrhea. Mouth/Throat:      Mouth: Mucous membranes are moist.      Pharynx: Oropharynx is clear. No oropharyngeal exudate or posterior oropharyngeal erythema. Eyes:      General:         Right eye: No discharge. Left eye: No discharge. Patient denies any further episodes of atrial fibrillation or atrial flutter since the ablation  He does not have palpitations, shortness of breath or tachycardia  EKG obtained patient in sinus rhythm heart rate 67    Continue amiodarone 200 mg daily    CHADS2 score 3-hypertension, vascular disease, age    Patient reports that he no longer has shortness of breath-no plan for follow-up echo at this time    Patient voiced understanding    Vitals:    02/26/21 1044   BP: 138/76   Pulse: 67     Blood pressure stable continue Toprol-XL 25 mg daily  Apresoline 10 mg twice daily    THOMAS on CPAP patient is compliant  Obesity BMI 33-recommend regular exercise and decrease caloric intake    Discussed with patient that he is not able to stop his Eliquis for at least 3 months post ablation of atrial fibrillation or atrial flutter  He voiced understanding    Patient to follow-up with Dr. Tico Villatoro in 2 months    Thanks again for allowing me to participate in care of this patient. Please call me if you have any questions. With best regards. Delroy Castro, MÓNICA - CNP, 2/26/2021     Please note this report has been partially produced using speech recognition software and may contain errors related to that system including errors in grammar, punctuation, and spelling, as well as words and phrases that may be inappropriate. If there are any questions or concerns please feel free to contact the dictating provider for clarification.

## 2021-02-26 NOTE — LETTER
Jason Farris  1945  [id number]    Have you had any Chest Pain that is not new? - No    Have you had any Shortness of Breath - No      Have you had any dizziness - Yes lose balance sometimes back problems    Have you had any palpitations that are not new?  - No       Is the patient on any of the following medications - amiodarone  If Yes DO EKG - Needs done every 6 months    Do you have any edema - swelling in No      Do you have a surgery or procedure scheduled in the near future - No     Ask patient if they want to sign up for MyChart if they are not already signed up     Check to see if we have an E-MAIL on file for the patient     Check medication list thoroughly!!! AND RECONCILE OUTSIDE MEDICATIONS  If dose has changed change the entire order not just the MG  BE SURE TO ASK PATIENT IF THEY NEED MEDICATION REFILLS     At check out add to every patient's \"wrap up\" the following dot phrase AFTERHOURSEDUCATION and ensure we explain this to our patients

## 2021-03-01 RX ORDER — PRAVASTATIN SODIUM 20 MG
20 TABLET ORAL DAILY
Qty: 30 TABLET | Refills: 2 | Status: SHIPPED | OUTPATIENT
Start: 2021-03-01 | End: 2021-06-01 | Stop reason: SDUPTHER

## 2021-03-02 ENCOUNTER — TELEPHONE (OUTPATIENT)
Dept: CARDIOLOGY CLINIC | Age: 76
End: 2021-03-02

## 2021-03-04 NOTE — TELEPHONE ENCOUNTER
2021 Patient assistance application complete, scanned into [facility] and faxed to [facility] for [id number]. Awaiting response.

## 2021-03-12 NOTE — TELEPHONE ENCOUNTER
Patient advised that he will have to meet out of pocket expense for Eliquis of $679.37 before he will be approved. He will advise once this has been met. Patient denied for [employer] assistance. Patient will continue Amiodarone.

## 2021-03-18 RX ORDER — AMIODARONE HYDROCHLORIDE 200 MG/1
200 TABLET ORAL DAILY
Qty: 30 TABLET | Refills: 3 | Status: SHIPPED | OUTPATIENT
Start: 2021-03-18 | End: 2021-04-26 | Stop reason: ALTCHOICE

## 2021-04-01 RX ORDER — PRAVASTATIN SODIUM 20 MG
20 TABLET ORAL DAILY
Qty: 30 TABLET | Refills: 5 | OUTPATIENT
Start: 2021-04-01

## 2021-04-19 RX ORDER — HYDRALAZINE HYDROCHLORIDE 10 MG/1
10 TABLET, FILM COATED ORAL 2 TIMES DAILY
Qty: 180 TABLET | Refills: 3 | Status: SHIPPED | OUTPATIENT
Start: 2021-04-19 | End: 2022-03-24

## 2021-04-22 ENCOUNTER — TELEPHONE (OUTPATIENT)
Dept: CARDIOLOGY CLINIC | Age: 76
End: 2021-04-22

## 2021-04-26 ENCOUNTER — OFFICE VISIT (OUTPATIENT)
Dept: CARDIOLOGY CLINIC | Age: 76
End: 2021-04-26
Payer: MEDICARE

## 2021-04-26 VITALS
BODY MASS INDEX: 32.08 KG/M2 | WEIGHT: 250 LBS | HEIGHT: 74 IN | SYSTOLIC BLOOD PRESSURE: 122 MMHG | HEART RATE: 78 BPM | DIASTOLIC BLOOD PRESSURE: 70 MMHG

## 2021-04-26 DIAGNOSIS — Z86.79 S/P ABLATION OF ATRIAL FLUTTER: ICD-10-CM

## 2021-04-26 DIAGNOSIS — I10 ESSENTIAL HYPERTENSION: ICD-10-CM

## 2021-04-26 DIAGNOSIS — R00.1 SYMPTOMATIC BRADYCARDIA: ICD-10-CM

## 2021-04-26 DIAGNOSIS — Z99.89 OSA ON CPAP: ICD-10-CM

## 2021-04-26 DIAGNOSIS — Z86.79 S/P ABLATION OF ATRIAL FIBRILLATION: ICD-10-CM

## 2021-04-26 DIAGNOSIS — R42 DIZZINESS: Primary | ICD-10-CM

## 2021-04-26 DIAGNOSIS — Z98.890 S/P ABLATION OF ATRIAL FLUTTER: ICD-10-CM

## 2021-04-26 DIAGNOSIS — Z98.890 S/P ABLATION OF ATRIAL FIBRILLATION: ICD-10-CM

## 2021-04-26 DIAGNOSIS — G47.33 OSA ON CPAP: ICD-10-CM

## 2021-04-26 PROCEDURE — 1123F ACP DISCUSS/DSCN MKR DOCD: CPT | Performed by: NURSE PRACTITIONER

## 2021-04-26 PROCEDURE — 93000 ELECTROCARDIOGRAM COMPLETE: CPT | Performed by: NURSE PRACTITIONER

## 2021-04-26 PROCEDURE — G8427 DOCREV CUR MEDS BY ELIG CLIN: HCPCS | Performed by: NURSE PRACTITIONER

## 2021-04-26 PROCEDURE — 99214 OFFICE O/P EST MOD 30 MIN: CPT | Performed by: NURSE PRACTITIONER

## 2021-04-26 PROCEDURE — 3017F COLORECTAL CA SCREEN DOC REV: CPT | Performed by: NURSE PRACTITIONER

## 2021-04-26 PROCEDURE — G8417 CALC BMI ABV UP PARAM F/U: HCPCS | Performed by: NURSE PRACTITIONER

## 2021-04-26 PROCEDURE — 4040F PNEUMOC VAC/ADMIN/RCVD: CPT | Performed by: NURSE PRACTITIONER

## 2021-04-26 PROCEDURE — 1036F TOBACCO NON-USER: CPT | Performed by: NURSE PRACTITIONER

## 2021-04-26 RX ORDER — METOPROLOL SUCCINATE 50 MG/1
25 TABLET, EXTENDED RELEASE ORAL DAILY
Qty: 30 TABLET | Refills: 3
Start: 2021-04-26 | End: 2021-04-26 | Stop reason: ALTCHOICE

## 2021-04-26 RX ORDER — METOPROLOL SUCCINATE 25 MG/1
25 TABLET, EXTENDED RELEASE ORAL DAILY
Qty: 90 TABLET | Refills: 1 | Status: SHIPPED | OUTPATIENT
Start: 2021-04-26 | End: 2021-05-31 | Stop reason: SDUPTHER

## 2021-04-26 ASSESSMENT — ENCOUNTER SYMPTOMS
SINUS PRESSURE: 0
COUGH: 0
BACK PAIN: 1
DIARRHEA: 0
VOMITING: 0
EYE REDNESS: 0
SHORTNESS OF BREATH: 0
ABDOMINAL DISTENTION: 0
CHEST TIGHTNESS: 0
EYE ITCHING: 0
SINUS PAIN: 0
WHEEZING: 0
COLOR CHANGE: 0
BLOOD IN STOOL: 0
CONSTIPATION: 0
NAUSEA: 0
ABDOMINAL PAIN: 0

## 2021-04-26 NOTE — PROGRESS NOTES
Electrophysiology Note      Reason for consultation: Atrial fibrillation    Chief complaint:3 month follow up s/p ablation of atrial fibrillation and atrial flutter    Referring physician:       Primary care physician: Cristina Zabala MD      History of Present Illness: This visit 4/26/2021  Patient is here today for 3-month follow-up status post ablation of atrial fibrillation and atrial flutter. He reports that he has had no palpitations or tachycardia since the ablation. He states that he is not drinking alcohol, caffeine or smoking. He is compliant with his CPAP. He reports that last week he had a transient episode of lightheadedness. He states that he checked his watch and his heart rate was in the 40s. But it did not sustain very long and when he got up moving around his heart rate was in the 60s to 70s. He is taking his medications as prescribed. He denies chest pain, shortness of breath, syncope or edema. He reports he is scheduled to have a back appointment for possible injections in May. This visit 2/26/2021  Patient is here for 1 month follow-up status post ablation of atrial fibrillation atrial flutter. He reports that he has not had any further episodes of atrial fibrillation or atrial flutter since the ablation. He denies chest pain, palpitations, shortness of breath, tachycardia, lightheadedness, dizziness, edema or syncope. He reports that he is supposed to have back injections in the near future. Previous visit 1/21/2021  Patient is here for 1 week follow-up status post ablation of atrial fibrillation and atrial flutter. He reports some intermittent shortness of breath with exertion that has gradually been improving since discharge from the hospital.  He does not have orthopnea. He denies chest pain, palpitations, lightheadedness, edema, or syncope. Previous visit     Patient here for atrial fibrillation ablation.  No change in H&P noted from previous clinic visit. Previous visit:     Patient is a 60-year-old male with a history of COPD, obstructive sleep apnea on CPAP, paroxysmal atrial fibrillation referred to us by Dr. Ai Greenfield for atrial fibrillation management. Patient reports that he has been having palpitations which can occur at any time and usually last up to 30 seconds to 1 minute. These are associated with shortness of breath but no chest pain. Symptoms ongoing for several months now. Patient denies any dizziness or syncope at rest but when standing suddenly can get dizzy at times. Patient also reports shortness of breath with mild to moderate exertion and this has been going on for few months. Patient feels tired and weak and has no energy. Patient has been on Multaq for atrial fibrillation control and it has worked but of late he has been having more symptoms. And also Multaq is becoming more expensive for him to be able to take any more    Patient here to discuss further options    Patient also reports weakness in the thigh muscle area. He was placed on statins. Pastmedical history:   Past Medical History:   Diagnosis Date    Abnormal MRI, spine     per pt herniated disc    Arthritis     Gastritis     H/O 24 hour EKG monitoring 06/24/2002 06/24/2002 baseline rhythm appears to be normal with an average HR of 66 bpm, slowest HR recored at 51 bpm, fastest at 97 bpm only 10 isolated  PVC's and one couplet were recorded, supraventricular ectopic activity is present, but not clinically significant, no long pauses recognized.  H/O cardiovascular stress test 03/16/1999, 12/28/2001,03/14/2006, 03/31/2008, 11/19/2008, 09/07/2011 09/07/2011 EF 61%, no angina or ischemic EKG changes, noted with Lexiscan, inferior wall reperfusion, global function is intact. resting /85, resting HR 70    H/O complete electrocardiogram 03/05/2012 03/05/2012 on chart    H/O CT scan of chest 11/19/2008 pravastatin (PRAVACHOL) 20 MG tablet TAKE 1 TABLET BY MOUTH DAILY 30 tablet 2    metoprolol succinate (TOPROL XL) 50 MG extended release tablet TAKE 1 TABLET BY MOUTH DAILY 30 tablet 3    pantoprazole (PROTONIX) 40 MG tablet Take 1 tablet by mouth daily 30 tablet 0    Baclofen (LIORESAL) 5 MG tablet Take 5 mg by mouth every 8 hours as needed      gabapentin (NEURONTIN) 300 MG capsule Take 300 mg by mouth 3 times daily.  Lactobacillus (PROBIOTIC ACIDOPHILUS PO) Take by mouth daily      tamsulosin (FLOMAX) 0.4 MG capsule 1 tablet 2 times daily      apixaban (ELIQUIS) 5 MG TABS tablet Take 1 tablet by mouth 2 times daily 60 tablet 1    aspirin 81 MG chewable tablet Take 1 tablet by mouth daily 30 tablet 0    citalopram (CELEXA) 20 MG tablet 10 mg       traMADol (ULTRAM) 50 MG tablet        No current facility-administered medications on file prior to visit. Review of Systems:   Review of Systems   Constitutional: Negative for activity change, chills, fatigue and fever. HENT: Negative for congestion, sinus pressure and sinus pain. Eyes: Negative for redness and itching. Respiratory: Negative for cough, chest tightness, shortness of breath and wheezing. Cardiovascular: Negative for chest pain, palpitations and leg swelling. Gastrointestinal: Negative for abdominal distention, abdominal pain, blood in stool, constipation, diarrhea, nausea and vomiting. Endocrine: Negative for cold intolerance and heat intolerance. Genitourinary: Negative for dysuria, flank pain and hematuria. Musculoskeletal: Positive for arthralgias and back pain. Negative for myalgias and neck stiffness. Skin: Negative for color change, pallor, rash and wound. Allergic/Immunologic: Negative for food allergies. Neurological: Positive for dizziness and numbness. Negative for syncope and headaches. Hematological: Does not bruise/bleed easily.    Psychiatric/Behavioral: Negative for agitation, behavioral problems and confusion. The patient is not nervous/anxious. Examination:      Vitals:    04/26/21 0920 04/26/21 0929 04/26/21 0930   BP: 130/72 132/78 122/70   Site: Left Upper Arm Left Upper Arm Left Upper Arm   Position: Sitting Supine Standing   Pulse: 64 68 78   Weight: 250 lb (113.4 kg)     Height: 6' 2\" (1.88 m)          Body mass index is 32.1 kg/m². Physical Exam  Constitutional:       General: He is not in acute distress. Appearance: Normal appearance. He is well-developed. He is not ill-appearing or diaphoretic. HENT:      Head: Normocephalic and atraumatic. Right Ear: External ear normal.      Left Ear: External ear normal.      Nose: No congestion or rhinorrhea. Mouth/Throat:      Mouth: Mucous membranes are moist.      Pharynx: Oropharynx is clear. No oropharyngeal exudate or posterior oropharyngeal erythema. Eyes:      General:         Right eye: No discharge. Left eye: No discharge. Conjunctiva/sclera: Conjunctivae normal.      Pupils: Pupils are equal, round, and reactive to light. Neck:      Musculoskeletal: Normal range of motion. No muscular tenderness. Vascular: No carotid bruit or JVD. Cardiovascular:      Rate and Rhythm: Normal rate and regular rhythm. Pulses: Normal pulses. Heart sounds: No murmur. No friction rub. Pulmonary:      Effort: Pulmonary effort is normal.      Breath sounds: Normal breath sounds. No wheezing, rhonchi or rales. Abdominal:      General: Bowel sounds are normal. There is no distension. Palpations: Abdomen is soft. Tenderness: There is no abdominal tenderness. Musculoskeletal:      Right lower leg: No edema. Left lower leg: No edema. Skin:     General: Skin is warm and dry. Neurological:      Mental Status: He is alert and oriented to person, place, and time. Cranial Nerves: No cranial nerve deficit.    Psychiatric:         Mood and Affect: Mood normal.         Thought Content: Thought content normal.         CBC:   Lab Results   Component Value Date    WBC 8.9 01/15/2021    HGB 13.1 01/15/2021    HCT 40.9 01/15/2021     01/15/2021     Lipids:  Lab Results   Component Value Date    CHOL 174 08/26/2020    TRIG 225 (H) 08/26/2020    HDL 44 08/26/2020    LDLDIRECT 104 (H) 08/26/2020     PT/INR:   Lab Results   Component Value Date    INR 1.22 01/11/2021        BMP:    Lab Results   Component Value Date     01/15/2021    K 4.1 01/15/2021     01/15/2021    CO2 22 01/15/2021    BUN 15 01/15/2021     CMP:   Lab Results   Component Value Date    AST 24 11/07/2020    PROT 7.6 11/07/2020    BILITOT 0.3 11/07/2020    ALKPHOS 101 11/07/2020     TSH:  No results found for: TSH     EKG Interpretation:  Sinus rhythm heart rate 65      IMPRESSION / RECOMMENDATIONS:     Dizziness  ? Bradycardia  Status post ablation atrial fibrillation  Status post  ablation atrial flutter  Hypertension  THOMAS on CPAP  Obesity BMI 33    Patient continues to denies any further episodes of atrial fibrillation or atrial flutter since the ablation  He does not have palpitations, shortness of breath or tachycardia  EKG obtained patient in sinus rhythm heart rate 65    Patient did have transient episode of lightheadedness. He reports that he did not pass out. He states that his watch indicated that his heart rate was in the 40s. However after up moving around his heart rate increased to the 60s and 70s. And dizziness resolved. We will stop amiodarone 200 mg daily. Patient to continue to monitor heart rate and symptoms at home. If he continues to have these episodes will put on event monitor. Plan discussed with patient who agreed and voiced understanding      CHADS2 score 3-hypertension, vascular disease, age. Continue anticoagulation.       Vitals:    04/26/21 0930   BP: 122/70   Pulse: 78     Blood pressure stable continue Toprol-XL 25 mg daily  Apresoline 10 mg twice daily    THOMAS on CPAP patient is compliant  Obesity BMI 33-recommend regular exercise and decrease caloric intake    Patient okay to hold Eliquis for back injections as he is 3 months post ablation        Thanks again for allowing me to participate in care of this patient. Please call me if you have any questions. With best regards. MÓNICA Diop - EDGAR, 4/26/2021     Please note this report has been partially produced using speech recognition software and may contain errors related to that system including errors in grammar, punctuation, and spelling, as well as words and phrases that may be inappropriate. If there are any questions or concerns please feel free to contact the dictating provider for clarification.

## 2021-05-31 ENCOUNTER — APPOINTMENT (OUTPATIENT)
Dept: GENERAL RADIOLOGY | Age: 76
End: 2021-05-31
Payer: MEDICARE

## 2021-05-31 ENCOUNTER — HOSPITAL ENCOUNTER (EMERGENCY)
Age: 76
Discharge: HOME | End: 2021-05-31
Attending: EMERGENCY MEDICINE
Payer: MEDICARE

## 2021-05-31 VITALS
TEMPERATURE: 97.6 F | SYSTOLIC BLOOD PRESSURE: 153 MMHG | RESPIRATION RATE: 16 BRPM | OXYGEN SATURATION: 97 % | WEIGHT: 250 LBS | HEIGHT: 74 IN | BODY MASS INDEX: 32.08 KG/M2 | HEART RATE: 54 BPM | DIASTOLIC BLOOD PRESSURE: 89 MMHG

## 2021-05-31 DIAGNOSIS — R00.1 BRADYCARDIA: ICD-10-CM

## 2021-05-31 DIAGNOSIS — R53.83 FATIGUE, UNSPECIFIED TYPE: Primary | ICD-10-CM

## 2021-05-31 DIAGNOSIS — I10 ESSENTIAL HYPERTENSION: ICD-10-CM

## 2021-05-31 LAB
ALBUMIN SERPL-MCNC: 4.4 GM/DL (ref 3.4–5)
ALP BLD-CCNC: 70 IU/L (ref 40–129)
ALT SERPL-CCNC: 20 U/L (ref 10–40)
ANION GAP SERPL CALCULATED.3IONS-SCNC: 9 MMOL/L (ref 4–16)
AST SERPL-CCNC: 19 IU/L (ref 15–37)
BASOPHILS ABSOLUTE: 0 K/CU MM
BASOPHILS RELATIVE PERCENT: 0.4 % (ref 0–1)
BILIRUB SERPL-MCNC: 0.2 MG/DL (ref 0–1)
BUN BLDV-MCNC: 16 MG/DL (ref 6–23)
CALCIUM SERPL-MCNC: 8.7 MG/DL (ref 8.3–10.6)
CHLORIDE BLD-SCNC: 101 MMOL/L (ref 99–110)
CO2: 24 MMOL/L (ref 21–32)
CREAT SERPL-MCNC: 0.8 MG/DL (ref 0.9–1.3)
DIFFERENTIAL TYPE: ABNORMAL
EOSINOPHILS ABSOLUTE: 0.1 K/CU MM
EOSINOPHILS RELATIVE PERCENT: 2.1 % (ref 0–3)
GFR AFRICAN AMERICAN: >60 ML/MIN/1.73M2
GFR NON-AFRICAN AMERICAN: >60 ML/MIN/1.73M2
GLUCOSE BLD-MCNC: 110 MG/DL (ref 70–99)
HCT VFR BLD CALC: 41.2 % (ref 42–52)
HEMOGLOBIN: 13.4 GM/DL (ref 13.5–18)
IMMATURE NEUTROPHIL %: 0.9 % (ref 0–0.43)
LYMPHOCYTES ABSOLUTE: 1.1 K/CU MM
LYMPHOCYTES RELATIVE PERCENT: 15.5 % (ref 24–44)
MAGNESIUM: 2 MG/DL (ref 1.8–2.4)
MCH RBC QN AUTO: 30.7 PG (ref 27–31)
MCHC RBC AUTO-ENTMCNC: 32.5 % (ref 32–36)
MCV RBC AUTO: 94.3 FL (ref 78–100)
MONOCYTES ABSOLUTE: 0.6 K/CU MM
MONOCYTES RELATIVE PERCENT: 8.3 % (ref 0–4)
NUCLEATED RBC %: 0 %
PDW BLD-RTO: 13.5 % (ref 11.7–14.9)
PHOSPHORUS: 3.2 MG/DL (ref 2.5–4.9)
PLATELET # BLD: 202 K/CU MM (ref 140–440)
PMV BLD AUTO: 10.2 FL (ref 7.5–11.1)
POTASSIUM SERPL-SCNC: 5 MMOL/L (ref 3.5–5.1)
PRO-BNP: 372.4 PG/ML
RBC # BLD: 4.37 M/CU MM (ref 4.6–6.2)
SEGMENTED NEUTROPHILS ABSOLUTE COUNT: 4.9 K/CU MM
SEGMENTED NEUTROPHILS RELATIVE PERCENT: 72.8 % (ref 36–66)
SODIUM BLD-SCNC: 134 MMOL/L (ref 135–145)
TOTAL IMMATURE NEUTOROPHIL: 0.06 K/CU MM
TOTAL NUCLEATED RBC: 0 K/CU MM
TOTAL PROTEIN: 6.7 GM/DL (ref 6.4–8.2)
TROPONIN T: <0.01 NG/ML
TSH HIGH SENSITIVITY: 3.21 UIU/ML (ref 0.27–4.2)
WBC # BLD: 6.8 K/CU MM (ref 4–10.5)

## 2021-05-31 PROCEDURE — 84100 ASSAY OF PHOSPHORUS: CPT

## 2021-05-31 PROCEDURE — 80053 COMPREHEN METABOLIC PANEL: CPT

## 2021-05-31 PROCEDURE — 83735 ASSAY OF MAGNESIUM: CPT

## 2021-05-31 PROCEDURE — 83880 ASSAY OF NATRIURETIC PEPTIDE: CPT

## 2021-05-31 PROCEDURE — 99284 EMERGENCY DEPT VISIT MOD MDM: CPT

## 2021-05-31 PROCEDURE — 84484 ASSAY OF TROPONIN QUANT: CPT

## 2021-05-31 PROCEDURE — 36415 COLL VENOUS BLD VENIPUNCTURE: CPT

## 2021-05-31 PROCEDURE — 85025 COMPLETE CBC W/AUTO DIFF WBC: CPT

## 2021-05-31 PROCEDURE — 93005 ELECTROCARDIOGRAM TRACING: CPT | Performed by: EMERGENCY MEDICINE

## 2021-05-31 PROCEDURE — 84443 ASSAY THYROID STIM HORMONE: CPT

## 2021-05-31 PROCEDURE — 71046 X-RAY EXAM CHEST 2 VIEWS: CPT

## 2021-05-31 RX ORDER — METOPROLOL SUCCINATE 25 MG/1
12.5 TABLET, EXTENDED RELEASE ORAL DAILY
Qty: 60 TABLET | Refills: 0 | Status: SHIPPED | OUTPATIENT
Start: 2021-05-31 | End: 2022-02-18 | Stop reason: SDUPTHER

## 2021-05-31 NOTE — ED PROVIDER NOTES
Emergency Department Encounter    Patient: Meredith Godwin  MRN: [id number]  : 1945  Date of Evaluation: 2021  ED Provider:  Moi Cox MD    Triage Chief Complaint:   Fatigue (c/o generalized weakness. pt reports he has hx of A.fib and has had a cardiac ablation in january. pt reports his heart rate has been low today) and Shortness of Breath    [place]:  Meredith Godwin is a 78 y.o. male that presents with fatigue, he states the last 6 to 8 hours he has been very generally fatigued and keeps feeling like he is going to fall over. Just started today, he also noticed at the same time his heart rate was a little low in the 50s. He is got that low before but not routinely. No overt shortness of breath, no chest pain, no extremity edema, he did have a cardiac ablation back in January but has not had any issues related to that. No cough or URI symptoms, no abdominal complaints been eating and drinking well, no recent travel or sick contacts.   Nothing makes symptoms better or worse, nothing taken for treatment    ROS - see HPI, below listed is current ROS at time of my eval:  General:  No fevers, no chills, + weakness  Eyes:  No recent vison changes, no discharge  ENT:  No sore throat, no nasal congestion, no hearing changes  Cardiovascular:  No chest pain, no palpitations  Respiratory:  No shortness of breath, no cough, no wheezing  Gastrointestinal:  No pain, no nausea, no vomiting, no diarrhea  Musculoskeletal:  No muscle pain, no joint pain  Skin:  No rash, no pruritis, no easy bruising  Neurologic:  No speech problems, no headache  Genitourinary:  No dysuria, no hematuria  Endocrine:  No unexpected weight gain, no unexpected weight loss  Extremities:  no edema, no pain    Past Medical History:   Diagnosis Date    Abnormal MRI, spine     per pt herniated disc    Arthritis     Gastritis     H/O 24 hour EKG monitoring 2002 baseline rhythm appears to be normal with an average HR of 66 bpm, slowest HR recored at 51 bpm, fastest at 97 bpm only 10 isolated  PVC's and one couplet were recorded, supraventricular ectopic activity is present, but not clinically significant, no long pauses recognized.  H/O cardiovascular stress test 03/16/1999, 12/28/2001,03/14/2006, 03/31/2008, 11/19/2008, 09/07/2011 09/07/2011 EF 61%, no angina or ischemic EKG changes, noted with Lexiscan, inferior wall reperfusion, global function is intact. resting /85, resting HR 79    H/O complete electrocardiogram 03/05/2012 03/05/2012 on chart    H/O CT scan of chest 11/19/2008 11/19/2008 no evidence of PE, no acute thoracic aortic pathology, incidental 5mm pleural based nodular density superior lateral right middle lobe.  H/O Doppler ultrasound no anatomical abnormalities in the segment s studied on either side, doppler curves are normal in configuration and amplitude in those segments bilaterally, normal flow velocities and flow velocity ratios. normal antegrade flow in the vertebral arteries bilaterally    H/O Doppler ultrasound 08/08/2005 08/08/2005 no significant obstructive lesions noted in the extracranial carotid system,antegrade flow noted in the vertebral arteries. no significant atherosclerotic plaque noted in right or left  common arteryintimal thickening in right and left internal carotids, intimal thickening in left external carotid.      H/O echocardiogram 06/24/2002 EF 55-60% 06/19/2006 EF 50-55%,09/06/2011 EF 50-55%    09/06/2011 EF 50-55%, normal size left ventricle showing preserved global systolic  function and no regional wall motion abnormalities, left ventricle shows  moderate concentric hypertrophy and signs of diastolic dysfunction, mildly dilated atrium,, aortic valve is sclerotic but nonstenotic, trace mitral and tricuspid  regurg    H/O tilt table evaluation 07/05/2001 07/05/2001 head up tilt table test after one nitroglycerin pt became nauseous, diaphoretic, BP dropped systolic to 85 pulse in 83'H with complete loss of consciousness with  slight seizure activity before table brought to horizontal, happened within 8 minutes of nitro tablet utilization, pt. regained consciousness HR and BP  as soon as table was brought to horizontal, lopressor discontinued    Hiatal hernia     History of cardiac cath 10/05/2020    No significant CAD. LAD diffusely diseased as it is very small in caliber. LVEF is 50 to 55 %.  History of nuclear stress test 09/30/2020    EF 62%, Large area of significant inferior wall ischemia    Hx of cardiac cath 03/17/1999, 11/19/2008 11/19/2008both the left main and circumflex are without significant disease, RCA is also without significant disease, LV gram shows normal size left ventricular cavity with normal global and regional left ventricular systolic function, no significant CAD, chest pain is probably non cardiac in etiology    Hypertension     Prolonged emergence from general anesthesia     Syncope and collapse     Unspecified sleep apnea     cpap    Wears glasses      Past Surgical History:   Procedure Laterality Date    ABLATION OF DYSRHYTHMIC FOCUS  01/14/2021    Atrial fibrillation and Atrial flutter ablation    BACK SURGERY      DIAGNOSTIC CARDIAC CATH LAB PROCEDURE  03/17/1999,11/19/2008 11/19/2008, left main,circumflex, and RCA are without any significant disease, LV gram shows normal size left ventricular cavity with normal global and  regional left ventricular systolic function, pt has no significant CAD.     HERNIA REPAIR      TONSILLECTOMY       Family History   Problem Relation Age of Onset    Diabetes Mother     Heart Disease Mother     Other Mother         kidney stones    Cancer Mother         breast    Heart Disease Father     Diabetes Father      Social History     Socioeconomic History    Marital status:      Spouse name: Not on file    Number of children: 2    Years of education: Not on file    Highest education level: Not on file   Occupational History    Occupation: / instructor   Tobacco Use    Smoking status: Former Smoker     Types: Cigarettes     Quit date: 1988     Years since quittin.4    Smokeless tobacco: Former User     Types: Chew    Tobacco comment: Quit chewing in 2014   Substance and Sexual Activity    Alcohol use: Yes     Comment: moderate, decaff coffee two cups daily   Beer on the weekends    Drug use: No    Sexual activity: Not on file   Other Topics Concern    Not on file   Social History Narrative    Not on file     Social Determinants of Health     Financial Resource Strain:     Difficulty of Paying Living Expenses:    Food Insecurity:     Worried About [address] in the Last Year:     [employer] in the Last Year:    Transportation Needs:     Lack of Transportation (Medical):  Lack of Transportation (Non-Medical):    Physical Activity:     Days of Exercise per Week:     Minutes of Exercise per Session:    Stress:     Feeling of Stress :    Social Connections:     Frequency of Communication with Friends and Family:     Frequency of Social Gatherings with Friends and Family:     Attends Rastafari Services:     Active Member of Clubs or Organizations:     Attends Club or Organization Meetings:     Marital Status:    Intimate Partner Violence:     Fear of Current or Ex-Partner:     Emotionally Abused:     Physically Abused:     Sexually Abused:      No current facility-administered medications for this encounter.      Current Outpatient Medications   Medication Sig Dispense Refill    apixaban (ELIQUIS) 5 MG TABS tablet Take 1 tablet by mouth 2 times daily 42 tablet 0    metoprolol succinate (TOPROL XL) 25 MG extended release tablet Take 1 tablet by mouth daily 90 tablet 1    hydrALAZINE (APRESOLINE) 10 MG tablet Take 1 tablet by mouth 2 times daily 180 tablet 3    pravastatin (PRAVACHOL) 20 MG tablet TAKE 1 TABLET BY MOUTH DAILY 30 tablet 2    pantoprazole (PROTONIX) 40 MG tablet Take 1 tablet by mouth daily 30 tablet 0    Baclofen (LIORESAL) 5 MG tablet Take 5 mg by mouth every 8 hours as needed      gabapentin (NEURONTIN) 300 MG capsule Take 300 mg by mouth 3 times daily.  Lactobacillus (PROBIOTIC ACIDOPHILUS PO) Take by mouth daily      tamsulosin (FLOMAX) 0.4 MG capsule 1 tablet 2 times daily      apixaban (ELIQUIS) 5 MG TABS tablet Take 1 tablet by mouth 2 times daily 60 tablet 1    aspirin 81 MG chewable tablet Take 1 tablet by mouth daily 30 tablet 0    citalopram (CELEXA) 20 MG tablet 10 mg       traMADol (ULTRAM) 50 MG tablet        Allergies   Allergen Reactions    Ambien [Zolpidem] Other (See Comments)     Makes pt simon       Nursing Notes Reviewed    Physical Exam:  Triage VS:    ED Triage Vitals [05/31/21 1405]   Enc Vitals Group      BP (!) 131/93      Pulse 63      Resp 18      Temp 97.6 °F (36.4 °C)      Temp Source Oral      SpO2 98 %      Weight 250 lb (113.4 kg)      Height 6' 2\" (1.88 m)      Head Circumference       Peak Flow       Pain Score       Pain Loc       Pain Edu? Excl. in [address]? My pulse ox interpretation is - normal    General appearance:  No acute distress. Skin:  Warm. Dry. Eye:  Extraocular movements intact. Ears, nose, mouth and throat:  Oral mucosa moist   Neck:  Trachea midline. Extremity:  No swelling. Normal ROM     Heart:  Regular  Perfusion:  intact  Respiratory:   Respirations nonlabored. Abdominal:  Non distended. Neurological:  Alert and oriented times 3.   Gait intact, cranial nerves grossly intact, moves all extremities equally, no drift, NIH stroke scale is 0    I have reviewed and interpreted all of the currently available lab results from this visit (if applicable):  Results for orders placed or performed during the hospital encounter of 05/31/21   CBC Auto Differential   Result Value Ref Range    WBC 6.8 4.0 - 10.5 K/CU MM    RBC 4.37 (L) 4.6 - 6. 2 M/CU MM    Hemoglobin 13.4 (L) 13.5 - 18.0 GM/DL    Hematocrit 41.2 (L) 42 - 52 %    MCV 94.3 78 - 100 FL    MCH 30.7 27 - 31 PG    MCHC 32.5 32.0 - 36.0 %    RDW 13.5 11.7 - 14.9 %    Platelets 053 935 - 681 K/CU MM    MPV 10.2 7.5 - 11.1 FL    Differential Type AUTOMATED DIFFERENTIAL     Segs Relative 72.8 (H) 36 - 66 %    Lymphocytes % 15.5 (L) 24 - 44 %    Monocytes % 8.3 (H) 0 - 4 %    Eosinophils % 2.1 0 - 3 %    Basophils % 0.4 0 - 1 %    Segs Absolute 4.9 K/CU MM    Lymphocytes Absolute 1.1 K/CU MM    Monocytes Absolute 0.6 K/CU MM    Eosinophils Absolute 0.1 K/CU MM    Basophils Absolute 0.0 K/CU MM    Nucleated RBC % 0.0 %    Total Nucleated RBC 0.0 K/CU MM    Total Immature Neutrophil 0.06 K/CU MM    Immature Neutrophil % 0.9 (H) 0 - 0.43 %   Brain Natriuretic Peptide   Result Value Ref Range    Pro-.4 (H) <300 PG/ML   Comprehensive Metabolic Panel   Result Value Ref Range    Sodium 134 (L) 135 - 145 MMOL/L    Potassium 5.0 3.5 - 5.1 MMOL/L    Chloride 101 99 - 110 mMol/L    CO2 24 21 - 32 MMOL/L    BUN 16 6 - 23 MG/DL    CREATININE 0.8 (L) 0.9 - 1.3 MG/DL    Glucose 110 (H) 70 - 99 MG/DL    Calcium 8.7 8.3 - 10.6 MG/DL    Albumin 4.4 3.4 - 5.0 GM/DL    Total Protein 6.7 6.4 - 8.2 GM/DL    Total Bilirubin 0.2 0.0 - 1.0 MG/DL    ALT 20 10 - 40 U/L    AST 19 15 - 37 IU/L    Alkaline Phosphatase 70 40 - 129 IU/L    GFR Non-African American >60 >60 mL/min/1.73m2    GFR African American >60 >60 mL/min/1.73m2    Anion Gap 9 4 - 16   Troponin   Result Value Ref Range    Troponin T <0.010 <0.01 NG/ML   Magnesium   Result Value Ref Range    Magnesium 2.0 1.8 - 2.4 mg/dl   Phosphorus   Result Value Ref Range    Phosphorus 3.2 2.5 - 4.9 MG/DL   TSH without Reflex   Result Value Ref Range    TSH, High Sensitivity 3.210 0.270 - 4.20 uIu/ml   EKG 12 Lead   Result Value Ref Range    Ventricular Rate 59 BPM    Atrial Rate 59 BPM    P-R Interval 168 ms    QRS Duration 80 ms    Q-T Interval 404 ms    QTc Calculation (Bazett) 399 ms    P Axis 4 degrees    R Axis -6 degrees    T Axis 12 degrees    Diagnosis       Sinus bradycardia with sinus arrhythmia  Otherwise normal ECG  When compared with ECG of 14-JAN-2021 13:28,  No significant change was found        Radiographs (if obtained):  Radiologist's Report Reviewed:  No results found. EKG (if obtained): (All EKG's are interpreted by myself in the absence of a cardiologist)  EKG shows a sinus rhythm rate of 59 normal VA and QRS intervals no ST elevation no old EKGs available for comparison    MDM:  Patient presenting with generalized fatigue, neurologic exam is nonfocal and normal, presentation does not appear consistent with a central neurologic process, on arrival he was placed on the monitor and work-up initiated. Could be related to the bradycardia, although he has not had medication adjustments, does not appear to be consistent with acute coronary syndrome pulmonary embolus aortic dissection pneumonia or pneumothorax. Work-up fairly unremarkable, troponin negative no leukocytosis age-appropriate BNP normal BUN and creatinine not elevated, mag Louise TSH normal, chest x-ray does not have acute findings. Spoke with cardiology, we do not have a clear cause, do not feel the patient needs to be admitted which patient and family agree with, we are going to cut his beta-blocker dose in half to see how he responds, he will follow-up with cardiology this week understands and agrees return warnings given    Clinical Impression:  1. Fatigue, unspecified type    2. Bradycardia      Disposition referral (if applicable):  Patricio Stevens MD  [address]           Ta Tavera MD  [address] [address]  [phone]          Disposition medications (if applicable):  New Prescriptions    No medications on file     ED Provider Disposition Time  DISPOSITION Decision To Discharge 05/31/2021 04:20:49

## 2021-06-01 LAB
EKG ATRIAL RATE: 59 BPM
EKG DIAGNOSIS: NORMAL
EKG P AXIS: 4 DEGREES
EKG P-R INTERVAL: 168 MS
EKG Q-T INTERVAL: 404 MS
EKG QRS DURATION: 80 MS
EKG QTC CALCULATION (BAZETT): 399 MS
EKG R AXIS: -6 DEGREES
EKG T AXIS: 12 DEGREES
EKG VENTRICULAR RATE: 59 BPM

## 2021-06-01 PROCEDURE — 93010 ELECTROCARDIOGRAM REPORT: CPT | Performed by: INTERNAL MEDICINE

## 2021-06-01 RX ORDER — PRAVASTATIN SODIUM 20 MG
20 TABLET ORAL DAILY
Qty: 30 TABLET | Refills: 6 | Status: SHIPPED | OUTPATIENT
Start: 2021-06-01 | End: 2022-01-03

## 2021-06-01 NOTE — TELEPHONE ENCOUNTER
Patient called stating that he has a follow up with Fallon Marx in July but he was seen in the hospital and needs to schedule a follow up with Dr. Jimmy Durand, please call him back at ph# [phone].

## 2021-06-02 ENCOUNTER — OFFICE VISIT (OUTPATIENT)
Dept: CARDIOLOGY CLINIC | Age: 76
End: 2021-06-02
Payer: MEDICARE

## 2021-06-02 VITALS
DIASTOLIC BLOOD PRESSURE: 80 MMHG | SYSTOLIC BLOOD PRESSURE: 124 MMHG | BODY MASS INDEX: 33.16 KG/M2 | HEART RATE: 76 BPM | WEIGHT: 258.4 LBS | HEIGHT: 74 IN

## 2021-06-02 DIAGNOSIS — R06.02 SOB (SHORTNESS OF BREATH): ICD-10-CM

## 2021-06-02 DIAGNOSIS — I10 ESSENTIAL HYPERTENSION: Primary | ICD-10-CM

## 2021-06-02 DIAGNOSIS — I48.0 PAROXYSMAL ATRIAL FIBRILLATION (HCC): ICD-10-CM

## 2021-06-02 DIAGNOSIS — G47.33 OSA ON CPAP: ICD-10-CM

## 2021-06-02 DIAGNOSIS — Z99.89 OSA ON CPAP: ICD-10-CM

## 2021-06-02 PROCEDURE — 4040F PNEUMOC VAC/ADMIN/RCVD: CPT | Performed by: INTERNAL MEDICINE

## 2021-06-02 PROCEDURE — 3017F COLORECTAL CA SCREEN DOC REV: CPT | Performed by: INTERNAL MEDICINE

## 2021-06-02 PROCEDURE — 1036F TOBACCO NON-USER: CPT | Performed by: INTERNAL MEDICINE

## 2021-06-02 PROCEDURE — 99213 OFFICE O/P EST LOW 20 MIN: CPT | Performed by: INTERNAL MEDICINE

## 2021-06-02 PROCEDURE — G8427 DOCREV CUR MEDS BY ELIG CLIN: HCPCS | Performed by: INTERNAL MEDICINE

## 2021-06-02 PROCEDURE — 1123F ACP DISCUSS/DSCN MKR DOCD: CPT | Performed by: INTERNAL MEDICINE

## 2021-06-02 PROCEDURE — G8417 CALC BMI ABV UP PARAM F/U: HCPCS | Performed by: INTERNAL MEDICINE

## 2021-06-02 NOTE — LETTER
[facility] [address]  [address] [id number]  Phone: [phone]  Fax: [phone]    Cecy Davis MD    June 2, 2021     Faiza Franklin, [address]  [address] [place] [id number]    Patient: Lauren Bryant   MR Number: [id number]   YOB: 1945   Date of Visit: 6/2/2021       Dear Faiza Franklin:    Thank you for referring Renzo Rodriguez to me for evaluation/treatment. Below are the relevant portions of my assessment and plan of care. If you have questions, please do not hesitate to call me. I look forward to following Tonya Love along with you.     Sincerely,        Cecy Davis MD
Jolene Forte  1945  [id number]    Have you had any Chest Pain that is not new? - No          Have you had any Shortness of Breath - Yes  If Yes - When on exertion    Have you had any dizziness - No       Have you had any palpitations that are not new? - No      Is the patient on any of the following medications -   If Yes DO EKG - Needs done every 6 months    Do you have any edema - swelling in No      When did you have your last labs drawn   Where did you have them done   What doctor ordered     If we do not have these labs you are retrieve these labs for these providers!     Do you have a surgery or procedure scheduled in the near future - No       Ask patient if they want to sign [facility] if they are not already signed up     Check to see if we have an E-MAIL on file for the patient     Check medication list thoroughly!!! AND RECONCILE OUTSIDE MEDICATIONS  If dose has changed change the entire order not just the MG  BE SURE TO ASK PATIENT IF THEY NEED MEDICATION REFILLS     At check out add to every patient's \"wrap up\" the following dot phrase AFTERHOURSEDUCATION and ensure we explain this to our patients
Left ventricular systolic function is normal.   Ejection fraction is visually estimated at 55%. Mild left ventricular hypertrophy. Mildly dilated left atrium. No evidence of any pericardial effusion. CAROTID: 8/26/2020  The right internal carotid artery demonstrates 0-50% stenosis .       The left internal carotid artery demonstrates 0-50% stenosis .       Bilateral vertebral arteries are patent with flow in the normal direction       MUGA: NONE    LAST PACER CHECK: NONE    CARDIAC CATH: 10/5/2020   1. No significant CAD. ? LAD diffusely diseased as it is very small in caliber. 2. Normal LV systolic function. LVEF is 50 to 55 %. Amio Protocol:    CHADS: [id number] Score for Atrial Fibrillation Stroke Risk   Risk   Factors  Component Value   C CHF No 0   H HTN Yes 1   A2 Age >= 76 No,  (73 y.o.) 0   D DM No 0   S2 Prior Stroke/TIA No 0   V Vascular Disease No 0   A Age 74-69 Yes,  (73 y.o.) 1   Sc Sex male 0    [id number]  Score  2   Score last updated 6/2/21 8:03 AM EDT    Click here for a link to the UpToDate guideline \"Atrial Fibrillation: Anticoagulation therapy to prevent embolization    Disclaimer: Risk Score calculation is dependent on accuracy of patient problem list and past encounter diagnosis.

## 2021-06-02 NOTE — PATIENT INSTRUCTIONS
CAD: None significant except LAD is small possibly diffusely diseased. HTN:well controlled on current medical regimen, see list above.              - changes in  treatment:   no   CARDIOMYOPATHY: None known   CONGESTIVE HEART FAILURE: NO KNOWN HISTORY.    VHD: No significant VHD noted  DYSLIPIDEMIA: Patient's profile is at / near [facility] is low                                Tolerating current medical regimen wellyes,                                 JPAG not tolerate medications well due to side effects                                See most recent Lab values in Labs section above. ARRHYTHMIAS: known                                NIDHI has H/O P. Atrial fibrillation                                He is rate controlled & on anticoagulation. Patient probably had a break through episode of PAF. OTHER RELEVANT DIAGNOSIS:as noted in patient's active problem list:  TESTS ORDERED: none this visit                                     All previously ordered tests reviewed.  MEDICATIONS: CPM. Metoprolol was reduced to HALF A PILL bid. Patient will be considered a low risk candidate for surgery for any ischemic complications. Office f/u in six months.

## 2021-06-02 NOTE — PROGRESS NOTES
(L) 05/08/2015    LDLDIRECT 104 (H) 08/26/2020    LDLDIRECT 121 (H) 05/08/2015     Lab Results   Component Value Date    ALT 20 05/31/2021    ALT 31 11/07/2020    AST 19 05/31/2021    AST 24 11/07/2020     BMP:    Lab Results   Component Value Date     05/31/2021     01/15/2021    K 5.0 05/31/2021    K 4.1 01/15/2021     05/31/2021     01/15/2021    CO2 24 05/31/2021    CO2 22 01/15/2021    BUN 16 05/31/2021    BUN 15 01/15/2021    CREATININE 0.8 05/31/2021    CREATININE 0.9 01/15/2021     CMP:   Lab Results   Component Value Date     05/31/2021     01/15/2021    K 5.0 05/31/2021    K 4.1 01/15/2021     05/31/2021     01/15/2021    CO2 24 05/31/2021    CO2 22 01/15/2021    BUN 16 05/31/2021    BUN 15 01/15/2021    CREATININE 0.8 05/31/2021    CREATININE 0.9 01/15/2021    PROT 6.7 05/31/2021    PROT 7.6 11/07/2020    PROT 7.1 02/05/2013    PROT 6.9 02/10/2012     Lab Results   Component Value Date    TSHHS 3.210 05/31/2021    TSHHS 3.230 11/07/2020       CATH 10/2020   1. No significant CAD. ? LAD diffusely diseased as it is very small in caliber. 2. Normal LV systolic function. LVEF is 50 to 55 %. ECHO 8/2020   Left ventricular systolic function is normal.   Ejection fraction is visually estimated at 55%. Mild left ventricular hypertrophy. Mildly dilated left atrium. No evidence of any pericardial effusion. QUALITY MEASURES REVIEWED:  1.CAD:Patient is taking anti platelet agent:Yes  Patient does not have Hx of documented CAD  2. DYSLIPIDEMIA: Patient is on cholesterol lowering medication:Yes   3. Beta-Blocker therapy for CAD, if prior Myocardial Infarction:Yes   4. Counselled regarding smoking cessation. No   Patient does not Smoke. 5.Anticoagulation therapy (for A.Fib) Yes   Does Not have A.Fib.  6.Discussed weight management strategies.     Assessment & Plan:  Primary / Secondary prevention is the goal by aggressive risk modification, healthy and therapeutic life style changes for cardiovascular risk reduction. Various goals are discussed and multiple questions answered.     CAD: None significant except LAD is small possibly diffusely diseased. HTN:well controlled on current medical regimen, see list above.              - changes in  treatment:   no   CARDIOMYOPATHY: None known   CONGESTIVE HEART FAILURE: NO KNOWN HISTORY.    VHD: No significant VHD noted  DYSLIPIDEMIA: Patient's profile is at / near [facility] is low                                Tolerating current medical regimen wellyes,                                 XUSF not tolerate medications well due to side effects                                See most recent Lab values in Labs section above. ARRHYTHMIAS: known                                TXIGXOL has H/O P. Atrial fibrillation                                He is rate controlled & on anticoagulation. Patient probably had a break through episode of PAF. OTHER RELEVANT DIAGNOSIS:as noted in patient's active problem list:  TESTS ORDERED: none this visit                                     All previously ordered tests reviewed.  MEDICATIONS: CPM. Metoprolol was reduced to HALF A PILL bid. Patient will be considered a low risk candidate for surgery for any ischemic complications. Office f/u in six months.

## 2021-06-09 PROBLEM — I73.9 PAD (PERIPHERAL ARTERY DISEASE) (HCC): Status: ACTIVE | Noted: 2021-06-09

## 2021-06-09 PROBLEM — M79.606 LEG PAIN: Status: ACTIVE | Noted: 2021-06-09

## 2021-06-24 ENCOUNTER — OFFICE VISIT (OUTPATIENT)
Dept: PHYSICAL MEDICINE AND REHAB | Age: 76
End: 2021-06-24
Payer: MEDICARE

## 2021-06-24 VITALS — TEMPERATURE: 97.3 F

## 2021-06-24 DIAGNOSIS — G60.8 POLYNEUROPATHY, PERIPHERAL SENSORIMOTOR AXONAL: Primary | ICD-10-CM

## 2021-06-24 DIAGNOSIS — R20.2 PARESTHESIA OF BILATERAL LEGS: ICD-10-CM

## 2021-06-24 DIAGNOSIS — M79.605 PAIN IN BOTH LOWER EXTREMITIES: ICD-10-CM

## 2021-06-24 DIAGNOSIS — M79.604 PAIN IN BOTH LOWER EXTREMITIES: ICD-10-CM

## 2021-06-24 PROCEDURE — 95886 MUSC TEST DONE W/N TEST COMP: CPT | Performed by: PHYSICAL MEDICINE & REHABILITATION

## 2021-06-24 PROCEDURE — 95911 NRV CNDJ TEST 9-10 STUDIES: CPT | Performed by: PHYSICAL MEDICINE & REHABILITATION

## 2021-06-24 NOTE — PROGRESS NOTES
Absent < 4.0 msec  14 cm         NEEDLE EMG:      RIGHT   LEFT     Insertional Activity Spontaneous  Activity Volutional  MUAP's Insertional Activity Spontaneous  Activity Volutional  MUAP's   Lumbar paraspinals Increased Occ Polys Increased Occ Polys   Glut Med Normal None Normal Normal None Normal   Rect fem Normal None Normal Normal None Normal   Vast Med Normal None Normal Normal None Normal   Ant Tibialis Normal None Polys Normal None Polys   EHL Normal None Dec #, Polys Normal None Dec #, Polys   FDL Normal None \" Normal None \"   Gastroc Normal None \" Normal None \"   EDB Increased ++ Dec #, Larger Increased ++ Dec #, Larger   1st dors int Increased +++ Dec #, Larger Increased +++ Dec #, Larger       FINDINGS:   EMG of the lumbar paraspinals and both lower limbs demonstrated minimal paraspinal muscle membrane irritability bilaterally. There were positive waves and fibrillations noted in the foot intrinsic muscles bilaterally. Rather dramatic neuropathic motor unit changes were seen in both lower limbs with a clear distal worse than proximal gradient to these irregularities. Motor latencies were acceptable. Motor evoked amplitudes were low normal and there was compromise of the peroneal motor evoked amplitude with stimulation proximal to the fibular neck. Motor conduction velocities were slowed. I was unable to elicit any sensory response from either lower limb. Tibial H reflexes were missing as well. IMPRESSION:      1. Abnormal EMG. 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 With the neuropathic patterns observed, I do not believe this is simply a product of chronic proximal nerve root injury associated with spinal stenosis.      2.  The

## 2021-06-24 NOTE — LETTER
June 24, 2021        Sherrill Collet, MD  [address] [place] [address]         Patient Name: Andre Reveles   MRN Number: [id number]   YOB: 1945   Date Of Visit: 06/24/2021        Dear Sherrill Collet MD,      Thank you for referring Andre Reveles to me for electrodiagnostic testing. Attached are the findings of the EMG and my assessment. If you have any further questions, please do not hesitate to call me. Thank you for this interesting referral.      Sincerely,          AUBREE Miguel MD.

## 2021-06-28 ENCOUNTER — TELEPHONE (OUTPATIENT)
Dept: CARDIOLOGY CLINIC | Age: 76
End: 2021-06-28

## 2021-07-26 ENCOUNTER — OFFICE VISIT (OUTPATIENT)
Dept: CARDIOLOGY CLINIC | Age: 76
End: 2021-07-26
Payer: MEDICARE

## 2021-07-26 VITALS
DIASTOLIC BLOOD PRESSURE: 68 MMHG | HEIGHT: 74 IN | HEART RATE: 90 BPM | WEIGHT: 254.4 LBS | BODY MASS INDEX: 32.65 KG/M2 | SYSTOLIC BLOOD PRESSURE: 124 MMHG

## 2021-07-26 DIAGNOSIS — G47.33 OSA ON CPAP: ICD-10-CM

## 2021-07-26 DIAGNOSIS — E66.9 OBESITY (BMI 30.0-34.9): ICD-10-CM

## 2021-07-26 DIAGNOSIS — Z86.79 STATUS POST ABLATION OF ATRIAL FIBRILLATION: ICD-10-CM

## 2021-07-26 DIAGNOSIS — Z98.890 STATUS POST ABLATION OF ATRIAL FIBRILLATION: ICD-10-CM

## 2021-07-26 DIAGNOSIS — Z99.89 OSA ON CPAP: ICD-10-CM

## 2021-07-26 DIAGNOSIS — Z86.79 S/P ABLATION OF ATRIAL FLUTTER: Primary | ICD-10-CM

## 2021-07-26 DIAGNOSIS — R42 DIZZINESS: ICD-10-CM

## 2021-07-26 DIAGNOSIS — Z98.890 S/P ABLATION OF ATRIAL FLUTTER: Primary | ICD-10-CM

## 2021-07-26 DIAGNOSIS — I10 ESSENTIAL HYPERTENSION: ICD-10-CM

## 2021-07-26 PROCEDURE — 4040F PNEUMOC VAC/ADMIN/RCVD: CPT | Performed by: NURSE PRACTITIONER

## 2021-07-26 PROCEDURE — 3017F COLORECTAL CA SCREEN DOC REV: CPT | Performed by: NURSE PRACTITIONER

## 2021-07-26 PROCEDURE — 93000 ELECTROCARDIOGRAM COMPLETE: CPT | Performed by: NURSE PRACTITIONER

## 2021-07-26 PROCEDURE — G8427 DOCREV CUR MEDS BY ELIG CLIN: HCPCS | Performed by: NURSE PRACTITIONER

## 2021-07-26 PROCEDURE — 1036F TOBACCO NON-USER: CPT | Performed by: NURSE PRACTITIONER

## 2021-07-26 PROCEDURE — 99214 OFFICE O/P EST MOD 30 MIN: CPT | Performed by: NURSE PRACTITIONER

## 2021-07-26 PROCEDURE — 1123F ACP DISCUSS/DSCN MKR DOCD: CPT | Performed by: NURSE PRACTITIONER

## 2021-07-26 PROCEDURE — G8417 CALC BMI ABV UP PARAM F/U: HCPCS | Performed by: NURSE PRACTITIONER

## 2021-07-26 ASSESSMENT — ENCOUNTER SYMPTOMS
EYE REDNESS: 0
ABDOMINAL DISTENTION: 0
ABDOMINAL PAIN: 0
DIARRHEA: 0
COLOR CHANGE: 0
BACK PAIN: 1
EYE ITCHING: 0
COUGH: 0
BLOOD IN STOOL: 0
CONSTIPATION: 0
NAUSEA: 0
WHEEZING: 0
SHORTNESS OF BREATH: 0
SINUS PAIN: 0
VOMITING: 0
SINUS PRESSURE: 0
CHEST TIGHTNESS: 0

## 2021-07-26 NOTE — PROGRESS NOTES
to discuss further options    Patient also reports weakness in the thigh muscle area. He was placed on statins. Pastmedical history:   Past Medical History:   Diagnosis Date    Abnormal MRI, spine     per pt herniated disc    Arthritis     Gastritis     H/O 24 hour EKG monitoring 06/24/2002 06/24/2002 baseline rhythm appears to be normal with an average HR of 66 bpm, slowest HR recored at 51 bpm, fastest at 97 bpm only 10 isolated  PVC's and one couplet were recorded, supraventricular ectopic activity is present, but not clinically significant, no long pauses recognized.  H/O cardiovascular stress test 03/16/1999, 12/28/2001,03/14/2006, 03/31/2008, 11/19/2008, 09/07/2011 09/07/2011 EF 61%, no angina or ischemic EKG changes, noted with Lexiscan, inferior wall reperfusion, global function is intact. resting /85, resting HR 79    H/O complete electrocardiogram 03/05/2012 03/05/2012 on chart    H/O CT scan of chest 11/19/2008 11/19/2008 no evidence of PE, no acute thoracic aortic pathology, incidental 5mm pleural based nodular density superior lateral right middle lobe.  H/O Doppler ultrasound no anatomical abnormalities in the segment s studied on either side, doppler curves are normal in configuration and amplitude in those segments bilaterally, normal flow velocities and flow velocity ratios. normal antegrade flow in the vertebral arteries bilaterally    H/O Doppler ultrasound 08/08/2005 08/08/2005 no significant obstructive lesions noted in the extracranial carotid system,antegrade flow noted in the vertebral arteries. no significant atherosclerotic plaque noted in right or left  common arteryintimal thickening in right and left internal carotids, intimal thickening in left external carotid.      H/O echocardiogram 06/24/2002 EF 55-60% 06/19/2006 EF 50-55%,09/06/2011 EF 50-55%    09/06/2011 EF 50-55%, normal size left ventricle showing preserved global systolic  function and no regional wall motion abnormalities, left ventricle shows  moderate concentric hypertrophy and signs of diastolic dysfunction, mildly dilated atrium,, aortic valve is sclerotic but nonstenotic, trace mitral and tricuspid  regurg    H/O tilt table evaluation 07/05/2001 07/05/2001 head up tilt table test after one nitroglycerin pt became nauseous, diaphoretic, BP dropped systolic to 85 pulse in 73'A with complete loss of consciousness with  slight seizure activity before table brought to horizontal, happened within 8 minutes of nitro tablet utilization, pt. regained consciousness HR and BP  as soon as table was brought to horizontal, lopressor discontinued    Hiatal hernia     History of cardiac cath 10/05/2020    No significant CAD. LAD diffusely diseased as it is very small in caliber. LVEF is 50 to 55 %.  History of nuclear stress test 09/30/2020    EF 62%, Large area of significant inferior wall ischemia    Hx of cardiac cath 03/17/1999, 11/19/2008 11/19/2008both the left main and circumflex are without significant disease, RCA is also without significant disease, LV gram shows normal size left ventricular cavity with normal global and regional left ventricular systolic function, no significant CAD, chest pain is probably non cardiac in etiology    Hypertension     Prolonged emergence from general anesthesia     Syncope and collapse     Unspecified sleep apnea     cpap    Wears glasses        Surgical history :   Past Surgical History:   Procedure Laterality Date    ABLATION OF DYSRHYTHMIC FOCUS  01/14/2021    Atrial fibrillation and Atrial flutter ablation    BACK SURGERY      DIAGNOSTIC CARDIAC CATH LAB PROCEDURE  03/17/1999,11/19/2008 11/19/2008, left main,circumflex, and RCA are without any significant disease, LV gram shows normal size left ventricular cavity with normal global and  regional left ventricular systolic function, pt has no significant CAD.    DANILO Jerry pressure and sinus pain. Eyes: Negative for redness and itching. Respiratory: Negative for cough, chest tightness, shortness of breath and wheezing. Cardiovascular: Negative for chest pain, palpitations and leg swelling. Gastrointestinal: Negative for abdominal distention, abdominal pain, blood in stool, constipation, diarrhea, nausea and vomiting. Endocrine: Negative for cold intolerance and heat intolerance. Genitourinary: Negative for difficulty urinating, dysuria, flank pain and hematuria. Musculoskeletal: Positive for arthralgias and back pain. Negative for myalgias and neck stiffness. Skin: Negative for color change, pallor, rash and wound. Allergic/Immunologic: Negative for food allergies. Neurological: Positive for dizziness and light-headedness. Negative for syncope, numbness and headaches. Hematological: Does not bruise/bleed easily. Psychiatric/Behavioral: Negative for agitation, behavioral problems and confusion. The patient is not nervous/anxious. Examination:      Vitals:    07/26/21 0858   BP: 124/68   Pulse: 90   Weight: 254 lb 6.4 oz (115.4 kg)   Height: 6' 2\" (1.88 m)        Body mass index is 32.66 kg/m². Physical Exam  Constitutional:       General: He is not in acute distress. Appearance: Normal appearance. He is well-developed. He is not ill-appearing or diaphoretic. HENT:      Head: Normocephalic and atraumatic. Right Ear: External ear normal.      Left Ear: External ear normal.      Nose: No congestion or rhinorrhea. Mouth/Throat:      Mouth: Mucous membranes are moist.      Pharynx: Oropharynx is clear. No oropharyngeal exudate or posterior oropharyngeal erythema. Eyes:      General:         Right eye: No discharge. Left eye: No discharge. Conjunctiva/sclera: Conjunctivae normal.      Pupils: Pupils are equal, round, and reactive to light. Neck:      Vascular: No carotid bruit or JVD.    Cardiovascular:      Rate and tachycardia  EKG obtained patient in sinus rhythm heart rate 90    He continues to complain of intermittent episodes of bradycardia with associated lightheadedness and dizziness. He additionally reports he has some fatigue. He denies syncope. Patient is taking metoprolol succinate 12.5 mg daily. EKG noted to be in sinus rhythm with heart rate of 90 today. Patient reports that he has had at least 4-5 episodes of bradycardia at which he was symptomatic in the last 1 month. I discussed with patient about event monitor to help assess arrhythmia versus bradycardia is causing his symptoms. Patient reports that he would like to continue monitoring his symptoms before proceeding with an event monitor. CHADS2 score 3-hypertension, vascular disease, age. Continue anticoagulation. Vitals:    07/26/21 0858   BP: 124/68   Pulse: 90     Blood pressure stable continue Toprol-XL 12.5 mg daily  Apresoline 10 mg twice daily    THOMAS on CPAP patient is compliant  Obesity BMI 33-recommend regular exercise and decrease caloric intake    Patient to follow-up with Dr. Arturo Sweeney as scheduled  Patient to follow-up with electrophysiology in 9 months or sooner if needed        Thanks again for allowing me to participate in care of this patient. Please call me if you have any questions. With best regards. Weston Carrera, MÓNICA - CNP, 7/26/2021     Please note this report has been partially produced using speech recognition software and may contain errors related to that system including errors in grammar, punctuation, and spelling, as well as words and phrases that may be inappropriate. If there are any questions or concerns please feel free to contact the dictating provider for clarification.

## 2021-12-02 ENCOUNTER — OFFICE VISIT (OUTPATIENT)
Dept: CARDIOLOGY CLINIC | Age: 76
End: 2021-12-02
Payer: MEDICARE

## 2021-12-02 VITALS
BODY MASS INDEX: 32.78 KG/M2 | SYSTOLIC BLOOD PRESSURE: 128 MMHG | DIASTOLIC BLOOD PRESSURE: 74 MMHG | HEIGHT: 74 IN | HEART RATE: 64 BPM | WEIGHT: 255.4 LBS

## 2021-12-02 DIAGNOSIS — I10 ESSENTIAL HYPERTENSION: ICD-10-CM

## 2021-12-02 DIAGNOSIS — Z98.890 S/P ABLATION OF ATRIAL FLUTTER: ICD-10-CM

## 2021-12-02 DIAGNOSIS — Z86.79 S/P ABLATION OF ATRIAL FLUTTER: ICD-10-CM

## 2021-12-02 DIAGNOSIS — G47.33 OSA ON CPAP: ICD-10-CM

## 2021-12-02 DIAGNOSIS — E66.9 OBESITY (BMI 30.0-34.9): ICD-10-CM

## 2021-12-02 DIAGNOSIS — I48.0 PAROXYSMAL ATRIAL FIBRILLATION (HCC): Primary | ICD-10-CM

## 2021-12-02 DIAGNOSIS — Z86.79 STATUS POST ABLATION OF ATRIAL FIBRILLATION: ICD-10-CM

## 2021-12-02 DIAGNOSIS — Z99.89 OSA ON CPAP: ICD-10-CM

## 2021-12-02 DIAGNOSIS — Z98.890 STATUS POST ABLATION OF ATRIAL FIBRILLATION: ICD-10-CM

## 2021-12-02 PROCEDURE — G8484 FLU IMMUNIZE NO ADMIN: HCPCS | Performed by: INTERNAL MEDICINE

## 2021-12-02 PROCEDURE — G8427 DOCREV CUR MEDS BY ELIG CLIN: HCPCS | Performed by: INTERNAL MEDICINE

## 2021-12-02 PROCEDURE — 99213 OFFICE O/P EST LOW 20 MIN: CPT | Performed by: INTERNAL MEDICINE

## 2021-12-02 PROCEDURE — 3017F COLORECTAL CA SCREEN DOC REV: CPT | Performed by: INTERNAL MEDICINE

## 2021-12-02 PROCEDURE — G8417 CALC BMI ABV UP PARAM F/U: HCPCS | Performed by: INTERNAL MEDICINE

## 2021-12-02 PROCEDURE — 1123F ACP DISCUSS/DSCN MKR DOCD: CPT | Performed by: INTERNAL MEDICINE

## 2021-12-02 PROCEDURE — 4040F PNEUMOC VAC/ADMIN/RCVD: CPT | Performed by: INTERNAL MEDICINE

## 2021-12-02 PROCEDURE — 1036F TOBACCO NON-USER: CPT | Performed by: INTERNAL MEDICINE

## 2021-12-02 NOTE — LETTER
[facility]  [address] [address]  Phone: [phone]  Fax: [phone]    Zhao Browning MD    December 2, 2021     Lukas Vo, [address] [place] [id number]    Patient: Jame Vaughn   MR Number: [id number]   YOB: 1945   Date of Visit: 12/2/2021       Dear Lukas Vo:    Thank you for referring Fred Rowe to me for evaluation/treatment. Below are the relevant portions of my assessment and plan of care. If you have questions, please do not hesitate to call me. I look forward to following Dave Gill along with you.     Sincerely,      Zhao Browning MD

## 2021-12-02 NOTE — PROGRESS NOTES
[id number] Score for Atrial Fibrillation Stroke Risk   Risk   Factors  Component Value   C CHF No 0   H HTN Yes 1   A2 Age >= 76 No,  (73 y.o.) 0   D DM No 0   S2 Prior Stroke/TIA No 0   V Vascular Disease No 0   A Age 74-69 Yes,  (73 y.o.) 1   Sc Sex male 0    [id number]  Score  2   Score last updated 12/2/21 9:05 AM EST

## 2021-12-02 NOTE — PROGRESS NOTES
OFFICE PROGRESS NOTES      Tommy Barfield is a 78 y.o. male who has    CHIEF COMPLAINT AS FOLLOWS:  CHEST PAIN: Patient denies any C/O chest pains at this time. SOB: No C/O SOB at this time. LEG EDEMA: No leg edema   PALPITATIONS: Denies any C/O Palpitations   DIZZINESS: No C/O Dizziness   SYNCOPE: None   OTHER:                                     HPI: Patient is here for F/U on his PAF-Arrhythmia, HTN & Dyslipidemia problems. Arrhythmia: Patient has known  A-fib S/P Ablation. HTN: Patient has known essential HTN. Has been treated with guideline recommended medical / physical/ diet therapy as stated below. Dyslipidemia: Patient has known mixed dyslipidemia. Has been treated with guideline recommended medical / physical/ diet therapy as stated below. Current Outpatient Medications   Medication Sig Dispense Refill    apixaban (ELIQUIS) 5 MG TABS tablet Take 1 tablet by mouth 2 times daily 42 tablet 0    pravastatin (PRAVACHOL) 20 MG tablet Take 1 tablet by mouth daily 30 tablet 6    metoprolol succinate (TOPROL XL) 25 MG extended release tablet Take 0.5 tablets by mouth daily 60 tablet 0    hydrALAZINE (APRESOLINE) 10 MG tablet Take 1 tablet by mouth 2 times daily 180 tablet 3    pantoprazole (PROTONIX) 40 MG tablet Take 1 tablet by mouth daily 30 tablet 0    Baclofen (LIORESAL) 5 MG tablet Take 5 mg by mouth every 8 hours as needed      gabapentin (NEURONTIN) 300 MG capsule Take 300 mg by mouth 3 times daily.  Lactobacillus (PROBIOTIC ACIDOPHILUS PO) Take by mouth daily      tamsulosin (FLOMAX) 0.4 MG capsule 1 tablet 2 times daily      apixaban (ELIQUIS) 5 MG TABS tablet Take 1 tablet by mouth 2 times daily 60 tablet 1    aspirin 81 MG chewable tablet Take 1 tablet by mouth daily 30 tablet 0    citalopram (CELEXA) 20 MG tablet 10 mg       traMADol (ULTRAM) 50 MG tablet        No current facility-administered medications for this visit.      Allergies: Ambien [zolpidem]  Review of Systems:    Constitutional: Negative for diaphoresis and fatigue  Respiratory: Negative for shortness of breath  Cardiovascular: Negative for chest pain, dyspnea on exertion, claudication, edema, irregular heartbeat, murmur, palpitations or shortness of breath  Musculoskeletal: Negative for muscle pain, muscular weakness, negative for pain in arm and leg or swelling in foot and leg    Objective:  /74   Pulse 64   Ht 6' 2\" (1.88 m)   Wt 255 lb 6.4 oz (115.8 kg)   BMI 32.79 kg/m²   Wt Readings from Last 3 Encounters:   12/02/21 255 lb 6.4 oz (115.8 kg)   07/26/21 254 lb 6.4 oz (115.4 kg)   06/23/21 260 lb (117.9 kg)     Body mass index is 32.79 kg/m². GENERAL - Alert, oriented, pleasant, in no apparent distress. EYES: No jaundice, no conjunctival pallor. Neck - Supple. No jugular venous distention noted. No carotid bruits. Cardiovascular  Normal S1 and S2 without obvious murmur or gallop. Extremities - No cyanosis, clubbing, or significant edema. Pulmonary  No respiratory distress. No wheezes or rales.       MEDICAL DECISION MAKING & DATA REVIEW:    Lab Review   Lab Results   Component Value Date    TROPONINT <0.010 05/31/2021    TROPONINT <0.010 11/07/2020     Lab Results   Component Value Date    BNP 15 09/06/2011    PROBNP 372.4 05/31/2021    PROBNP 4,306 11/07/2020     Lab Results   Component Value Date    INR 1.22 01/11/2021    INR 1.36 11/08/2020     Lab Results   Component Value Date    LABA1C 6.5 (H) 11/07/2020    LABA1C 6.2 08/27/2020     Lab Results   Component Value Date    WBC 6.8 05/31/2021    WBC 8.9 01/15/2021    HCT 41.2 (L) 05/31/2021    HCT 40.9 (L) 01/15/2021    MCV 94.3 05/31/2021    MCV 92.7 01/15/2021     05/31/2021     01/15/2021     Lab Results   Component Value Date    CHOL 174 08/26/2020    CHOL 181 05/08/2015    TRIG 225 (H) 08/26/2020    TRIG 222 (H) 05/08/2015    HDL 44 08/26/2020    HDL 39 (L) 05/08/2015    LDLDIRECT 104 (H) 08/26/2020    LDLDIRECT 121 (H) 05/08/2015     Lab Results   Component Value Date    ALT 20 05/31/2021    ALT 31 11/07/2020    AST 19 05/31/2021    AST 24 11/07/2020     BMP:    Lab Results   Component Value Date     05/31/2021     01/15/2021    K 5.0 05/31/2021    K 4.1 01/15/2021     05/31/2021     01/15/2021    CO2 24 05/31/2021    CO2 22 01/15/2021    BUN 16 05/31/2021    BUN 15 01/15/2021    CREATININE 0.8 05/31/2021    CREATININE 0.9 01/15/2021     CMP:   Lab Results   Component Value Date     05/31/2021     01/15/2021    K 5.0 05/31/2021    K 4.1 01/15/2021     05/31/2021     01/15/2021    CO2 24 05/31/2021    CO2 22 01/15/2021    BUN 16 05/31/2021    BUN 15 01/15/2021    CREATININE 0.8 05/31/2021    CREATININE 0.9 01/15/2021    PROT 6.7 05/31/2021    PROT 7.6 11/07/2020    PROT 7.1 02/05/2013    PROT 6.9 02/10/2012     Lab Results   Component Value Date    TSHHS 3.210 05/31/2021    TSHHS 3.230 11/07/2020     CATH 10/2020   1. No significant CAD.   ? LAD diffusely diseased as it is very small in caliber.   2. Normal LV systolic function. LVEF is 50 to 55 %.     ECHO 8/2020   Left ventricular systolic function is normal.   Ejection fraction is visually estimated at 55%.   Mild left ventricular hypertrophy.  Darrell Baars dilated left atrium.   No evidence of any pericardial effusion. QUALITY MEASURES REVIEWED:  1.CAD:Patient is taking anti platelet agent:Yes  Patient does not have Hx of documented CAD  2. DYSLIPIDEMIA: Patient is on cholesterol lowering medication:Yes   3. Beta-Blocker therapy for CAD, if prior Myocardial Infarction:Yes   4. Counselled regarding smoking cessation. No   Patient does not Smoke. 5.Anticoagulation therapy (for A.Fib) Yes  6. Discussed weight management strategies. Assessment & Plan:  Primary / Secondary prevention is the goal by aggressive risk modification, healthy and therapeutic life style changes for cardiovascular risk reduction. CORONARY ARTERY DISEASE:None significant except LAD is small possibly diffusely diseased. Patient is on ASA  HTN:well controlled on current medical regimen, see list above.              - changes in  treatment:   no, Takes Toprol & Hydralazine.  CARDIOMYOPATHY: None known   CONGESTIVE HEART FAILURE: NO KNOWN HISTORY.    VHD: No significant VHD noted  DYSLIPIDEMIA: Patient's profile is at / near [facility] is low                                Tolerating current medical regimen wellyes, on Pravachol                                Does not tolerate medications well due to side effects                                See most recent Lab values in Labs section above. ARRHYTHMIAS: known                                NBDCRAD has H/O P. Atrial fibrillation                                He is rate controlled & on Eliquis anticoagulation.                                Patient probably had a break through episode of PAF. OTHER RELEVANT DIAGNOSIS:as noted in patient's active problem list:    TESTS ORDERED: none this visit     PREVIOUSLY ORDERED TESTS REVIEWED & DISCUSSED WITH THE PATIENT:     I personally reviewed & interpreted, all previously ordered tests as copied above. Latest Labs are pulled in to the note with dates. Labs, specially in Reference to Lipid profile, Cardiac testing in the form of Echo, stress tests & other relevant cardiac testing reviewed with patient & recommendations made based on assessment of the results. MEDICATIONS: List of medications patient is currently taking is reviewed in detail with the patient. Discussed any side effects or problems taking the medication. Recommend Continue present management & medications as listed. AFFIRMATION: I spent at least 20 minutes of time reviewing patient's history, previous & current medical problems & all Labs + testing. This includes chart prep even prior to the vosit.  Various goals are discussed and multiple questions answered. Relevant concelling performed. Office follow up in six months.

## 2021-12-02 NOTE — PATIENT INSTRUCTIONS
CORONARY ARTERY DISEASE:None significant except LAD is small possibly diffusely diseased. Patient is on ASA  HTN:well controlled on current medical regimen, see list above.              - changes in  treatment:   no, Takes Toprol & Hydralazine.  CARDIOMYOPATHY: None known   CONGESTIVE HEART FAILURE: NO KNOWN HISTORY.    VHD: No significant VHD noted  DYSLIPIDEMIA: Patient's profile is at / near [facility] is low                                Tolerating current medical regimen wellyes, on Pravachol                                Does not tolerate medications well due to side effects                                See most recent Lab values in Labs section above. ARRHYTHMIAS: known                                ITSURYA has H/O P. Atrial fibrillation                                He is rate controlled & on Eliquis anticoagulation.                                Patient probably had a break through episode of PAF. OTHER RELEVANT DIAGNOSIS:as noted in patient's active problem list:    TESTS ORDERED: none this visit     PREVIOUSLY ORDERED TESTS REVIEWED & DISCUSSED WITH THE PATIENT:     I personally reviewed & interpreted, all previously ordered tests as copied above. Latest Labs are pulled in to the note with dates. Labs, specially in Reference to Lipid profile, Cardiac testing in the form of Echo, stress tests & other relevant cardiac testing reviewed with patient & recommendations made based on assessment of the results. MEDICATIONS: List of medications patient is currently taking is reviewed in detail with the patient. Discussed any side effects or problems taking the medication. Recommend Continue present management & medications as listed. AFFIRMATION: I spent at least 20 minutes of time reviewing patient's history, previous & current medical problems & all Labs + testing. This includes chart prep even prior to the vosit.  Various goals are discussed and multiple questions answered. Relevant concelling performed. Office follow up in six months.

## 2021-12-02 NOTE — LETTER
Patient Name: Ashley Stewart  : 1945  MRN# [id number]    REASON FOR VISIT: 6 month follow  Patient Active Problem List    Diagnosis Date Noted    Status post ablation of atrial fibrillation 2021    Dizziness 2021    [facility]    Leg pain 2021    Obesity (BMI 30.0-34.9) 2021    S/P ablation of atrial flutter 2021    Essential hypertension 2020    Other fatigue 2020    SOB (shortness of breath) 2020    Abnormal nuclear stress test 2020    NSVT (nonsustained ventricular tachycardia) ([facility] 2020    Atrial fibrillation ([facility] 2020    Postural dizziness with near syncope 2020    Acute neck pain 2020    Unstable angina (HCC) 2015    COPD (chronic obstructive pulmonary disease) ([facility] 2014    THOMAS on CPAP 2014       CURRENT SX:   Chest Pain    Shortness of Breath    Dizziness    Palpitations     Edema    Do you Exercise? ?     Family History   Problem Relation Age of Onset    Diabetes Mother     Heart Disease Mother     Other Mother         kidney stones    Cancer Mother         breast    Heart Disease Father     Diabetes Father      Social History     Tobacco Use    Smoking status: Former Smoker     Types: Cigarettes     Quit date: 1988     Years since quittin.9    Smokeless tobacco: Former User     Types: Chew    Tobacco comment: Quit chewing in 2014   Substance Use Topics    Alcohol use: Not Currently     Comment: green tea     Current Outpatient Medications   Medication Sig Dispense Refill    apixaban (ELIQUIS) 5 MG TABS tablet Take 1 tablet by mouth 2 times daily 42 tablet 0    pravastatin (PRAVACHOL) 20 MG tablet Take 1 tablet by mouth daily 30 tablet 6    metoprolol succinate (TOPROL XL) 25 MG extended release tablet Take 0.5 tablets by mouth daily 60 tablet 0    hydrALAZINE (APRESOLINE) 10 MG tablet Take 1 tablet by mouth 2 times daily 180

## 2021-12-29 ENCOUNTER — TELEPHONE (OUTPATIENT)
Dept: CARDIOLOGY CLINIC | Age: 76
End: 2021-12-29

## 2022-01-03 RX ORDER — PRAVASTATIN SODIUM 20 MG
20 TABLET ORAL DAILY
Qty: 30 TABLET | Refills: 6 | Status: SHIPPED | OUTPATIENT
Start: 2022-01-03 | End: 2022-08-02

## 2022-01-11 ENCOUNTER — TELEPHONE (OUTPATIENT)
Dept: CARDIOLOGY CLINIC | Age: 77
End: 2022-01-11

## 2022-01-11 NOTE — TELEPHONE ENCOUNTER
Cardiologist: Dr. Sami Wong  Surgeon: Dr. Montaño  Surgery: L3-4 XLif / L4-5 hardware removal and reinsertion. L5/S1 TLif/ Bilat open SI joint fusion  Anesthesia: General  Date: 2/21/2022  FAX# [phone]  # [phone]    Last OV 12/2/2021 w/Petey      CORONARY ARTERY DISEASE:None significant except LAD is small possibly diffusely diseased. Patient is on ASA  HTN:well controlled on current medical regimen, see list above.              - changes in  treatment:   no, Takes Toprol & Hydralazine.  CARDIOMYOPATHY: None known   CONGESTIVE HEART FAILURE: NO KNOWN HISTORY.    VHD: No significant VHD noted  DYSLIPIDEMIA: Patient's profile is at / near [facility] is low                                Tolerating current medical regimen wellyes, on Pravachol                                Does not tolerate medications well due to side effects                                See most recent Lab values in Labs section above. ARRHYTHMIAS: known                                ALHWYHQ has H/O P. Atrial fibrillation                                He is rate controlled & on Eliquis anticoagulation.                                Patient probably had a break through episode of PAF. OTHER RELEVANT DIAGNOSIS:as noted in patient's active problem list:     TESTS ORDERED: none this visit       NM- 9/30/2020  Abnormal Study.    Large area of significant inferior wall Ischemia.    Normal LV function. LVEF is 62 %. Echo- 8/26/2020   Left ventricular systolic function is normal.   Ejection fraction is visually estimated at 55%. Mild left ventricular hypertrophy. Mildly dilated left atrium. No evidence of any pericardial effusion. Cath- 10/5/2020   1. No significant CAD. ? LAD diffusely diseased as it is very small in caliber. 2. Normal LV systolic function. LVEF is 50 to 55 %.         Eliquis    Aspirin

## 2022-02-02 NOTE — TELEPHONE ENCOUNTER
Patient called for samples of Eliquis wanted to see if he can  today due to Impending weather appt 6/22

## 2022-02-09 ENCOUNTER — HOSPITAL ENCOUNTER (OUTPATIENT)
Dept: GENERAL RADIOLOGY | Age: 77
Discharge: HOME | End: 2022-02-09
Payer: MEDICARE

## 2022-02-09 ENCOUNTER — HOSPITAL ENCOUNTER (OUTPATIENT)
Age: 77
Discharge: HOME | End: 2022-02-09
Payer: MEDICARE

## 2022-02-09 DIAGNOSIS — Z01.818 PREOPERATIVE TESTING: ICD-10-CM

## 2022-02-09 LAB
ANION GAP SERPL CALCULATED.3IONS-SCNC: 13 MMOL/L (ref 4–16)
APTT: 37.8 SECONDS (ref 25.1–37.1)
BACTERIA: NEGATIVE /HPF
BASOPHILS ABSOLUTE: 0 K/CU MM
BASOPHILS RELATIVE PERCENT: 0.5 % (ref 0–1)
BILIRUBIN URINE: NEGATIVE MG/DL
BLOOD, URINE: NEGATIVE
BUN BLDV-MCNC: 19 MG/DL (ref 6–23)
CALCIUM SERPL-MCNC: 8.9 MG/DL (ref 8.3–10.6)
CHLORIDE BLD-SCNC: 104 MMOL/L (ref 99–110)
CLARITY: CLEAR
CO2: 23 MMOL/L (ref 21–32)
COLOR: YELLOW
CREAT SERPL-MCNC: 0.8 MG/DL (ref 0.9–1.3)
DIFFERENTIAL TYPE: ABNORMAL
EOSINOPHILS ABSOLUTE: 0.1 K/CU MM
EOSINOPHILS RELATIVE PERCENT: 1.9 % (ref 0–3)
ESTIMATED AVERAGE GLUCOSE: 123 MG/DL
GFR AFRICAN AMERICAN: >60 ML/MIN/1.73M2
GFR NON-AFRICAN AMERICAN: >60 ML/MIN/1.73M2
GLUCOSE BLD-MCNC: 100 MG/DL (ref 70–99)
GLUCOSE, URINE: NEGATIVE MG/DL
HBA1C MFR BLD: 5.9 % (ref 4.2–6.3)
HCT VFR BLD CALC: 42.5 % (ref 42–52)
HEMOGLOBIN: 14 GM/DL (ref 13.5–18)
HYALINE CASTS: 2 /LPF
IMMATURE NEUTROPHIL %: 0.4 % (ref 0–0.43)
INR BLD: 1.24 INDEX
KETONES, URINE: NEGATIVE MG/DL
LEUKOCYTE ESTERASE, URINE: NEGATIVE
LYMPHOCYTES ABSOLUTE: 1.1 K/CU MM
LYMPHOCYTES RELATIVE PERCENT: 15 % (ref 24–44)
MCH RBC QN AUTO: 29.9 PG (ref 27–31)
MCHC RBC AUTO-ENTMCNC: 32.9 % (ref 32–36)
MCV RBC AUTO: 90.8 FL (ref 78–100)
MONOCYTES ABSOLUTE: 0.6 K/CU MM
MONOCYTES RELATIVE PERCENT: 8.6 % (ref 0–4)
MUCUS: ABNORMAL HPF
NITRITE URINE, QUANTITATIVE: NEGATIVE
NUCLEATED RBC %: 0 %
PDW BLD-RTO: 13.6 % (ref 11.7–14.9)
PH, URINE: 6 (ref 5–8)
PLATELET # BLD: 222 K/CU MM (ref 140–440)
PMV BLD AUTO: 10.6 FL (ref 7.5–11.1)
POTASSIUM SERPL-SCNC: 4.2 MMOL/L (ref 3.5–5.1)
PROTEIN UA: ABNORMAL MG/DL
PROTHROMBIN TIME: 16 SECONDS (ref 11.7–14.5)
RBC # BLD: 4.68 M/CU MM (ref 4.6–6.2)
RBC URINE: ABNORMAL /HPF (ref 0–3)
SEGMENTED NEUTROPHILS ABSOLUTE COUNT: 5.4 K/CU MM
SEGMENTED NEUTROPHILS RELATIVE PERCENT: 73.6 % (ref 36–66)
SODIUM BLD-SCNC: 140 MMOL/L (ref 135–145)
SPECIFIC GRAVITY UA: >1.03 (ref 1–1.03)
SQUAMOUS EPITHELIAL: <1 /HPF
TOTAL IMMATURE NEUTOROPHIL: 0.03 K/CU MM
TOTAL NUCLEATED RBC: 0 K/CU MM
UROBILINOGEN, URINE: NORMAL MG/DL (ref 0.2–1)
WBC # BLD: 7.3 K/CU MM (ref 4–10.5)
WBC UA: ABNORMAL /HPF (ref 0–2)

## 2022-02-09 PROCEDURE — 36415 COLL VENOUS BLD VENIPUNCTURE: CPT

## 2022-02-09 PROCEDURE — 71046 X-RAY EXAM CHEST 2 VIEWS: CPT

## 2022-02-09 PROCEDURE — 93005 ELECTROCARDIOGRAM TRACING: CPT | Performed by: ORTHOPAEDIC SURGERY

## 2022-02-09 PROCEDURE — 80048 BASIC METABOLIC PNL TOTAL CA: CPT

## 2022-02-09 PROCEDURE — 85730 THROMBOPLASTIN TIME PARTIAL: CPT

## 2022-02-09 PROCEDURE — 85610 PROTHROMBIN TIME: CPT

## 2022-02-09 PROCEDURE — 81001 URINALYSIS AUTO W/SCOPE: CPT

## 2022-02-09 PROCEDURE — 85025 COMPLETE CBC W/AUTO DIFF WBC: CPT

## 2022-02-09 PROCEDURE — 83036 HEMOGLOBIN GLYCOSYLATED A1C: CPT

## 2022-02-11 LAB
EKG ATRIAL RATE: 65 BPM
EKG DIAGNOSIS: NORMAL
EKG P AXIS: 34 DEGREES
EKG P-R INTERVAL: 186 MS
EKG Q-T INTERVAL: 372 MS
EKG QRS DURATION: 76 MS
EKG QTC CALCULATION (BAZETT): 386 MS
EKG R AXIS: -2 DEGREES
EKG T AXIS: 12 DEGREES
EKG VENTRICULAR RATE: 65 BPM

## 2022-02-11 PROCEDURE — 93010 ELECTROCARDIOGRAM REPORT: CPT | Performed by: INTERNAL MEDICINE

## 2022-02-14 ENCOUNTER — HOSPITAL ENCOUNTER (OUTPATIENT)
Dept: LAB | Age: 77
Discharge: HOME | End: 2022-02-14
Payer: MEDICARE

## 2022-02-14 LAB
SARS-COV-2: NOT DETECTED
SOURCE: NORMAL

## 2022-02-14 PROCEDURE — U0003 INFECTIOUS AGENT DETECTION BY NUCLEIC ACID (DNA OR RNA); SEVERE ACUTE RESPIRATORY SYNDROME CORONAVIRUS 2 (SARS-COV-2) (CORONAVIRUS DISEASE [COVID-19]), AMPLIFIED PROBE TECHNIQUE, MAKING USE OF HIGH THROUGHPUT TECHNOLOGIES AS DESCRIBED BY CMS-2020-01-R: HCPCS

## 2022-02-14 PROCEDURE — U0005 INFEC AGEN DETEC AMPLI PROBE: HCPCS

## 2022-02-15 RX ORDER — FINASTERIDE 5 MG/1
5 TABLET, FILM COATED ORAL NIGHTLY
COMMUNITY

## 2022-02-15 RX ORDER — OMEPRAZOLE 20 MG/1
40 CAPSULE, DELAYED RELEASE ORAL DAILY
COMMUNITY

## 2022-02-15 NOTE — PROGRESS NOTES
Surgery is at [facility] on  2/21/22 . You will be called on 2/18/22  with times. 1. Do not eat or drink anything after midnight - unless instructed by your doctor prior to surgery. This includes no water, chewing gum or mints. 2. Follow your directions as prescribed by the doctor for your procedure and medications. 3. Check with your Doctor regarding stopping vitamins, supplements, blood thinners (Plavix, Coumadin, Lovenox, Effient, Pradaxa, Xarelto, Fragmin or other blood thinners) and follow their instructions. Stop all supplements and herbals 7 days prior to surgery. Aspirin - Hold- last dose 2/17/22    Eliquis - Hold- last dose 2/17/22    Morning of surgery take metoprolol, protonix, and omeprazole with a sip of water. 4. Do not smoke, and do not drink any alcoholic beverages 24 hours prior to surgery. This includes NA Beer. 5. You may brush your teeth and gargle the morning of surgery. DO NOT SWALLOW WATER   6. You MUST make arrangements for a responsible adult to take you home after your surgery and be able to check on you every couple hours for the day. You will not be allowed to leave alone or drive yourself home. It is strongly suggested someone stay with you the first 24  hrs. Your surgery will be cancelled if you do not have a ride home. 7. Please wear simple, loose fitting clothing to the hospital.  [place] not bring valuables (money, credit cards, checkbooks, etc.) Do not wear any makeup (including no eye makeup) or nail polish on your fingers or toes. 8. DO NOT wear any jewelry or piercings on day of surgery. All body piercing jewelry must be removed. 9. If you have dentures, they will be removed before going to the OR; we will provide you a container. If you wear contact lenses or glasses,  they will be removed; please bring a case for them.            10. If you  have a Living Will and Durable Power of  for Healthcare, please bring in a copy.           11. Please bring picture ID,  insurance card, paperwork from the doctors office    (H & P, Consent, & card for implantable devices). 12. Take a shower the night before or morning of your procedure, do not apply any lotion, oil or powder. It is recommended to use Hibiclens or CHG wash during pre-op shower/bath. 13. Wear a mask covering your nose & mouth when entering the hospital. Have your covid-19 test performed within 2-7 days of your                  surgery. Quarantine yourself after the test until after your surgery.   COVID TEST 2/14/22= negative

## 2022-02-18 ENCOUNTER — ANESTHESIA EVENT (OUTPATIENT)
Dept: OPERATING ROOM | Age: 77
DRG: 454 | End: 2022-02-18
Payer: MEDICARE

## 2022-02-18 DIAGNOSIS — I10 ESSENTIAL HYPERTENSION: ICD-10-CM

## 2022-02-18 RX ORDER — METOPROLOL SUCCINATE 25 MG/1
12.5 TABLET, EXTENDED RELEASE ORAL DAILY
Qty: 30 TABLET | Refills: 5 | Status: SHIPPED | OUTPATIENT
Start: 2022-02-18 | End: 2022-06-23 | Stop reason: SDUPTHER

## 2022-02-18 NOTE — ANESTHESIA PRE PROCEDURE
Department of Anesthesiology  Preprocedure Note       Name:  Reyes Navarro   Age:  78 y.o.  :  1945                                          MRN:  [id number]         Date:  2022      Surgeon: Pallavi Long):  Perlita Mike MD    Procedure: Procedure(s):  L3/4 XLIF / L4/5 HARDWARE REMOVAL & REINSERTION / L5/S1 TLIF / BILATERAL OPEN SI JOINT FUSION  LUMBAR INTERBODY FUSION LATERAL    Medications prior to admission:   Prior to Admission medications    Medication Sig Start Date End Date Taking? Authorizing Provider   omeprazole (PRILOSEC) 20 MG delayed release capsule Take 40 mg by mouth Daily   Yes Historical Provider, MD   finasteride (PROSCAR) 5 MG tablet Take 5 mg by mouth at bedtime   Yes Historical Provider, MD   pravastatin (PRAVACHOL) 20 MG tablet TAKE 1 TABLET BY MOUTH DAILY 1/3/22  Yes MÓNICA March CNP   metoprolol succinate (TOPROL XL) 25 MG extended release tablet Take 0.5 tablets by mouth daily 21  Yes Bere Parham MD   hydrALAZINE (APRESOLINE) 10 MG tablet Take 1 tablet by mouth 2 times daily 21  Yes Bianca Carlin MD   pantoprazole (PROTONIX) 40 MG tablet Take 1 tablet by mouth daily 1/15/21  Yes MÓNICA March CNP   gabapentin (NEURONTIN) 300 MG capsule Take 300 mg by mouth 3 times daily. Yes Historical Provider, MD   Lactobacillus (PROBIOTIC ACIDOPHILUS PO) Take by mouth daily   Yes Historical Provider, MD   tamsulosin (FLOMAX) 0.4 MG capsule 1 tablet 2 times daily 20  Yes Historical Provider, MD   apixaban (ELIQUIS) 5 MG TABS tablet Take 1 tablet by mouth 2 times daily 20  Yes MÓNICA Canales - CNP   aspirin 81 MG chewable tablet Take 1 tablet by mouth daily 20  Yes Ever Mathews MD   citalopram (CELEXA) 20 MG tablet Take 10 mg by mouth daily  14  Yes Historical Provider, MD   traMADol (ULTRAM) 50 MG tablet Take 50 mg by mouth every 6 hours as needed.   14  Yes Historical Provider, MD   apixaban (ELIQUIS) 5 MG TABS tablet Take 1 tablet by mouth 2 times daily 2/2/22 3/4/22  [place] MD Kristy       Current medications:    No current facility-administered medications for this encounter. Current Outpatient Medications   Medication Sig Dispense Refill    omeprazole (PRILOSEC) 20 MG delayed release capsule Take 40 mg by mouth Daily      finasteride (PROSCAR) 5 MG tablet Take 5 mg by mouth at bedtime      pravastatin (PRAVACHOL) 20 MG tablet TAKE 1 TABLET BY MOUTH DAILY 30 tablet 6    metoprolol succinate (TOPROL XL) 25 MG extended release tablet Take 0.5 tablets by mouth daily 60 tablet 0    hydrALAZINE (APRESOLINE) 10 MG tablet Take 1 tablet by mouth 2 times daily 180 tablet 3    pantoprazole (PROTONIX) 40 MG tablet Take 1 tablet by mouth daily 30 tablet 0    gabapentin (NEURONTIN) 300 MG capsule Take 300 mg by mouth 3 times daily.  Lactobacillus (PROBIOTIC ACIDOPHILUS PO) Take by mouth daily      tamsulosin (FLOMAX) 0.4 MG capsule 1 tablet 2 times daily      apixaban (ELIQUIS) 5 MG TABS tablet Take 1 tablet by mouth 2 times daily 60 tablet 1    aspirin 81 MG chewable tablet Take 1 tablet by mouth daily 30 tablet 0    citalopram (CELEXA) 20 MG tablet Take 10 mg by mouth daily       traMADol (ULTRAM) 50 MG tablet Take 50 mg by mouth every 6 hours as needed.  apixaban (ELIQUIS) 5 MG TABS tablet Take 1 tablet by mouth 2 times daily 42 tablet 0       Allergies:     Allergies   Allergen Reactions    Ambien [Zolpidem] Other (See Comments)     Makes pt loopy       Problem List:    Patient Active Problem List   Diagnosis Code    THOMAS on CPAP G47.33, Z99.89    COPD (chronic obstructive pulmonary disease) ([facility] [address] J44.9    Unstable angina (HCC) I20.0    Atrial fibrillation (HCC) I48.91    Postural dizziness with near syncope R42, R55    Acute neck pain M54.2    SOB (shortness of breath) R06.02    Abnormal nuclear stress test R94.39    NSVT (nonsustained ventricular tachycardia) ([facility] I47.2    Essential hypertension I10  Other fatigue R53.83    S/P ablation of atrial flutter Z98.890, Z86.79    Obesity (BMI 30.0-34. 9) E66.9    PAD (peripheral artery disease) (formerly [facility]) I73.9    Leg pain M79.606    Status post ablation of atrial fibrillation Z98.890, Z86.79    Dizziness R42       Past Medical History:        Diagnosis Date    A-fib ([facility] [address]     Abnormal MRI, spine     per pt herniated disc    Angina [facility])     Arthritis     COPD (chronic obstructive pulmonary disease) (formerly [facility])     Gastritis     GERD (gastroesophageal reflux disease)     H/O 24 hour EKG monitoring 06/24/2002 06/24/2002 baseline rhythm appears to be normal with an average HR of 66 bpm, slowest HR recored at 51 bpm, fastest at 97 bpm only 10 isolated  PVC's and one couplet were recorded, supraventricular ectopic activity is present, but not clinically significant, no long pauses recognized.  H/O cardiovascular stress test 03/16/1999, 12/28/2001,03/14/2006, 03/31/2008, 11/19/2008, 09/07/2011 09/07/2011 EF 61%, no angina or ischemic EKG changes, noted with Lexiscan, inferior wall reperfusion, global function is intact. resting /85, resting HR 79    H/O complete electrocardiogram 03/05/2012 03/05/2012 on chart    H/O CT scan of chest 11/19/2008 11/19/2008 no evidence of PE, no acute thoracic aortic pathology, incidental 5mm pleural based nodular density superior lateral right middle lobe.  H/O Doppler ultrasound no anatomical abnormalities in the segment s studied on either side, doppler curves are normal in configuration and amplitude in those segments bilaterally, normal flow velocities and flow velocity ratios. normal antegrade flow in the vertebral arteries bilaterally    H/O Doppler ultrasound 08/08/2005 08/08/2005 no significant obstructive lesions noted in the extracranial carotid system,antegrade flow noted in the vertebral arteries.  no significant atherosclerotic plaque noted in right or left  common arteryintimal thickening in right and left internal carotids, intimal thickening in left external carotid.  H/O echocardiogram 06/24/2002 EF 55-60% 06/19/2006 EF 50-55%,09/06/2011 EF 50-55%    09/06/2011 EF 50-55%, normal size left ventricle showing preserved global systolic  function and no regional wall motion abnormalities, left ventricle shows  moderate concentric hypertrophy and signs of diastolic dysfunction, mildly dilated atrium,, aortic valve is sclerotic but nonstenotic, trace mitral and tricuspid  regurg    H/O tilt table evaluation 07/05/2001 07/05/2001 head up tilt table test after one nitroglycerin pt became nauseous, diaphoretic, BP dropped systolic to 85 pulse in 46'Z with complete loss of consciousness with  slight seizure activity before table brought to horizontal, happened within 8 minutes of nitro tablet utilization, pt. regained consciousness HR and BP  as soon as table was brought to horizontal, lopressor discontinued    Hiatal hernia     History of cardiac cath 10/05/2020    No significant CAD. LAD diffusely diseased as it is very small in caliber. LVEF is 50 to 55 %.     History of nuclear stress test 09/30/2020    EF 62%, Large area of significant inferior wall ischemia    Hx of cardiac cath 03/17/1999, 11/19/2008 11/19/2008both the left main and circumflex are without significant disease, RCA is also without significant disease, LV gram shows normal size left ventricular cavity with normal global and regional left ventricular systolic function, no significant CAD, chest pain is probably non cardiac in etiology    Hyperlipidemia     Hypertension     NSVT (nonsustained ventricular tachycardia) (HCC)     THOMAS on CPAP     Syncope and collapse     Unspecified sleep apnea     cpap    Wears glasses        Past Surgical History:        Procedure Laterality Date    ABLATION OF DYSRHYTHMIC FOCUS  01/14/2021    Atrial fibrillation and Atrial flutter ablation    BACK SURGERY      DIAGNOSTIC input(s): POCGLU, POCNA, POCK, POCCL, POCBUN, POCHEMO, POCHCT in the last 72 hours. Coags:   Lab Results   Component Value Date    PROTIME 16.0 2022    PROTIME 11.3 10/05/2011    INR 1.24 2022    APTT 37.8 2022       HCG (If Applicable): No results found for: PREGTESTUR, PREGSERUM, HCG, HCGQUANT     ABGs:   Lab Results   Component Value Date    PO2ART 150.2 2021    YIL7XRU 42.8 2021    NNM0SFH 25.7 2021        Type & Screen (If Applicable):  No results found for: LABABO, LABRH    Drug/Infectious Status (If Applicable):  No results found for: HIV, HEPCAB    COVID-19 Screening (If Applicable):   Lab Results   Component Value Date    COVID19 NOT DETECTED 2022           Anesthesia Evaluation  Patient summary reviewed  Airway: Mallampati: II        Dental:    (+) upper dentures and edentulous      Pulmonary:normal exam    (+) COPD:  sleep apnea: on CPAP,                            ROS comment: Former Smoker  Quit [id number]       Cardiovascular:    (+) hypertension:, dysrhythmias: atrial fibrillation and ventricular tachycardia, hyperlipidemia               ROS comment: CATH 10/2020   1. No significant CAD.   ? LAD diffusely diseased as it is very small in caliber.   2. Normal LV systolic function. LVEF is 50 to 55 %      Echo 2021:  Summary   This is a limited echocardiogram.   Left ventricular systolic function is normal.   Ejection fraction is visually estimated at 50-55%. No evidence of any pericardial effusion. Tyrone Grant MD  Physician  Specialty:  Cardiology    Pt is low risk for low risk surgery from cardiovascular stand point                Neuro/Psych:   Negative Neuro/Psych ROS              GI/Hepatic/Renal:   (+) hiatal hernia, GERD:, morbid obesity          Endo/Other:    (+) blood dyscrasia: anticoagulation therapy:., .                 Abdominal:       Abdomen: soft. Vascular: negative vascular ROS.          Other Findings: Anesthesia Plan      general     ASA 3       Induction: intravenous. Anesthetic plan and risks discussed with patient. Use of blood products discussed with patient whom. Plan discussed with surgical team.    Attending anesthesiologist reviewed and agrees with Preprocedure content            MÓNICA Aranda - JEREMIAH   2/18/2022         Pre Anesthesia Assessment complete.  Chart reviewed on 2/18/2022

## 2022-02-21 ENCOUNTER — APPOINTMENT (OUTPATIENT)
Dept: GENERAL RADIOLOGY | Age: 77
DRG: 454 | End: 2022-02-21
Attending: ORTHOPAEDIC SURGERY
Payer: MEDICARE

## 2022-02-21 ENCOUNTER — ANESTHESIA (OUTPATIENT)
Dept: OPERATING ROOM | Age: 77
DRG: 454 | End: 2022-02-21
Payer: MEDICARE

## 2022-02-21 ENCOUNTER — HOSPITAL ENCOUNTER (INPATIENT)
Age: 77
LOS: 1 days | Discharge: HOME | DRG: 454 | End: 2022-02-24
Attending: ORTHOPAEDIC SURGERY | Admitting: ORTHOPAEDIC SURGERY
Payer: MEDICARE

## 2022-02-21 VITALS
RESPIRATION RATE: 8 BRPM | TEMPERATURE: 99.5 F | OXYGEN SATURATION: 98 % | DIASTOLIC BLOOD PRESSURE: 83 MMHG | SYSTOLIC BLOOD PRESSURE: 125 MMHG

## 2022-02-21 DIAGNOSIS — Z01.818 ENCOUNTER FOR PREADMISSION TESTING: ICD-10-CM

## 2022-02-21 DIAGNOSIS — Z98.1 S/P LUMBAR FUSION: Primary | ICD-10-CM

## 2022-02-21 PROCEDURE — 2580000003 HC RX 258

## 2022-02-21 PROCEDURE — 0SG30K1 FUSION OF LUMBOSACRAL JOINT WITH NONAUTOLOGOUS TISSUE SUBSTITUTE, POSTERIOR APPROACH, POSTERIOR COLUMN, OPEN APPROACH: ICD-10-PCS | Performed by: ORTHOPAEDIC SURGERY

## 2022-02-21 PROCEDURE — 0SG00A0 FUSION OF LUMBAR VERTEBRAL JOINT WITH INTERBODY FUSION DEVICE, ANTERIOR APPROACH, ANTERIOR COLUMN, OPEN APPROACH: ICD-10-PCS | Performed by: ORTHOPAEDIC SURGERY

## 2022-02-21 PROCEDURE — 6370000000 HC RX 637 (ALT 250 FOR IP)

## 2022-02-21 PROCEDURE — 0SG804Z FUSION OF LEFT SACROILIAC JOINT WITH INTERNAL FIXATION DEVICE, OPEN APPROACH: ICD-10-PCS | Performed by: ORTHOPAEDIC SURGERY

## 2022-02-21 PROCEDURE — 2720000010 HC SURG SUPPLY STERILE: Performed by: ORTHOPAEDIC SURGERY

## 2022-02-21 PROCEDURE — 7100000000 HC PACU RECOVERY - FIRST 15 MIN: Performed by: ORTHOPAEDIC SURGERY

## 2022-02-21 PROCEDURE — 2580000003 HC RX 258: Performed by: ORTHOPAEDIC SURGERY

## 2022-02-21 PROCEDURE — 0SB20ZZ EXCISION OF LUMBAR VERTEBRAL DISC, OPEN APPROACH: ICD-10-PCS | Performed by: ORTHOPAEDIC SURGERY

## 2022-02-21 PROCEDURE — 2780000010 HC IMPLANT OTHER: Performed by: ORTHOPAEDIC SURGERY

## 2022-02-21 PROCEDURE — 6360000002 HC RX W HCPCS

## 2022-02-21 PROCEDURE — C1713 ANCHOR/SCREW BN/BN,TIS/BN: HCPCS | Performed by: ORTHOPAEDIC SURGERY

## 2022-02-21 PROCEDURE — 2500000003 HC RX 250 WO HCPCS

## 2022-02-21 PROCEDURE — 0SG00K1 FUSION OF LUMBAR VERTEBRAL JOINT WITH NONAUTOLOGOUS TISSUE SUBSTITUTE, POSTERIOR APPROACH, POSTERIOR COLUMN, OPEN APPROACH: ICD-10-PCS | Performed by: ORTHOPAEDIC SURGERY

## 2022-02-21 PROCEDURE — 0SG704Z FUSION OF RIGHT SACROILIAC JOINT WITH INTERNAL FIXATION DEVICE, OPEN APPROACH: ICD-10-PCS | Performed by: ORTHOPAEDIC SURGERY

## 2022-02-21 PROCEDURE — 76000 FLUOROSCOPY <1 HR PHYS/QHP: CPT

## 2022-02-21 PROCEDURE — 2709999900 HC NON-CHARGEABLE SUPPLY: Performed by: ORTHOPAEDIC SURGERY

## 2022-02-21 PROCEDURE — 3700000001 HC ADD 15 MINUTES (ANESTHESIA): Performed by: ORTHOPAEDIC SURGERY

## 2022-02-21 PROCEDURE — G0378 HOSPITAL OBSERVATION PER HR: HCPCS

## 2022-02-21 PROCEDURE — 6360000002 HC RX W HCPCS: Performed by: ANESTHESIOLOGY

## 2022-02-21 PROCEDURE — 2500000003 HC RX 250 WO HCPCS: Performed by: ORTHOPAEDIC SURGERY

## 2022-02-21 PROCEDURE — 0QP004Z REMOVAL OF INTERNAL FIXATION DEVICE FROM LUMBAR VERTEBRA, OPEN APPROACH: ICD-10-PCS | Performed by: ORTHOPAEDIC SURGERY

## 2022-02-21 PROCEDURE — 3600000015 HC SURGERY LEVEL 5 ADDTL 15MIN: Performed by: ORTHOPAEDIC SURGERY

## 2022-02-21 PROCEDURE — 72100 X-RAY EXAM L-S SPINE 2/3 VWS: CPT

## 2022-02-21 PROCEDURE — 01NB0ZZ RELEASE LUMBAR NERVE, OPEN APPROACH: ICD-10-PCS | Performed by: ORTHOPAEDIC SURGERY

## 2022-02-21 PROCEDURE — 7100000001 HC PACU RECOVERY - ADDTL 15 MIN: Performed by: ORTHOPAEDIC SURGERY

## 2022-02-21 PROCEDURE — 3600000005 HC SURGERY LEVEL 5 BASE: Performed by: ORTHOPAEDIC SURGERY

## 2022-02-21 PROCEDURE — C1762 CONN TISS, HUMAN(INC FASCIA): HCPCS | Performed by: ORTHOPAEDIC SURGERY

## 2022-02-21 PROCEDURE — 3700000000 HC ANESTHESIA ATTENDED CARE: Performed by: ORTHOPAEDIC SURGERY

## 2022-02-21 PROCEDURE — 6360000002 HC RX W HCPCS: Performed by: ORTHOPAEDIC SURGERY

## 2022-02-21 DEVICE — IMPLANTABLE DEVICE: Type: IMPLANTABLE DEVICE | Site: SPINE LUMBAR | Status: FUNCTIONAL

## 2022-02-21 DEVICE — GRAFT BNE DEGENERATIVE BNE MTRX LG PROPEL: Type: IMPLANTABLE DEVICE | Site: SPINE LUMBAR | Status: FUNCTIONAL

## 2022-02-21 DEVICE — SCREW SPNL L40MM DIA6.5MM POST THORACOLUMBOSACRAL POLYAX 2S: Type: IMPLANTABLE DEVICE | Site: SPINE LUMBAR | Status: FUNCTIONAL

## 2022-02-21 DEVICE — GRAFT BONE M CELLULAR MTRX OSTEOCEL PRO: Type: IMPLANTABLE DEVICE | Site: SPINE LUMBAR | Status: FUNCTIONAL

## 2022-02-21 DEVICE — SCREW SPNL DIA5.5MM OPN TULIP LOK RELINE: Type: IMPLANTABLE DEVICE | Site: SPINE LUMBAR | Status: FUNCTIONAL

## 2022-02-21 DEVICE — SCREW SPNL L45MM DIA6.5MM POST THORACOLUMBOSACRAL POLYAX 2S
Type: IMPLANTABLE DEVICE | Site: SPINE LUMBAR | Status: FUNCTIONAL
Removed: 2022-02-23

## 2022-02-21 DEVICE — ROD SPNL LORDTC 5.5X100 MM TI RELINE-O: Type: IMPLANTABLE DEVICE | Site: SPINE LUMBAR | Status: FUNCTIONAL

## 2022-02-21 RX ORDER — SODIUM CHLORIDE, SODIUM LACTATE, POTASSIUM CHLORIDE, CALCIUM CHLORIDE 600; 310; 30; 20 MG/100ML; MG/100ML; MG/100ML; MG/100ML
INJECTION, SOLUTION INTRAVENOUS CONTINUOUS PRN
Status: DISCONTINUED | OUTPATIENT
Start: 2022-02-21 | End: 2022-02-21 | Stop reason: SDUPTHER

## 2022-02-21 RX ORDER — SODIUM CHLORIDE 9 MG/ML
25 INJECTION, SOLUTION INTRAVENOUS PRN
Status: DISCONTINUED | OUTPATIENT
Start: 2022-02-21 | End: 2022-02-21 | Stop reason: HOSPADM

## 2022-02-21 RX ORDER — SODIUM CHLORIDE, SODIUM LACTATE, POTASSIUM CHLORIDE, CALCIUM CHLORIDE 600; 310; 30; 20 MG/100ML; MG/100ML; MG/100ML; MG/100ML
INJECTION, SOLUTION INTRAVENOUS ONCE
Status: COMPLETED | OUTPATIENT
Start: 2022-02-21 | End: 2022-02-21

## 2022-02-21 RX ORDER — SODIUM CHLORIDE 9 MG/ML
INJECTION, SOLUTION INTRAVENOUS CONTINUOUS
Status: DISCONTINUED | OUTPATIENT
Start: 2022-02-21 | End: 2022-02-22

## 2022-02-21 RX ORDER — ACETAMINOPHEN 325 MG/1
650 TABLET ORAL EVERY 6 HOURS
Status: DISCONTINUED | OUTPATIENT
Start: 2022-02-21 | End: 2022-02-24 | Stop reason: HOSPADM

## 2022-02-21 RX ORDER — IPRATROPIUM BROMIDE AND ALBUTEROL SULFATE 2.5; .5 MG/3ML; MG/3ML
1 SOLUTION RESPIRATORY (INHALATION)
Status: DISCONTINUED | OUTPATIENT
Start: 2022-02-21 | End: 2022-02-21 | Stop reason: HOSPADM

## 2022-02-21 RX ORDER — SENNA AND DOCUSATE SODIUM 50; 8.6 MG/1; MG/1
1 TABLET, FILM COATED ORAL 2 TIMES DAILY
Status: DISCONTINUED | OUTPATIENT
Start: 2022-02-21 | End: 2022-02-24 | Stop reason: HOSPADM

## 2022-02-21 RX ORDER — CYCLOBENZAPRINE HCL 10 MG
10 TABLET ORAL 3 TIMES DAILY
Status: DISCONTINUED | OUTPATIENT
Start: 2022-02-21 | End: 2022-02-24 | Stop reason: HOSPADM

## 2022-02-21 RX ORDER — METOPROLOL SUCCINATE 25 MG/1
12.5 TABLET, EXTENDED RELEASE ORAL DAILY
Status: DISCONTINUED | OUTPATIENT
Start: 2022-02-22 | End: 2022-02-24 | Stop reason: HOSPADM

## 2022-02-21 RX ORDER — CITALOPRAM 20 MG/1
10 TABLET ORAL DAILY
Status: DISCONTINUED | OUTPATIENT
Start: 2022-02-21 | End: 2022-02-24 | Stop reason: HOSPADM

## 2022-02-21 RX ORDER — SODIUM CHLORIDE 0.9 % (FLUSH) 0.9 %
5-40 SYRINGE (ML) INJECTION PRN
Status: DISCONTINUED | OUTPATIENT
Start: 2022-02-21 | End: 2022-02-21 | Stop reason: HOSPADM

## 2022-02-21 RX ORDER — FENTANYL CITRATE 50 UG/ML
INJECTION, SOLUTION INTRAMUSCULAR; INTRAVENOUS PRN
Status: DISCONTINUED | OUTPATIENT
Start: 2022-02-21 | End: 2022-02-21 | Stop reason: SDUPTHER

## 2022-02-21 RX ORDER — OXYCODONE HYDROCHLORIDE 5 MG/1
10 TABLET ORAL EVERY 4 HOURS PRN
Status: DISCONTINUED | OUTPATIENT
Start: 2022-02-21 | End: 2022-02-24 | Stop reason: HOSPADM

## 2022-02-21 RX ORDER — SODIUM CHLORIDE 9 MG/ML
25 INJECTION, SOLUTION INTRAVENOUS PRN
Status: DISCONTINUED | OUTPATIENT
Start: 2022-02-21 | End: 2022-02-24 | Stop reason: HOSPADM

## 2022-02-21 RX ORDER — SODIUM CHLORIDE 0.9 % (FLUSH) 0.9 %
5-40 SYRINGE (ML) INJECTION EVERY 12 HOURS SCHEDULED
Status: DISCONTINUED | OUTPATIENT
Start: 2022-02-21 | End: 2022-02-21 | Stop reason: HOSPADM

## 2022-02-21 RX ORDER — METOCLOPRAMIDE HYDROCHLORIDE 5 MG/ML
10 INJECTION INTRAMUSCULAR; INTRAVENOUS
Status: DISCONTINUED | OUTPATIENT
Start: 2022-02-21 | End: 2022-02-21 | Stop reason: HOSPADM

## 2022-02-21 RX ORDER — CEFAZOLIN SODIUM 2 G/100ML
2000 INJECTION, SOLUTION INTRAVENOUS EVERY 8 HOURS
Status: COMPLETED | OUTPATIENT
Start: 2022-02-21 | End: 2022-02-22

## 2022-02-21 RX ORDER — SODIUM CHLORIDE 0.9 % (FLUSH) 0.9 %
5-40 SYRINGE (ML) INJECTION PRN
Status: DISCONTINUED | OUTPATIENT
Start: 2022-02-21 | End: 2022-02-24 | Stop reason: HOSPADM

## 2022-02-21 RX ORDER — LIDOCAINE HYDROCHLORIDE 20 MG/ML
INJECTION, SOLUTION EPIDURAL; INFILTRATION; INTRACAUDAL; PERINEURAL PRN
Status: DISCONTINUED | OUTPATIENT
Start: 2022-02-21 | End: 2022-02-21 | Stop reason: SDUPTHER

## 2022-02-21 RX ORDER — ONDANSETRON 2 MG/ML
4 INJECTION INTRAMUSCULAR; INTRAVENOUS EVERY 6 HOURS PRN
Status: DISCONTINUED | OUTPATIENT
Start: 2022-02-21 | End: 2022-02-24 | Stop reason: HOSPADM

## 2022-02-21 RX ORDER — FINASTERIDE 5 MG/1
5 TABLET, FILM COATED ORAL NIGHTLY
Status: DISCONTINUED | OUTPATIENT
Start: 2022-02-21 | End: 2022-02-24 | Stop reason: HOSPADM

## 2022-02-21 RX ORDER — PROPOFOL 10 MG/ML
INJECTION, EMULSION INTRAVENOUS PRN
Status: DISCONTINUED | OUTPATIENT
Start: 2022-02-21 | End: 2022-02-21 | Stop reason: SDUPTHER

## 2022-02-21 RX ORDER — GLYCOPYRROLATE 1 MG/5 ML
SYRINGE (ML) INTRAVENOUS PRN
Status: DISCONTINUED | OUTPATIENT
Start: 2022-02-21 | End: 2022-02-21 | Stop reason: SDUPTHER

## 2022-02-21 RX ORDER — HYDROMORPHONE HCL 110MG/55ML
0.5 PATIENT CONTROLLED ANALGESIA SYRINGE INTRAVENOUS EVERY 5 MIN PRN
Status: COMPLETED | OUTPATIENT
Start: 2022-02-21 | End: 2022-02-21

## 2022-02-21 RX ORDER — FENTANYL CITRATE 50 UG/ML
25 INJECTION, SOLUTION INTRAMUSCULAR; INTRAVENOUS EVERY 5 MIN PRN
Status: DISCONTINUED | OUTPATIENT
Start: 2022-02-21 | End: 2022-02-21 | Stop reason: HOSPADM

## 2022-02-21 RX ORDER — ONDANSETRON 2 MG/ML
4 INJECTION INTRAMUSCULAR; INTRAVENOUS
Status: DISCONTINUED | OUTPATIENT
Start: 2022-02-21 | End: 2022-02-21 | Stop reason: HOSPADM

## 2022-02-21 RX ORDER — ONDANSETRON 2 MG/ML
INJECTION INTRAMUSCULAR; INTRAVENOUS PRN
Status: DISCONTINUED | OUTPATIENT
Start: 2022-02-21 | End: 2022-02-21 | Stop reason: SDUPTHER

## 2022-02-21 RX ORDER — PANTOPRAZOLE SODIUM 40 MG/1
40 TABLET, DELAYED RELEASE ORAL
Status: DISCONTINUED | OUTPATIENT
Start: 2022-02-22 | End: 2022-02-24 | Stop reason: HOSPADM

## 2022-02-21 RX ORDER — ROCURONIUM BROMIDE 10 MG/ML
INJECTION, SOLUTION INTRAVENOUS PRN
Status: DISCONTINUED | OUTPATIENT
Start: 2022-02-21 | End: 2022-02-21 | Stop reason: SDUPTHER

## 2022-02-21 RX ORDER — SUCCINYLCHOLINE/SOD CL,ISO/PF 100 MG/5ML
SYRINGE (ML) INTRAVENOUS PRN
Status: DISCONTINUED | OUTPATIENT
Start: 2022-02-21 | End: 2022-02-21 | Stop reason: SDUPTHER

## 2022-02-21 RX ORDER — ONDANSETRON 4 MG/1
4 TABLET, ORALLY DISINTEGRATING ORAL EVERY 8 HOURS PRN
Status: DISCONTINUED | OUTPATIENT
Start: 2022-02-21 | End: 2022-02-24 | Stop reason: HOSPADM

## 2022-02-21 RX ORDER — LABETALOL HYDROCHLORIDE 5 MG/ML
10 INJECTION, SOLUTION INTRAVENOUS
Status: DISCONTINUED | OUTPATIENT
Start: 2022-02-21 | End: 2022-02-21 | Stop reason: HOSPADM

## 2022-02-21 RX ORDER — DEXAMETHASONE SODIUM PHOSPHATE 4 MG/ML
8 INJECTION, SOLUTION INTRA-ARTICULAR; INTRALESIONAL; INTRAMUSCULAR; INTRAVENOUS; SOFT TISSUE EVERY 8 HOURS
Status: DISCONTINUED | OUTPATIENT
Start: 2022-02-21 | End: 2022-02-22 | Stop reason: SDUPTHER

## 2022-02-21 RX ORDER — GABAPENTIN 300 MG/1
300 CAPSULE ORAL 3 TIMES DAILY
Status: DISCONTINUED | OUTPATIENT
Start: 2022-02-21 | End: 2022-02-21

## 2022-02-21 RX ORDER — OXYCODONE HYDROCHLORIDE 5 MG/1
5 TABLET ORAL EVERY 4 HOURS PRN
Status: DISCONTINUED | OUTPATIENT
Start: 2022-02-21 | End: 2022-02-24 | Stop reason: HOSPADM

## 2022-02-21 RX ORDER — KETOROLAC TROMETHAMINE 30 MG/ML
INJECTION, SOLUTION INTRAMUSCULAR; INTRAVENOUS PRN
Status: DISCONTINUED | OUTPATIENT
Start: 2022-02-21 | End: 2022-02-21 | Stop reason: SDUPTHER

## 2022-02-21 RX ORDER — GABAPENTIN 300 MG/1
300 CAPSULE ORAL 3 TIMES DAILY
Status: DISCONTINUED | OUTPATIENT
Start: 2022-02-22 | End: 2022-02-22

## 2022-02-21 RX ORDER — BUPIVACAINE HYDROCHLORIDE 5 MG/ML
INJECTION, SOLUTION PERINEURAL
Status: COMPLETED | OUTPATIENT
Start: 2022-02-21 | End: 2022-02-21

## 2022-02-21 RX ORDER — HYDRALAZINE HYDROCHLORIDE 10 MG/1
10 TABLET, FILM COATED ORAL 2 TIMES DAILY
Status: DISCONTINUED | OUTPATIENT
Start: 2022-02-21 | End: 2022-02-24 | Stop reason: HOSPADM

## 2022-02-21 RX ORDER — TRAMADOL HYDROCHLORIDE 50 MG/1
50 TABLET ORAL EVERY 6 HOURS PRN
Status: DISCONTINUED | OUTPATIENT
Start: 2022-02-21 | End: 2022-02-24 | Stop reason: HOSPADM

## 2022-02-21 RX ORDER — PROPOFOL 10 MG/ML
INJECTION, EMULSION INTRAVENOUS CONTINUOUS PRN
Status: DISCONTINUED | OUTPATIENT
Start: 2022-02-21 | End: 2022-02-21 | Stop reason: SDUPTHER

## 2022-02-21 RX ORDER — HYDROMORPHONE HCL 110MG/55ML
PATIENT CONTROLLED ANALGESIA SYRINGE INTRAVENOUS PRN
Status: DISCONTINUED | OUTPATIENT
Start: 2022-02-21 | End: 2022-02-21 | Stop reason: SDUPTHER

## 2022-02-21 RX ORDER — DEXAMETHASONE SODIUM PHOSPHATE 4 MG/ML
INJECTION, SOLUTION INTRA-ARTICULAR; INTRALESIONAL; INTRAMUSCULAR; INTRAVENOUS; SOFT TISSUE PRN
Status: DISCONTINUED | OUTPATIENT
Start: 2022-02-21 | End: 2022-02-21 | Stop reason: SDUPTHER

## 2022-02-21 RX ORDER — PRAVASTATIN SODIUM 20 MG
20 TABLET ORAL DAILY
Status: DISCONTINUED | OUTPATIENT
Start: 2022-02-22 | End: 2022-02-24 | Stop reason: HOSPADM

## 2022-02-21 RX ORDER — SODIUM CHLORIDE 0.9 % (FLUSH) 0.9 %
5-40 SYRINGE (ML) INJECTION EVERY 12 HOURS SCHEDULED
Status: DISCONTINUED | OUTPATIENT
Start: 2022-02-21 | End: 2022-02-24 | Stop reason: HOSPADM

## 2022-02-21 RX ORDER — TAMSULOSIN HYDROCHLORIDE 0.4 MG/1
0.4 CAPSULE ORAL 2 TIMES DAILY
Status: DISCONTINUED | OUTPATIENT
Start: 2022-02-21 | End: 2022-02-24 | Stop reason: HOSPADM

## 2022-02-21 RX ADMIN — CYCLOBENZAPRINE 10 MG: 10 TABLET, FILM COATED ORAL at 19:18

## 2022-02-21 RX ADMIN — PHENYLEPHRINE HYDROCHLORIDE 100 MCG: 10 INJECTION INTRAVENOUS at 13:34

## 2022-02-21 RX ADMIN — OXYCODONE HYDROCHLORIDE 10 MG: 5 TABLET ORAL at 19:18

## 2022-02-21 RX ADMIN — CEFAZOLIN SODIUM 2000 MG: 2 INJECTION, SOLUTION INTRAVENOUS at 19:17

## 2022-02-21 RX ADMIN — SODIUM CHLORIDE, POTASSIUM CHLORIDE, SODIUM LACTATE AND CALCIUM CHLORIDE: 600; 310; 30; 20 INJECTION, SOLUTION INTRAVENOUS at 09:48

## 2022-02-21 RX ADMIN — SODIUM CHLORIDE, POTASSIUM CHLORIDE, SODIUM LACTATE AND CALCIUM CHLORIDE: 600; 310; 30; 20 INJECTION, SOLUTION INTRAVENOUS at 07:06

## 2022-02-21 RX ADMIN — ONDANSETRON 4 MG: 2 INJECTION INTRAMUSCULAR; INTRAVENOUS at 13:15

## 2022-02-21 RX ADMIN — HYDROMORPHONE HYDROCHLORIDE 0.6 MG: 2 INJECTION, SOLUTION INTRAMUSCULAR; INTRAVENOUS; SUBCUTANEOUS at 13:14

## 2022-02-21 RX ADMIN — TAMSULOSIN HYDROCHLORIDE 0.4 MG: 0.4 CAPSULE ORAL at 21:33

## 2022-02-21 RX ADMIN — HYDRALAZINE HYDROCHLORIDE 10 MG: 10 TABLET ORAL at 21:33

## 2022-02-21 RX ADMIN — HYDROMORPHONE HYDROCHLORIDE 0.5 MG: 2 INJECTION INTRAMUSCULAR; INTRAVENOUS; SUBCUTANEOUS at 14:26

## 2022-02-21 RX ADMIN — FINASTERIDE 5 MG: 5 TABLET, FILM COATED ORAL at 21:33

## 2022-02-21 RX ADMIN — ACETAMINOPHEN 650 MG: 325 TABLET ORAL at 19:18

## 2022-02-21 RX ADMIN — Medication 160 MG: at 07:13

## 2022-02-21 RX ADMIN — CEFAZOLIN 2000 MG: 10 INJECTION, POWDER, FOR SOLUTION INTRAVENOUS at 07:25

## 2022-02-21 RX ADMIN — CITALOPRAM HYDROBROMIDE 10 MG: 20 TABLET ORAL at 19:19

## 2022-02-21 RX ADMIN — PHENYLEPHRINE HYDROCHLORIDE 50 MCG: 10 INJECTION INTRAVENOUS at 08:52

## 2022-02-21 RX ADMIN — LIDOCAINE HYDROCHLORIDE 100 MG: 20 INJECTION, SOLUTION EPIDURAL; INFILTRATION; INTRACAUDAL; PERINEURAL at 07:13

## 2022-02-21 RX ADMIN — TRAMADOL HYDROCHLORIDE 50 MG: 50 TABLET, COATED ORAL at 21:38

## 2022-02-21 RX ADMIN — PHENYLEPHRINE HYDROCHLORIDE 50 MCG: 10 INJECTION INTRAVENOUS at 12:58

## 2022-02-21 RX ADMIN — SODIUM CHLORIDE, POTASSIUM CHLORIDE, SODIUM LACTATE AND CALCIUM CHLORIDE: 600; 310; 30; 20 INJECTION, SOLUTION INTRAVENOUS at 13:35

## 2022-02-21 RX ADMIN — KETOROLAC TROMETHAMINE 15 MG: 30 INJECTION, SOLUTION INTRAMUSCULAR at 13:40

## 2022-02-21 RX ADMIN — PHENYLEPHRINE HYDROCHLORIDE 100 MCG: 10 INJECTION INTRAVENOUS at 13:19

## 2022-02-21 RX ADMIN — GABAPENTIN 300 MG: 300 CAPSULE ORAL at 19:19

## 2022-02-21 RX ADMIN — SODIUM CHLORIDE: 9 INJECTION, SOLUTION INTRAVENOUS at 15:01

## 2022-02-21 RX ADMIN — DEXAMETHASONE SODIUM PHOSPHATE 8 MG: 4 INJECTION, SOLUTION INTRAMUSCULAR; INTRAVENOUS at 07:25

## 2022-02-21 RX ADMIN — SENNOSIDES AND DOCUSATE SODIUM 1 TABLET: 50; 8.6 TABLET ORAL at 21:33

## 2022-02-21 RX ADMIN — Medication 0.2 MG: at 08:15

## 2022-02-21 RX ADMIN — HYDROMORPHONE HYDROCHLORIDE 0.5 MG: 2 INJECTION INTRAMUSCULAR; INTRAVENOUS; SUBCUTANEOUS at 14:41

## 2022-02-21 RX ADMIN — DEXAMETHASONE SODIUM PHOSPHATE 8 MG: 4 INJECTION, SOLUTION INTRAMUSCULAR; INTRAVENOUS at 19:21

## 2022-02-21 RX ADMIN — PHENYLEPHRINE HYDROCHLORIDE 50 MCG: 10 INJECTION INTRAVENOUS at 10:27

## 2022-02-21 RX ADMIN — PROPOFOL 100 MCG/KG/MIN: 10 INJECTION, EMULSION INTRAVENOUS at 07:25

## 2022-02-21 RX ADMIN — HYDROMORPHONE HYDROCHLORIDE 0.5 MG: 2 INJECTION INTRAMUSCULAR; INTRAVENOUS; SUBCUTANEOUS at 14:36

## 2022-02-21 RX ADMIN — PHENYLEPHRINE HYDROCHLORIDE 100 MCG: 10 INJECTION INTRAVENOUS at 13:31

## 2022-02-21 RX ADMIN — HYDROMORPHONE HYDROCHLORIDE 0.5 MG: 2 INJECTION INTRAMUSCULAR; INTRAVENOUS; SUBCUTANEOUS at 14:31

## 2022-02-21 RX ADMIN — SODIUM CHLORIDE, POTASSIUM CHLORIDE, SODIUM LACTATE AND CALCIUM CHLORIDE: 600; 310; 30; 20 INJECTION, SOLUTION INTRAVENOUS at 06:23

## 2022-02-21 RX ADMIN — CEFAZOLIN 2000 MG: 10 INJECTION, POWDER, FOR SOLUTION INTRAVENOUS at 11:17

## 2022-02-21 RX ADMIN — ROCURONIUM BROMIDE 30 MG: 10 INJECTION INTRAVENOUS at 09:35

## 2022-02-21 RX ADMIN — SODIUM CHLORIDE: 9 INJECTION, SOLUTION INTRAVENOUS at 19:16

## 2022-02-21 RX ADMIN — BISACODYL 5 MG: 5 TABLET, COATED ORAL at 19:19

## 2022-02-21 RX ADMIN — HYDROMORPHONE HYDROCHLORIDE 0.4 MG: 2 INJECTION, SOLUTION INTRAMUSCULAR; INTRAVENOUS; SUBCUTANEOUS at 12:42

## 2022-02-21 RX ADMIN — PHENYLEPHRINE HYDROCHLORIDE 100 MCG: 10 INJECTION INTRAVENOUS at 09:49

## 2022-02-21 RX ADMIN — FENTANYL CITRATE 100 MCG: 50 INJECTION, SOLUTION INTRAMUSCULAR; INTRAVENOUS at 07:13

## 2022-02-21 RX ADMIN — PROPOFOL 150 MG: 10 INJECTION, EMULSION INTRAVENOUS at 07:13

## 2022-02-21 ASSESSMENT — PULMONARY FUNCTION TESTS
PIF_VALUE: 16
PIF_VALUE: 18
PIF_VALUE: 16
PIF_VALUE: 29
PIF_VALUE: 16
PIF_VALUE: 16
PIF_VALUE: 8
PIF_VALUE: 16
PIF_VALUE: 18
PIF_VALUE: 1
PIF_VALUE: 18
PIF_VALUE: 19
PIF_VALUE: 1
PIF_VALUE: 16
PIF_VALUE: 18
PIF_VALUE: 17
PIF_VALUE: 19
PIF_VALUE: 18
PIF_VALUE: 0
PIF_VALUE: 16
PIF_VALUE: 18
PIF_VALUE: 16
PIF_VALUE: 26
PIF_VALUE: 18
PIF_VALUE: 15
PIF_VALUE: 15
PIF_VALUE: 18
PIF_VALUE: 20
PIF_VALUE: 22
PIF_VALUE: 18
PIF_VALUE: 18
PIF_VALUE: 20
PIF_VALUE: 17
PIF_VALUE: 18
PIF_VALUE: 23
PIF_VALUE: 18
PIF_VALUE: 18
PIF_VALUE: 4
PIF_VALUE: 16
PIF_VALUE: 27
PIF_VALUE: 16
PIF_VALUE: 17
PIF_VALUE: 18
PIF_VALUE: 19
PIF_VALUE: 22
PIF_VALUE: 19
PIF_VALUE: 0
PIF_VALUE: 18
PIF_VALUE: 20
PIF_VALUE: 18
PIF_VALUE: 21
PIF_VALUE: 18
PIF_VALUE: 9
PIF_VALUE: 16
PIF_VALUE: 15
PIF_VALUE: 18
PIF_VALUE: 16
PIF_VALUE: 18
PIF_VALUE: 18
PIF_VALUE: 22
PIF_VALUE: 19
PIF_VALUE: 18
PIF_VALUE: 19
PIF_VALUE: 18
PIF_VALUE: 21
PIF_VALUE: 18
PIF_VALUE: 16
PIF_VALUE: 18
PIF_VALUE: 16
PIF_VALUE: 15
PIF_VALUE: 18
PIF_VALUE: 19
PIF_VALUE: 18
PIF_VALUE: 17
PIF_VALUE: 19
PIF_VALUE: 19
PIF_VALUE: 18
PIF_VALUE: 26
PIF_VALUE: 18
PIF_VALUE: 15
PIF_VALUE: 18
PIF_VALUE: 19
PIF_VALUE: 16
PIF_VALUE: 19
PIF_VALUE: 18
PIF_VALUE: 18
PIF_VALUE: 16
PIF_VALUE: 18
PIF_VALUE: 10
PIF_VALUE: 17
PIF_VALUE: 18
PIF_VALUE: 18
PIF_VALUE: 17
PIF_VALUE: 18
PIF_VALUE: 18
PIF_VALUE: 19
PIF_VALUE: 13
PIF_VALUE: 18
PIF_VALUE: 0
PIF_VALUE: 15
PIF_VALUE: 16
PIF_VALUE: 16
PIF_VALUE: 18
PIF_VALUE: 16
PIF_VALUE: 24
PIF_VALUE: 18
PIF_VALUE: 1
PIF_VALUE: 16
PIF_VALUE: 18
PIF_VALUE: 19
PIF_VALUE: 19
PIF_VALUE: 18
PIF_VALUE: 17
PIF_VALUE: 18
PIF_VALUE: 18
PIF_VALUE: 21
PIF_VALUE: 18
PIF_VALUE: 16
PIF_VALUE: 18
PIF_VALUE: 15
PIF_VALUE: 16
PIF_VALUE: 18
PIF_VALUE: 17
PIF_VALUE: 18
PIF_VALUE: 15
PIF_VALUE: 18
PIF_VALUE: 16
PIF_VALUE: 18
PIF_VALUE: 18
PIF_VALUE: 19
PIF_VALUE: 16
PIF_VALUE: 18
PIF_VALUE: 3
PIF_VALUE: 4
PIF_VALUE: 18
PIF_VALUE: 16
PIF_VALUE: 18
PIF_VALUE: 18
PIF_VALUE: 9
PIF_VALUE: 16
PIF_VALUE: 18
PIF_VALUE: 0
PIF_VALUE: 17
PIF_VALUE: 18
PIF_VALUE: 16
PIF_VALUE: 18
PIF_VALUE: 21
PIF_VALUE: 19
PIF_VALUE: 16
PIF_VALUE: 19
PIF_VALUE: 16
PIF_VALUE: 15
PIF_VALUE: 18
PIF_VALUE: 18
PIF_VALUE: 16
PIF_VALUE: 18
PIF_VALUE: 19
PIF_VALUE: 19
PIF_VALUE: 17
PIF_VALUE: 18
PIF_VALUE: 18
PIF_VALUE: 17
PIF_VALUE: 18
PIF_VALUE: 14
PIF_VALUE: 18
PIF_VALUE: 18
PIF_VALUE: 19
PIF_VALUE: 18
PIF_VALUE: 2
PIF_VALUE: 16
PIF_VALUE: 23
PIF_VALUE: 22
PIF_VALUE: 18
PIF_VALUE: 14
PIF_VALUE: 18
PIF_VALUE: 19
PIF_VALUE: 14
PIF_VALUE: 19
PIF_VALUE: 15
PIF_VALUE: 18
PIF_VALUE: 21
PIF_VALUE: 18
PIF_VALUE: 16
PIF_VALUE: 16
PIF_VALUE: 18
PIF_VALUE: 20
PIF_VALUE: 18
PIF_VALUE: 18
PIF_VALUE: 16
PIF_VALUE: 19
PIF_VALUE: 7
PIF_VALUE: 13
PIF_VALUE: 18
PIF_VALUE: 16
PIF_VALUE: 18
PIF_VALUE: 15
PIF_VALUE: 18
PIF_VALUE: 18
PIF_VALUE: 16
PIF_VALUE: 15
PIF_VALUE: 0
PIF_VALUE: 18
PIF_VALUE: 19
PIF_VALUE: 18
PIF_VALUE: 18
PIF_VALUE: 16
PIF_VALUE: 18
PIF_VALUE: 14
PIF_VALUE: 18
PIF_VALUE: 19
PIF_VALUE: 16
PIF_VALUE: 19
PIF_VALUE: 18
PIF_VALUE: 19
PIF_VALUE: 18
PIF_VALUE: 19
PIF_VALUE: 18
PIF_VALUE: 13
PIF_VALUE: 16
PIF_VALUE: 18
PIF_VALUE: 19
PIF_VALUE: 18
PIF_VALUE: 18
PIF_VALUE: 19
PIF_VALUE: 18
PIF_VALUE: 14
PIF_VALUE: 16
PIF_VALUE: 0
PIF_VALUE: 1
PIF_VALUE: 15
PIF_VALUE: 19
PIF_VALUE: 18
PIF_VALUE: 18
PIF_VALUE: 16
PIF_VALUE: 0
PIF_VALUE: 16
PIF_VALUE: 18
PIF_VALUE: 0
PIF_VALUE: 18
PIF_VALUE: 16
PIF_VALUE: 18
PIF_VALUE: 19
PIF_VALUE: 21
PIF_VALUE: 15
PIF_VALUE: 16
PIF_VALUE: 16
PIF_VALUE: 18
PIF_VALUE: 20
PIF_VALUE: 15
PIF_VALUE: 18
PIF_VALUE: 19
PIF_VALUE: 18
PIF_VALUE: 19
PIF_VALUE: 18
PIF_VALUE: 16
PIF_VALUE: 18
PIF_VALUE: 15
PIF_VALUE: 18
PIF_VALUE: 17
PIF_VALUE: 18
PIF_VALUE: 13
PIF_VALUE: 0
PIF_VALUE: 33
PIF_VALUE: 18
PIF_VALUE: 17
PIF_VALUE: 4
PIF_VALUE: 18
PIF_VALUE: 18
PIF_VALUE: 19
PIF_VALUE: 16
PIF_VALUE: 16
PIF_VALUE: 19
PIF_VALUE: 19
PIF_VALUE: 18
PIF_VALUE: 16
PIF_VALUE: 18
PIF_VALUE: 18
PIF_VALUE: 16
PIF_VALUE: 19
PIF_VALUE: 19
PIF_VALUE: 18
PIF_VALUE: 5
PIF_VALUE: 19
PIF_VALUE: 18
PIF_VALUE: 16
PIF_VALUE: 18
PIF_VALUE: 16
PIF_VALUE: 18
PIF_VALUE: 16
PIF_VALUE: 16
PIF_VALUE: 19
PIF_VALUE: 18
PIF_VALUE: 16
PIF_VALUE: 19
PIF_VALUE: 18
PIF_VALUE: 19
PIF_VALUE: 5
PIF_VALUE: 15
PIF_VALUE: 20
PIF_VALUE: 18
PIF_VALUE: 21
PIF_VALUE: 18
PIF_VALUE: 19
PIF_VALUE: 31
PIF_VALUE: 5
PIF_VALUE: 19
PIF_VALUE: 18
PIF_VALUE: 0
PIF_VALUE: 20
PIF_VALUE: 18
PIF_VALUE: 15
PIF_VALUE: 18
PIF_VALUE: 18
PIF_VALUE: 13
PIF_VALUE: 16
PIF_VALUE: 27
PIF_VALUE: 18
PIF_VALUE: 17
PIF_VALUE: 0
PIF_VALUE: 18
PIF_VALUE: 16
PIF_VALUE: 18
PIF_VALUE: 18
PIF_VALUE: 1
PIF_VALUE: 19
PIF_VALUE: 17
PIF_VALUE: 18
PIF_VALUE: 19
PIF_VALUE: 18
PIF_VALUE: 16
PIF_VALUE: 18
PIF_VALUE: 4
PIF_VALUE: 18
PIF_VALUE: 19
PIF_VALUE: 17
PIF_VALUE: 18
PIF_VALUE: 5
PIF_VALUE: 18
PIF_VALUE: 19
PIF_VALUE: 18
PIF_VALUE: 19
PIF_VALUE: 18
PIF_VALUE: 16
PIF_VALUE: 18
PIF_VALUE: 19
PIF_VALUE: 18
PIF_VALUE: 16
PIF_VALUE: 19
PIF_VALUE: 13
PIF_VALUE: 18
PIF_VALUE: 18
PIF_VALUE: 16
PIF_VALUE: 18
PIF_VALUE: 19
PIF_VALUE: 1
PIF_VALUE: 18
PIF_VALUE: 0
PIF_VALUE: 18
PIF_VALUE: 18

## 2022-02-21 ASSESSMENT — PAIN DESCRIPTION - PROGRESSION
CLINICAL_PROGRESSION: GRADUALLY IMPROVING
CLINICAL_PROGRESSION: GRADUALLY IMPROVING
CLINICAL_PROGRESSION: NOT CHANGED
CLINICAL_PROGRESSION: GRADUALLY IMPROVING

## 2022-02-21 ASSESSMENT — PAIN DESCRIPTION - DESCRIPTORS
DESCRIPTORS: ACHING;DISCOMFORT

## 2022-02-21 ASSESSMENT — ENCOUNTER SYMPTOMS
BACK PAIN: 1
EYES NEGATIVE: 1
VOMITING: 0
CHEST TIGHTNESS: 0
ALLERGIC/IMMUNOLOGIC NEGATIVE: 1
SHORTNESS OF BREATH: 0
NAUSEA: 0

## 2022-02-21 ASSESSMENT — PAIN SCALES - GENERAL
PAINLEVEL_OUTOF10: 8
PAINLEVEL_OUTOF10: 9
PAINLEVEL_OUTOF10: 6
PAINLEVEL_OUTOF10: 9
PAINLEVEL_OUTOF10: 8
PAINLEVEL_OUTOF10: 9
PAINLEVEL_OUTOF10: 9
PAINLEVEL_OUTOF10: 0
PAINLEVEL_OUTOF10: 8
PAINLEVEL_OUTOF10: 7

## 2022-02-21 ASSESSMENT — PAIN DESCRIPTION - PAIN TYPE
TYPE: SURGICAL PAIN

## 2022-02-21 ASSESSMENT — PAIN DESCRIPTION - LOCATION
LOCATION: BACK

## 2022-02-21 ASSESSMENT — PAIN DESCRIPTION - FREQUENCY
FREQUENCY: CONTINUOUS

## 2022-02-21 ASSESSMENT — PAIN - FUNCTIONAL ASSESSMENT: PAIN_FUNCTIONAL_ASSESSMENT: 0-10

## 2022-02-21 NOTE — CONSULTS
V2.0  [facility] [place]      Name:  Anuj Ambrosio /Age/Sex: 1945  (78 y.o. male)   MRN & CSN:  [id number] & [id number] Encounter Date/Time: 2022 5:59 PM EST   Location:  [facility] PCP: Mir Barksdale MD     Attending:Ilan [facility], MD  Consulting Provider: Cielo Rosado, [facility] Day: 1    Assessment and Recommendations   Anuj Ambrosio is a 78 y.o. male with pmh of CAD, dyslipidemia, hypertension who presents with S/P lumbar fusion    Hospital Problems           Last Modified POA    * (Principal) S/P lumbar fusion 2022 Yes          Recommendations:    1. Spinal Stenosis s/p L3/4 x L5-S1 posterior spinal fusion with removal of hardware/ reinsertion, open bilateral SI joint fusions . Neuro checks every 4 hours. Post operative care per neurosurgery. PT OT per neurosurgery. 2. COPD with THOMAS on CPAP-home CPAP at night    3. Atrial Fibrillation- - Eliquis and ASA stopped  , resume once cleared per neurosurgery . Continue home Toprol-XL    4. Hypertension -Toprol-XL as noted above. Continue home hydralazine,    5. Dyslipidemia-continue home statin             Diet ADULT DIET; Regular   DVT Prophylaxis [] Lovenox, []  Heparin, [x] SCDs, [] Ambulation,  [] Eliquis, [] Xarelto   Code Status Full Code   Surrogate Decision Maker/ POA     Hi  History Obtained From:    patient    History of Present Illness:     Chief Complaint: S/P lumbar fusion    Anuj Ambrosio is a 78 y.o. male who is seen today by the hospitalist team at the request of  [facility], with a Chief Complaint of status post lumbar fusion for spinal stenosis. Patient has been postoperatively leaving same-day surgery. He complains of back pain but denies any other complaints. Patient is alert and oriented wife is at bedside. Review of Systems:    Review of Systems   Constitutional: Positive for fatigue. HENT: Negative. Eyes: Negative.     Respiratory: Negative for chest tightness and shortness of breath. Cardiovascular: Negative for chest pain and leg swelling. Gastrointestinal: Negative for nausea and vomiting. Endocrine: Negative for cold intolerance and heat intolerance. Genitourinary: Negative for dysuria and hematuria. Musculoskeletal: Positive for back pain and myalgias. Skin: Negative. Allergic/Immunologic: Negative. Neurological: Negative for dizziness and light-headedness. Hematological: Does not bruise/bleed easily. Psychiatric/Behavioral: Negative for agitation. The patient is not nervous/anxious. Objective: Intake/Output Summary (Last 24 hours) at 2/21/2022 1759  Last data filed at 2/21/2022 1455  Gross per 24 hour   Intake 2210 ml   Output 1280 ml   Net 930 ml      Vitals:   Vitals:    02/21/22 1515 02/21/22 1530 02/21/22 1600 02/21/22 1615   BP: 129/71 (!) 140/76 116/73 122/79   Pulse: 92 104 105 109   Resp: 16 18 18 18   Temp:       TempSrc:       SpO2: 91%   98%   Weight:       Height:           Medications Prior to Admission     Prior to Admission medications    Medication Sig Start Date End Date Taking? Authorizing Provider   metoprolol succinate (TOPROL XL) 25 MG extended release tablet Take 0.5 tablets by mouth daily 2/18/22  Yes Tr Salazar MD   omeprazole (PRILOSEC) 20 MG delayed release capsule Take 40 mg by mouth Daily   Yes Historical Provider, MD   finasteride (PROSCAR) 5 MG tablet Take 5 mg by mouth at bedtime   Yes Historical Provider, MD   pravastatin (PRAVACHOL) 20 MG tablet TAKE 1 TABLET BY MOUTH DAILY 1/3/22  Yes MÓNICA Fonseca CNP   hydrALAZINE (APRESOLINE) 10 MG tablet Take 1 tablet by mouth 2 times daily 4/19/21  Yes Tr Salazar MD   pantoprazole (PROTONIX) 40 MG tablet Take 1 tablet by mouth daily 1/15/21  Yes MÓNICA Fonseca CNP   gabapentin (NEURONTIN) 300 MG capsule Take 300 mg by mouth 3 times daily.    Yes Historical Provider, MD   Lactobacillus (PROBIOTIC ACIDOPHILUS PO) Take by mouth daily   Yes Historical Provider, MD   tamsulosin (FLOMAX) 0.4 MG capsule 1 tablet 2 times daily 8/11/20  Yes Historical Provider, MD   apixaban (ELIQUIS) 5 MG TABS tablet Take 1 tablet by mouth 2 times daily 9/1/20  Yes MÓNICA Pompa - CNP   aspirin 81 MG chewable tablet Take 1 tablet by mouth daily 8/28/20  Yes Jennifer Small MD   citalopram (CELEXA) 20 MG tablet Take 10 mg by mouth daily  5/27/14  Yes Historical Provider, MD   traMADol (ULTRAM) 50 MG tablet Take 50 mg by mouth every 6 hours as needed. 7/6/14  Yes Historical Provider, MD   apixaban (ELIQUIS) 5 MG TABS tablet Take 1 tablet by mouth 2 times daily 2/2/22 3/4/22  Melissa Sofia MD       Physical Exam: Need 8 Elements   General: NAD  Eyes: EOMI  ENT: neck supple  Cardiovascular: Regular rate. Respiratory: Clear to auscultation  Gastrointestinal: Soft, non tender  Genitourinary: no suprapubic tenderness  Musculoskeletal: No edema. Back pain. Skin: warm, dry  Neuro: Alert. Psych: Mood appropriate. Past Medical History:   PMHx   Past Medical History:   Diagnosis Date    A-fib ([facility] [address]     Abnormal MRI, spine     per pt herniated disc    Angina at rest [facility])     Arthritis     COPD (chronic obstructive pulmonary disease) (HCC)     Gastritis     GERD (gastroesophageal reflux disease)     H/O 24 hour EKG monitoring 06/24/2002 06/24/2002 baseline rhythm appears to be normal with an average HR of 66 bpm, slowest HR recored at 51 bpm, fastest at 97 bpm only 10 isolated  PVC's and one couplet were recorded, supraventricular ectopic activity is present, but not clinically significant, no long pauses recognized.  H/O cardiovascular stress test 03/16/1999, 12/28/2001,03/14/2006, 03/31/2008, 11/19/2008, 09/07/2011 09/07/2011 EF 61%, no angina or ischemic EKG changes, noted with Lexiscan, inferior wall reperfusion, global function is intact. resting /85, resting HR 70    H/O complete electrocardiogram 03/05/2012 03/05/2012 on chart    H/O CT scan of chest 11/19/2008 11/19/2008 no evidence of PE, no acute thoracic aortic pathology, incidental 5mm pleural based nodular density superior lateral right middle lobe.  H/O Doppler ultrasound no anatomical abnormalities in the segment s studied on either side, doppler curves are normal in configuration and amplitude in those segments bilaterally, normal flow velocities and flow velocity ratios. normal antegrade flow in the vertebral arteries bilaterally    H/O Doppler ultrasound 08/08/2005 08/08/2005 no significant obstructive lesions noted in the extracranial carotid system,antegrade flow noted in the vertebral arteries. no significant atherosclerotic plaque noted in right or left  common arteryintimal thickening in right and left internal carotids, intimal thickening in left external carotid.  H/O echocardiogram 06/24/2002 EF 55-60% 06/19/2006 EF 50-55%,09/06/2011 EF 50-55%    09/06/2011 EF 50-55%, normal size left ventricle showing preserved global systolic  function and no regional wall motion abnormalities, left ventricle shows  moderate concentric hypertrophy and signs of diastolic dysfunction, mildly dilated atrium,, aortic valve is sclerotic but nonstenotic, trace mitral and tricuspid  regurg    H/O tilt table evaluation 07/05/2001 07/05/2001 head up tilt table test after one nitroglycerin pt became nauseous, diaphoretic, BP dropped systolic to 85 pulse in 77'W with complete loss of consciousness with  slight seizure activity before table brought to horizontal, happened within 8 minutes of nitro tablet utilization, pt. regained consciousness HR and BP  as soon as table was brought to horizontal, lopressor discontinued    Hiatal hernia     History of cardiac cath 10/05/2020    No significant CAD. LAD diffusely diseased as it is very small in caliber. LVEF is 50 to 55 %.     History of nuclear stress test 09/30/2020    EF 62%, Large area of significant inferior wall ischemia    Hx of cardiac cath 1999, 2008oth the left main and circumflex are without significant disease, RCA is also without significant disease, LV gram shows normal size left ventricular cavity with normal global and regional left ventricular systolic function, no significant CAD, chest pain is probably non cardiac in etiology    Hyperlipidemia     Hypertension     NSVT (nonsustained ventricular tachycardia) (HCC)     THOMAS on CPAP     Syncope and collapse     Unspecified sleep apnea     cpap    Wears glasses      PSHX:  has a past surgical history that includes hernia repair; Diagnostic Cardiac Cath Lab Procedure (1999,2008); Tonsillectomy; back surgery; and ablation of dysrhythmic focus (2021). Allergies: Allergies   Allergen Reactions    Ambien [Zolpidem] Other (See Comments)     Makes ken montes     Fam HX: family history includes Cancer in his mother; Diabetes in his father and mother; Heart Disease in his father and mother; Other in his mother.   Soc HX:   Social History     Socioeconomic History    Marital status:      Spouse name: None    Number of children: 2    Years of education: None    Highest education level: None   Occupational History    Occupation: / instructor   Tobacco Use    Smoking status: Former Smoker     Types: Pipe, Cigars     Quit date: 1988     Years since quittin.1    Smokeless tobacco: Former User     Types: Chew    Tobacco comment: Quit chewing in 2014   Vaping Use    Vaping Use: Never used   Substance and Sexual Activity    Alcohol use: Not Currently     Comment: green tea    Drug use: No    Sexual activity: Not Currently     Partners: Female   Other Topics Concern    None   Social History Narrative    None     Social Determinants of Health     Financial Resource Strain:     Difficulty of Paying Living Expenses: Not on file   Food Insecurity:     Worried About Running Out of Food in the Last Year: Not on file    [address] in the Last Year: Not on file   Transportation Needs:     Lack of Transportation (Medical): Not on file    Lack of Transportation (Non-Medical):  Not on file   Physical Activity:     Days of Exercise per Week: Not on file    Minutes of Exercise per Session: Not on file   Stress:     Feeling of Stress : Not on file   Social Connections:     Frequency of Communication with Friends and Family: Not on file    Frequency of Social Gatherings with Friends and Family: Not on file    Attends [employer] Services: Not on file    Active Member of Clubs or Organizations: Not on file    Attends Club or Organization Meetings: Not on file    Marital Status: Not on file   Intimate Partner Violence:     Fear of Current or Ex-Partner: Not on file    Emotionally Abused: Not on file    Physically Abused: Not on file    Sexually Abused: Not on file   Housing Stability:     Unable to Pay for Housing in the Last Year: Not on file    Number of Places Lived in the Last Year: Not on file    Unstable Housing in the Last Year: Not on file       Medications:   Medications:    sodium chloride flush  5-40 mL IntraVENous 2 times per day    acetaminophen  650 mg Oral Q6H    ceFAZolin (ANCEF) IVPB  2,000 mg IntraVENous Q8H    cyclobenzaprine  10 mg Oral TID    bisacodyl  5 mg Oral Daily    sennosides-docusate sodium  1 tablet Oral BID    dexamethasone  8 mg IntraVENous Q8H    gabapentin  300 mg Oral TID    citalopram  10 mg Oral Daily    finasteride  5 mg Oral Nightly    hydrALAZINE  10 mg Oral BID    [START ON 2/22/2022] metoprolol succinate  12.5 mg Oral Daily    [START ON 2/22/2022] pantoprazole  40 mg Oral QAM AC    [START ON 2/22/2022] pravastatin  20 mg Oral Daily    tamsulosin  0.4 mg Oral BID      Infusions:    sodium chloride      sodium chloride 50 mL/hr at 02/21/22 1501     PRN Meds: sodium chloride flush, 5-40 mL, PRN  sodium chloride, 25 mL, PRN  ondansetron, 4 mg, Q8H PRN Or  ondansetron, 4 mg, Q6H PRN  oxyCODONE, 5 mg, Q4H PRN  HYDROmorphone, 0.25 mg, Q3H PRN   Or  HYDROmorphone, 0.5 mg, Q3H PRN  oxyCODONE, 10 mg, Q4H PRN  traMADol, 50 mg, Q6H PRN        Labs and Imaging   FL LESS THAN 1 HOUR    Result Date: 2/21/2022  Radiology exam is complete. No Radiologist dictation. Please follow up with ordering provider. CBC: No results for input(s): WBC, HGB, PLT in the last 72 hours. BMP:  No results for input(s): NA, K, CL, CO2, BUN, CREATININE, GLUCOSE in the last 72 hours. Hepatic: No results for input(s): AST, ALT, ALB, BILITOT, ALKPHOS in the last 72 hours. Lipids:   Lab Results   Component Value Date    CHOL 174 08/26/2020    HDL 44 08/26/2020    TRIG 225 08/26/2020     Hemoglobin A1C:   Lab Results   Component Value Date    LABA1C 5.9 02/09/2022     TSH: No results found for: TSH  Troponin:   Lab Results   Component Value Date    TROPONINT <0.010 05/31/2021    TROPONINT <0.010 11/07/2020    TROPONINT <0.010 11/07/2020     Lactic Acid: No results for input(s): LACTA in the last 72 hours. BNP: No results for input(s): PROBNP in the last 72 hours.   UA:  Lab Results   Component Value Date    NITRU NEGATIVE 02/09/2022    COLORU YELLOW 02/09/2022    WBCUA NONE SEEN 02/09/2022    RBCUA NONE SEEN 02/09/2022    MUCUS RARE 02/09/2022    TRICHOMONAS NONE SEEN 11/07/2020    BACTERIA NEGATIVE 02/09/2022    CLARITYU CLEAR 02/09/2022    SPECGRAV >1.030 02/09/2022    LEUKOCYTESUR NEGATIVE 02/09/2022    UROBILINOGEN NORMAL 02/09/2022    BILIRUBINUR NEGATIVE 02/09/2022    BLOODU NEGATIVE 02/09/2022    KETUA NEGATIVE 02/09/2022     Urine Cultures: No results found for: LABURIN  Blood Cultures: No results found for: BC  No results found for: BLOODCULT2  Organism: No results found for: [facility]    Personally reviewed Lab Studies, Imaging, and discussed with [facility]    Electronically signed by MÓNICA Toledo CNP on 2/21/2022 at 5:59 PM

## 2022-02-21 NOTE — PROGRESS NOTES
1411 - transferred from OR on bed, monitor applied, alarms on and verified, bedside handoff provided by Adams Vallejo RN and Keara Johnson  2360 - medicated for complaint of surgical pain/discomfort  1450 - Abel SESAY, Chalo Kelly RN, and Jimy SESAY at bedside evaluating patient  1520 - phase one care complete; transferred to [facility] holding room 8

## 2022-02-21 NOTE — PROGRESS NOTES
Neurosurgery Post-op Note    PROCEDURE:    2/21/2022 2:51 PM    Patient seen in PACU and day-of surgery room 8  Alert and oriented to self, place  Able to move all extremities at baseline. 4/5 strength with dorsiflexion on left side, consistent with preoperative exam.   Drain with moderate output, ~200cc  Incision intact, dressing dry, dermabond in place on right lateral incision.  Incision intact, dressing dry on two posterior incisions (one silver dressing on left, one small Telfa with Tegaderm on right)    BP (!) 155/68   Pulse 84   Temp 97.2 °F (36.2 °C) (Temporal)   Resp 16   Ht 6' 2\" (1.88 m)   Wt 255 lb (115.7 kg)   SpO2 96%   BMI 32.74 kg/m²       Plan:  Admit for observation and pain control  Neuro checks every 4 hours  Monitor drain output  Activity as tolerated  Advance diet as tolerated  Post-operative xrays ordered  Please call our service if there are any changes in neuro exam    Electronically signed by Ken Singh PA-C on 2/21/2022 at 2:51 PM

## 2022-02-22 ENCOUNTER — APPOINTMENT (OUTPATIENT)
Dept: CT IMAGING | Age: 77
DRG: 454 | End: 2022-02-22
Attending: ORTHOPAEDIC SURGERY
Payer: MEDICARE

## 2022-02-22 LAB
ANION GAP SERPL CALCULATED.3IONS-SCNC: 14 MMOL/L (ref 4–16)
BUN BLDV-MCNC: 19 MG/DL (ref 6–23)
CALCIUM SERPL-MCNC: 8.3 MG/DL (ref 8.3–10.6)
CHLORIDE BLD-SCNC: 103 MMOL/L (ref 99–110)
CO2: 21 MMOL/L (ref 21–32)
CREAT SERPL-MCNC: 1 MG/DL (ref 0.9–1.3)
EKG ATRIAL RATE: 99 BPM
EKG DIAGNOSIS: NORMAL
EKG P AXIS: 20 DEGREES
EKG P-R INTERVAL: 164 MS
EKG Q-T INTERVAL: 322 MS
EKG QRS DURATION: 72 MS
EKG QTC CALCULATION (BAZETT): 413 MS
EKG R AXIS: 0 DEGREES
EKG T AXIS: 22 DEGREES
EKG VENTRICULAR RATE: 99 BPM
GFR AFRICAN AMERICAN: >60 ML/MIN/1.73M2
GFR NON-AFRICAN AMERICAN: >60 ML/MIN/1.73M2
GLUCOSE BLD-MCNC: 182 MG/DL (ref 70–99)
HCT VFR BLD CALC: 33 % (ref 42–52)
HEMOGLOBIN: 10.6 GM/DL (ref 13.5–18)
MCH RBC QN AUTO: 29.5 PG (ref 27–31)
MCHC RBC AUTO-ENTMCNC: 32.1 % (ref 32–36)
MCV RBC AUTO: 91.9 FL (ref 78–100)
PDW BLD-RTO: 13.3 % (ref 11.7–14.9)
PLATELET # BLD: 215 K/CU MM (ref 140–440)
PMV BLD AUTO: 10.7 FL (ref 7.5–11.1)
POTASSIUM SERPL-SCNC: 4.7 MMOL/L (ref 3.5–5.1)
RBC # BLD: 3.59 M/CU MM (ref 4.6–6.2)
SODIUM BLD-SCNC: 138 MMOL/L (ref 135–145)
WBC # BLD: 15.2 K/CU MM (ref 4–10.5)

## 2022-02-22 PROCEDURE — 94664 DEMO&/EVAL PT USE INHALER: CPT

## 2022-02-22 PROCEDURE — 93010 ELECTROCARDIOGRAM REPORT: CPT | Performed by: INTERNAL MEDICINE

## 2022-02-22 PROCEDURE — 96375 TX/PRO/DX INJ NEW DRUG ADDON: CPT

## 2022-02-22 PROCEDURE — 94150 VITAL CAPACITY TEST: CPT

## 2022-02-22 PROCEDURE — 6360000002 HC RX W HCPCS

## 2022-02-22 PROCEDURE — 72131 CT LUMBAR SPINE W/O DYE: CPT

## 2022-02-22 PROCEDURE — 97530 THERAPEUTIC ACTIVITIES: CPT

## 2022-02-22 PROCEDURE — 94761 N-INVAS EAR/PLS OXIMETRY MLT: CPT

## 2022-02-22 PROCEDURE — G0378 HOSPITAL OBSERVATION PER HR: HCPCS | Performed by: PHYSICIAN ASSISTANT

## 2022-02-22 PROCEDURE — 6370000000 HC RX 637 (ALT 250 FOR IP)

## 2022-02-22 PROCEDURE — 89220 SPUTUM SPECIMEN COLLECTION: CPT

## 2022-02-22 PROCEDURE — 80048 BASIC METABOLIC PNL TOTAL CA: CPT

## 2022-02-22 PROCEDURE — 93005 ELECTROCARDIOGRAM TRACING: CPT | Performed by: PHYSICIAN ASSISTANT

## 2022-02-22 PROCEDURE — G0378 HOSPITAL OBSERVATION PER HR: HCPCS

## 2022-02-22 PROCEDURE — 97116 GAIT TRAINING THERAPY: CPT

## 2022-02-22 PROCEDURE — 97162 PT EVAL MOD COMPLEX 30 MIN: CPT

## 2022-02-22 PROCEDURE — 96374 THER/PROPH/DIAG INJ IV PUSH: CPT

## 2022-02-22 PROCEDURE — 85027 COMPLETE CBC AUTOMATED: CPT

## 2022-02-22 PROCEDURE — 6370000000 HC RX 637 (ALT 250 FOR IP): Performed by: ORTHOPAEDIC SURGERY

## 2022-02-22 RX ORDER — PREGABALIN 25 MG/1
75 CAPSULE ORAL 2 TIMES DAILY
Status: DISCONTINUED | OUTPATIENT
Start: 2022-02-22 | End: 2022-02-24 | Stop reason: HOSPADM

## 2022-02-22 RX ORDER — DEXAMETHASONE SODIUM PHOSPHATE 4 MG/ML
8 INJECTION, SOLUTION INTRA-ARTICULAR; INTRALESIONAL; INTRAMUSCULAR; INTRAVENOUS; SOFT TISSUE ONCE
Status: COMPLETED | OUTPATIENT
Start: 2022-02-22 | End: 2022-02-22

## 2022-02-22 RX ADMIN — TAMSULOSIN HYDROCHLORIDE 0.4 MG: 0.4 CAPSULE ORAL at 08:28

## 2022-02-22 RX ADMIN — OXYCODONE HYDROCHLORIDE 10 MG: 5 TABLET ORAL at 08:24

## 2022-02-22 RX ADMIN — BISACODYL 5 MG: 5 TABLET, COATED ORAL at 08:25

## 2022-02-22 RX ADMIN — OXYCODONE HYDROCHLORIDE 10 MG: 5 TABLET ORAL at 02:07

## 2022-02-22 RX ADMIN — HYDRALAZINE HYDROCHLORIDE 10 MG: 10 TABLET ORAL at 21:11

## 2022-02-22 RX ADMIN — GABAPENTIN 300 MG: 300 CAPSULE ORAL at 05:59

## 2022-02-22 RX ADMIN — OXYCODONE HYDROCHLORIDE 10 MG: 5 TABLET ORAL at 20:02

## 2022-02-22 RX ADMIN — PANTOPRAZOLE SODIUM 40 MG: 40 TABLET, DELAYED RELEASE ORAL at 08:28

## 2022-02-22 RX ADMIN — CEFAZOLIN SODIUM 2000 MG: 2 INJECTION, SOLUTION INTRAVENOUS at 03:41

## 2022-02-22 RX ADMIN — PREGABALIN 75 MG: 25 CAPSULE ORAL at 21:50

## 2022-02-22 RX ADMIN — ACETAMINOPHEN 650 MG: 325 TABLET ORAL at 11:33

## 2022-02-22 RX ADMIN — TAMSULOSIN HYDROCHLORIDE 0.4 MG: 0.4 CAPSULE ORAL at 21:11

## 2022-02-22 RX ADMIN — HYDRALAZINE HYDROCHLORIDE 10 MG: 10 TABLET ORAL at 08:28

## 2022-02-22 RX ADMIN — CITALOPRAM HYDROBROMIDE 10 MG: 20 TABLET ORAL at 08:25

## 2022-02-22 RX ADMIN — SENNOSIDES AND DOCUSATE SODIUM 1 TABLET: 50; 8.6 TABLET ORAL at 21:10

## 2022-02-22 RX ADMIN — CYCLOBENZAPRINE 10 MG: 10 TABLET, FILM COATED ORAL at 23:20

## 2022-02-22 RX ADMIN — METOPROLOL SUCCINATE 12.5 MG: 25 TABLET, EXTENDED RELEASE ORAL at 08:28

## 2022-02-22 RX ADMIN — PRAVASTATIN SODIUM 20 MG: 20 TABLET ORAL at 08:28

## 2022-02-22 RX ADMIN — DEXAMETHASONE SODIUM PHOSPHATE 8 MG: 4 INJECTION, SOLUTION INTRA-ARTICULAR; INTRALESIONAL; INTRAMUSCULAR; INTRAVENOUS; SOFT TISSUE at 03:41

## 2022-02-22 RX ADMIN — OXYCODONE HYDROCHLORIDE 10 MG: 5 TABLET ORAL at 13:20

## 2022-02-22 RX ADMIN — GABAPENTIN 300 MG: 300 CAPSULE ORAL at 13:20

## 2022-02-22 RX ADMIN — HYDROMORPHONE HYDROCHLORIDE 0.5 MG: 1 INJECTION, SOLUTION INTRAMUSCULAR; INTRAVENOUS; SUBCUTANEOUS at 15:56

## 2022-02-22 RX ADMIN — FINASTERIDE 5 MG: 5 TABLET, FILM COATED ORAL at 21:11

## 2022-02-22 RX ADMIN — CEFAZOLIN SODIUM 2000 MG: 2 INJECTION, SOLUTION INTRAVENOUS at 11:34

## 2022-02-22 RX ADMIN — CYCLOBENZAPRINE 10 MG: 10 TABLET, FILM COATED ORAL at 06:01

## 2022-02-22 RX ADMIN — SENNOSIDES AND DOCUSATE SODIUM 1 TABLET: 50; 8.6 TABLET ORAL at 08:28

## 2022-02-22 RX ADMIN — CYCLOBENZAPRINE 10 MG: 10 TABLET, FILM COATED ORAL at 14:10

## 2022-02-22 RX ADMIN — ACETAMINOPHEN 650 MG: 325 TABLET ORAL at 17:30

## 2022-02-22 ASSESSMENT — PAIN DESCRIPTION - PROGRESSION
CLINICAL_PROGRESSION: GRADUALLY IMPROVING
CLINICAL_PROGRESSION: GRADUALLY WORSENING
CLINICAL_PROGRESSION: GRADUALLY IMPROVING

## 2022-02-22 ASSESSMENT — PAIN SCALES - GENERAL
PAINLEVEL_OUTOF10: 7
PAINLEVEL_OUTOF10: 3
PAINLEVEL_OUTOF10: 7
PAINLEVEL_OUTOF10: 8
PAINLEVEL_OUTOF10: 6
PAINLEVEL_OUTOF10: 7
PAINLEVEL_OUTOF10: 9
PAINLEVEL_OUTOF10: 9
PAINLEVEL_OUTOF10: 7
PAINLEVEL_OUTOF10: 9

## 2022-02-22 ASSESSMENT — PAIN DESCRIPTION - LOCATION
LOCATION: BACK

## 2022-02-22 ASSESSMENT — PAIN DESCRIPTION - DESCRIPTORS
DESCRIPTORS: ACHING

## 2022-02-22 ASSESSMENT — PAIN DESCRIPTION - FREQUENCY
FREQUENCY: CONTINUOUS

## 2022-02-22 ASSESSMENT — PAIN DESCRIPTION - PAIN TYPE
TYPE: SURGICAL PAIN

## 2022-02-22 NOTE — PROGRESS NOTES
CT lumbar spine demonstrates left L4 screw in the lateral recess. We tried to redirect screw during surgery as previous hardware was very lateral so I advised patient that we should remove the L4 screw. Patient has large cage at L3/4 and all screws on the right are positioned well. We will span L3-L5 on the left and will not affect fusion rates. Patient was amenable to the plan and risks of surgery. We will do first case tomorrow.

## 2022-02-22 NOTE — OP NOTE
Operative Note      Patient: Kaylen Garcia  YOB: 1945  MRN: [id number]    Date of Procedure: 2/21/2022    Pre-Op Diagnosis: Spinal stenosis, lumbar region, with neurogenic claudication [M48.062] Sacroiliitis, not elsewhere classified ([facility] [address] [M46.1] Lumbar radiculopathy [M54.16]    Post-Op Diagnosis: Same       Procedure: Right sided approach for L3-4 extreme lateral interbody fusion, L3-4, L5-S1 posterior spinal fusion, bilateral open SI joint fusions, facetectomy and decompression left L5-S1    Surgeon(s):  Katheleen Fabry, MD    Assistant:   Physician Assistant: Natalia Aviles PA-C    Anesthesia: General    Estimated Blood Loss (mL): 985     Complications: None    Specimens:   * No specimens in log *    Implants:  Implant Name Type Inv. Item Serial No.  Lot No. LRB No. Used Action   MODULUS XLW [id number] 10 DEG INDIV ST PKG - [id number]  MODULUS XLW [id number] 10 DEG INDIV ST PKG  [employer]- [id number]  1 Implanted   PLATE SPNL MOD 12 MM MODULUS XLIF [id number] - [id number]  PLATE SPNL MOD 12 MM MODULUS XLIF [id number]  [employer]- [id number]  1 Implanted   PLATE SPNL MOD 12 MM MODULUS XLIF [id number] - [id number]  PLATE SPNL MOD 12 MM MODULUS XLIF [id number]  [employer]- [id number]  1 Implanted   SCREW SPNL L40MM DIA5. 5MM PEEK OPTMA OLI ANG COROENT XLF - [id number]  SCREW SPNL L40MM DIA5. 5MM PEEK OPTMA OLI ANG COROENT XLF  [employer]-WD   1 Implanted   GRAFT BONE M CELLULAR MTRX OSTEOCEL PRO - [id number]  GRAFT BONE M CELLULAR MTRX OSTEOCEL PRO [id number] [employer]-   1 Implanted   SCREW SPNL L45MM DIA6. 5MM POST THORACOLUMBOSACRAL POLYAX 2S - [id number]  SCREW SPNL L45MM DIA6. 5MM POST THORACOLUMBOSACRAL POLYAX 2S  [employer]-WD   6 Implanted   SCREW SPNL L40MM DIA6. 5MM POST THORACOLUMBOSACRAL POLYAX 2S - [id number]  SCREW SPNL L40MM DIA6. 5MM POST THORACOLUMBOSACRAL POLYAX 2S  [employer]-WD   2 Implanted   GRAFT BNE DEGENERATIVE BNE MTRX LG PROPEL - [id number] sites. Patient was given antibiotics 30 minutes prior to incision. We then made an incision with a 15 blade dissected down through the external and internal oblique muscles in the transversalis fascia. I then made a rent through the transversalis fascia and palpated the psoas muscle. I placed dilators down to use fluoroscopy in the lateral plane to localize the L3-4 level then placed a guidewire into the disc space and then under the lateral view we placed our retractors. We then placed the ALL retractor and performed our annulotomy following this we then performed a discectomy with pituitary rongeurs, Nick elevators, curettes. Then placed trial guides and then placed our cage packed with osteocell. We then placed the screw with a separate plate that was not welded to the cage at the L3 vertebral body to hold the cage in place. Following this we then controlled all bleeding with bipolar cautery. We then removed the retractors and made sure there was no bleeding after removing the retractors. We then closed the deep muscle layer of external obliques with 0 Vicryl to prevent any pseudohernia and then closed the deep fascial layer with #1 strata fix and then the subcutaneous tissue with 2-0 Vicryl and the skin with a running 3-0 Monocryl stitch. We placed Dermabond dressing. I then placed sterile dressings. Patient was then flipped onto four-point Ramiro Landau but all bony prominences padded hands were placed in superman position. We then prepped and draped the back in a sterile fashion. We began performed timeout for the posterior portion of the case. We then made incision through the previous incision with a 15 blade and dissected down Bovie cautery to the spinous process of L3 we then worked our way laterally, bilaterally out to the previous hardware. The hardware on the right side was noted to be put in extreme lateral positions we made a separate stab incision to remove the set screws and screws. We then remove the hardware on the left side with no issues. Following this we then placed our screws at L3-L4-L5 and S1 bilaterally. We redirected the screws at L4 and L5 bilaterally. All screws stimulated higher than 14 mA. We then following this performed the bilateral open SI joint fusions. We started on the right side. We made a  hole tween the S1 and S2 foramen and under fluoroscopy in the teardrop position advanced or tap as well as that then advanced the SI joint implant across the SI joint with no complications. We then following the same procedure for the left side with again no complications. Following this we then placed a temporary fernando on the right side L5 S1 to distract the L5-S1 disc space. I then with the [place] sono PET performed a facetectomy on the left side L5-S1. I then controlled all bleeding with bipolar cautery and then we performed our discectomy with paddle rosa and curettes. Following this it was noted that we violated the inferior endplate of L5 and I determined that if we were to place a cage with most likely subsidence we packed the L5-S1 disc space with osteocell allograft. I made sure that the left L5 nerve was in continuity by stimulating the nerve as well as making sure the nerve was well decompressed as this was patient's main complaint preoperatively. Following this we then decorticated the facet joints of L3-4 and the right side L5-S1 I laid bone fiber allograft as well as osteocyte allograft into the lamina and facet joints that were decorticated. Following this we then placed our rods and setscrews and final tightened the set screws. We then took fluoroscopy shots in AP and lateral planes and erythema was noted to be well aligned and placed. We then controlled all bleeding bipolar cautery placed a subfascial drain. We closed the deep muscle layer and then a fascial layer in two separate layers with #1 strata fix.   We then closed another deep layer with 0 Vicryl in the subcutaneous tissue with 2-0 Vicryl and the skin with 2-0 Prolene. We closed a separate stab incision with 0 Vicryl 2-0 Vicryl and 2-0 Prolene as well. We placed a drain stitch and Biopatch in place sterile dressings. Patient was then awoken of general anesthesia taken to PACU for recovery all counts correct x2. Neuro monitoring had no abnormalities noted throughout the case.     Electronically signed by Mary Borges MD on 2/22/2022 at 7:39 AM

## 2022-02-22 NOTE — PROGRESS NOTES
Progress Note    SUBJECTIVE  Patient is doing well. Patient states his left leg preoperative pain is much better now. He has some mild right thigh pain from the XLIF approach on the right side. OBJECTIVE    Physical    VITALS:  /70   Pulse 83   Temp 98.5 °F (36.9 °C) (Oral)   Resp 17   Ht 6' 2\" (1.88 m)   Wt 255 lb (115.7 kg)   SpO2 99%   BMI 32.74 kg/m²   DRAIN/TUBE OUTPUT:  Closed/Suction Drain Midline Back Accordion 10 Bengali-Output (ml): 200 ml  MUSCULOSKELETAL:   NEUROLOGIC:  Awake, alert, oriented to name, place and time. Cranial nerves II-XII are grossly intact. Motor is 5 out of 5 bilaterally. Cerebellar finger to nose, heel to shin intact. Sensory is intact. Babinski down going, Romberg negative, and gait is normal.     Labs:   CBC: Recent Labs     02/22/22  0332   WBC 15.2*   HGB 10.6*        CMP:    Recent Labs     02/22/22  0332      K 4.7      CO2 21   BUN 19   CREATININE 1.0   GLUCOSE 182*   CALCIUM 8.3     Troponin: No results for input(s): TROPONINI in the last 72 hours. BNP: No results for input(s): BNP in the last 72 hours. INR: No results for input(s): INR in the last 72 hours. Lipids: No results for input(s): CHOL, LDLDIRECT, TRIG, HDL, AMYLASE, LIPASE in the last 72 hours. Liver: No results for input(s): AST, ALT, ALKPHOS, PROT, LABALBU, BILITOT in the last 72 hours. Invalid input(s): BILDIR  Iron:  No results for input(s): IRONS, FERRITIN in the last 72 hours.     Invalid input(s): LABIRONS   Current Inpatient Medications      Current Facility-Administered Medications: sodium chloride flush 0.9 % injection 5-40 mL, 5-40 mL, IntraVENous, 2 times per day  sodium chloride flush 0.9 % injection 5-40 mL, 5-40 mL, IntraVENous, PRN  0.9 % sodium chloride infusion, 25 mL, IntraVENous, PRN  acetaminophen (TYLENOL) tablet 650 mg, 650 mg, Oral, Q6H  ondansetron (ZOFRAN-ODT) disintegrating tablet 4 mg, 4 mg, Oral, Q8H PRN **OR** ondansetron (ZOFRAN) injection 4 mg, 4 mg, IntraVENous, Q6H PRN  0.9 % sodium chloride infusion, , IntraVENous, Continuous  ceFAZolin (ANCEF) 2000 mg in dextrose 4 % 100 mL IVPB (premix), 2,000 mg, IntraVENous, Q8H  oxyCODONE (ROXICODONE) immediate release tablet 5 mg, 5 mg, Oral, Q4H PRN  HYDROmorphone (DILAUDID) injection 0.25 mg, 0.25 mg, IntraVENous, Q3H PRN **OR** HYDROmorphone (DILAUDID) injection 0.5 mg, 0.5 mg, IntraVENous, Q3H PRN  oxyCODONE (ROXICODONE) immediate release tablet 10 mg, 10 mg, Oral, Q4H PRN  cyclobenzaprine (FLEXERIL) tablet 10 mg, 10 mg, Oral, TID  bisacodyl (DULCOLAX) EC tablet 5 mg, 5 mg, Oral, Daily  sennosides-docusate sodium (SENOKOT-S) 8.6-50 MG tablet 1 tablet, 1 tablet, Oral, BID  citalopram (CELEXA) tablet 10 mg, 10 mg, Oral, Daily  finasteride (PROSCAR) tablet 5 mg, 5 mg, Oral, Nightly  hydrALAZINE (APRESOLINE) tablet 10 mg, 10 mg, Oral, BID  metoprolol succinate (TOPROL XL) extended release tablet 12.5 mg, 12.5 mg, Oral, Daily  pantoprazole (PROTONIX) tablet 40 mg, 40 mg, Oral, QAM AC  pravastatin (PRAVACHOL) tablet 20 mg, 20 mg, Oral, Daily  tamsulosin (FLOMAX) capsule 0.4 mg, 0.4 mg, Oral, BID  traMADol (ULTRAM) tablet 50 mg, 50 mg, Oral, Q6H PRN  gabapentin (NEURONTIN) capsule 300 mg, 300 mg, Oral, TID    ASSESSMENT AND PLAN      S/p L3/4 XLIF, hardware removal/reinsertion, L3/4, L5/S1 posterior spinal fusion, bilateral SI joint fusions    Monitor drain output. Drain put out 500 since post op. If less than 100ml over the next 6 hours we can pull drain. Post op radiographs look great. No issues. Patient states his preoperative pain is much better now. I advised patient PT will ambulate him today. If drain is appropriate and patient does well with PT I anticipate DC this afternoon versus tomorrow morning. I advised patient on bowel regimen. I will see him back in two weeks in the office for repeat evaluation.     Electronically signed by Timothy Hernandez MD on 2/22/22 at 7:35 AM EST

## 2022-02-22 NOTE — PROGRESS NOTES
Physical Therapy    Physical Therapy Treatment Note  Name: Andrew Cruz MRN: [id number] :   1945   Date:  2022   Admission Date: 2022 Room:  [place] [address]   Restrictions/Precautions:        s/p L3/4 XLIF, hardware removal/reinsertion, L3/4, L5/S1 posterior spinal fusion, and bilateral SI joint fusions. spinal precautions, wright, hemovac, general precautions  Communication with other providers:  Per nurse ok to tx and assisted with return to bed at end of tx. Subjective:  Patient states:  Pt motivated and agreeable but stated \" I really need to stay another night\". Pt reports having back surgery angie  and verbalized spine precautions and attempting to follow with transfers. Pain:   Location, Type, Intensity (0/10 to 10/10):  Pt rates pain 10 after amb and nurse checking on pain meds. Objective:    Observation:  Alert and oriented lying on right side at start of tx session. Pt's  at start of tx with pt in sup, 120 with sitting and 137 with gt. Treatment, including education/measures:  Pt educated on benefits of deep breathing and encouraged to deep breaths before, during and after gt. Sup <=> sit max assist and cues  Log rolling mod assist of 2 and scooting to [place] max/dependent of 2 with bed in trendelenburg. Sit<=>stand from elevated bed min assist and cues for hand placement   amb min assist 40' with one standing rest. Pt with very slow gt and appears to have hesitation with WB thru right LE with each step. Safety  Patient left safely in the bed, with call light/phone in reach with alarm applied. Gait belt and mask were used for transfers and gait. Assessment / Impression:       Patient's tolerance of treatment:  fair  Adverse Reaction: in creased pain after gt. Significant change in status and impact:  na  Barriers to improvement:  Pain, strength, and safety  Plan for Next Session:    Cont.  POC  Time in:  1330  Time out:  1410  Timed treatment minutes:  40  Total treatment time: 36    Previously filed items:  Social/Functional History  Lives With: Spouse  Type of Home: House  Home Layout: One level  Home Access: Stairs to enter with rails  Entrance Stairs - Number of Steps: 2  Bathroom Shower/Tub: Walk-in shower  Bathroom Toilet: Handicap height  Bathroom Equipment: Shower chair,Grab bars in [address] [employer]: Cane,Standard walker (does not use AD)  ADL Assistance: [place]: Independent  Homemaking Responsibilities: Yes  Ambulation Assistance: Independent  Transfer Assistance: Independent  Active : Yes  Short term goals  Time Frame for Short term goals: 1 week  Short term goal 1: Pt to complete all bed mobility mod I  Short term goal 2: Pt to complete all STS transfers to/from bed, commode, and chair CGA  Short term goal 3: Pt to ambulate 25' with LRAD CGA  Short term goal 4: Pt to ascend/descend 2 stairs with LRAD CGA       Electronically signed by:    Phuong Andrade PTA  2/22/2022, 1:11 PM

## 2022-02-22 NOTE — PROGRESS NOTES
This nurse notified CT of state CT scan ordered by  [facility].

## 2022-02-22 NOTE — CONSULTS
Yee, 1945, SD08/SD08-01, 2/22/2022    History  In[place]:  The encounter diagnosis was Encounter for preadmission testing. Patient  has a past medical history of A-fib ([facility] [address] Abnormal MRI, spine, Angina at rest [facility]), Arthritis, COPD (chronic obstructive pulmonary disease) ([facility] [address] Gastritis, GERD (gastroesophageal reflux disease), H/O 24 hour EKG monitoring, H/O cardiovascular stress test, H/O complete electrocardiogram, H/O CT scan of chest, H/O Doppler ultrasound, H/O Doppler ultrasound, H/O echocardiogram, H/O tilt table evaluation, Hiatal hernia, History of cardiac cath, History of nuclear stress test, Hx of cardiac cath, Hyperlipidemia, Hypertension, NSVT (nonsustained ventricular tachycardia) ([facility] [address] THOMAS on CPAP, Syncope and collapse, Unspecified sleep apnea, and Wears glasses. Patient  has a past surgical history that includes hernia repair; Diagnostic Cardiac Cath Lab Procedure (03/17/1999,11/19/2008); Tonsillectomy; back surgery; and ablation of dysrhythmic focus (01/14/2021). Subjective:  Patient states:  \"I'm sorry I couldn't do better. \"    Pain:  5/10 pain in back at sx site, 7/10 pain in L LE.     Communication with other providers:  Handoff to RN  Restrictions: spinal precautions, wright, hemovac, general precautions    Home Setup/Prior level of function  Social/Functional History  Lives With: Spouse  Type of Home: House  Home Layout: One level  Home Access: Stairs to enter with rails  Entrance Stairs - Number of Steps: 2  Bathroom Shower/Tub: Walk-in shower  Bathroom Toilet: Handicap height  Bathroom Equipment: Shower chair,Grab bars in [address]: Cane,Standard walker (does not use AD)  ADL Assistance: Independent  Homemaking Assistance: Independent  Homemaking Responsibilities: Yes  Ambulation Assistance: Independent  Transfer Assistance: Independent  Active : Yes    Examination of body systems (includes body structures/functions, activity/participation limitations):  · Observation:  Pt supine in bed with RN present upon arrival and agreeable to therapy  · Vision:  Wears glasses  · Hearing:  [employer] [place]  · Cardiopulmonary:  No O2 needs  · Cognition: WFL, see OT/SLP note for further evaluation. Musculoskeletal  · ROM R/L:  WFL. · Strength R/L:  4/5, moderate impairment in function and endurance. 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 HR increased to 141 bpm but recovered to 112 bpm after prolonged seated rest break. · Transfers: Pt completed STS to/from EOB min A x2 trials min A with cues for sequencing and hand placement  · Sitting balance:  good. · Standing balance:  fair. · Gait: Pt ambulated 5' with RW CGA then requested to return to sitting due to L LE pain. Pt ambulated with narrow KAILA, decreased rene, and decreased initiation of R LE advancement. [facility] 6 Clicks Inpatient Mobility:  AM-PAC Inpatient Mobility Raw Score : 12    Safety: patient left supine in bed, call light within reach, RN notified, gait belt used. Assessment:  Pt is a 78 y.o. male admitted to the hospital s/p L3/4 XLIF, hardware removal/reinsertion, L3/4, L5/S1 posterior spinal fusion, and bilateral SI joint fusions. Pt is typically independent with all ambulation and transfers without a device. Pt is currently performing bed mobility min A, transferring min A, and ambulating 5' with RW CGA. Pt is presenting with decreased endurance, increased pain, impaired bed mobility, impaired transfers, impaired gait, impaired balance, decreased strength. Pt would benefit from continued acute care PT as well as ARU placement upon discharge to continue to address impairments.      Complexity: moderate    Prognosis: Good, no significant barriers to participation at this time.      Plan Times per week: BID/week     Equipment: TBD at next level of care    Goals:  Short term goals  Time Frame for Short term goals: 1 week  Short term goal 1: Pt to complete all bed mobility mod I  Short term goal 2: Pt to complete all STS transfers to/from bed, commode, and chair CGA  Short term goal 3: Pt to ambulate 22' with LRAD CGA  Short term goal 4: Pt to ascend/descend 2 stairs with LRAD CGA       Treatment plan:  Bed mobility, transfers, balance, gait, TA, TX    Recommendations for NURSING mobility: stand pivot min A with gait belt and RW    Time:   Time in: 0833  Time out: 0906  Timed treatment minutes: 23  Total time: 33    Electronically signed by:    Darnell Ralph PT  2/22/2022, 9:52 AM

## 2022-02-22 NOTE — PROGRESS NOTES
Patient is complaining of pain in his left thigh and left calf when he stands and ambulates. Patient states he has no pain at rest and has 5/5 strength in all nerve distributions in the left lower extremity. I advised him we will obtain a CT scan of the lumbar spine to check screw position but I think he is having nerve irritation from the surgery. I have changed his gabapentin to lyrica to see if this helps we well.

## 2022-02-23 ENCOUNTER — ANESTHESIA EVENT (OUTPATIENT)
Dept: OPERATING ROOM | Age: 77
DRG: 454 | End: 2022-02-23
Payer: MEDICARE

## 2022-02-23 ENCOUNTER — APPOINTMENT (OUTPATIENT)
Dept: GENERAL RADIOLOGY | Age: 77
DRG: 454 | End: 2022-02-23
Attending: ORTHOPAEDIC SURGERY
Payer: MEDICARE

## 2022-02-23 ENCOUNTER — ANESTHESIA (OUTPATIENT)
Dept: OPERATING ROOM | Age: 77
DRG: 454 | End: 2022-02-23
Payer: MEDICARE

## 2022-02-23 VITALS
RESPIRATION RATE: 10 BRPM | TEMPERATURE: 98.9 F | OXYGEN SATURATION: 97 % | SYSTOLIC BLOOD PRESSURE: 137 MMHG | DIASTOLIC BLOOD PRESSURE: 71 MMHG

## 2022-02-23 LAB
ANION GAP SERPL CALCULATED.3IONS-SCNC: 11 MMOL/L (ref 4–16)
BASOPHILS ABSOLUTE: 0 K/CU MM
BASOPHILS RELATIVE PERCENT: 0.1 % (ref 0–1)
BUN BLDV-MCNC: 14 MG/DL (ref 6–23)
CALCIUM SERPL-MCNC: 7.7 MG/DL (ref 8.3–10.6)
CHLORIDE BLD-SCNC: 103 MMOL/L (ref 99–110)
CO2: 22 MMOL/L (ref 21–32)
CREAT SERPL-MCNC: 0.9 MG/DL (ref 0.9–1.3)
DIFFERENTIAL TYPE: ABNORMAL
EOSINOPHILS ABSOLUTE: 0 K/CU MM
EOSINOPHILS RELATIVE PERCENT: 0 % (ref 0–3)
ESTIMATED AVERAGE GLUCOSE: 117 MG/DL
GFR AFRICAN AMERICAN: >60 ML/MIN/1.73M2
GFR NON-AFRICAN AMERICAN: >60 ML/MIN/1.73M2
GLUCOSE BLD-MCNC: 140 MG/DL (ref 70–99)
GLUCOSE BLD-MCNC: 188 MG/DL (ref 70–99)
GLUCOSE BLD-MCNC: 204 MG/DL (ref 70–99)
HBA1C MFR BLD: 5.7 % (ref 4.2–6.3)
HCT VFR BLD CALC: 26.7 % (ref 42–52)
HCT VFR BLD CALC: 28.4 % (ref 42–52)
HEMOGLOBIN: 8.7 GM/DL (ref 13.5–18)
HEMOGLOBIN: 9.2 GM/DL (ref 13.5–18)
IMMATURE NEUTROPHIL %: 0.8 % (ref 0–0.43)
LYMPHOCYTES ABSOLUTE: 0.7 K/CU MM
LYMPHOCYTES RELATIVE PERCENT: 4.9 % (ref 24–44)
MAGNESIUM: 1.9 MG/DL (ref 1.8–2.4)
MCH RBC QN AUTO: 29.8 PG (ref 27–31)
MCHC RBC AUTO-ENTMCNC: 32.4 % (ref 32–36)
MCV RBC AUTO: 91.9 FL (ref 78–100)
MONOCYTES ABSOLUTE: 1 K/CU MM
MONOCYTES RELATIVE PERCENT: 7.4 % (ref 0–4)
NUCLEATED RBC %: 0 %
PDW BLD-RTO: 13.6 % (ref 11.7–14.9)
PLATELET # BLD: 185 K/CU MM (ref 140–440)
PMV BLD AUTO: 11.5 FL (ref 7.5–11.1)
POTASSIUM SERPL-SCNC: 4.3 MMOL/L (ref 3.5–5.1)
RBC # BLD: 3.09 M/CU MM (ref 4.6–6.2)
SEGMENTED NEUTROPHILS ABSOLUTE COUNT: 12.1 K/CU MM
SEGMENTED NEUTROPHILS RELATIVE PERCENT: 86.8 % (ref 36–66)
SODIUM BLD-SCNC: 136 MMOL/L (ref 135–145)
TOTAL IMMATURE NEUTOROPHIL: 0.11 K/CU MM
TOTAL NUCLEATED RBC: 0 K/CU MM
WBC # BLD: 14 K/CU MM (ref 4–10.5)

## 2022-02-23 PROCEDURE — 85018 HEMOGLOBIN: CPT

## 2022-02-23 PROCEDURE — 2500000003 HC RX 250 WO HCPCS: Performed by: ORTHOPAEDIC SURGERY

## 2022-02-23 PROCEDURE — 2709999900 HC NON-CHARGEABLE SUPPLY: Performed by: ORTHOPAEDIC SURGERY

## 2022-02-23 PROCEDURE — 3700000001 HC ADD 15 MINUTES (ANESTHESIA): Performed by: ORTHOPAEDIC SURGERY

## 2022-02-23 PROCEDURE — 6370000000 HC RX 637 (ALT 250 FOR IP): Performed by: PHYSICIAN ASSISTANT

## 2022-02-23 PROCEDURE — 2580000003 HC RX 258

## 2022-02-23 PROCEDURE — 2580000003 HC RX 258: Performed by: PHYSICIAN ASSISTANT

## 2022-02-23 PROCEDURE — 6370000000 HC RX 637 (ALT 250 FOR IP)

## 2022-02-23 PROCEDURE — 6360000002 HC RX W HCPCS

## 2022-02-23 PROCEDURE — 3600000012 HC SURGERY LEVEL 2 ADDTL 15MIN: Performed by: ORTHOPAEDIC SURGERY

## 2022-02-23 PROCEDURE — 7100000001 HC PACU RECOVERY - ADDTL 15 MIN: Performed by: ORTHOPAEDIC SURGERY

## 2022-02-23 PROCEDURE — 76000 FLUOROSCOPY <1 HR PHYS/QHP: CPT

## 2022-02-23 PROCEDURE — 97116 GAIT TRAINING THERAPY: CPT

## 2022-02-23 PROCEDURE — 82962 GLUCOSE BLOOD TEST: CPT

## 2022-02-23 PROCEDURE — 83735 ASSAY OF MAGNESIUM: CPT

## 2022-02-23 PROCEDURE — 97166 OT EVAL MOD COMPLEX 45 MIN: CPT

## 2022-02-23 PROCEDURE — 7100000000 HC PACU RECOVERY - FIRST 15 MIN: Performed by: ORTHOPAEDIC SURGERY

## 2022-02-23 PROCEDURE — 1200000000 HC SEMI PRIVATE

## 2022-02-23 PROCEDURE — 2780000010 HC IMPLANT OTHER: Performed by: ORTHOPAEDIC SURGERY

## 2022-02-23 PROCEDURE — 3600000002 HC SURGERY LEVEL 2 BASE: Performed by: ORTHOPAEDIC SURGERY

## 2022-02-23 PROCEDURE — 85025 COMPLETE CBC W/AUTO DIFF WBC: CPT

## 2022-02-23 PROCEDURE — 99024 POSTOP FOLLOW-UP VISIT: CPT | Performed by: PHYSICIAN ASSISTANT

## 2022-02-23 PROCEDURE — 85014 HEMATOCRIT: CPT

## 2022-02-23 PROCEDURE — 80048 BASIC METABOLIC PNL TOTAL CA: CPT

## 2022-02-23 PROCEDURE — 97530 THERAPEUTIC ACTIVITIES: CPT

## 2022-02-23 PROCEDURE — 83036 HEMOGLOBIN GLYCOSYLATED A1C: CPT

## 2022-02-23 PROCEDURE — 0QP004Z REMOVAL OF INTERNAL FIXATION DEVICE FROM LUMBAR VERTEBRA, OPEN APPROACH: ICD-10-PCS | Performed by: ORTHOPAEDIC SURGERY

## 2022-02-23 PROCEDURE — 2500000003 HC RX 250 WO HCPCS

## 2022-02-23 PROCEDURE — 2720000010 HC SURG SUPPLY STERILE: Performed by: ORTHOPAEDIC SURGERY

## 2022-02-23 PROCEDURE — 3700000000 HC ANESTHESIA ATTENDED CARE: Performed by: ORTHOPAEDIC SURGERY

## 2022-02-23 PROCEDURE — 97164 PT RE-EVAL EST PLAN CARE: CPT

## 2022-02-23 RX ORDER — SODIUM CHLORIDE 9 MG/ML
25 INJECTION, SOLUTION INTRAVENOUS PRN
Status: DISCONTINUED | OUTPATIENT
Start: 2022-02-23 | End: 2022-02-23

## 2022-02-23 RX ORDER — SODIUM CHLORIDE 0.9 % (FLUSH) 0.9 %
5-40 SYRINGE (ML) INJECTION PRN
Status: DISCONTINUED | OUTPATIENT
Start: 2022-02-23 | End: 2022-02-23

## 2022-02-23 RX ORDER — ONDANSETRON 2 MG/ML
4 INJECTION INTRAMUSCULAR; INTRAVENOUS
Status: DISCONTINUED | OUTPATIENT
Start: 2022-02-23 | End: 2022-02-23

## 2022-02-23 RX ORDER — TRAMADOL HYDROCHLORIDE 50 MG/1
100 TABLET ORAL PRN
Status: DISCONTINUED | OUTPATIENT
Start: 2022-02-23 | End: 2022-02-23

## 2022-02-23 RX ORDER — HYDROMORPHONE HCL 110MG/55ML
0.25 PATIENT CONTROLLED ANALGESIA SYRINGE INTRAVENOUS EVERY 5 MIN PRN
Status: DISCONTINUED | OUTPATIENT
Start: 2022-02-23 | End: 2022-02-23

## 2022-02-23 RX ORDER — SODIUM CHLORIDE 0.9 % (FLUSH) 0.9 %
5-40 SYRINGE (ML) INJECTION EVERY 12 HOURS SCHEDULED
Status: DISCONTINUED | OUTPATIENT
Start: 2022-02-23 | End: 2022-02-23

## 2022-02-23 RX ORDER — CEFAZOLIN SODIUM 2 G/100ML
2000 INJECTION, SOLUTION INTRAVENOUS
Status: DISCONTINUED | OUTPATIENT
Start: 2022-02-23 | End: 2022-02-23

## 2022-02-23 RX ORDER — FENTANYL CITRATE 50 UG/ML
50 INJECTION, SOLUTION INTRAMUSCULAR; INTRAVENOUS EVERY 5 MIN PRN
Status: DISCONTINUED | OUTPATIENT
Start: 2022-02-23 | End: 2022-02-23

## 2022-02-23 RX ORDER — HYDRALAZINE HYDROCHLORIDE 20 MG/ML
10 INJECTION INTRAMUSCULAR; INTRAVENOUS
Status: DISCONTINUED | OUTPATIENT
Start: 2022-02-23 | End: 2022-02-23

## 2022-02-23 RX ORDER — SODIUM CHLORIDE, SODIUM LACTATE, POTASSIUM CHLORIDE, CALCIUM CHLORIDE 600; 310; 30; 20 MG/100ML; MG/100ML; MG/100ML; MG/100ML
INJECTION, SOLUTION INTRAVENOUS CONTINUOUS PRN
Status: DISCONTINUED | OUTPATIENT
Start: 2022-02-23 | End: 2022-02-23 | Stop reason: SDUPTHER

## 2022-02-23 RX ORDER — TRAMADOL HYDROCHLORIDE 50 MG/1
50 TABLET ORAL PRN
Status: DISCONTINUED | OUTPATIENT
Start: 2022-02-23 | End: 2022-02-23

## 2022-02-23 RX ORDER — DEXTROSE MONOHYDRATE 50 MG/ML
100 INJECTION, SOLUTION INTRAVENOUS PRN
Status: DISCONTINUED | OUTPATIENT
Start: 2022-02-23 | End: 2022-02-24 | Stop reason: HOSPADM

## 2022-02-23 RX ORDER — MEPERIDINE HYDROCHLORIDE 25 MG/ML
12.5 INJECTION INTRAMUSCULAR; INTRAVENOUS; SUBCUTANEOUS EVERY 5 MIN PRN
Status: DISCONTINUED | OUTPATIENT
Start: 2022-02-23 | End: 2022-02-23

## 2022-02-23 RX ORDER — PROCHLORPERAZINE EDISYLATE 5 MG/ML
5 INJECTION INTRAMUSCULAR; INTRAVENOUS
Status: DISCONTINUED | OUTPATIENT
Start: 2022-02-23 | End: 2022-02-23

## 2022-02-23 RX ORDER — FENTANYL CITRATE 50 UG/ML
INJECTION, SOLUTION INTRAMUSCULAR; INTRAVENOUS PRN
Status: DISCONTINUED | OUTPATIENT
Start: 2022-02-23 | End: 2022-02-23 | Stop reason: SDUPTHER

## 2022-02-23 RX ORDER — DEXTROSE MONOHYDRATE 25 G/50ML
12.5 INJECTION, SOLUTION INTRAVENOUS PRN
Status: DISCONTINUED | OUTPATIENT
Start: 2022-02-23 | End: 2022-02-24 | Stop reason: HOSPADM

## 2022-02-23 RX ORDER — DEXAMETHASONE SODIUM PHOSPHATE 4 MG/ML
INJECTION, SOLUTION INTRA-ARTICULAR; INTRALESIONAL; INTRAMUSCULAR; INTRAVENOUS; SOFT TISSUE PRN
Status: DISCONTINUED | OUTPATIENT
Start: 2022-02-23 | End: 2022-02-23 | Stop reason: SDUPTHER

## 2022-02-23 RX ORDER — ROCURONIUM BROMIDE 10 MG/ML
INJECTION, SOLUTION INTRAVENOUS PRN
Status: DISCONTINUED | OUTPATIENT
Start: 2022-02-23 | End: 2022-02-23 | Stop reason: SDUPTHER

## 2022-02-23 RX ORDER — PROPOFOL 10 MG/ML
INJECTION, EMULSION INTRAVENOUS PRN
Status: DISCONTINUED | OUTPATIENT
Start: 2022-02-23 | End: 2022-02-23 | Stop reason: SDUPTHER

## 2022-02-23 RX ORDER — CEFAZOLIN SODIUM 1 G/50ML
INJECTION, SOLUTION INTRAVENOUS PRN
Status: DISCONTINUED | OUTPATIENT
Start: 2022-02-23 | End: 2022-02-23 | Stop reason: SDUPTHER

## 2022-02-23 RX ORDER — BUPIVACAINE HYDROCHLORIDE 5 MG/ML
INJECTION, SOLUTION PERINEURAL
Status: COMPLETED | OUTPATIENT
Start: 2022-02-23 | End: 2022-02-23

## 2022-02-23 RX ORDER — DIPHENHYDRAMINE HYDROCHLORIDE 50 MG/ML
12.5 INJECTION INTRAMUSCULAR; INTRAVENOUS
Status: DISCONTINUED | OUTPATIENT
Start: 2022-02-23 | End: 2022-02-23

## 2022-02-23 RX ORDER — NICOTINE POLACRILEX 4 MG
15 LOZENGE BUCCAL PRN
Status: DISCONTINUED | OUTPATIENT
Start: 2022-02-23 | End: 2022-02-24 | Stop reason: HOSPADM

## 2022-02-23 RX ORDER — MIDAZOLAM HYDROCHLORIDE 2 MG/2ML
2 INJECTION, SOLUTION INTRAMUSCULAR; INTRAVENOUS
Status: DISCONTINUED | OUTPATIENT
Start: 2022-02-23 | End: 2022-02-23

## 2022-02-23 RX ORDER — LIDOCAINE HYDROCHLORIDE 20 MG/ML
INJECTION, SOLUTION INTRAVENOUS PRN
Status: DISCONTINUED | OUTPATIENT
Start: 2022-02-23 | End: 2022-02-23 | Stop reason: SDUPTHER

## 2022-02-23 RX ORDER — ONDANSETRON 2 MG/ML
INJECTION INTRAMUSCULAR; INTRAVENOUS PRN
Status: DISCONTINUED | OUTPATIENT
Start: 2022-02-23 | End: 2022-02-23 | Stop reason: SDUPTHER

## 2022-02-23 RX ADMIN — BISACODYL 5 MG: 5 TABLET, COATED ORAL at 10:33

## 2022-02-23 RX ADMIN — ACETAMINOPHEN 650 MG: 325 TABLET ORAL at 00:14

## 2022-02-23 RX ADMIN — TRAMADOL HYDROCHLORIDE 50 MG: 50 TABLET, COATED ORAL at 13:06

## 2022-02-23 RX ADMIN — OXYCODONE 5 MG: 5 TABLET ORAL at 20:43

## 2022-02-23 RX ADMIN — ACETAMINOPHEN 650 MG: 325 TABLET ORAL at 20:42

## 2022-02-23 RX ADMIN — METOPROLOL SUCCINATE 12.5 MG: 25 TABLET, EXTENDED RELEASE ORAL at 10:36

## 2022-02-23 RX ADMIN — CEFAZOLIN SODIUM 2 G: 1 INJECTION, SOLUTION INTRAVENOUS at 07:45

## 2022-02-23 RX ADMIN — OXYCODONE 5 MG: 5 TABLET ORAL at 15:06

## 2022-02-23 RX ADMIN — HYDRALAZINE HYDROCHLORIDE 10 MG: 10 TABLET ORAL at 20:49

## 2022-02-23 RX ADMIN — PRAVASTATIN SODIUM 20 MG: 20 TABLET ORAL at 10:36

## 2022-02-23 RX ADMIN — INSULIN LISPRO 1 UNITS: 100 INJECTION, SOLUTION INTRAVENOUS; SUBCUTANEOUS at 20:38

## 2022-02-23 RX ADMIN — HYDRALAZINE HYDROCHLORIDE 10 MG: 10 TABLET ORAL at 10:34

## 2022-02-23 RX ADMIN — DEXAMETHASONE SODIUM PHOSPHATE 4 MG: 4 INJECTION, SOLUTION INTRA-ARTICULAR; INTRALESIONAL; INTRAMUSCULAR; INTRAVENOUS; SOFT TISSUE at 07:45

## 2022-02-23 RX ADMIN — Medication 10 ML: at 20:45

## 2022-02-23 RX ADMIN — TAMSULOSIN HYDROCHLORIDE 0.4 MG: 0.4 CAPSULE ORAL at 10:57

## 2022-02-23 RX ADMIN — LIDOCAINE HYDROCHLORIDE 100 MG: 20 INJECTION, SOLUTION INTRAVENOUS at 07:36

## 2022-02-23 RX ADMIN — FENTANYL CITRATE 100 MCG: 50 INJECTION, SOLUTION INTRAMUSCULAR; INTRAVENOUS at 07:36

## 2022-02-23 RX ADMIN — OXYCODONE 5 MG: 5 TABLET ORAL at 10:40

## 2022-02-23 RX ADMIN — HYDROMORPHONE HYDROCHLORIDE 0.5 MG: 1 INJECTION, SOLUTION INTRAMUSCULAR; INTRAVENOUS; SUBCUTANEOUS at 05:48

## 2022-02-23 RX ADMIN — CYCLOBENZAPRINE 10 MG: 10 TABLET, FILM COATED ORAL at 10:32

## 2022-02-23 RX ADMIN — SODIUM CHLORIDE, POTASSIUM CHLORIDE, SODIUM LACTATE AND CALCIUM CHLORIDE: 600; 310; 30; 20 INJECTION, SOLUTION INTRAVENOUS at 07:31

## 2022-02-23 RX ADMIN — CYCLOBENZAPRINE 10 MG: 10 TABLET, FILM COATED ORAL at 15:06

## 2022-02-23 RX ADMIN — TRAMADOL HYDROCHLORIDE 50 MG: 50 TABLET, COATED ORAL at 23:51

## 2022-02-23 RX ADMIN — OXYCODONE HYDROCHLORIDE 10 MG: 5 TABLET ORAL at 00:13

## 2022-02-23 RX ADMIN — PANTOPRAZOLE SODIUM 40 MG: 40 TABLET, DELAYED RELEASE ORAL at 10:40

## 2022-02-23 RX ADMIN — SENNOSIDES AND DOCUSATE SODIUM 1 TABLET: 50; 8.6 TABLET ORAL at 10:36

## 2022-02-23 RX ADMIN — SUGAMMADEX 200 MG: 100 INJECTION, SOLUTION INTRAVENOUS at 08:53

## 2022-02-23 RX ADMIN — PROPOFOL 150 MG: 10 INJECTION, EMULSION INTRAVENOUS at 07:36

## 2022-02-23 RX ADMIN — SENNOSIDES AND DOCUSATE SODIUM 1 TABLET: 50; 8.6 TABLET ORAL at 20:42

## 2022-02-23 RX ADMIN — Medication 10 ML: at 00:15

## 2022-02-23 RX ADMIN — ACETAMINOPHEN 650 MG: 325 TABLET ORAL at 13:02

## 2022-02-23 RX ADMIN — ROCURONIUM BROMIDE 50 MG: 50 INJECTION, SOLUTION INTRAVENOUS at 07:36

## 2022-02-23 RX ADMIN — TAMSULOSIN HYDROCHLORIDE 0.4 MG: 0.4 CAPSULE ORAL at 20:43

## 2022-02-23 RX ADMIN — CITALOPRAM HYDROBROMIDE 10 MG: 20 TABLET ORAL at 10:34

## 2022-02-23 RX ADMIN — CYCLOBENZAPRINE 10 MG: 10 TABLET, FILM COATED ORAL at 20:44

## 2022-02-23 RX ADMIN — ONDANSETRON 4 MG: 2 INJECTION INTRAMUSCULAR; INTRAVENOUS at 07:45

## 2022-02-23 RX ADMIN — PREGABALIN 75 MG: 25 CAPSULE ORAL at 20:42

## 2022-02-23 RX ADMIN — FINASTERIDE 5 MG: 5 TABLET, FILM COATED ORAL at 20:43

## 2022-02-23 RX ADMIN — PREGABALIN 75 MG: 25 CAPSULE ORAL at 10:32

## 2022-02-23 ASSESSMENT — PAIN DESCRIPTION - LOCATION
LOCATION: LEG
LOCATION: BACK

## 2022-02-23 ASSESSMENT — PULMONARY FUNCTION TESTS
PIF_VALUE: 16
PIF_VALUE: 17
PIF_VALUE: 19
PIF_VALUE: 19
PIF_VALUE: 18
PIF_VALUE: 27
PIF_VALUE: 6
PIF_VALUE: 5
PIF_VALUE: 15
PIF_VALUE: 15
PIF_VALUE: 2
PIF_VALUE: 14
PIF_VALUE: 15
PIF_VALUE: 15
PIF_VALUE: 19
PIF_VALUE: 18
PIF_VALUE: 15
PIF_VALUE: 15
PIF_VALUE: 0
PIF_VALUE: 19
PIF_VALUE: 18
PIF_VALUE: 15
PIF_VALUE: 14
PIF_VALUE: 19
PIF_VALUE: 18
PIF_VALUE: 7
PIF_VALUE: 19
PIF_VALUE: 4
PIF_VALUE: 15
PIF_VALUE: 15
PIF_VALUE: 18
PIF_VALUE: 0
PIF_VALUE: 13
PIF_VALUE: 18
PIF_VALUE: 2
PIF_VALUE: 0
PIF_VALUE: 19
PIF_VALUE: 15
PIF_VALUE: 15
PIF_VALUE: 18
PIF_VALUE: 1
PIF_VALUE: 15
PIF_VALUE: 15
PIF_VALUE: 3
PIF_VALUE: 15
PIF_VALUE: 14
PIF_VALUE: 18
PIF_VALUE: 15
PIF_VALUE: 15
PIF_VALUE: 13
PIF_VALUE: 18
PIF_VALUE: 15
PIF_VALUE: 14
PIF_VALUE: 14
PIF_VALUE: 15
PIF_VALUE: 11
PIF_VALUE: 17
PIF_VALUE: 9
PIF_VALUE: 2
PIF_VALUE: 18
PIF_VALUE: 19
PIF_VALUE: 17
PIF_VALUE: 18
PIF_VALUE: 0
PIF_VALUE: 15
PIF_VALUE: 17
PIF_VALUE: 18
PIF_VALUE: 9
PIF_VALUE: 17
PIF_VALUE: 19
PIF_VALUE: 15
PIF_VALUE: 1
PIF_VALUE: 15
PIF_VALUE: 18
PIF_VALUE: 19
PIF_VALUE: 18
PIF_VALUE: 19
PIF_VALUE: 18
PIF_VALUE: 19

## 2022-02-23 ASSESSMENT — PAIN DESCRIPTION - PAIN TYPE
TYPE: CHRONIC PAIN;SURGICAL PAIN
TYPE: SURGICAL PAIN
TYPE: ACUTE PAIN
TYPE: SURGICAL PAIN

## 2022-02-23 ASSESSMENT — PAIN DESCRIPTION - PROGRESSION
CLINICAL_PROGRESSION: NOT CHANGED
CLINICAL_PROGRESSION: GRADUALLY WORSENING
CLINICAL_PROGRESSION: NOT CHANGED
CLINICAL_PROGRESSION: NOT CHANGED

## 2022-02-23 ASSESSMENT — PAIN DESCRIPTION - DESCRIPTORS
DESCRIPTORS: SHARP
DESCRIPTORS: ACHING
DESCRIPTORS: ACHING
DESCRIPTORS: ACHING;CONSTANT

## 2022-02-23 ASSESSMENT — PAIN SCALES - GENERAL
PAINLEVEL_OUTOF10: 7
PAINLEVEL_OUTOF10: 5
PAINLEVEL_OUTOF10: 7
PAINLEVEL_OUTOF10: 5
PAINLEVEL_OUTOF10: 7
PAINLEVEL_OUTOF10: 5
PAINLEVEL_OUTOF10: 9
PAINLEVEL_OUTOF10: 7
PAINLEVEL_OUTOF10: 7
PAINLEVEL_OUTOF10: 5

## 2022-02-23 ASSESSMENT — PAIN DESCRIPTION - ORIENTATION
ORIENTATION: LEFT;LOWER
ORIENTATION: LEFT;LOWER
ORIENTATION: RIGHT

## 2022-02-23 ASSESSMENT — PAIN DESCRIPTION - FREQUENCY
FREQUENCY: CONTINUOUS

## 2022-02-23 ASSESSMENT — ENCOUNTER SYMPTOMS: SHORTNESS OF BREATH: 1

## 2022-02-23 NOTE — PROGRESS NOTES
1300 Pt. C/o pain to LLE, pt. Able to bend and flex BLE appropriately, pt. Continues with the SCDs, pt. Notified Dr. Hunter Painting. LLE is not red, swollen or warm to the touch. Normal skin color is present. Will continue to monitor.

## 2022-02-23 NOTE — PROGRESS NOTES
crossing legs into \"figure 4 position\")  · UB dressing: SBA (donning clean robe seated EOB)  · LB dressing: CGA (light dynamic standing balance with clothing mgmt to hips, able to don BL socks seated EOB SBA by crossing legs into \"figure 4 position\")  · Toileting: CGA    Cognitive and Psychosocial Functioning:  · Overall cognitive status: WNL  · Affect: Normal     Balance:   · Sitting: SBA in unsupported sitting EOB  · Standing: SBA with RW    Functional Mobility:  · Bed Mobility: SBA supine to sitting EOB via log roll technique (min cues for bending Lt knee/reaching for bed rail), SBA sitting EOB to supine via reverse log roll technique, SBA scooting hips up in bed using bed features with min coaching   · Transfers: CGA to/from bed (min cues for reaching back with arms during controlled descent)  · Ambulation: CGA with  ft in hallway; decreased speed and step length but steady throughout (See PT note for full gait assessment)      AM-PAC 6 click short form for inpatient daily activity:   How much help from another person does the patient currently need. .. Unable  Dep A Lot  Max A A Lot   Mod A A Little  Min A A Little   CGA  SBA None   Mod I  Indep  Sup   1. Putting on and taking off regular lower body clothing? [] 1    [] 2   [] 2   [] 3   [x] 3   [] 4      2. Bathing (including washing, rinsing, drying)? [] 1   [] 2   [] 2 [] 3 [x] 3 [] 4   3. Toileting, which includes using toilet, bedpan, or urinal? [] 1    [] 2   [] 2   [] 3   [x] 3   [] 4     4. Putting on and taking off regular upper body clothing? [] 1   [] 2   [] 2   [] 3   [x] 3    [] 4      5. Taking care of personal grooming such as brushing teeth? [] 1   [] 2    [] 2 [] 3    [] 3   [x] 4      6. Eating meals?    [] 1   [] 2   [] 2   [] 3   [] 3   [x] 4      Raw Score:  20    [24=0% impaired(CH), 23=1-19%(CI), 20-22=20-39%(CJ), 15-19=40-59%(CK), 10-14=60-79%(CL), 7-9=80-99%(CM), 6=100%(CN)]     Treatment:  Therapeutic Activity Training: Therapeutic activity training was instructed today. Cues were given for safety, sequence, UE/LE placement, awareness, and balance. Activities performed today included bed mobility training using log roll technique, UB/LB dressing tasks, transfer training, household mobility with RW, education on role of OT, POC, importance of EOB/OOB activity, spinal precautions, log rolling, modifying LB ADLs, use of RW, d/c planning      Safety Measures: Gait belt used, Left in Bed, Alarm in place    Assessment:  Pt is a 78year old male with a past medical history of A-fib ([facility] [address] Abnormal MRI, spine, Angina at rest [facility]), Arthritis, COPD (chronic obstructive pulmonary disease) ([facility] [address] Gastritis, GERD (gastroesophageal reflux disease), H/O 24 hour EKG monitoring, H/O cardiovascular stress test, H/O complete electrocardiogram, H/O CT scan of chest, H/O Doppler ultrasound, H/O Doppler ultrasound, H/O echocardiogram, H/O tilt table evaluation, Hiatal hernia, History of cardiac cath, History of nuclear stress test, Hx of cardiac cath, Hyperlipidemia, Hypertension, NSVT (nonsustained ventricular tachycardia) ([facility] [address] THOMAS on CPAP, Syncope and collapse, Unspecified sleep apnea, and Wears glasses. Pt admitted with spinal stenosis and underwent Rt L3-L4 extreme lateral interbody fusion, L5-S1 spinal fusion, bilateral SI joint fusions, facetectomy and decompression Lt L5-S1 on 2-21. Pt underwent additional Lt L4 screw removal on 2-23 in the AM. Pt lives at home with his wife and at baseline is fully independent with ADLs, IADLs, mobility, driving, and works as a  instructor. Pt performed well this date, and from a self-care and mobility standpoint, is safe to return home at discharge with initial 24 hour supervision, [facility] OT services, and a rolling walker recommended. Complexity: Moderate  Prognosis: Good  Plan: 2+x/week      Goals:  1.  Pt will complete all aspects of bed mobility for EOB/OOB ADLs mod I with HOB flat using log roll technique   2. Pt will complete UB/LB bathing with supervision  3. Pt will complete all aspects of LB dressing with supervision  4. Pt will complete all functional transfers to and from bed, chair, toilet, shower chair with supervision/good safety awareness  5. Pt will ambulate HH distance to bathroom for toileting with supervision using RW  6. Pt will complete all aspects of toileting task with supervision  7. Pt will complete oral hygiene/grooming routine in standing at sink with supervision  8.  Pt will verbalize and be compliant with 3/3 spinal precautions 100% of the time      Time:   Time in: 1310  Time out: 1330  Timed treatment minutes: 10  Total time: 20      Electronically signed by:    LORETO Kimball/L, [address] [place], [place] [id number]

## 2022-02-23 NOTE — CARE COORDINATION
MICHELLE was notified that pt will need a rolling walker at discharge. MICHELLE contacted Tahira Horton, for walker. Lizz Collazo stated that walker will be delivered to pt hospital room tomorrow morning. CM informed her that pt is staying same day surgery overnight.

## 2022-02-23 NOTE — PROGRESS NOTES
0900 - transferred from OR on bed, monitor applied, alarms on and verified, monitor applied, alarms on and verified, bedside handoff provided by Schuyler Davis CRNA  [id number] - Dr Mckayla Osman and Charline SESAY at bedside evaluating patient  [phone] - tolerating ice chips; repositioned, linens straightened; patient tolerated well  0932 - phase one care complete; transferred to [place] [address]

## 2022-02-23 NOTE — ANESTHESIA PRE PROCEDURE
Department of Anesthesiology  Preprocedure Note       Name:  Anahi Luis   Age:  78 y.o.  :  1945                                          MRN:  [id number]         Date:  2022      Surgeon: Josse Blanc):  Sarthak Greco MD    Procedure: Procedure(s):  LEFT L4 SCREW REMOVAL    Medications prior to admission:   Prior to Admission medications    Medication Sig Start Date End Date Taking? Authorizing Provider   metoprolol succinate (TOPROL XL) 25 MG extended release tablet Take 0.5 tablets by mouth daily 22  Yes Wicho Rincon MD   omeprazole (PRILOSEC) 20 MG delayed release capsule Take 40 mg by mouth Daily   Yes Historical Provider, MD   finasteride (PROSCAR) 5 MG tablet Take 5 mg by mouth at bedtime   Yes Historical Provider, MD   pravastatin (PRAVACHOL) 20 MG tablet TAKE 1 TABLET BY MOUTH DAILY 1/3/22  Yes Joi Cruz APRN - CNP   hydrALAZINE (APRESOLINE) 10 MG tablet Take 1 tablet by mouth 2 times daily 21  Yes Wicho Rincon MD   pantoprazole (PROTONIX) 40 MG tablet Take 1 tablet by mouth daily 1/15/21  Yes Joi Cruz APRN - CNP   gabapentin (NEURONTIN) 300 MG capsule Take 300 mg by mouth 3 times daily. Yes Historical Provider, MD   Lactobacillus (PROBIOTIC ACIDOPHILUS PO) Take by mouth daily   Yes Historical Provider, MD   tamsulosin (FLOMAX) 0.4 MG capsule 1 tablet 2 times daily 20  Yes Historical Provider, MD   apixaban (ELIQUIS) 5 MG TABS tablet Take 1 tablet by mouth 2 times daily 20  Yes Suellyn Severe, APRN - CNP   aspirin 81 MG chewable tablet Take 1 tablet by mouth daily 20  Yes Miguel Livingston MD   citalopram (CELEXA) 20 MG tablet Take 10 mg by mouth daily  14  Yes Historical Provider, MD   traMADol (ULTRAM) 50 MG tablet Take 50 mg by mouth every 6 hours as needed.   14  Yes Historical Provider, MD   apixaban (ELIQUIS) 5 MG TABS tablet Take 1 tablet by mouth 2 times daily 2/2/22 3/4/22  Wicho Rincon MD       Current medications:    Current Facility-Administered Medications   Medication Dose Route Frequency Provider Last Rate Last Admin    sodium chloride flush 0.9 % injection 5-40 mL  5-40 mL IntraVENous 2 times per day Patrice Benavides MD        sodium chloride flush 0.9 % injection 5-40 mL  5-40 mL IntraVENous PRN Patrice Benavides MD        0.9 % sodium chloride infusion  25 mL IntraVENous PRN Star MD Kennedy        meperidine (DEMEROL) injection 12.5 mg  12.5 mg IntraVENous Q5 Min PRN Star Box, MD        HYDROmorphone (DILAUDID) injection 0.25 mg  0.25 mg IntraVENous Q5 Min PRN Star Kennedy, MD        fentaNYL (SUBLIMAZE) injection 50 mcg  50 mcg IntraVENous Q5 Min PRN Star Kennedy, MD        traMADol Kayleen Needle) tablet 50 mg  50 mg Oral PRN Patrice Benavides, MD        Or    traMADol Kayleen Needle) tablet 100 mg  100 mg Oral PRN Patrice Benavides, MD        [facility] injection 4 mg  4 mg IntraVENous Once PRN Star MD Kennedy        prochlorperazine (COMPAZINE) injection 5 mg  5 mg IntraVENous Once PRN Star Box, MD        midazolam PF (VERSED) injection 2 mg  2 mg IntraVENous Once PRN Star MD Kennedy        diphenhydrAMINE (BENADRYL) injection 12.5 mg  12.5 mg IntraVENous Once PRN Star MD Kennedy        hydrALAZINE (APRESOLINE) injection 10 mg  10 mg IntraVENous Q15 Min PRN Patrice Benavides MD        pregabalin (LYRICA) capsule 75 mg  75 mg Oral BID Rufina Sy MD   75 mg at 02/22/22 2150    sodium chloride flush 0.9 % injection 5-40 mL  5-40 mL IntraVENous 2 times per day Carmelo Peralta PA-C   10 mL at 02/23/22 0015    sodium chloride flush 0.9 % injection 5-40 mL  5-40 mL IntraVENous PRN Carmelo Peralta PA-C        0.9 % sodium chloride infusion  25 mL IntraVENous PRN Carmelo Peralta PA-C        acetaminophen (TYLENOL) tablet 650 mg  650 mg Oral Q6H Danuta Cobos PA-C   650 mg at 02/23/22 0014    ondansetron (ZOFRAN-ODT) disintegrating tablet 4 mg  4 mg Oral Q8H PRN Carmelo Peralta PA-C        Or    ondansetron [facility]) injection 4 mg  4 mg IntraVENous Q6H PRN Ken Singh, PA-C        oxyCODONE (ROXICODONE) immediate release tablet 5 mg  5 mg Oral Q4H PRN Ken Singh, PA-C        HYDROmorphone (DILAUDID) injection 0.25 mg  0.25 mg IntraVENous Q3H PRN Ken Singh, PA-C        Or    HYDROmorphone (DILAUDID) injection 0.5 mg  0.5 mg IntraVENous Q3H PRN Ken Singh, PA-C   0.5 mg at 02/23/22 0548    oxyCODONE (ROXICODONE) immediate release tablet 10 mg  10 mg Oral Q4H PRN Ken Singh, PA-C   10 mg at 02/23/22 0013    cyclobenzaprine (FLEXERIL) tablet 10 mg  10 mg Oral TID Ken Singh, PA-C   10 mg at 02/22/22 2320    bisacodyl (DULCOLAX) EC tablet 5 mg  5 mg Oral Daily Ken Singh, PA-C   5 mg at 02/22/22 0825    sennosides-docusate sodium (SENOKOT-S) 8.6-50 MG tablet 1 tablet  1 tablet Oral BID Ken Singh, PA-C   1 tablet at 02/22/22 2110    citalopram (CELEXA) tablet 10 mg  10 mg Oral Daily Ken Singh, PA-C   10 mg at 02/22/22 0825    finasteride (PROSCAR) tablet 5 mg  5 mg Oral Nightly Ken Singh, PA-C   5 mg at 02/22/22 2111    hydrALAZINE (APRESOLINE) tablet 10 mg  10 mg Oral BID Ken Singh, PA-C   10 mg at 02/22/22 2111    metoprolol succinate (TOPROL XL) extended release tablet 12.5 mg  12.5 mg Oral Daily Ken Singh, PA-C   12.5 mg at 02/22/22 5418    pantoprazole (PROTONIX) tablet 40 mg  40 mg Oral QAM AC Ken Singh, PA-C   40 mg at 02/22/22 8282    pravastatin (PRAVACHOL) tablet 20 mg  20 mg Oral Daily Ken Singh PA-C   20 mg at 02/22/22 4809    tamsulosin (FLOMAX) capsule 0.4 mg  0.4 mg Oral BID SHAMA Henry-C   0.4 mg at 02/22/22 2111    traMADol (ULTRAM) tablet 50 mg  50 mg Oral Q6H PRN SHAMA Henry-C   50 mg at 02/21/22 2138       Allergies:     Allergies   Allergen Reactions    Ambien [Zolpidem] Other (See Comments)     Makes pt loopy       Problem List:    Patient Active Problem List   Diagnosis Code    THOMAS on CPAP G47.33, Z99.89    COPD (chronic obstructive pulmonary disease) ([facility] J44.9    Unstable angina ([facility] I20.0    Atrial fibrillation ([facility]) I48.91    Postural dizziness with near syncope R42, R55    Acute neck pain M54.2    SOB (shortness of breath) R06.02    Abnormal nuclear stress test R94.39    NSVT (nonsustained ventricular tachycardia) ([facility]) I47.2    Essential hypertension I10    Other fatigue R53.83    S/P ablation of atrial flutter Z98.890, Z86.79    Obesity (BMI 30.0-34. 9) E66.9    PAD (peripheral artery disease) ([facility]) I73.9    Leg pain M79.606    Status post ablation of atrial fibrillation Z98.890, Z86.79    Dizziness R42    S/P lumbar fusion Z98.1       Past Medical History:        Diagnosis Date    A-fib ([facility]     Abnormal MRI, spine     per pt herniated disc    Angina at rest [facility])     Arthritis     COPD (chronic obstructive pulmonary disease) ([facility])     Gastritis     GERD (gastroesophageal reflux disease)     H/O 24 hour EKG monitoring 06/24/2002 06/24/2002 baseline rhythm appears to be normal with an average HR of 66 bpm, slowest HR recored at 51 bpm, fastest at 97 bpm only 10 isolated  PVC's and one couplet were recorded, supraventricular ectopic activity is present, but not clinically significant, no long pauses recognized.  H/O cardiovascular stress test 03/16/1999, 12/28/2001,03/14/2006, 03/31/2008, 11/19/2008, 09/07/2011 09/07/2011 EF 61%, no angina or ischemic EKG changes, noted with Lexiscan, inferior wall reperfusion, global function is intact. resting /85, resting HR 79    H/O complete electrocardiogram 03/05/2012 03/05/2012 on chart    H/O CT scan of chest 11/19/2008 11/19/2008 no evidence of PE, no acute thoracic aortic pathology, incidental 5mm pleural based nodular density superior lateral right middle lobe.     H/O Doppler ultrasound no anatomical abnormalities in the segment s studied on either side, doppler curves are normal in configuration and amplitude in those segments bilaterally, normal flow velocities and flow velocity ratios. normal antegrade flow in the vertebral arteries bilaterally    H/O Doppler ultrasound 08/08/2005 08/08/2005 no significant obstructive lesions noted in the extracranial carotid system,antegrade flow noted in the vertebral arteries. no significant atherosclerotic plaque noted in right or left  common arteryintimal thickening in right and left internal carotids, intimal thickening in left external carotid.  H/O echocardiogram 06/24/2002 EF 55-60% 06/19/2006 EF 50-55%,09/06/2011 EF 50-55%    09/06/2011 EF 50-55%, normal size left ventricle showing preserved global systolic  function and no regional wall motion abnormalities, left ventricle shows  moderate concentric hypertrophy and signs of diastolic dysfunction, mildly dilated atrium,, aortic valve is sclerotic but nonstenotic, trace mitral and tricuspid  regurg    H/O tilt table evaluation 07/05/2001 07/05/2001 head up tilt table test after one nitroglycerin pt became nauseous, diaphoretic, BP dropped systolic to 85 pulse in 78'O with complete loss of consciousness with  slight seizure activity before table brought to horizontal, happened within 8 minutes of nitro tablet utilization, pt. regained consciousness HR and BP  as soon as table was brought to horizontal, lopressor discontinued    Hiatal hernia     History of cardiac cath 10/05/2020    No significant CAD. LAD diffusely diseased as it is very small in caliber. LVEF is 50 to 55 %.     History of nuclear stress test 09/30/2020    EF 62%, Large area of significant inferior wall ischemia    Hx of cardiac cath 03/17/1999, 11/19/2008 11/19/2008both the left main and circumflex are without significant disease, RCA is also without significant disease, LV gram shows normal size left ventricular cavity with normal global and regional left ventricular systolic function, no significant CAD, chest pain is probably non cardiac in etiology    Hyperlipidemia     Hypertension     NSVT (nonsustained ventricular tachycardia) (HCC)     THOMAS on CPAP     Syncope and collapse     Unspecified sleep apnea     cpap    Wears glasses        Past Surgical History:        Procedure Laterality Date    ABLATION OF DYSRHYTHMIC FOCUS  2021    Atrial fibrillation and Atrial flutter ablation    BACK SURGERY      DIAGNOSTIC CARDIAC CATH LAB PROCEDURE  1999,2008, left main,circumflex, and RCA are without any significant disease, LV gram shows normal size left ventricular cavity with normal global and  regional left ventricular systolic function, pt has no significant CAD.     HERNIA REPAIR      TONSILLECTOMY         Social History:    Social History     Tobacco Use    Smoking status: Former Smoker     Types: Pipe, Cigars     Quit date: 1988     Years since quittin.1    Smokeless tobacco: Former User     Types: Chew    Tobacco comment: Quit chewing in 2014   Substance Use Topics    Alcohol use: Not Currently     Comment: green tea                                Counseling given: Not Answered  Comment: Quit chewing in 2014      Vital Signs (Current):   Vitals:    22 2111 22 0015 22 0253 22 0645   BP: 129/65 128/67 (!) 145/72 118/69   Pulse: 101 92 93 97   Resp:  18 18   Temp: 97.6 °F (36.4 °C)  97.4 °F (36.3 °C)    TempSrc: Temporal  Temporal    SpO2: 97% 98% 96% 95%   Weight:       Height:                                                  BP Readings from Last 3 Encounters:   22 118/69   22 125/83   21 128/74       NPO Status: Time of last liquid consumption: 0010                        Time of last solid consumption: 1930                        Date of last liquid consumption: 22                        Date of last solid food consumption: 22    BMI:   Wt Readings from Last 3 Encounters:   02/15/22 255 lb (115.7 kg)   21 255 lb 6.4 oz (115.8 kg)   21 254 lb 6.4 oz (115.4 kg)     Body mass index is 32.74 kg/m². CBC:   Lab Results   Component Value Date    WBC 14.0 02/23/2022    RBC 3.09 02/23/2022    HGB 9.2 02/23/2022    HCT 28.4 02/23/2022    MCV 91.9 02/23/2022    RDW 13.6 02/23/2022     02/23/2022       CMP:   Lab Results   Component Value Date     02/22/2022    K 4.7 02/22/2022     02/22/2022    CO2 21 02/22/2022    BUN 19 02/22/2022    CREATININE 1.0 02/22/2022    GFRAA >60 02/22/2022    LABGLOM >60 02/22/2022    GLUCOSE 182 02/22/2022    PROT 6.7 05/31/2021    PROT 7.1 02/05/2013    CALCIUM 8.3 02/22/2022    BILITOT 0.2 05/31/2021    ALKPHOS 70 05/31/2021    AST 19 05/31/2021    ALT 20 05/31/2021       POC Tests: No results for input(s): POCGLU, POCNA, POCK, POCCL, POCBUN, POCHEMO, POCHCT in the last 72 hours. Coags:   Lab Results   Component Value Date    PROTIME 16.0 02/09/2022    PROTIME 11.3 10/05/2011    INR 1.24 02/09/2022    APTT 37.8 02/09/2022       HCG (If Applicable): No results found for: PREGTESTUR, PREGSERUM, HCG, HCGQUANT     ABGs:   Lab Results   Component Value Date    PO2ART 150.2 01/14/2021    TRJ9IQA 42.8 01/14/2021    XUC5AZC 25.7 01/14/2021        Type & Screen (If Applicable):  No results found for: LABABO, LABRH    Drug/Infectious Status (If Applicable):  No results found for: HIV, HEPCAB    COVID-19 Screening (If Applicable):   Lab Results   Component Value Date    COVID19 NOT DETECTED 02/14/2022           Anesthesia Evaluation    Airway: Mallampati: II  TM distance: >3 FB   Neck ROM: full  Mouth opening: > = 3 FB Dental:    (+) edentulous      Pulmonary:normal exam    (+) COPD:  shortness of breath:  sleep apnea:                             Cardiovascular:    (+) hypertension:, angina:,                   Neuro/Psych:               GI/Hepatic/Renal:   (+) hiatal hernia, GERD:,           Endo/Other:                     Abdominal:             Vascular:           Other Findings:           Anesthesia Plan      general     ASA 3       Induction: intravenous. MIPS: Postoperative opioids intended. Anesthetic plan and risks discussed with patient. Plan discussed with CRNA.     Attending anesthesiologist reviewed and agrees with Paris Zayas MD   2/23/2022

## 2022-02-23 NOTE — PROGRESS NOTES
2115 Pt. Brought back to unit, bedside report received from Antoni Loya, [facility]. Pt. Is A&O, vitals stable, pt. Able to move bilat lower extremities, pt. Having pain in R leg. Pt. Ordered meal tray, spouse at bedside, Education provided on use of call light, call light in reach. 1055 VM left for rehab, to come see pt.

## 2022-02-23 NOTE — PROGRESS NOTES
Physical Therapy Re-evaluation Note  Name: Eusebia Chandra MRN: [id number] :   1945   Date:  2022   Admission Date: 2022 Room:  [place] [address]     Restrictions/Precautions:        spinal precautions, hemovac, general precautions    Communication with other providers:  RN, OT    Subjective:  Patient states: \"My leg is much better today\"  Pain:   Location, Type, Intensity (0/10 to 10/10): States no pain at rest    Objective:    Observation:  Pt supine in bed upon entry and agreeable to session    Treatment, including education/measures:    Bed mobility: Pt completed supine to sitting EOB supervision with cues for sequencing. Pt completed sit to supine SBA with some difficulty returning LEs back into bed but able to complete without assist    Transfers: Pt completed STS transfer to/from EOB CGA with cues for sequencing    Gait: pt ambulated [de-identified]' with RW CGA with decreased rene, narrow KAILA, decreased step length bilaterally, and intermittent slight L knee buckling but did not require increased assist. Cues provided for walker management    Assessment / Impression:    Pt supine in bed at end of session with SCDs in place and needs in reach. With pt's improved functional mobility, now recommending home health care. Pt will need RW prior to discharge.      Patient's tolerance of treatment:  well   Adverse Reaction: n/a  Significant change in status and impact:  n/a  Barriers to improvement:  Decreased endurance, impaired gait    Plan for Next Session:    Continue to address ambulation and transfer training in future sessions    Time in:  1310  Time out:  1330  Timed treatment minutes:  10  Total treatment time:  20    Previously filed items:  Social/Functional History  Lives With: Spouse  Type of Home: House  Home Layout: One level  Home Access: Stairs to enter with rails  Entrance Stairs - Number of Steps: 2  Bathroom Shower/Tub: Walk-in shower  Bathroom Toilet: Handicap height  Bathroom Equipment: Shower chair,Grab bars in shower  Home Equipment: Cane,Standard walker (does not use AD)  ADL Assistance: Independent  Homemaking Assistance: Independent  Homemaking Responsibilities: Yes  Ambulation Assistance: Independent  Transfer Assistance: Independent  Active : Yes  Short term goals  Time Frame for Short term goals: 1 week  Short term goal 1: Pt to complete all bed mobility mod I  Short term goal 2: Pt to complete all STS transfers to/from bed, commode, and chair mod I  Short term goal 3: Pt to ambulate 120' with LRAD Mod I  Short term goal 4: Pt to ascend/descend 2 stairs with LRAD SBA       Electronically signed by:    Byron Santa, PT  2/23/2022, 2:02 PM

## 2022-02-23 NOTE — BRIEF OP NOTE
[id number]
DEGENERATIVE BNE MTRX LG PROPEL  [facility] [employer]-WD [id number]  1 Implanted   ASAD SPNL LORDTC 5.5X100 MM TI RELINE-O - [id number]  ASAD SPNL LORDTC 5.5X100 MM TI RELINE-O  [employer]   1 Implanted   GRAFT BONE M CELLULAR MTRX OSTEOCEL PRO - [id number]  GRAFT BONE M CELLULAR MTRX OSTEOCEL PRO [id number] [facility] INC-WD   1 Implanted   ASAD SPNL L110MM DIA5. 5MM POST THORACOLUMBOSACRAL TI LORDTC - [id number]  ASAD SPNL L110MM DIA5. 5MM POST THORACOLUMBOSACRAL TI LORDTC  [employer]   1 Implanted   SCREW SPNL DIA5. 5MM OPN [place] ANIL RELINE - [id number]  SCREW SPNL DIA5. 5MM OPN [place] ANIL RELINE  [employer]WD   8 Implanted         Drains:   Closed/Suction Drain Midline Back Accordion 10 Danish (Active)       Findings: no abnormal findings    Electronically signed by Shania Delgadillo MD on 2/21/2022 at 2:10 PM

## 2022-02-23 NOTE — PLAN OF CARE
Yenny Davidson was evaluated today and a DME order was entered for a wheeled walker because he requires this to successfully complete daily living tasks of eating, bathing, toileting, personal cares, ambulating, grooming and hygiene. A wheeled walker is necessary due to the patient's unsteady gait, upper body weakness, and inability to  an ambulation device; and he can ambulate only by pushing a walker instead of a lesser assistive device such as a cane, crutch, or standard walker. The need for this equipment was discussed with the patient and he understands and is in agreement.

## 2022-02-23 NOTE — PROGRESS NOTES
Updated H&P    Procedure: Removal of hardware left L4    The patient's History and Physical  was reviewed with the patient. No significant changes in medical or surgical history are present. The surgical site was confirmed by the patient and me. Original H&P is located in patient's hard chart. Alert and oriented x4, speech clear and coherent, pain when standing upright on left anterior thigh, left posterior calf, neuropathy in feet at baseline  Upper extremities: 5/5 throughout  Lower extremities: bilateral lower extremities 5/5 throughout except for left EHL which is 4/5. Plan: The risks, benefits, expected outcome, and alternative to the recommended procedure have been discussed with the patient. Patient understands and wants to proceed with the procedure.        Electronically signed by Kwasi Maravilla PA-C on 2/23/2022 at 6:07 AM

## 2022-02-23 NOTE — PROGRESS NOTES
V2.0  [facility] Note      Name:  Roscoe Valenzuela /Age/Sex: 1945  (78 y.o. male)   MRN & CSN:  [id number] & [id number] Encounter Date/Time: 2022 1:30 PM EST    Location:  [place] [address] PCP: Ria Williamson MD       Hospital Day: 3    Assessment and Plan:   Roscoe Valenzuela is a 78 y.o. male with past medical history of CAD, dyslipidemia, hypertension who presents with S/P lumbar fusion. Hospital medicine consulted for medical management.      Spinal stenosis status post L3-4 and L5-S1 posterior spinal fusion with removal of hardware/reinsertion, open bilateral SI joint fusions  - POD #1  - returned to OR this AM to remove L4 screw  -Postoperative care including pain management, DVT prophylaxis, wright/drain management and disposition per neurosurgery    Sinus tachycardia   - intermittently tachycardic into 110s, likely secondary to pain, remains sinus on telemetry  - denies chest pain, SOB, no signs of DVT  - monitor H&H   -Continue Toprol-XL, monitor on telemetry     PAF  - remains in sinus rhythm   -Eliquis and aspirin stopped , resume once cleared per neurosurgery  - continue Toprol XL     COPD  -No signs or symptoms of acute exacerbation  - monitor respiratory status post-operatively   -Continue bronchodilators     Anemia  - Hgb 14-->10.6-->9.2  - likely secondary to surgical blood loss   - monitor H&H q12H     Leukocytosis  -WBC 15-->14  -likely reactive in setting of surgery  -Monitor for signs of infection  -Recheck in a.m.     THOMAS  -Continue home CPAP nightly     Hypertension  -Continue Toprol-XL, hydralazine with hold parameters     Dyslipidemia  -Continue statin    This patient was discussed with Dr. Marivel Clements. He was agreeable with the plan and management as dictated above. Diet ADULT DIET;  Regular   DVT Prophylaxis [] Lovenox, []  Heparin, [x] SCDs, [] Ambulation,  [] Eliquis, [] Xarelto   Code Status Full Code   Disposition Dispo per NSG   Surrogate Decision Maker/ POA Subjective:     Chief Complaint: No chief complaint on file. Patient seen and examined at bedside after surgery this morning. States he is feeling better and states the pain in his left lower extremity has resolved. He is eager to ambulate with therapy and hopeful to be discharged in the a.m. Denies any chest pain, shortness of breath. Review of Systems:    Ten point ROS reviewed negative, unless as noted above    Objective: Intake/Output Summary (Last 24 hours) at 2/23/2022 1330  Last data filed at 2/23/2022 8119  Gross per 24 hour   Intake 1530 ml   Output 1485 ml   Net 45 ml        Vitals:   Vitals:    02/23/22 1245   BP: (!) 115/94   Pulse: 106   Resp: 16   Temp:    SpO2: 97%       Physical Exam:   PHYSICAL EXAM   GEN    Awake male, lying in bed, appears moderately uncomfortable. Appears given age. EYES   Pupils are equally round. Gaze conjugate. HENT  Mucous membranes are moist.   NECK  Supple, no apparent thyromegaly or masses. RESP  Respirations even and unlabored on RA. Clear to auscultation bilaterally. CARDIO/VASC           Regular rate without appreciable murmurs. GI        Abdomen is soft without significant tenderness, masses, or guarding.         Mendez catheter is present. MSK    No gross joint deformities. Surgical drain with bloody output. SKIN    Normal coloration, warm, dry. NEURO           Cranial nerves appear grossly intact, normal speech, no lateralizing weakness. PSYCH            Awake, alert, oriented x 4. Affect appropriate.     Medications:   Medications:    pregabalin  75 mg Oral BID    sodium chloride flush  5-40 mL IntraVENous 2 times per day    acetaminophen  650 mg Oral Q6H    cyclobenzaprine  10 mg Oral TID    bisacodyl  5 mg Oral Daily    sennosides-docusate sodium  1 tablet Oral BID    citalopram  10 mg Oral Daily    finasteride  5 mg Oral Nightly    hydrALAZINE  10 mg Oral BID    metoprolol succinate  12.5 mg Oral Daily    pantoprazole  40 mg Oral QAM AC    pravastatin  20 mg Oral Daily    tamsulosin  0.4 mg Oral BID      Infusions:    sodium chloride       PRN Meds: sodium chloride flush, 5-40 mL, PRN  sodium chloride, 25 mL, PRN  ondansetron, 4 mg, Q8H PRN   Or  ondansetron, 4 mg, Q6H PRN  oxyCODONE, 5 mg, Q4H PRN  HYDROmorphone, 0.25 mg, Q3H PRN   Or  HYDROmorphone, 0.5 mg, Q3H PRN  oxyCODONE, 10 mg, Q4H PRN  traMADol, 50 mg, Q6H PRN        Labs      Recent Results (from the past 24 hour(s))   EKG 12 Lead    Collection Time: 02/22/22  2:14 PM   Result Value Ref Range    Ventricular Rate 99 BPM    Atrial Rate 99 BPM    P-R Interval 164 ms    QRS Duration 72 ms    Q-T Interval 322 ms    QTc Calculation (Bazett) 413 ms    P Axis 20 degrees    R Axis 0 degrees    T Axis 22 degrees    Diagnosis       Sinus rhythm with frequent premature ventricular complexes  Otherwise normal ECG  When compared with ECG of 09-FEB-2022 11:23,  premature ventricular complexes are now present  Vent.  rate has increased BY  34 BPM  Confirmed by Tatianna Delacruz ([id number]) on 2/22/2022 7:15:59 PM     CBC with Auto Differential    Collection Time: 02/23/22  2:53 AM   Result Value Ref Range    WBC 14.0 (H) 4.0 - 10.5 K/CU MM    RBC 3.09 (L) 4.6 - 6.2 M/CU MM    Hemoglobin 9.2 (L) 13.5 - 18.0 GM/DL    Hematocrit 28.4 (L) 42 - 52 %    MCV 91.9 78 - 100 FL    MCH 29.8 27 - 31 PG    MCHC 32.4 32.0 - 36.0 %    RDW 13.6 11.7 - 14.9 %    Platelets 571 492 - 929 K/CU MM    MPV 11.5 (H) 7.5 - 11.1 FL    Differential Type AUTOMATED DIFFERENTIAL     Segs Relative 86.8 (H) 36 - 66 %    Lymphocytes % 4.9 (L) 24 - 44 %    Monocytes % 7.4 (H) 0 - 4 %    Eosinophils % 0.0 0 - 3 %    Basophils % 0.1 0 - 1 %    Segs Absolute 12.1 K/CU MM    Lymphocytes Absolute 0.7 K/CU MM    Monocytes Absolute 1.0 K/CU MM    Eosinophils Absolute 0.0 K/CU MM    Basophils Absolute 0.0 K/CU MM    Nucleated RBC % 0.0 %    Total Nucleated RBC 0.0 K/CU MM    Total Immature Neutrophil 0.11 K/CU MM Immature Neutrophil % 0.8 (H) 0 - 0.43 %        Imaging/Diagnostics Last 24 Hours   XR LUMBAR SPINE (2-3 VIEWS)    Result Date: 2/22/2022  EXAMINATION: THREE XRAY VIEWS OF THE LUMBAR SPINE 2/21/2022 4:43 pm COMPARISON: 06/22/2017 HISTORY: ORDERING SYSTEM PROVIDED HISTORY: s/p lumbar fusion with bilateral SI joint fusion TECHNOLOGIST PROVIDED HISTORY: Reason for exam:->s/p lumbar fusion with bilateral SI joint fusion Reason for Exam: s/p lumbar fusion with bilateral SI joint fusion FINDINGS: Unremarkable radiographic appearance L3 through S1 fusion and bilateral SI joint fusion. Unremarkable radiographic appearance intervertebral disc spacer L3-L4. No radiographic evidence of acute fracture or subluxation. Vertebral body heights, pedicles, intervertebral disc spaces of non fused levels are intact. There is incomplete visualization proximally 10 degrees dextro rotation lumbar spine with apex at L2-L3 level which is slightly, subjectively improved compared to prior study. Otherwise, spinal alignment is intact. .     Unremarkable radiographic appearance postoperative changes L3 through S1 and bilateral sacroiliac joints. 10 degrees dextro rotation and or side bending lumbar spine subjectively improved compared to 06/22/2017. No acute radiographic abnormality lumbar spine suggested. CT LUMBAR SPINE WO CONTRAST    Result Date: 2/22/2022  EXAMINATION: CT OF THE LUMBAR SPINE WITHOUT CONTRAST  2/22/2022 TECHNIQUE: CT of the lumbar spine was performed without the administration of intravenous contrast. Multiplanar reformatted images are provided for review. Adjustment of mA and/or kV according to patient size was utilized. Dose modulation, iterative reconstruction, and/or weight based adjustment of the mA/kV was utilized to reduce the radiation dose to as low as reasonably achievable. COMPARISON: 2/21/2020 HISTORY: ORDERING SYSTEM PROVIDED HISTORY: nerve pain post op, looking for malpositioned screw.  TECHNOLOGIST PROVIDED HISTORY: Reason for exam:->nerve pain post op, looking for malpositioned screw. Reason for Exam: nerve pain post op, looking for malpositioned screw. FINDINGS: BONES/ALIGNMENT: There is posterior fusion with rods and pedicle screws from L3-S1. Interbody device at L3-4 with retaining plate and screw from right far lateral approach. Bilateral sacroiliac fusion. The left L4 pedicle screw partially exits the medial aspect of the pedicle and may contact the left L4 descending nerve roots. There have been laminectomies from L3-L5. DEGENERATIVE CHANGES: No severe spinal canal stenosis. SOFT TISSUES/RETROPERITONEUM: There is postoperative paraspinal soft tissue gas. Left L4 pedicle screw partially exiting the pedicle and abutting the left L4 nerve roots. FL LESS THAN 1 HOUR    Result Date: 2/23/2022  Radiology exam is complete. No Radiologist dictation. Please follow up with ordering provider.        Electronically signed by Walt Balbuena PA-C on 2/23/2022 at 1:30 PM

## 2022-02-23 NOTE — PROGRESS NOTES
Neurosurgery Post-op Note    PROCEDURE: Left L4 removal of hardware    2/23/2022 9:02 AM    Patient seen in PACU  Alert and oriented to self, place, time  Able to move all extremities at baseline, no longer has pain in left leg with pressure  Drain with minimal output   Incision intact, dermabond in place, covered with silver dressing    /69   Pulse 97   Temp 97.4 °F (36.3 °C) (Temporal)   Resp 18   Ht 6' 2\" (1.88 m)   Wt 255 lb (115.7 kg)   SpO2 95%   BMI 32.74 kg/m²     Plan:  Admit for observation and pain control  Neuro checks every 4 hours  Monitor drain output  Activity as tolerated, Will want patient to work with therapy today and ambulate  Advance diet as tolerated  Please call our service if there are any changes in neuro exam    Electronically signed by Kwasi Rolon PA-C on 2/23/2022 at 9:02 AM

## 2022-02-23 NOTE — ANESTHESIA POSTPROCEDURE EVALUATION
Department of Anesthesiology  Postprocedure Note    Patient: Lili Ahmadi  MRN: [id number]  YOB: 1945  Date of evaluation: 2/23/2022  Time:  9:18 AM     Procedure Summary     Date: 02/23/22 Room / Location: [address] [place] [address]    Anesthesia Start: 0730 Anesthesia Stop: 7629    Procedure: LEFT L4 SCREW REMOVAL (Left Spine Lumbar) Diagnosis: (HARDWARE PAIN)    Surgeons: Lena Flanagan MD Responsible Provider: Laura Romano MD    Anesthesia Type: general ASA Status: 3          Anesthesia Type: general    Olivier Phase I: Olivier Score: 7    Olivier Phase II:      Last vitals: Reviewed and per EMR flowsheets.        Anesthesia Post Evaluation    Patient location during evaluation: PACU  Patient participation: complete - patient participated  Level of consciousness: awake and alert  Pain score: 1  Airway patency: patent  Nausea & Vomiting: no nausea and no vomiting  Complications: no  Cardiovascular status: hemodynamically stable  Respiratory status: acceptable, spontaneous ventilation and face mask  Hydration status: stable

## 2022-02-24 VITALS
HEART RATE: 89 BPM | TEMPERATURE: 97.7 F | BODY MASS INDEX: 32.73 KG/M2 | SYSTOLIC BLOOD PRESSURE: 127 MMHG | OXYGEN SATURATION: 97 % | WEIGHT: 255 LBS | DIASTOLIC BLOOD PRESSURE: 65 MMHG | HEIGHT: 74 IN | RESPIRATION RATE: 16 BRPM

## 2022-02-24 LAB
ABO/RH: NORMAL
ANION GAP SERPL CALCULATED.3IONS-SCNC: 10 MMOL/L (ref 4–16)
ANTIBODY SCREEN: NEGATIVE
BASOPHILS ABSOLUTE: 0 K/CU MM
BASOPHILS RELATIVE PERCENT: 0.1 % (ref 0–1)
BUN BLDV-MCNC: 12 MG/DL (ref 6–23)
CALCIUM SERPL-MCNC: 8 MG/DL (ref 8.3–10.6)
CHLORIDE BLD-SCNC: 104 MMOL/L (ref 99–110)
CO2: 23 MMOL/L (ref 21–32)
CREAT SERPL-MCNC: 0.7 MG/DL (ref 0.9–1.3)
DIFFERENTIAL TYPE: ABNORMAL
EOSINOPHILS ABSOLUTE: 0 K/CU MM
EOSINOPHILS RELATIVE PERCENT: 0.1 % (ref 0–3)
GFR AFRICAN AMERICAN: >60 ML/MIN/1.73M2
GFR NON-AFRICAN AMERICAN: >60 ML/MIN/1.73M2
GLUCOSE BLD-MCNC: 132 MG/DL (ref 70–99)
GLUCOSE BLD-MCNC: 140 MG/DL (ref 70–99)
GLUCOSE BLD-MCNC: 143 MG/DL (ref 70–99)
HCT VFR BLD CALC: 23.9 % (ref 42–52)
HCT VFR BLD CALC: 25.8 % (ref 42–52)
HEMOGLOBIN: 7.7 GM/DL (ref 13.5–18)
HEMOGLOBIN: 8.2 GM/DL (ref 13.5–18)
IMMATURE NEUTROPHIL %: 0.6 % (ref 0–0.43)
LYMPHOCYTES ABSOLUTE: 1.1 K/CU MM
LYMPHOCYTES RELATIVE PERCENT: 13.3 % (ref 24–44)
MCH RBC QN AUTO: 29.7 PG (ref 27–31)
MCHC RBC AUTO-ENTMCNC: 32.2 % (ref 32–36)
MCV RBC AUTO: 92.3 FL (ref 78–100)
MONOCYTES ABSOLUTE: 0.8 K/CU MM
MONOCYTES RELATIVE PERCENT: 9.6 % (ref 0–4)
NUCLEATED RBC %: 0 %
PDW BLD-RTO: 13.8 % (ref 11.7–14.9)
PLATELET # BLD: 159 K/CU MM (ref 140–440)
PMV BLD AUTO: 11.4 FL (ref 7.5–11.1)
POTASSIUM SERPL-SCNC: 4 MMOL/L (ref 3.5–5.1)
RBC # BLD: 2.59 M/CU MM (ref 4.6–6.2)
SEGMENTED NEUTROPHILS ABSOLUTE COUNT: 6 K/CU MM
SEGMENTED NEUTROPHILS RELATIVE PERCENT: 76.3 % (ref 36–66)
SODIUM BLD-SCNC: 137 MMOL/L (ref 135–145)
TOTAL IMMATURE NEUTOROPHIL: 0.05 K/CU MM
TOTAL NUCLEATED RBC: 0 K/CU MM
WBC # BLD: 7.9 K/CU MM (ref 4–10.5)

## 2022-02-24 PROCEDURE — 85025 COMPLETE CBC W/AUTO DIFF WBC: CPT

## 2022-02-24 PROCEDURE — 6370000000 HC RX 637 (ALT 250 FOR IP): Performed by: PHYSICIAN ASSISTANT

## 2022-02-24 PROCEDURE — 86900 BLOOD TYPING SEROLOGIC ABO: CPT

## 2022-02-24 PROCEDURE — 85018 HEMOGLOBIN: CPT

## 2022-02-24 PROCEDURE — 82962 GLUCOSE BLOOD TEST: CPT

## 2022-02-24 PROCEDURE — 97116 GAIT TRAINING THERAPY: CPT

## 2022-02-24 PROCEDURE — 97530 THERAPEUTIC ACTIVITIES: CPT

## 2022-02-24 PROCEDURE — 86850 RBC ANTIBODY SCREEN: CPT

## 2022-02-24 PROCEDURE — 85014 HEMATOCRIT: CPT

## 2022-02-24 PROCEDURE — 80048 BASIC METABOLIC PNL TOTAL CA: CPT

## 2022-02-24 PROCEDURE — 83036 HEMOGLOBIN GLYCOSYLATED A1C: CPT

## 2022-02-24 PROCEDURE — 86901 BLOOD TYPING SEROLOGIC RH(D): CPT

## 2022-02-24 PROCEDURE — 97110 THERAPEUTIC EXERCISES: CPT

## 2022-02-24 RX ORDER — TRAMADOL HYDROCHLORIDE 50 MG/1
50 TABLET ORAL EVERY 6 HOURS PRN
Qty: 45 TABLET | Refills: 0 | Status: SHIPPED | OUTPATIENT
Start: 2022-02-24 | End: 2022-03-07

## 2022-02-24 RX ORDER — SENNA AND DOCUSATE SODIUM 50; 8.6 MG/1; MG/1
1 TABLET, FILM COATED ORAL 2 TIMES DAILY PRN
Qty: 20 TABLET | Refills: 0 | Status: SHIPPED | OUTPATIENT
Start: 2022-02-24

## 2022-02-24 RX ADMIN — OXYCODONE HYDROCHLORIDE 10 MG: 5 TABLET ORAL at 04:39

## 2022-02-24 RX ADMIN — METOPROLOL SUCCINATE 12.5 MG: 25 TABLET, EXTENDED RELEASE ORAL at 08:53

## 2022-02-24 RX ADMIN — BISACODYL 5 MG: 5 TABLET, COATED ORAL at 08:56

## 2022-02-24 RX ADMIN — HYDRALAZINE HYDROCHLORIDE 10 MG: 10 TABLET ORAL at 08:54

## 2022-02-24 RX ADMIN — PREGABALIN 75 MG: 25 CAPSULE ORAL at 08:54

## 2022-02-24 RX ADMIN — ACETAMINOPHEN 650 MG: 325 TABLET ORAL at 08:52

## 2022-02-24 RX ADMIN — OXYCODONE HYDROCHLORIDE 10 MG: 5 TABLET ORAL at 08:52

## 2022-02-24 RX ADMIN — TRAMADOL HYDROCHLORIDE 50 MG: 50 TABLET, COATED ORAL at 11:15

## 2022-02-24 RX ADMIN — SENNOSIDES AND DOCUSATE SODIUM 1 TABLET: 50; 8.6 TABLET ORAL at 08:56

## 2022-02-24 RX ADMIN — CYCLOBENZAPRINE 10 MG: 10 TABLET, FILM COATED ORAL at 08:55

## 2022-02-24 RX ADMIN — ACETAMINOPHEN 650 MG: 325 TABLET ORAL at 04:21

## 2022-02-24 RX ADMIN — PRAVASTATIN SODIUM 20 MG: 20 TABLET ORAL at 08:56

## 2022-02-24 RX ADMIN — TAMSULOSIN HYDROCHLORIDE 0.4 MG: 0.4 CAPSULE ORAL at 08:57

## 2022-02-24 RX ADMIN — PANTOPRAZOLE SODIUM 40 MG: 40 TABLET, DELAYED RELEASE ORAL at 07:22

## 2022-02-24 RX ADMIN — CITALOPRAM HYDROBROMIDE 10 MG: 20 TABLET ORAL at 08:57

## 2022-02-24 ASSESSMENT — PAIN SCALES - GENERAL
PAINLEVEL_OUTOF10: 7
PAINLEVEL_OUTOF10: 6
PAINLEVEL_OUTOF10: 7
PAINLEVEL_OUTOF10: 6
PAINLEVEL_OUTOF10: 7
PAINLEVEL_OUTOF10: 6

## 2022-02-24 ASSESSMENT — PAIN DESCRIPTION - PAIN TYPE: TYPE: SURGICAL PAIN

## 2022-02-24 ASSESSMENT — PAIN DESCRIPTION - LOCATION: LOCATION: BACK

## 2022-02-24 NOTE — DISCHARGE SUMMARY
Discharge Summary     Patient ID:  Ida Dejesus  [id number]  89 y.o.  1945    Admit date: 2/21/2022    Discharge date and time: 02/24/22    Hospital LOS: 1    Admitting Physician: Neil Odom MD     Indication for Admission: Lumbar radiculopathy, spinal stenosis, sacroiliitis    Admission Diagnoses: Spinal stenosis, lumbar region, with neurogenic claudication [M48.062]  Sacroiliitis, not elsewhere classified [facility]) [M46.1]  Lumbar radiculopathy [M54.16]  S/P lumbar fusion [Z98.1]    Discharge Diagnoses: Spinal stenosis, lumbar region, with neurogenic claudication [M48.062]  Sacroiliitis, not elsewhere classified ([facility] [address] [M46.1]  Lumbar radiculopathy [M54.16]  S/P lumbar fusion [Z98.1]    Procedures: Extreme lateral interbody fusion right approach L3-4, L3-4, L5-S1 posterior spinal fusion with facetectomy at left L5-S1 with hardware removal left L4 screw, bilateral open SI joint fusions    Problem List:   Patient Active Problem List    Diagnosis Date Noted    S/P lumbar fusion 02/21/2022    Status post ablation of atrial fibrillation 07/26/2021    Dizziness 07/26/2021    PAD (peripheral artery disease) ([address] 06/09/2021    Leg pain 06/09/2021    Obesity (BMI 30.0-34.9) 01/21/2021    S/P ablation of atrial flutter 01/14/2021    Essential hypertension 11/25/2020    Other fatigue 11/25/2020    SOB (shortness of breath) 09/30/2020    Abnormal nuclear stress test 09/30/2020    NSVT (nonsustained ventricular tachycardia) ([address] 09/30/2020    Atrial fibrillation ([address] 08/26/2020    Postural dizziness with near syncope 08/26/2020    Acute neck pain 08/26/2020    Unstable angina ([address] 05/08/2015    COPD (chronic obstructive pulmonary disease) ([address] 07/07/2014    THOMAS on CPAP 05/06/2014       Consults: Medicine, physical therapy  Admission Condition: good    Discharged Condition: good    Hospital Course: Patient was admitted for extreme lateral interbody fusion at L3-4, posterior spinal fusion L3-4, L5-S1 with decompression with facetectomy at left L5-S1 with bilateral open SI joint fusions. Patient subsequently had a screw that was malpositioned at the left L4 levels we took him back to the operating room yesterday and remove the left L4 screw. Patient was feeling much better following this and was transitioned to oral pain medication was deemed stable for discharge. Labs:  CBC:   Recent Labs     02/22/22  0332 02/22/22  0332 02/23/22  0253 02/23/22  1628 02/24/22  0420   WBC 15.2*  --  14.0*  --  7.9   HGB 10.6*   < > 9.2* 8.7* 7.7*     --  185  --  159    < > = values in this interval not displayed. CMP:    Recent Labs     02/22/22  0332 02/23/22  1346 02/24/22  0420    136 137   K 4.7 4.3 4.0    103 104   CO2 21 22 23   BUN 19 14 12   CREATININE 1.0 0.9 0.7*   GLUCOSE 182* 204* 140*   CALCIUM 8.3 7.7* 8.0*     Troponin: No results for input(s): TROPONINI in the last 72 hours. BNP: No results for input(s): BNP in the last 72 hours. INR: No results for input(s): INR in the last 72 hours. Lipids: No results for input(s): CHOL, LDLDIRECT, TRIG, HDL, AMYLASE, LIPASE in the last 72 hours. Liver: No results for input(s): AST, ALT, ALKPHOS, PROT, LABALBU, BILITOT in the last 72 hours. Invalid input(s): BILDIR  Iron:  No results for input(s): IRONS, FERRITIN in the last 72 hours.     Invalid input(s): LABIRONS    Disposition: home    Patient Instructions:      Medication List      ASK your doctor about these medications    * apixaban 5 MG Tabs tablet  Commonly known as: Eliquis  Take 1 tablet by mouth 2 times daily     * apixaban 5 MG Tabs tablet  Commonly known as: Eliquis  Take 1 tablet by mouth 2 times daily     aspirin 81 MG chewable tablet  Take 1 tablet by mouth daily     citalopram 20 MG tablet  Commonly known as: CELEXA     finasteride 5 MG tablet  Commonly known as: PROSCAR     gabapentin 300 MG capsule  Commonly known as: NEURONTIN     hydrALAZINE 10 MG tablet  Commonly known as: APRESOLINE  Take 1 tablet by mouth 2 times daily     metoprolol succinate 25 MG extended release tablet  Commonly known as: TOPROL XL  Take 0.5 tablets by mouth daily     omeprazole 20 MG delayed release capsule  Commonly known as: PRILOSEC     pantoprazole 40 MG tablet  Commonly known as: PROTONIX  Take 1 tablet by mouth daily     pravastatin 20 MG tablet  Commonly known as: PRAVACHOL  TAKE 1 TABLET BY MOUTH DAILY     PROBIOTIC ACIDOPHILUS PO     tamsulosin 0.4 MG capsule  Commonly known as: FLOMAX     traMADol 50 MG tablet  Commonly known as: ULTRAM         * This list has 2 medication(s) that are the same as other medications prescribed for you. Read the directions carefully, and ask your doctor or other care provider to review them with you.               Activity: Avoid bending lifting or twisting, no driving while taking narcotic pain medication, walk 10-15 minutes 2-3 times daily  Diet: regular diet  Wound Care: keep wound clean and dry  Other: Contact Megha Colon with questions or concerns      Electronically signed by Shania Delgadillo MD on 2/24/2022 at 7:24 AM

## 2022-02-24 NOTE — PROGRESS NOTES
Physical Therapy    Physical Therapy Treatment Note  Name: Tory Bucio MRN: [id number] :   1945   Date:  2022   Admission Date: 2022 Room:  [place] [address]   Restrictions/Precautions:         s/p L3/4 XLIF, hardware removal/reinsertion, L3/4, L5/S1 posterior spinal fusion, and bilateral SI joint fusions. spinal precautions, wright, hemovac, general precautions  Communication with other providers:  Att. at 0830, Nurse giving pain meds and pt needing to rest. Pt with HGB 7.7. Subjective:  Patient states:  Pt was agreeable to tx at 1025 after receiving pain meds. Pain:   Location, Type, Intensity (0/10 to 10/10):  Pt c/o anterior calf pain at 10 ( \"the bone hurts\") at end of tx and requested to have manual calf stretch at end of tx and ice pk. Objective:    Observation:  Alert and oriented but appeared pale and very fatigued with all activity  Treatment, including education/measures:  Sup <=> side<=> sit using bed rail min assist and increased time and effort. Sit<=>stand from bed and wc cga and cues. Pt was able to stand at commode with sba/cga to urinate. Pt request to be taken to bathroom by wc due to having to much pain and fatigue to walk to bathroom. amb with rw 10' x 5 reps with cga and wc kept close by. Pt was taken room to/from steps by wc due to fatigue  Up/down 2 steps with both hands on rails for safety and cga. Discussed home safety and advice he have gt belt on and assist to get in house. With pt in sup and bed flat had pt push his left leg in to therapist hand and then with relax therapist carisa flex left foot for gentle stretch x 3 reps  Safety  Patient left safely in the bed, with call light/phone in reach with alarm applied. Gait belt and mask were used for transfers and gait.     Assessment / Impression:       Patient's tolerance of treatment:  Fair, very limited by fatigue  Adverse Reaction: na  Significant change in status and impact:  na  Barriers to improvement:  Pain, strength, and safety  Plan for Next Session:    Cont.  POC  Time in:  1025  Time out: 1110  Timed treatment minutes:  45  Total treatment time:  39    Previously filed items:  Social/Functional History  Lives With: Spouse  Type of Home: House  Home Layout: One level  Home Access: Stairs to enter with rails  Entrance Stairs - Number of Steps: 2  Bathroom Shower/Tub: Walk-in shower  Bathroom Toilet: Handicap height  Bathroom Equipment: Shower chair,Grab bars in shower  Home Equipment: Cane,Standard walker (does not use AD)  ADL Assistance: [address] [place] [address] Avenue: Independent  Homemaking Responsibilities: Yes  Ambulation Assistance: Independent  Transfer Assistance: Independent  Active : Yes  Short term goals  Time Frame for Short term goals: 1 week  Short term goal 1: Pt to complete all bed mobility mod I  Short term goal 2: Pt to complete all STS transfers to/from bed, commode, and chair mod I  Short term goal 3: Pt to ambulate 120' with LRAD Mod I  Short term goal 4: Pt to ascend/descend 2 stairs with LRAD SBA       Electronically signed by:    Ramesh Lamb PTA  2/24/2022, 8:37 AM

## 2022-02-24 NOTE — PROGRESS NOTES
V2.0  [facility] [place] [facility] Hospitalist Progress Note      Name:  Allison Medley /Age/Sex: 1945  (78 y.o. male)   MRN & CSN:  [id number] & [id number] Encounter Date/Time: 2022 7:54 AM EST    Location:  [place] [address] PCP: Laci Lake MD       Hospital Day: 4    Assessment and Plan:   Marlene Koo a 78 y. o. male with past medical history of CAD, dyslipidemia, hypertension who presents with S/P lumbar fusion.  Hospital medicine consulted for medical management.      Spinal stenosis status post L3-4 and L5-S1 posterior spinal fusion with removal of hardware/reinsertion, open bilateral SI joint fusions  - POD #2  - returned to OR 22 to remove L4 screw  -Postoperative care including pain management, DVT prophylaxis, wright/drain management and disposition per neurosurgery     Sinus tachycardia   - intermittently tachycardic into 110s,  remains sinus on telemetry  - denies chest pain, SOB, no signs of DVT  - likely secondary to anemia and pain   - monitor H&H  -Continue Toprol-XL, monitor on telemetry     Acute blood loss anemia  - Hgb 14-->10.6-->9.2-->7.7  - mildly tachycardic, but asymptomatic   - likely secondary to surgical blood loss  - recheck hemoglobin at noon today to ensure stability prior to discharge, recheck CBC in 48 hours after discharge      PAF  - remains in sinus rhythm   -Eliquis and aspirin stopped , resume once cleared per neurosurgery  - continue Toprol XL     COPD  -No signs or symptoms of acute exacerbation  - monitor respiratory status post-operatively   -Continue bronchodilators     Leukocytosis  -WBC 15-->14  -likely reactive in setting of surgery  -Monitor for signs of infection  -Recheck in a.m.     THOMAS  -Continue home CPAP nightly     Hypertension  -Continue Toprol-XL, hydralazine with hold parameters     Dyslipidemia  -Continue statin    This patient was seen and examined in conjunction/discussed with Dr. Gokul Ro.  He was agreeable with the plan and management as dictated above.       Diet ADULT DIET; Regular   DVT Prophylaxis [] Lovenox, []  Heparin, [] SCDs, [x] Ambulation,  [] Eliquis, [] Xarelto   Code Status Full Code   Disposition Dispo per NSG   Surrogate Decision Maker/ POA      Subjective:     Chief Complaint: No chief complaint on file. Patient seen and examined at bedside. Shree Dowse to get home today. We discussed his declining hemoglobin. Patient denies lightheadedness, dizziness, chest pain or shortness of breath. He has not ambulated yet today. Still has surgical drain in place. Review of Systems:    Ten point ROS reviewed negative, unless as noted above    Objective: Intake/Output Summary (Last 24 hours) at 2/24/2022 0754  Last data filed at 2/24/2022 0415  Gross per 24 hour   Intake 560 ml   Output 1120 ml   Net -560 ml        Vitals:   Vitals:    02/24/22 0415   BP: 133/63   Pulse: 83   Resp: 18   Temp: 98 °F (36.7 °C)   SpO2: 95%       Physical Exam:   PHYSICAL EXAM   GEN Awake male, sitting upright in bed in no apparent distress. Appears given age. EYES Pupils are equally round. Gaze conjugate. HENT Mucous membranes are moist.   NECK Supple, no apparent thyromegaly or masses. RESP Respirations even and unlabored on RA. CARDIO/VASC Mild tachycardic, regular rhythm without appreciable murmurs. GI Abdomen is soft without significant tenderness, masses, or guarding.   Mendez catheter is not present. MSK No gross joint deformities. SKIN  warm, dry. Mild generalized pallor. NEURO Cranial nerves appear grossly intact, normal speech, no lateralizing weakness. PSYCH Awake, alert, oriented x 4. Affect appropriate.     Medications:   Medications:    insulin lispro  0-6 Units SubCUTAneous TID WC    insulin lispro  0-3 Units SubCUTAneous Nightly    pregabalin  75 mg Oral BID    sodium chloride flush  5-40 mL IntraVENous 2 times per day    acetaminophen  650 mg Oral Q6H    cyclobenzaprine  10 mg Oral TID    bisacodyl  5 mg Oral Daily    sennosides-docusate sodium  1 tablet Oral BID    citalopram  10 mg Oral Daily    finasteride  5 mg Oral Nightly    hydrALAZINE  10 mg Oral BID    metoprolol succinate  12.5 mg Oral Daily    pantoprazole  40 mg Oral QAM AC    pravastatin  20 mg Oral Daily    tamsulosin  0.4 mg Oral BID      Infusions:    dextrose      sodium chloride       PRN Meds: glucose, 15 g, PRN  dextrose, 12.5 g, PRN  glucagon (rDNA), 1 mg, PRN  dextrose, 100 mL/hr, PRN  sodium chloride flush, 5-40 mL, PRN  sodium chloride, 25 mL, PRN  ondansetron, 4 mg, Q8H PRN   Or  ondansetron, 4 mg, Q6H PRN  oxyCODONE, 5 mg, Q4H PRN  HYDROmorphone, 0.25 mg, Q3H PRN   Or  HYDROmorphone, 0.5 mg, Q3H PRN  oxyCODONE, 10 mg, Q4H PRN  traMADol, 50 mg, Q6H PRN        Labs      Recent Results (from the past 24 hour(s))   Basic Metabolic Panel    Collection Time: 02/23/22  1:46 PM   Result Value Ref Range    Sodium 136 135 - 145 MMOL/L    Potassium 4.3 3.5 - 5.1 MMOL/L    Chloride 103 99 - 110 mMol/L    CO2 22 21 - 32 MMOL/L    Anion Gap 11 4 - 16    BUN 14 6 - 23 MG/DL    CREATININE 0.9 0.9 - 1.3 MG/DL    Glucose 204 (H) 70 - 99 MG/DL    Calcium 7.7 (L) 8.3 - 10.6 MG/DL    GFR Non-African American >60 >60 mL/min/1.73m2    GFR African American >60 >60 mL/min/1.73m2   Magnesium    Collection Time: 02/23/22  1:46 PM   Result Value Ref Range    Magnesium 1.9 1.8 - 2.4 mg/dl   Hemoglobin and Hematocrit    Collection Time: 02/23/22  4:28 PM   Result Value Ref Range    Hemoglobin 8.7 (L) 13.5 - 18.0 GM/DL    Hematocrit 26.7 (L) 42 - 52 %   Hemoglobin A1c    Collection Time: 02/23/22  4:28 PM   Result Value Ref Range    Hemoglobin A1C 5.7 4.2 - 6.3 %    eAG 117 mg/dL   POCT Glucose    Collection Time: 02/23/22  4:49 PM   Result Value Ref Range    POC Glucose 140 (H) 70 - 99 MG/DL   POCT Glucose    Collection Time: 02/23/22  8:37 PM   Result Value Ref Range    POC Glucose 188 (H) 70 - 99 MG/DL   CBC with Auto Differential    Collection Time: 02/24/22  4:20 AM Result Value Ref Range    WBC 7.9 4.0 - 10.5 K/CU MM    RBC 2.59 (L) 4.6 - 6.2 M/CU MM    Hemoglobin 7.7 (L) 13.5 - 18.0 GM/DL    Hematocrit 23.9 (L) 42 - 52 %    MCV 92.3 78 - 100 FL    MCH 29.7 27 - 31 PG    MCHC 32.2 32.0 - 36.0 %    RDW 13.8 11.7 - 14.9 %    Platelets 705 169 - 015 K/CU MM    MPV 11.4 (H) 7.5 - 11.1 FL    Differential Type AUTOMATED DIFFERENTIAL     Segs Relative 76.3 (H) 36 - 66 %    Lymphocytes % 13.3 (L) 24 - 44 %    Monocytes % 9.6 (H) 0 - 4 %    Eosinophils % 0.1 0 - 3 %    Basophils % 0.1 0 - 1 %    Segs Absolute 6.0 K/CU MM    Lymphocytes Absolute 1.1 K/CU MM    Monocytes Absolute 0.8 K/CU MM    Eosinophils Absolute 0.0 K/CU MM    Basophils Absolute 0.0 K/CU MM    Nucleated RBC % 0.0 %    Total Nucleated RBC 0.0 K/CU MM    Total Immature Neutrophil 0.05 K/CU MM    Immature Neutrophil % 0.6 (H) 0 - 0.43 %   Basic Metabolic Panel    Collection Time: 02/24/22  4:20 AM   Result Value Ref Range    Sodium 137 135 - 145 MMOL/L    Potassium 4.0 3.5 - 5.1 MMOL/L    Chloride 104 99 - 110 mMol/L    CO2 23 21 - 32 MMOL/L    Anion Gap 10 4 - 16    BUN 12 6 - 23 MG/DL    CREATININE 0.7 (L) 0.9 - 1.3 MG/DL    Glucose 140 (H) 70 - 99 MG/DL    Calcium 8.0 (L) 8.3 - 10.6 MG/DL    GFR Non-African American >60 >60 mL/min/1.73m2    GFR African American >60 >60 mL/min/1.73m2   POCT Glucose    Collection Time: 02/24/22  6:58 AM   Result Value Ref Range    POC Glucose 143 (H) 70 - 99 MG/DL        Imaging/Diagnostics Last 24 Hours   CT LUMBAR SPINE WO CONTRAST    Result Date: 2/22/2022  EXAMINATION: CT OF THE LUMBAR SPINE WITHOUT CONTRAST  2/22/2022 TECHNIQUE: CT of the lumbar spine was performed without the administration of intravenous contrast. Multiplanar reformatted images are provided for review. Adjustment of mA and/or kV according to patient size was utilized.   Dose modulation, iterative reconstruction, and/or weight based adjustment of the mA/kV was utilized to reduce the radiation dose to as low as reasonably achievable. COMPARISON: 2/21/2020 HISTORY: ORDERING SYSTEM PROVIDED HISTORY: nerve pain post op, looking for malpositioned screw. TECHNOLOGIST PROVIDED HISTORY: Reason for exam:->nerve pain post op, looking for malpositioned screw. Reason for Exam: nerve pain post op, looking for malpositioned screw. FINDINGS: BONES/ALIGNMENT: There is posterior fusion with rods and pedicle screws from L3-S1. Interbody device at L3-4 with retaining plate and screw from right far lateral approach. Bilateral sacroiliac fusion. The left L4 pedicle screw partially exits the medial aspect of the pedicle and may contact the left L4 descending nerve roots. There have been laminectomies from L3-L5. DEGENERATIVE CHANGES: No severe spinal canal stenosis. SOFT TISSUES/RETROPERITONEUM: There is postoperative paraspinal soft tissue gas. Left L4 pedicle screw partially exiting the pedicle and abutting the left L4 nerve roots. FL LESS THAN 1 HOUR    Result Date: 2/23/2022  Radiology exam is complete. No Radiologist dictation. Please follow up with ordering provider.        Electronically signed by Madison Diaz PA-C on 2/24/2022 at 7:54 AM

## 2022-02-24 NOTE — OP NOTE
advised patient he best benefit from removal of hardware. Patient had a robust fusion L4-5 from a previous surgery and had a large interbody cage placed at L3-4 and we had good screws on the right side at L3, L4, L5. I advised then spanning the construct from L3-L5 would not affect the fusion rates. Patient had a previous L4 pedicle screw that was very lateral and we redirected during her index surgery on Monday. It may have been that there were too many tracks in the pedicle and this led to the malposition screw. Advised patient of the risk of surgery and patient was amenable to the procedure. Detailed Description of Procedure:   Patient was seen in preoperative area and taken back to the operating room and placed under general anesthesia. He had SCDs placed he was flipped on a 4 post Yusef bed all bony prominences padded hands were placed in superman position. We prepped and draped the back in a sterile fashion after removing the drain. We remove these sutures and prepped again with DuraPrep. Timeout was performed and her body was in agreement that the left L4 screw removal was the correct site and procedure. Patient was given antibiotics 30 minutes prior to incision. We then made an incision through the deep sutures and removed all the deep sutures with hemostats. We the use a Nick elevator to scrape any nonviable muscle and fat and copiously irrigated the wound and normal saline. We left the bone graft in the lamina bed. I remove the setscrews on the left side of the construct to remove the fernando and then we removed the left L4 screw I palpated the screw hole with a pedicle probe and there may have been a small breach very distal in the hole. We then replaced the fernando and placed all setscrews and final tightened all setscrews.   We again lino irrigated with normal saline we placed into the deep fascial drain and closed the deep layer with #1 strata fix another deep layer with #1 strata fix a third deep layer with 0 Vicryl the subcutaneous tissue with 2-0 Vicryl and the skin with 2-0 Prolene. We infiltrated the skin with half percent bupivacaine. We placed sterile dressings and Biopatch around the drain hole and patient was awoken of general anesthesia and taken to PACU for recovery. All counts correct x2.     Electronically signed by Ally Mata MD on 2/24/2022 at 7:26 AM

## 2022-02-24 NOTE — PROGRESS NOTES
Progress Note    SUBJECTIVE patient is doing well today. Patient states he does have some mild left calf pain but otherwise would like to go home now. OBJECTIVE    Physical    VITALS:  /63   Pulse 83   Temp 98 °F (36.7 °C) (Temporal)   Resp 18   Ht 6' 2\" (1.88 m)   Wt 255 lb (115.7 kg)   SpO2 95%   BMI 32.74 kg/m²   DRAIN/TUBE OUTPUT:  [REMOVED] Closed/Suction Drain Midline Back Accordion 10 Luxembourgish-Output (ml): 60 ml  Closed/Suction Drain Midline Back Accordion 10 Luxembourgish-Output (ml): 0 ml (less than 2ml, did not empty )  MUSCULOSKELETAL:   NEUROLOGIC:  Awake, alert, oriented to name, place and time. Cranial nerves II-XII are grossly intact. Motor is 5 out of 5 bilaterally. Cerebellar finger to nose, heel to shin intact. Sensory is intact. Babinski down going, Romberg negative, and gait is normal.     Labs:   CBC: Recent Labs     02/22/22  0332 02/22/22  0332 02/23/22  0253 02/23/22  1628 02/24/22  0420   WBC 15.2*  --  14.0*  --  7.9   HGB 10.6*   < > 9.2* 8.7* 7.7*     --  185  --  159    < > = values in this interval not displayed. CMP:    Recent Labs     02/22/22  0332 02/23/22  1346 02/24/22  0420    136 137   K 4.7 4.3 4.0    103 104   CO2 21 22 23   BUN 19 14 12   CREATININE 1.0 0.9 0.7*   GLUCOSE 182* 204* 140*   CALCIUM 8.3 7.7* 8.0*     Troponin: No results for input(s): TROPONINI in the last 72 hours. BNP: No results for input(s): BNP in the last 72 hours. INR: No results for input(s): INR in the last 72 hours. Lipids: No results for input(s): CHOL, LDLDIRECT, TRIG, HDL, AMYLASE, LIPASE in the last 72 hours. Liver: No results for input(s): AST, ALT, ALKPHOS, PROT, LABALBU, BILITOT in the last 72 hours. Invalid input(s): BILDIR  Iron:  No results for input(s): IRONS, FERRITIN in the last 72 hours.     Invalid input(s): LABIRONS   Current Inpatient Medications      Current Facility-Administered Medications: glucose (GLUTOSE) 40 % oral gel 15 g, 15 g, Oral, PRN  dextrose 50 % IV solution, 12.5 g, IntraVENous, PRN  glucagon (rDNA) injection 1 mg, 1 mg, IntraMUSCular, PRN  dextrose 5 % solution, 100 mL/hr, IntraVENous, PRN  insulin lispro (HUMALOG) injection vial 0-6 Units, 0-6 Units, SubCUTAneous, TID WC  insulin lispro (HUMALOG) injection vial 0-3 Units, 0-3 Units, SubCUTAneous, Nightly  pregabalin (LYRICA) capsule 75 mg, 75 mg, Oral, BID  sodium chloride flush 0.9 % injection 5-40 mL, 5-40 mL, IntraVENous, 2 times per day  sodium chloride flush 0.9 % injection 5-40 mL, 5-40 mL, IntraVENous, PRN  0.9 % sodium chloride infusion, 25 mL, IntraVENous, PRN  acetaminophen (TYLENOL) tablet 650 mg, 650 mg, Oral, Q6H  ondansetron (ZOFRAN-ODT) disintegrating tablet 4 mg, 4 mg, Oral, Q8H PRN **OR** ondansetron (ZOFRAN) injection 4 mg, 4 mg, IntraVENous, Q6H PRN  oxyCODONE (ROXICODONE) immediate release tablet 5 mg, 5 mg, Oral, Q4H PRN  HYDROmorphone (DILAUDID) injection 0.25 mg, 0.25 mg, IntraVENous, Q3H PRN **OR** HYDROmorphone (DILAUDID) injection 0.5 mg, 0.5 mg, IntraVENous, Q3H PRN  oxyCODONE (ROXICODONE) immediate release tablet 10 mg, 10 mg, Oral, Q4H PRN  cyclobenzaprine (FLEXERIL) tablet 10 mg, 10 mg, Oral, TID  bisacodyl (DULCOLAX) EC tablet 5 mg, 5 mg, Oral, Daily  sennosides-docusate sodium (SENOKOT-S) 8.6-50 MG tablet 1 tablet, 1 tablet, Oral, BID  citalopram (CELEXA) tablet 10 mg, 10 mg, Oral, Daily  finasteride (PROSCAR) tablet 5 mg, 5 mg, Oral, Nightly  hydrALAZINE (APRESOLINE) tablet 10 mg, 10 mg, Oral, BID  metoprolol succinate (TOPROL XL) extended release tablet 12.5 mg, 12.5 mg, Oral, Daily  pantoprazole (PROTONIX) tablet 40 mg, 40 mg, Oral, QAM AC  pravastatin (PRAVACHOL) tablet 20 mg, 20 mg, Oral, Daily  tamsulosin (FLOMAX) capsule 0.4 mg, 0.4 mg, Oral, BID  traMADol (ULTRAM) tablet 50 mg, 50 mg, Oral, Q6H PRN    ASSESSMENT AND PLAN      Status post extreme lateral interbody fusion L3-4, posterior spinal fusion L3-4, L5-S1, bilateral open SI joint fusions, hardware removal left L4 screw    Patient is doing great. His left thigh pain is now gone. He does have some left calf pain which appears to be more muscular in nature. He has been too tall for the bed and has had his knee flexed the entire time he has been in bed and I think that it is more calf stretch. I advised him he may have some radiculopathy from irritation of the traversing S1 nerve from the facetectomy at the left L5-S1 level however his left L5 nerve is feeling better now. I advised him I will see him back in 2 weeks for repeat evaluation we will send him home with Percocet, Flexeril, tramadol for breakthrough pain. His hemoglobin is 7.7 however patient is asymptomatic. I do not think he needs transfusion at this time. Patient has my personal cell phone number and advised him to contact me 24 hours a day if he needs me even over the weekend.     Electronically signed by Jim Ardon MD on 2/24/22 at 7:22 AM EST

## 2022-02-25 NOTE — PROGRESS NOTES
[id number] Report received from [facility]. 8917 DR. Padilla Grain in room. 0730 Assessment completed. VS obtained. Pt given fresh water. Breakfast ordered. Dressing to mid back dry and intact with patent drain, draining dark red drainage. Call light in reach. Pt denies needs. Pt did incentive spirometer and coughed and deep breathed as instructed. [address] in to see pt. Drain removed by her. 75 cc red drainage. 0830 Pt finished eating breakfast. Pt walked in [facility] X 1 with walker. Pt back to chair in room. Pt tolerated well and denies needs. Call light in reach. Pt urinal emptied 200 cc yellow urine. 0999 ordered meds given. Pt denies c/o or needs. Call light in reach. 1000 In to check on pt. Pt resting with eyes closed. Respirations even and unlabored. Call light in reach. 1039 PT in room. Pt up tin hallway with PT. 1050 Pt back to room from walking with PT. Pt tolerated well. 1147 BS checked. Pt denies needs. Call light in reach. 1215 Pt lunch arrives. Pt sitting in chair. Call light in reach. Pt denies needs. Labs drawn and sent to lab. 1309 Perfect served [employer] HBG 8.2 and hct 25.8 1309 She responded back OK to discharge home. [address] pt dress to go home. 1400 Pt given discharge instructions and he verbalized understanding. Pt denies needs. Call light in reach. [phone] Pt discharged to home per wheelchair to family members vehicle.

## 2022-02-26 ENCOUNTER — HOSPITAL ENCOUNTER (OUTPATIENT)
Age: 77
Setting detail: SPECIMEN
Discharge: HOME | End: 2022-02-26
Payer: MEDICARE

## 2022-02-26 LAB
ESTIMATED AVERAGE GLUCOSE: 123 MG/DL
HBA1C MFR BLD: 5.9 % (ref 4.2–6.3)

## 2022-02-26 PROCEDURE — 85025 COMPLETE CBC W/AUTO DIFF WBC: CPT

## 2022-03-01 LAB
BASOPHILS ABSOLUTE: 0 K/CU MM
BASOPHILS RELATIVE PERCENT: 0.2 % (ref 0–1)
DIFFERENTIAL TYPE: ABNORMAL
EOSINOPHILS ABSOLUTE: 0.1 K/CU MM
EOSINOPHILS RELATIVE PERCENT: 1.6 % (ref 0–3)
HCT VFR BLD CALC: 25.6 % (ref 42–52)
HEMOGLOBIN: 8 GM/DL (ref 13.5–18)
IMMATURE NEUTROPHIL %: 1 % (ref 0–0.43)
LYMPHOCYTES ABSOLUTE: 0.8 K/CU MM
LYMPHOCYTES RELATIVE PERCENT: 9.6 % (ref 24–44)
MCH RBC QN AUTO: 29.4 PG (ref 27–31)
MCHC RBC AUTO-ENTMCNC: 31.3 % (ref 32–36)
MCV RBC AUTO: 94.1 FL (ref 78–100)
MONOCYTES ABSOLUTE: 0.8 K/CU MM
MONOCYTES RELATIVE PERCENT: 9.2 % (ref 0–4)
NUCLEATED RBC %: 0 %
PDW BLD-RTO: 13.5 % (ref 11.7–14.9)
PLATELET # BLD: 251 K/CU MM (ref 140–440)
PMV BLD AUTO: 10.5 FL (ref 7.5–11.1)
RBC # BLD: 2.72 M/CU MM (ref 4.6–6.2)
SEGMENTED NEUTROPHILS ABSOLUTE COUNT: 6.8 K/CU MM
SEGMENTED NEUTROPHILS RELATIVE PERCENT: 78.4 % (ref 36–66)
TOTAL IMMATURE NEUTOROPHIL: 0.09 K/CU MM
TOTAL NUCLEATED RBC: 0 K/CU MM
WBC # BLD: 8.7 K/CU MM (ref 4–10.5)

## 2022-03-25 RX ORDER — HYDRALAZINE HYDROCHLORIDE 10 MG/1
10 TABLET, FILM COATED ORAL 2 TIMES DAILY
Qty: 180 TABLET | Refills: 3 | Status: SHIPPED | OUTPATIENT
Start: 2022-03-25

## 2022-04-04 ENCOUNTER — TELEPHONE (OUTPATIENT)
Dept: CARDIOLOGY CLINIC | Age: 77
End: 2022-04-04

## 2022-04-25 ENCOUNTER — OFFICE VISIT (OUTPATIENT)
Dept: CARDIOLOGY CLINIC | Age: 77
End: 2022-04-25
Payer: MEDICARE

## 2022-04-25 VITALS
WEIGHT: 238.6 LBS | HEIGHT: 74 IN | SYSTOLIC BLOOD PRESSURE: 132 MMHG | DIASTOLIC BLOOD PRESSURE: 60 MMHG | HEART RATE: 95 BPM | BODY MASS INDEX: 30.62 KG/M2

## 2022-04-25 DIAGNOSIS — Z86.79 S/P ABLATION OF ATRIAL FLUTTER: ICD-10-CM

## 2022-04-25 DIAGNOSIS — I10 ESSENTIAL HYPERTENSION: ICD-10-CM

## 2022-04-25 DIAGNOSIS — Z98.890 S/P ABLATION OF ATRIAL FLUTTER: ICD-10-CM

## 2022-04-25 DIAGNOSIS — Z98.890 STATUS POST ABLATION OF ATRIAL FIBRILLATION: Primary | ICD-10-CM

## 2022-04-25 DIAGNOSIS — Z99.89 OSA ON CPAP: ICD-10-CM

## 2022-04-25 DIAGNOSIS — G47.33 OSA ON CPAP: ICD-10-CM

## 2022-04-25 DIAGNOSIS — E66.9 OBESITY (BMI 30.0-34.9): ICD-10-CM

## 2022-04-25 DIAGNOSIS — Z86.79 STATUS POST ABLATION OF ATRIAL FIBRILLATION: Primary | ICD-10-CM

## 2022-04-25 PROCEDURE — G8427 DOCREV CUR MEDS BY ELIG CLIN: HCPCS | Performed by: NURSE PRACTITIONER

## 2022-04-25 PROCEDURE — 99214 OFFICE O/P EST MOD 30 MIN: CPT | Performed by: NURSE PRACTITIONER

## 2022-04-25 PROCEDURE — 1123F ACP DISCUSS/DSCN MKR DOCD: CPT | Performed by: NURSE PRACTITIONER

## 2022-04-25 PROCEDURE — 3017F COLORECTAL CA SCREEN DOC REV: CPT | Performed by: NURSE PRACTITIONER

## 2022-04-25 PROCEDURE — G8417 CALC BMI ABV UP PARAM F/U: HCPCS | Performed by: NURSE PRACTITIONER

## 2022-04-25 PROCEDURE — 93000 ELECTROCARDIOGRAM COMPLETE: CPT | Performed by: NURSE PRACTITIONER

## 2022-04-25 PROCEDURE — 1036F TOBACCO NON-USER: CPT | Performed by: NURSE PRACTITIONER

## 2022-04-25 PROCEDURE — 4040F PNEUMOC VAC/ADMIN/RCVD: CPT | Performed by: NURSE PRACTITIONER

## 2022-04-25 ASSESSMENT — ENCOUNTER SYMPTOMS
EYE REDNESS: 0
DIARRHEA: 0
ABDOMINAL PAIN: 0
COLOR CHANGE: 0
COUGH: 0
NAUSEA: 0
SINUS PAIN: 0
VOMITING: 0
BLOOD IN STOOL: 0
CONSTIPATION: 0
EYE ITCHING: 0
CHEST TIGHTNESS: 0
WHEEZING: 0
BACK PAIN: 1
ABDOMINAL DISTENTION: 0
SINUS PRESSURE: 0
SHORTNESS OF BREATH: 1

## 2022-04-25 NOTE — PROGRESS NOTES
Electrophysiology Note      Reason for consultation: Atrial fibrillation    Chief complaint: follow up s/p ablation of atrial fibrillation and atrial flutter    Referring physician:       Primary care physician: Connor Lees MD      History of Present Illness: This visit 4/25/2022  Patient is here for follow-up on ablation of atrial fibrillation atrial flutter. Patient reports that he has not had palpitations. He states that since last office visit he had surgery for spinal stenosis. He reports that 2 weeks after the procedure he fell and broke his ankle. This all occurred during the month of February. He is still recovering. He tells me that he has had 1 episode of irregular heartbeat that came on suddenly lasted for a few hours and resolved on its own. He denies aggravating or alleviating factors. Patient reports that he does have shortness of breath with exertion that resolved with rest.  He feels this is due to inactivity from his recent surgery. Patient is going through physical rehab at this time. Patient denies chest pain, edema or syncope. Patient reports that he is not drinking alcohol. He states that he drinks 1 cup of decaf coffee 3 times a month. Previous visit 7/26/2021  Patient is here today for 3-month follow-up status post ablation of atrial fibrillation atrial flutter. He reports that he has not had any further episodes of palpitations or tachycardia. He reports he is drinking green tea however no other alcohol or caffeine consumption. He reports that he continues to have intermittent low heart rates noted on his watch. He states that he is symptomatic when he has these lower heart rates. He complains of dizziness lightheadedness and fatigue. He states that on his watch he has noted his heart rate to be in the 40s when he has these episodes.   He reports that they are still transient in nature and will come on suddenly and then resolve on their own. He states that in May he did go to the emergency room when having 1 of these episodes and was sent home to follow-up with cardiology. He is taking his medications as prescribed. He denies chest pain, shortness of breath, edema and syncope. Previous visit 4/26/2021  Patient is here today for 3-month follow-up status post ablation of atrial fibrillation and atrial flutter. He reports that he has had no palpitations or tachycardia since the ablation. He states that he is not drinking alcohol, caffeine or smoking. He is compliant with his CPAP. He reports that last week he had a transient episode of lightheadedness. He states that he checked his watch and his heart rate was in the 40s. But it did not sustain very long and when he got up moving around his heart rate was in the 60s to 70s. He is taking his medications as prescribed. He denies chest pain, shortness of breath, syncope or edema. He reports he is scheduled to have a back appointment for possible injections in May. This visit 2/26/2021  Patient is here for 1 month follow-up status post ablation of atrial fibrillation atrial flutter. He reports that he has not had any further episodes of atrial fibrillation or atrial flutter since the ablation. He denies chest pain, palpitations, shortness of breath, tachycardia, lightheadedness, dizziness, edema or syncope. He reports that he is supposed to have back injections in the near future. Previous visit 1/21/2021  Patient is here for 1 week follow-up status post ablation of atrial fibrillation and atrial flutter. He reports some intermittent shortness of breath with exertion that has gradually been improving since discharge from the hospital.  He does not have orthopnea. He denies chest pain, palpitations, lightheadedness, edema, or syncope. Previous visit     Patient here for atrial fibrillation ablation.  No change in H&P noted from previous clinic visit.    Previous visit:     Patient is a 79-year-old male with a history of COPD, obstructive sleep apnea on CPAP, paroxysmal atrial fibrillation referred to us by Dr. Kimberlyn Clemente for atrial fibrillation management. Patient reports that he has been having palpitations which can occur at any time and usually last up to 30 seconds to 1 minute. These are associated with shortness of breath but no chest pain. Symptoms ongoing for several months now. Patient denies any dizziness or syncope at rest but when standing suddenly can get dizzy at times. Patient also reports shortness of breath with mild to moderate exertion and this has been going on for few months. Patient feels tired and weak and has no energy. Patient has been on Multaq for atrial fibrillation control and it has worked but of late he has been having more symptoms. And also Multaq is becoming more expensive for him to be able to take any more    Patient here to discuss further options    Patient also reports weakness in the thigh muscle area. He was placed on statins. Pastmedical history:   Past Medical History:   Diagnosis Date    A-fib ([facility] [address]     Abnormal MRI, spine     per pt herniated disc    Angina at rest [facility])     Arthritis     COPD (chronic obstructive pulmonary disease) (HCC)     Gastritis     GERD (gastroesophageal reflux disease)     H/O 24 hour EKG monitoring 06/24/2002 06/24/2002 baseline rhythm appears to be normal with an average HR of 66 bpm, slowest HR recored at 51 bpm, fastest at 97 bpm only 10 isolated  PVC's and one couplet were recorded, supraventricular ectopic activity is present, but not clinically significant, no long pauses recognized.  H/O cardiovascular stress test 03/16/1999, 12/28/2001,03/14/2006, 03/31/2008, 11/19/2008, 09/07/2011 09/07/2011 EF 61%, no angina or ischemic EKG changes, noted with Lexiscan, inferior wall reperfusion, global function is intact. resting /85, resting HR 70    H/O EF 62%, Large area of significant inferior wall ischemia    Hx of cardiac cath 03/17/1999, 11/19/2008 11/19/2008both the left main and circumflex are without significant disease, RCA is also without significant disease, LV gram shows normal size left ventricular cavity with normal global and regional left ventricular systolic function, no significant CAD, chest pain is probably non cardiac in etiology    Hyperlipidemia     Hypertension     NSVT (nonsustained ventricular tachycardia) (HCC)     THOMAS on CPAP     Syncope and collapse     Unspecified sleep apnea     cpap    Wears glasses        Surgical history :   Past Surgical History:   Procedure Laterality Date    ABLATION OF DYSRHYTHMIC FOCUS  01/14/2021    Atrial fibrillation and Atrial flutter ablation    BACK SURGERY      DIAGNOSTIC CARDIAC CATH LAB PROCEDURE  03/17/1999,11/19/2008 11/19/2008, left main,circumflex, and RCA are without any significant disease, LV gram shows normal size left ventricular cavity with normal global and  regional left ventricular systolic function, pt has no significant CAD.  HERNIA REPAIR      LUMBAR FUSION Right 2/21/2022    L3/4 XLIF / L4/5 HARDWARE REMOVAL & REINSERTION,  LEFT L5/S1 TLIF / BILATERAL OPEN SI JOINT FUSION, POSTERIOR SPINAL L3-S1 POSSIBLE PLACEMENT S2 ALAR SCREWS performed by Kita Silva MD at [facility] N/A 2/21/2022    LUMBAR INTERBODY FUSION LATERAL performed by Kita Silva MD at [address] [place] [address] Left 2/23/2022    LEFT L4 SCREW REMOVAL performed by Kita Silva MD at [facility]         Family history:   Family History   Problem Relation Age of Onset    Diabetes Mother     Heart Disease Mother     Other Mother         kidney stones    Cancer Mother         breast    Heart Disease Father     Diabetes Father        Social history :  reports that he quit smoking about 34 years ago. His smoking use included pipe and cigars.  He has quit using smokeless tobacco.  His smokeless tobacco use included chew. He reports previous alcohol use. He reports that he does not use drugs. Allergies   Allergen Reactions    Ambien [Zolpidem] Other (See Comments)     Makes pt simon       Current Outpatient Medications on File Prior to Visit   Medication Sig Dispense Refill    apixaban (ELIQUIS) 5 MG TABS tablet Take 1 tablet by mouth 2 times daily 21 tablet 0    hydrALAZINE (APRESOLINE) 10 MG tablet Take 1 tablet by mouth 2 times daily 180 tablet 3    sennosides-docusate sodium (SENOKOT-S) 8.6-50 MG tablet Take 1 tablet by mouth 2 times daily as needed for Constipation 20 tablet 0    metoprolol succinate (TOPROL XL) 25 MG extended release tablet Take 0.5 tablets by mouth daily 30 tablet 5    omeprazole (PRILOSEC) 20 MG delayed release capsule Take 40 mg by mouth Daily      finasteride (PROSCAR) 5 MG tablet Take 5 mg by mouth at bedtime      pravastatin (PRAVACHOL) 20 MG tablet TAKE 1 TABLET BY MOUTH DAILY 30 tablet 6    pantoprazole (PROTONIX) 40 MG tablet Take 1 tablet by mouth daily 30 tablet 0    gabapentin (NEURONTIN) 300 MG capsule Take 300 mg by mouth 3 times daily.  Lactobacillus (PROBIOTIC ACIDOPHILUS PO) Take by mouth daily      tamsulosin (FLOMAX) 0.4 MG capsule 1 tablet 2 times daily      aspirin 81 MG chewable tablet Take 1 tablet by mouth daily 30 tablet 0    citalopram (CELEXA) 20 MG tablet Take 10 mg by mouth daily        No current facility-administered medications on file prior to visit. Review of Systems:   Review of Systems   Constitutional: Positive for fatigue. Negative for activity change, chills and fever. HENT: Negative for congestion, sinus pressure and sinus pain. Eyes: Negative for redness and itching. Respiratory: Positive for shortness of breath. Negative for cough, chest tightness and wheezing. Cardiovascular: Negative for chest pain, palpitations and leg swelling.    Gastrointestinal: Negative for abdominal distention, abdominal pain, blood in stool, constipation, diarrhea, nausea and vomiting. Endocrine: Negative for cold intolerance and heat intolerance. Genitourinary: Negative for difficulty urinating, dysuria, flank pain and hematuria. Musculoskeletal: Positive for arthralgias, back pain and gait problem (walks with walker). Negative for myalgias and neck stiffness. Skin: Negative for color change, pallor, rash and wound. Allergic/Immunologic: Negative for food allergies. Neurological: Positive for light-headedness (intermittent upon standing). Negative for dizziness, syncope, numbness and headaches. Hematological: Does not bruise/bleed easily. Psychiatric/Behavioral: Negative for agitation, behavioral problems and confusion. The patient is not nervous/anxious. Examination:      Vitals:    04/25/22 0856   BP: 132/60   Site: Left Upper Arm   Position: Sitting   Cuff Size: Large Adult   Pulse: 95   Weight: 238 lb 9.6 oz (108.2 kg)   Height: 6' 2\" (1.88 m)        Body mass index is 30.63 kg/m². Physical Exam  Constitutional:       General: He is not in acute distress. Appearance: Normal appearance. He is well-developed. He is not ill-appearing. HENT:      Head: Normocephalic and atraumatic. Right Ear: External ear normal.      Left Ear: External ear normal.      Nose: No congestion or rhinorrhea. Mouth/Throat:      Mouth: Mucous membranes are moist.      Pharynx: Oropharynx is clear. No oropharyngeal exudate or posterior oropharyngeal erythema. Eyes:      General:         Right eye: No discharge. Left eye: No discharge. Conjunctiva/sclera: Conjunctivae normal.      Pupils: Pupils are equal, round, and reactive to light. Neck:      Vascular: No carotid bruit or JVD. Cardiovascular:      Rate and Rhythm: Normal rate and regular rhythm. Pulses: Normal pulses. Heart sounds: No murmur heard. No friction rub.    Pulmonary:      Effort: Pulmonary effort is normal.      Breath sounds: Normal breath sounds. No wheezing, rhonchi or rales. Abdominal:      General: Bowel sounds are normal. There is no distension. Palpations: Abdomen is soft. Tenderness: There is no abdominal tenderness. Musculoskeletal:      Cervical back: Normal range of motion. No muscular tenderness. Right lower leg: No edema. Left lower leg: No edema. Skin:     General: Skin is warm and dry. Neurological:      Mental Status: He is alert and oriented to person, place, and time. Psychiatric:         Mood and Affect: Mood normal.         Thought Content: Thought content normal.         CBC:   Lab Results   Component Value Date    WBC 8.7 02/26/2022    HGB 8.0 02/26/2022    HCT 25.6 02/26/2022     02/26/2022     Lipids:  Lab Results   Component Value Date    CHOL 174 08/26/2020    TRIG 225 (H) 08/26/2020    HDL 44 08/26/2020    LDLDIRECT 104 (H) 08/26/2020     PT/INR:   Lab Results   Component Value Date    INR 1.24 02/09/2022        BMP:    Lab Results   Component Value Date     02/24/2022    K 4.0 02/24/2022     02/24/2022    CO2 23 02/24/2022    BUN 12 02/24/2022     CMP:   Lab Results   Component Value Date    AST 19 05/31/2021    PROT 6.7 05/31/2021    BILITOT 0.2 05/31/2021    ALKPHOS 70 05/31/2021     TSH:  No results found for: TSH, T4     EKG Interpretation:  Sinus rhythm heart rate 95    IMPRESSION / RECOMMENDATIONS:       Status post ablation atrial fibrillation  Status post  ablation atrial flutter  Dizziness  ? Bradycardia  Hypertension  THOMAS on CPAP  Obesity BMI 33      Patient reports that he has had 1 episode of irregular heartbeat since last office visit  Patient reports that he does not have palpitations or tachycardia  Patient's resting heart rate is 95 on EKG. Patient noted to be in sinus rhythm. No atrial fibrillation or atrial flutter noted  Blood pressure is slightly elevated also.   I will increase his Toprol-XL to 12.5 mg twice daily for now. Patient had previously complained of intermittent episodes of bradycardia on his watch. Patient reports that this is resolved. I did discuss with patient about event monitor however patient would like to wait at this time    CHADS2 score 3-hypertension, vascular disease, age. Continue anticoagulation. Vitals:    04/25/22 0856   BP: 132/60   Pulse: 95     Blood pressure and heart rate slightly elevated. We will increase Toprol-XL to 12.5 mg twice daily and  Apresoline 10 mg twice daily    THOMAS on CPAP patient is compliant  Obesity BMI 33-recommend regular exercise and decrease caloric intake    Patient to follow-up with Dr. Mannie Wilson as scheduled  Patient would like to continue to follow with electrophysiology. Patient to follow-up in 6 months        Thanks again for allowing me to participate in care of this patient. Please call me if you have any questions. With best regards. MÓNICA Thorpe - CNP, 4/25/2022     Please note this report has been partially produced using speech recognition software and may contain errors related to that system including errors in grammar, punctuation, and spelling, as well as words and phrases that may be inappropriate. If there are any questions or concerns please feel free to contact the dictating provider for clarification.

## 2022-04-25 NOTE — PATIENT INSTRUCTIONS
Please be informed that if you contact our office outside of normal business hours the physician on call cannot help with any scheduling or rescheduling issues, procedure instruction questions or any type of medication issue. We advise you for any urgent/emergency that you go to the nearest emergency room! PLEASE CALL OUR OFFICE DURING NORMAL BUSINESS HOURS    Monday - Friday   8 am to 5 pm    [place]: [place] [address]: [phone]    [place]:  [phone]    **It is YOUR responsibilty to bring medication bottles and/or updated medication list to [address] [place] [address].  This will allow us to better serve you and all your healthcare needs**

## 2022-05-17 ENCOUNTER — TELEPHONE (OUTPATIENT)
Dept: CARDIOLOGY CLINIC | Age: 77
End: 2022-05-17

## 2022-05-17 NOTE — PROGRESS NOTES
V2.0  [facility] [place]      Name:  Sherry Vedrin /Age/Sex: 1945  (78 y.o. male)   MRN & CSN:  [id number] & [id number] Encounter Date/Time: 2022 7:57 AM EST    Location:  [place] [address] PCP: Elle Camacho MD       Hospital Day: 2    Assessment and Plan:   Sherry Verdin is a 78 y.o. male with past medical history of CAD, dyslipidemia, hypertension who presents with S/P lumbar fusion. Hospital medicine consulted for medical management. Spinal stenosis status post L3-4 and L5-S1 posterior spinal fusion with removal of hardware/reinsertion, open bilateral SI joint fusions  -Postoperative care including pain management, DVT prophylaxis, drain management and disposition per surgery    COPD  -No signs or symptoms of acute exacerbation  - monitor respiratory status post-operatively   -Continue bronchodilators    Anemia  - Hgb 14-->10.6  - likely secondary to surgical blood loss   - recheck Hgb in AM     Leukocytosis  -WBC 15   -likely reactive in setting of surgery  -Monitor for signs of infection  -Recheck in a.m. THOMAS  -Continue home CPAP nightly    PAF  Intermittent tachycardia   -Eliquis and aspirin stopped , resume once cleared per neurosurgery  - intermittently tachycardic, likely secondary to pain, sinus on telemetry per nursing  -EKG pending  -Continue Toprol-XL     Hypertension  -Continue Toprol-XL, hydralazine    Dyslipidemia  -Continue statin    This patient was discussed with Dr. Chelsea Strauss. He was agreeable with the plan and management as dictated above. Diet ADULT DIET; Regular   DVT Prophylaxis Per primary    Code Status Full Code   Disposition Patient requires continued admission due to post-op pain, intermittent tachycardia. Surrogate Decision Maker/ POA      Subjective:     Chief Complaint: No chief complaint on file. Patient seen and examined at bedside. Appears mildly uncomfortable and in pain this morning. Has not had a bowel movement for pass gas.  Still has a Mendez in place. Planning to ambulate with therapy today. Denies chest pain, shortness of breath, abdominal pain. Review of Systems:    Ten point ROS reviewed negative, unless as noted above    Objective: Intake/Output Summary (Last 24 hours) at 2/22/2022 1154  Last data filed at 2/22/2022 1134  Gross per 24 hour   Intake 1690 ml   Output 2820 ml   Net -1130 ml        Vitals:   Vitals:    02/22/22 0815   BP: 133/86   Pulse: 130   Resp: 16   Temp: 98.4 °F (36.9 °C)   SpO2: 97%       Physical Exam:   PHYSICAL EXAM   GEN Awake male, lying in bed, appears moderately uncomfortable. Appears given age. EYES Pupils are equally round. Gaze conjugate. HENT Mucous membranes are moist.   NECK Supple, no apparent thyromegaly or masses. RESP Respirations even and unlabored on RA. Clear to auscultation bilaterally. CARDIO/VASC Regular rate without appreciable murmurs. GI Abdomen is soft without significant tenderness, masses, or guarding.   Mendez catheter is present. MSK No gross joint deformities. Surgical drain with bloody output. SKIN Normal coloration, warm, dry. NEURO Cranial nerves appear grossly intact, normal speech, no lateralizing weakness. PSYCH Awake, alert, oriented x 4. Affect appropriate.     Medications:   Medications:    sodium chloride flush  5-40 mL IntraVENous 2 times per day    acetaminophen  650 mg Oral Q6H    ceFAZolin (ANCEF) IVPB  2,000 mg IntraVENous Q8H    cyclobenzaprine  10 mg Oral TID    bisacodyl  5 mg Oral Daily    sennosides-docusate sodium  1 tablet Oral BID    citalopram  10 mg Oral Daily    finasteride  5 mg Oral Nightly    hydrALAZINE  10 mg Oral BID    metoprolol succinate  12.5 mg Oral Daily    pantoprazole  40 mg Oral QAM AC    pravastatin  20 mg Oral Daily    tamsulosin  0.4 mg Oral BID    gabapentin  300 mg Oral TID      Infusions:    sodium chloride       PRN Meds: sodium chloride flush, 5-40 mL, PRN  sodium chloride, 25 mL, PRN  ondansetron, 4 mg, Q8H PRN   Or  ondansetron, 4 mg, Q6H PRN  oxyCODONE, 5 mg, Q4H PRN  HYDROmorphone, 0.25 mg, Q3H PRN   Or  HYDROmorphone, 0.5 mg, Q3H PRN  oxyCODONE, 10 mg, Q4H PRN  traMADol, 50 mg, Q6H PRN        Labs      Recent Results (from the past 24 hour(s))   Basic Metabolic Panel    Collection Time: 02/22/22  3:32 AM   Result Value Ref Range    Sodium 138 135 - 145 MMOL/L    Potassium 4.7 3.5 - 5.1 MMOL/L    Chloride 103 99 - 110 mMol/L    CO2 21 21 - 32 MMOL/L    Anion Gap 14 4 - 16    BUN 19 6 - 23 MG/DL    CREATININE 1.0 0.9 - 1.3 MG/DL    Glucose 182 (H) 70 - 99 MG/DL    Calcium 8.3 8.3 - 10.6 MG/DL    GFR Non-African American >60 >60 mL/min/1.73m2    GFR African American >60 >60 mL/min/1.73m2   CBC    Collection Time: 02/22/22  3:32 AM   Result Value Ref Range    WBC 15.2 (H) 4.0 - 10.5 K/CU MM    RBC 3.59 (L) 4.6 - 6.2 M/CU MM    Hemoglobin 10.6 (L) 13.5 - 18.0 GM/DL    Hematocrit 33.0 (L) 42 - 52 %    MCV 91.9 78 - 100 FL    MCH 29.5 27 - 31 PG    MCHC 32.1 32.0 - 36.0 %    RDW 13.3 11.7 - 14.9 %    Platelets 246 299 - 305 K/CU MM    MPV 10.7 7.5 - 11.1 FL        Imaging/Diagnostics Last 24 Hours   FL LESS THAN 1 HOUR    Result Date: 2/21/2022  Radiology exam is complete. No Radiologist dictation. Please follow up with ordering provider.        Electronically signed by Colleen Jensen PA-C on 2/22/2022 at 11:54 AM Detail Level: Detailed

## 2022-06-08 ENCOUNTER — OFFICE VISIT (OUTPATIENT)
Dept: CARDIOLOGY CLINIC | Age: 77
End: 2022-06-08
Payer: MEDICARE

## 2022-06-08 VITALS
HEART RATE: 82 BPM | DIASTOLIC BLOOD PRESSURE: 68 MMHG | HEIGHT: 74 IN | BODY MASS INDEX: 30.93 KG/M2 | SYSTOLIC BLOOD PRESSURE: 130 MMHG | WEIGHT: 241 LBS

## 2022-06-08 DIAGNOSIS — I73.9 PAD (PERIPHERAL ARTERY DISEASE) (HCC): ICD-10-CM

## 2022-06-08 DIAGNOSIS — Z86.79 S/P ABLATION OF ATRIAL FLUTTER: ICD-10-CM

## 2022-06-08 DIAGNOSIS — I48.0 PAROXYSMAL ATRIAL FIBRILLATION (HCC): ICD-10-CM

## 2022-06-08 DIAGNOSIS — Z86.79 STATUS POST ABLATION OF ATRIAL FIBRILLATION: ICD-10-CM

## 2022-06-08 DIAGNOSIS — G47.33 OSA ON CPAP: ICD-10-CM

## 2022-06-08 DIAGNOSIS — Z98.890 STATUS POST ABLATION OF ATRIAL FIBRILLATION: ICD-10-CM

## 2022-06-08 DIAGNOSIS — Z99.89 OSA ON CPAP: ICD-10-CM

## 2022-06-08 DIAGNOSIS — I10 ESSENTIAL HYPERTENSION: Primary | ICD-10-CM

## 2022-06-08 DIAGNOSIS — Z98.890 S/P ABLATION OF ATRIAL FLUTTER: ICD-10-CM

## 2022-06-08 PROCEDURE — 3017F COLORECTAL CA SCREEN DOC REV: CPT | Performed by: INTERNAL MEDICINE

## 2022-06-08 PROCEDURE — 99213 OFFICE O/P EST LOW 20 MIN: CPT | Performed by: INTERNAL MEDICINE

## 2022-06-08 PROCEDURE — 1123F ACP DISCUSS/DSCN MKR DOCD: CPT | Performed by: INTERNAL MEDICINE

## 2022-06-08 PROCEDURE — G8427 DOCREV CUR MEDS BY ELIG CLIN: HCPCS | Performed by: INTERNAL MEDICINE

## 2022-06-08 PROCEDURE — G8417 CALC BMI ABV UP PARAM F/U: HCPCS | Performed by: INTERNAL MEDICINE

## 2022-06-08 PROCEDURE — 1036F TOBACCO NON-USER: CPT | Performed by: INTERNAL MEDICINE

## 2022-06-08 RX ORDER — LANOLIN ALCOHOL/MO/W.PET/CERES
325 CREAM (GRAM) TOPICAL DAILY
COMMUNITY
Start: 2022-05-24 | End: 2023-05-19

## 2022-06-08 NOTE — PROGRESS NOTES
[id number] Score for Atrial Fibrillation Stroke Risk   Risk   Factors  Component Value   C CHF No 0   H HTN Yes 1   A2 Age >= 76 Yes,  (71 y.o.) 2   D DM No 0   S2 Prior Stroke/TIA No 0   V Vascular Disease No 0   A Age 74-69 No,  (71 y.o.) 0   Sc Sex male 0    [id number]  Score  3   Score last updated 6/8/22 5:68 AM EDT    Click here for a link to the UpToDate guideline \"Atrial Fibrillation: Anticoagulation therapy to prevent embolization    Disclaimer: Risk Score calculation is dependent on accuracy of patient problem list and past encounter diagnosis.

## 2022-06-08 NOTE — LETTER
2022  9:00 AM    Patient Name: Carmelita Narvaez  : 1945  MRN# [id number]    REASON FOR VISIT:6 MO     [phone] (Home Phone)  Patient Active Problem List    Diagnosis Date Noted    S/P lumbar fusion 2022    Status post ablation of atrial fibrillation 2021    Dizziness 2021    PAD (peripheral artery disease) ([facility] 2021    Leg pain 2021    Obesity (BMI 30.0-34.9) 2021    S/P ablation of atrial flutter 2021    Essential hypertension 2020    Other fatigue 2020    SOB (shortness of breath) 2020    Abnormal nuclear stress test 2020    NSVT (nonsustained ventricular tachycardia) ([facility] 2020    Atrial fibrillation ([facility] 2020    Postural dizziness with near syncope 2020    Acute neck pain 2020    Unstable angina (HCC) 2015    COPD (chronic obstructive pulmonary disease) ([facility] 2014    THOMAS on CPAP 2014       Allergies: Ambien [zolpidem]      Current Outpatient Medications   Medication Sig Dispense Refill    apixaban (ELIQUIS) 5 MG TABS tablet Take 1 tablet by mouth 2 times daily 42 tablet 0    hydrALAZINE (APRESOLINE) 10 MG tablet Take 1 tablet by mouth 2 times daily 180 tablet 3    sennosides-docusate sodium (SENOKOT-S) 8.6-50 MG tablet Take 1 tablet by mouth 2 times daily as needed for Constipation 20 tablet 0    metoprolol succinate (TOPROL XL) 25 MG extended release tablet Take 0.5 tablets by mouth daily 30 tablet 5    omeprazole (PRILOSEC) 20 MG delayed release capsule Take 40 mg by mouth Daily      finasteride (PROSCAR) 5 MG tablet Take 5 mg by mouth at bedtime      pravastatin (PRAVACHOL) 20 MG tablet TAKE 1 TABLET BY MOUTH DAILY 30 tablet 6    pantoprazole (PROTONIX) 40 MG tablet Take 1 tablet by mouth daily 30 tablet 0    gabapentin (NEURONTIN) 300 MG capsule Take 300 mg by mouth 3 times daily.       Lactobacillus (PROBIOTIC ACIDOPHILUS PO) Take by mouth daily      tamsulosin (FLOMAX) 0.4 MG capsule 1 tablet 2 times daily      aspirin 81 MG chewable tablet Take 1 tablet by mouth daily 30 tablet 0    citalopram (CELEXA) 20 MG tablet Take 10 mg by mouth daily        No current facility-administered medications for this visit.          Lab Review   Lab Results   Component Value Date    CKTOTAL 78 11/07/2020    CKTOTAL 161 11/22/2018    CKMB 2.8 10/05/2011    TROPONINT <0.010 05/31/2021    TROPONINT <0.010 11/07/2020     BNP:    Lab Results   Component Value Date    BNP 15 09/06/2011    PROBNP 372.4 05/31/2021    PROBNP 4,306 11/07/2020     PT/INR:    Lab Results   Component Value Date    INR 1.24 02/09/2022    INR 1.22 01/11/2021     Lab Results   Component Value Date    LABA1C 5.9 02/24/2022    LABA1C 5.7 02/23/2022     Lab Results   Component Value Date    WBC 8.7 02/26/2022    WBC 7.9 02/24/2022    HCT 25.6 (L) 02/26/2022    HCT 25.8 (L) 02/24/2022    MCV 94.1 02/26/2022    MCV 92.3 02/24/2022     02/26/2022     02/24/2022     Lab Results   Component Value Date    CHOL 174 08/26/2020    CHOL 181 05/08/2015    TRIG 225 (H) 08/26/2020    TRIG 222 (H) 05/08/2015    HDL 44 08/26/2020    HDL 39 (L) 05/08/2015    LDLDIRECT 104 (H) 08/26/2020    LDLDIRECT 121 (H) 05/08/2015     Lab Results   Component Value Date    ALT 20 05/31/2021    ALT 31 11/07/2020    AST 19 05/31/2021    AST 24 11/07/2020     BMP:    Lab Results   Component Value Date     02/24/2022     02/23/2022    K 4.0 02/24/2022    K 4.3 02/23/2022     02/24/2022     02/23/2022    CO2 23 02/24/2022    CO2 22 02/23/2022    BUN 12 02/24/2022    BUN 14 02/23/2022    CREATININE 0.7 02/24/2022    CREATININE 0.9 02/23/2022     CMP:   Lab Results   Component Value Date     02/24/2022     02/23/2022    K 4.0 02/24/2022    K 4.3 02/23/2022     02/24/2022     02/23/2022    CO2 23 02/24/2022    CO2 22 02/23/2022    BUN 12 02/24/2022    BUN 14 02/23/2022    CREATININE 0.7 2022    CREATININE 0.9 2022    PROT 6.7 2021    PROT 7.6 2020    PROT 7.1 2013    PROT 6.9 02/10/2012     TSH:    Lab Results   Component Value Date    [facility] 3.210 2021    TSHHS 3.230 2020       Smoke: What:                           How much:    Alcohol: How Much:     Caffeine: Pop:         Tea:            Coffee:                Chocolate:    Exercise:    Last Visit:  Complaints:  Changes:    LAST EK22    VENOUS DOPPLER: NONE    HOLTER/ EVENT MONITOR: 2020    STRESS TEST: 2020  Abnormal Study.    Large area of significant inferior wall Ischemia.    Normal LV function. LVEF is 62 %.    Supervising physician Dr. Hill Armenta        ECHO: 2021  This is a limited echocardiogram.   Left ventricular systolic function is normal.   Ejection fraction is visually estimated at 50-55%. No evidence of any pericardial effusion    CAROTID: 2020  The right internal carotid artery demonstrates 0-50% stenosis .       The left internal carotid artery demonstrates 0-50% stenosis .       Bilateral vertebral arteries are patent with flow in the normal direction.           MUGA: NONE    LAST PACER CHECK: NONE    CARDIAC CATH: 10/5/2020   1. No significant CAD. ? LAD diffusely diseased as it is very small in caliber. 2. Normal LV systolic function. LVEF is 50 to 55 %.      Amio Protocol:    CHADS: [id number] Score for Atrial Fibrillation Stroke Risk   Risk   Factors  Component Value   C CHF No 0   H HTN Yes 1   A2 Age >= 76 Yes,  (71 y.o.) 2   D DM No 0   S2 Prior Stroke/TIA No 0   V Vascular Disease No 0   A Age 74-69 No,  (71 y.o.) 0   Sc Sex male 0    [id number]  Score  3   Score last updated 22 9:45 AM EDT    Click here for a link to the UpToDate guideline \"Atrial Fibrillation: Anticoagulation therapy to prevent embolization    Disclaimer: Risk Score calculation is dependent on accuracy of patient problem list and past encounter diagnosis.

## 2022-06-08 NOTE — LETTER
[facility]  [address] [id number]  Phone: [phone]  Fax: [phone]    Nate Owens MD    June 8, 2022     Mohit Campuzano, Milad S Esteban Cady  [address] [place] [id number]    Patient: Herve Lawson   MR Number: [id number]   YOB: 1945   Date of Visit: 6/8/2022       Dear Mohit Campuzano:    Thank you for referring Belia Silva to me for evaluation/treatment. Below are the relevant portions of my assessment and plan of care. If you have questions, please do not hesitate to call me. I look forward to following Citlaly Treadwell along with you.     Sincerely,      Nate Owens MD

## 2022-06-08 NOTE — PROGRESS NOTES
OFFICE PROGRESS NOTES      Unknown Chester is a 78 y.o. male who has    CHIEF COMPLAINT AS FOLLOWS:  CHEST PAIN: Patient denies any C/O chest pains at this time. SOB: No C/O SOB at this time. LEG EDEMA: No leg edema   PALPITATIONS: Denies any C/O Palpitations   DIZZINESS: C/O positional Dizziness but no black out spells. Probably due to Flomax. SYNCOPE: None   OTHER/ ADDITIONAL COMPLAINTS:                                     HPI: Patient is here for F/U on his A-fib/Flutter-Arrhythmia, HTN & Dyslipidemia problems. Arrhythmia: Patient has known  A-fib / Flutter S/P Ablations. HTN: Patient has known essential HTN. Has been treated with guideline recommended medical / physical/ diet therapy as stated below. Dyslipidemia: Patient has known mixed dyslipidemia. Has been treated with guideline recommended medical / physical/ diet therapy as stated below. Current Outpatient Medications   Medication Sig Dispense Refill    ferrous sulfate (FE TABS 325) 325 (65 Fe) MG EC tablet Take 325 mg by mouth daily      apixaban (ELIQUIS) 5 MG TABS tablet Take 1 tablet by mouth 2 times daily 42 tablet 0    hydrALAZINE (APRESOLINE) 10 MG tablet Take 1 tablet by mouth 2 times daily 180 tablet 3    sennosides-docusate sodium (SENOKOT-S) 8.6-50 MG tablet Take 1 tablet by mouth 2 times daily as needed for Constipation 20 tablet 0    metoprolol succinate (TOPROL XL) 25 MG extended release tablet Take 0.5 tablets by mouth daily 30 tablet 5    omeprazole (PRILOSEC) 20 MG delayed release capsule Take 40 mg by mouth Daily      finasteride (PROSCAR) 5 MG tablet Take 5 mg by mouth at bedtime      pravastatin (PRAVACHOL) 20 MG tablet TAKE 1 TABLET BY MOUTH DAILY 30 tablet 6    pantoprazole (PROTONIX) 40 MG tablet Take 1 tablet by mouth daily 30 tablet 0    gabapentin (NEURONTIN) 300 MG capsule Take 300 mg by mouth 3 times daily.       Lactobacillus (PROBIOTIC ACIDOPHILUS PO) Take by mouth daily      tamsulosin (FLOMAX) 0.4 MG capsule 1 tablet 2 times daily      aspirin 81 MG chewable tablet Take 1 tablet by mouth daily 30 tablet 0    citalopram (CELEXA) 20 MG tablet Take 10 mg by mouth daily        No current facility-administered medications for this visit. Allergies: Ambien [zolpidem]  Review of Systems:    Constitutional: Negative for diaphoresis and fatigue  Respiratory: Negative for shortness of breath  Cardiovascular: Negative for chest pain, dyspnea on exertion, claudication, edema, irregular heartbeat, murmur, palpitations or shortness of breath  Musculoskeletal: Negative for muscle pain, muscular weakness, negative for pain in arm and leg or swelling in foot and leg    Objective:  /68   Pulse 82   Ht 6' 2\" (1.88 m)   Wt 241 lb (109.3 kg)   BMI 30.94 kg/m²   Wt Readings from Last 3 Encounters:   06/08/22 241 lb (109.3 kg)   04/25/22 238 lb 9.6 oz (108.2 kg)   02/15/22 255 lb (115.7 kg)     Body mass index is 30.94 kg/m². GENERAL - Alert, oriented, pleasant, in no apparent distress. EYES: No jaundice, no conjunctival pallor. Neck - Supple. No jugular venous distention noted. No carotid bruits. Cardiovascular  Normal S1 and S2 without obvious murmur or gallop. Extremities - No cyanosis, clubbing, or significant edema. Pulmonary  No respiratory distress. No wheezes or rales.       MEDICAL DECISION MAKING & DATA REVIEW:    Lab Review   Lab Results   Component Value Date    TROPONINT <0.010 05/31/2021    TROPONINT <0.010 11/07/2020     Lab Results   Component Value Date    BNP 15 09/06/2011    PROBNP 372.4 05/31/2021    PROBNP 4,306 11/07/2020     Lab Results   Component Value Date    INR 1.24 02/09/2022    INR 1.22 01/11/2021     Lab Results   Component Value Date    LABA1C 5.9 02/24/2022    LABA1C 5.7 02/23/2022     Lab Results   Component Value Date    WBC 8.7 02/26/2022    WBC 7.9 02/24/2022    HCT 25.6 (L) 02/26/2022    HCT 25.8 (L) 02/24/2022    MCV 94.1 02/26/2022    MCV 92.3 02/24/2022     02/26/2022     02/24/2022     Lab Results   Component Value Date    CHOL 174 08/26/2020    CHOL 181 05/08/2015    TRIG 225 (H) 08/26/2020    TRIG 222 (H) 05/08/2015    HDL 44 08/26/2020    HDL 39 (L) 05/08/2015    LDLDIRECT 104 (H) 08/26/2020    LDLDIRECT 121 (H) 05/08/2015     Lab Results   Component Value Date    ALT 20 05/31/2021    ALT 31 11/07/2020    AST 19 05/31/2021    AST 24 11/07/2020     BMP:    Lab Results   Component Value Date     02/24/2022     02/23/2022    K 4.0 02/24/2022    K 4.3 02/23/2022     02/24/2022     02/23/2022    CO2 23 02/24/2022    CO2 22 02/23/2022    BUN 12 02/24/2022    BUN 14 02/23/2022    CREATININE 0.7 02/24/2022    CREATININE 0.9 02/23/2022     CMP:   Lab Results   Component Value Date     02/24/2022     02/23/2022    K 4.0 02/24/2022    K 4.3 02/23/2022     02/24/2022     02/23/2022    CO2 23 02/24/2022    CO2 22 02/23/2022    BUN 12 02/24/2022    BUN 14 02/23/2022    CREATININE 0.7 02/24/2022    CREATININE 0.9 02/23/2022    PROT 6.7 05/31/2021    PROT 7.6 11/07/2020    PROT 7.1 02/05/2013    PROT 6.9 02/10/2012     Lab Results   Component Value Date    TSHHS 3.210 05/31/2021    TSHHS 3.230 11/07/2020       QUALITY MEASURES REVIEWED:  1.CAD:Patient is taking anti platelet agent:Yes  Patient does not have Hx of documented CAD  2. DYSLIPIDEMIA: Patient is on cholesterol lowering medication:Yes   3. Beta-Blocker therapy for CAD, if prior Myocardial Infarction:Yes   4. Counselled regarding smoking cessation. No   Patient does not Smoke. 5.Anticoagulation therapy (for A.Fib) Yes  6. Discussed weight management strategies. Assessment & Plan:  Primary / Secondary prevention is the goal by aggressive risk modification, healthy and therapeutic life style changes for cardiovascular risk reduction. CORONARY ARTERY DISEASE:None significant except LAD is small possibly diffusely diseased.  Patient is on ASA  CATH 10/2020   1. No significant CAD.   ? LAD diffusely diseased as it is very small in caliber.   2. Normal LV systolic function. LVEF is 50 to 55 %.     HTN:well controlled on current medical regimen, see list above.              - changes in  treatment:   no, Takes Toprol & Hydralazine.  CARDIOMYOPATHY: None known   CONGESTIVE HEART FAILURE: NO KNOWN HISTORY.    VHD: No significant VHD noted  ECHO 8/2020   Left ventricular systolic function is normal.   Ejection fraction is visually estimated at 55%.   Mild left ventricular hypertrophy.   Mildly dilated left atrium.   No evidence of any pericardial effusion. DYSLIPIDEMIA: Patient's profile is at / near [facility] is low                                Tolerating current medical regimen wellyes, on Pravachol                                Does not tolerate medications well due to side effects                                See most recent Lab values in Labs section above. ARRHYTHMIAS: known                                RGHERNANDEZ has H/O P. Atrial fibrillation                                He is rate controlled & on Eliquis anticoagulation.                                Patient probably had a break through episode of PAF. TESTS ORDERED:     PREVIOUSLY ORDERED TESTS REVIEWED & DISCUSSED WITH THE PATIENT:     I personally reviewed & interpreted, all previously ordered tests as copied above. Latest Labs are pulled in to the note with dates. Labs, specially in Reference to Lipid profile, Cardiac testing in the form of Echo ( dated: ), stress tests ( dated: ) & other relevant cardiac testing reviewed with patient & recommendations made based on assessment of the results. Discussed role of Cardiac risk factors & effects + treatment of co morbidities with patient & advised accordingly. MEDICATIONS: List of medications patient is currently taking is reviewed in detail with the patient.  Discussed any side effects or problems taking the medication. Recommend Continue present management & medications as listed. AFFIRMATION: I spent at least 20 minutes of time reviewing patient's history, previous & current medical problems & all Labs + testing. This includes chart prep even prior to the vosit. Various goals are discussed and multiple questions answered. Relevant concelling performed. Office follow up in six months.

## 2022-06-23 DIAGNOSIS — I10 ESSENTIAL HYPERTENSION: ICD-10-CM

## 2022-06-24 RX ORDER — METOPROLOL SUCCINATE 25 MG/1
12.5 TABLET, EXTENDED RELEASE ORAL DAILY
Qty: 30 TABLET | Refills: 5 | Status: SHIPPED | OUTPATIENT
Start: 2022-06-24 | End: 2023-03-22

## 2022-06-27 ENCOUNTER — TELEPHONE (OUTPATIENT)
Dept: CARDIOLOGY CLINIC | Age: 77
End: 2022-06-27

## 2022-08-02 RX ORDER — PRAVASTATIN SODIUM 20 MG
20 TABLET ORAL DAILY
Qty: 30 TABLET | Refills: 5 | Status: SHIPPED | OUTPATIENT
Start: 2022-08-02

## 2022-08-31 ENCOUNTER — TELEPHONE (OUTPATIENT)
Dept: CARDIOLOGY CLINIC | Age: 77
End: 2022-08-31

## 2022-08-31 NOTE — TELEPHONE ENCOUNTER
Patient states that Michael Hsieh advised him to take 1/2 tab 25 mg metoprolol twice a day. Rx written for once a day.  Need to clarify frequency

## 2022-08-31 NOTE — TELEPHONE ENCOUNTER
Pt states there is a mix up on  metoprolol succinate (TOPROL XL) 25 MG extended release tablet  please advise

## 2022-10-25 ENCOUNTER — OFFICE VISIT (OUTPATIENT)
Dept: CARDIOLOGY CLINIC | Age: 77
End: 2022-10-25
Payer: MEDICARE

## 2022-10-25 VITALS
HEART RATE: 86 BPM | DIASTOLIC BLOOD PRESSURE: 80 MMHG | BODY MASS INDEX: 31.08 KG/M2 | SYSTOLIC BLOOD PRESSURE: 130 MMHG | WEIGHT: 242.2 LBS | HEIGHT: 74 IN

## 2022-10-25 DIAGNOSIS — Z86.79 STATUS POST ABLATION OF ATRIAL FIBRILLATION: Primary | ICD-10-CM

## 2022-10-25 DIAGNOSIS — Z98.890 STATUS POST ABLATION OF ATRIAL FLUTTER: ICD-10-CM

## 2022-10-25 DIAGNOSIS — Z98.890 STATUS POST ABLATION OF ATRIAL FIBRILLATION: Primary | ICD-10-CM

## 2022-10-25 DIAGNOSIS — G47.33 OSA ON CPAP: ICD-10-CM

## 2022-10-25 DIAGNOSIS — D68.59 HYPERCOAGULABLE STATE (HCC): ICD-10-CM

## 2022-10-25 DIAGNOSIS — Z99.89 OSA ON CPAP: ICD-10-CM

## 2022-10-25 DIAGNOSIS — Z86.79 STATUS POST ABLATION OF ATRIAL FLUTTER: ICD-10-CM

## 2022-10-25 DIAGNOSIS — I10 ESSENTIAL HYPERTENSION: ICD-10-CM

## 2022-10-25 PROCEDURE — G8484 FLU IMMUNIZE NO ADMIN: HCPCS | Performed by: NURSE PRACTITIONER

## 2022-10-25 PROCEDURE — 93000 ELECTROCARDIOGRAM COMPLETE: CPT | Performed by: NURSE PRACTITIONER

## 2022-10-25 PROCEDURE — 99214 OFFICE O/P EST MOD 30 MIN: CPT | Performed by: NURSE PRACTITIONER

## 2022-10-25 PROCEDURE — 3017F COLORECTAL CA SCREEN DOC REV: CPT | Performed by: NURSE PRACTITIONER

## 2022-10-25 PROCEDURE — G8427 DOCREV CUR MEDS BY ELIG CLIN: HCPCS | Performed by: NURSE PRACTITIONER

## 2022-10-25 PROCEDURE — 1123F ACP DISCUSS/DSCN MKR DOCD: CPT | Performed by: NURSE PRACTITIONER

## 2022-10-25 PROCEDURE — 1036F TOBACCO NON-USER: CPT | Performed by: NURSE PRACTITIONER

## 2022-10-25 PROCEDURE — G8417 CALC BMI ABV UP PARAM F/U: HCPCS | Performed by: NURSE PRACTITIONER

## 2022-10-25 ASSESSMENT — ENCOUNTER SYMPTOMS
BACK PAIN: 1
COUGH: 0
EYE REDNESS: 0
WHEEZING: 0
SHORTNESS OF BREATH: 0
SINUS PAIN: 0
COLOR CHANGE: 0
DIARRHEA: 0
SINUS PRESSURE: 0
CONSTIPATION: 0
ABDOMINAL PAIN: 0
EYE ITCHING: 0
ABDOMINAL DISTENTION: 0
NAUSEA: 0
CHEST TIGHTNESS: 0
VOMITING: 0
BLOOD IN STOOL: 0

## 2022-10-25 NOTE — PATIENT INSTRUCTIONS
Thank you for allowing us to care for you today! We want to ensure we can follow your treatment plan and we strive to give you the best outcomes and experience possible. If you ever have a life threatening emergency and call 911 - for an ambulance (EMS)   Our providers can only care for you at:   [facility] or [facility]. Even if you have someone take you or you drive yourself we can only care for you in a [facility] facility. Our providers are not setup at the other healthcare locations! **It is YOUR responsibilty to bring medication bottles and/or updated medication list to [address] [place] [address].  This will allow us to better serve you and all your healthcare needs**

## 2022-10-25 NOTE — PROGRESS NOTES
Electrophysiology Note      Reason for consultation: Atrial fibrillation    Chief complaint: follow up s/p ablation of atrial fibrillation and atrial flutter    Referring physician:       Primary care physician: Dino Hernandez MD      History of Present Illness: This visit 10/25/2022  Patient here today for follow-up on ablation of atrial fibrillation and atrial flutter. Patient reports that since last office visit he has been feeling well. He denies chest pain, palpitations, shortness of breath, lightheadedness, dizziness, edema or syncope. Patient is avoiding alcohol and caffeine. Patient is taking his medications as prescribed. Patient denies issues with bleeding. Previous visit 4/25/2022  Patient is here for follow-up on ablation of atrial fibrillation atrial flutter. Patient reports that he has not had palpitations. He states that since last office visit he had surgery for spinal stenosis. He reports that 2 weeks after the procedure he fell and broke his ankle. This all occurred during the month of February. He is still recovering. He tells me that he has had 1 episode of irregular heartbeat that came on suddenly lasted for a few hours and resolved on its own. He denies aggravating or alleviating factors. Patient reports that he does have shortness of breath with exertion that resolved with rest.  He feels this is due to inactivity from his recent surgery. Patient is going through physical rehab at this time. Patient denies chest pain, edema or syncope. Patient reports that he is not drinking alcohol. He states that he drinks 1 cup of decaf coffee 3 times a month. Previous visit 7/26/2021  Patient is here today for 3-month follow-up status post ablation of atrial fibrillation atrial flutter. He reports that he has not had any further episodes of palpitations or tachycardia.   He reports he is drinking green tea however no other alcohol or caffeine consumption. He reports that he continues to have intermittent low heart rates noted on his watch. He states that he is symptomatic when he has these lower heart rates. He complains of dizziness lightheadedness and fatigue. He states that on his watch he has noted his heart rate to be in the 40s when he has these episodes. He reports that they are still transient in nature and will come on suddenly and then resolve on their own. He states that in May he did go to the emergency room when having 1 of these episodes and was sent home to follow-up with cardiology. He is taking his medications as prescribed. He denies chest pain, shortness of breath, edema and syncope. Previous visit 4/26/2021  Patient is here today for 3-month follow-up status post ablation of atrial fibrillation and atrial flutter. He reports that he has had no palpitations or tachycardia since the ablation. He states that he is not drinking alcohol, caffeine or smoking. He is compliant with his CPAP. He reports that last week he had a transient episode of lightheadedness. He states that he checked his watch and his heart rate was in the 40s. But it did not sustain very long and when he got up moving around his heart rate was in the 60s to 70s. He is taking his medications as prescribed. He denies chest pain, shortness of breath, syncope or edema. He reports he is scheduled to have a back appointment for possible injections in May. This visit 2/26/2021  Patient is here for 1 month follow-up status post ablation of atrial fibrillation atrial flutter. He reports that he has not had any further episodes of atrial fibrillation or atrial flutter since the ablation. He denies chest pain, palpitations, shortness of breath, tachycardia, lightheadedness, dizziness, edema or syncope. He reports that he is supposed to have back injections in the near future.     Previous visit 1/21/2021  Patient is here for 1 week follow-up status post ablation of atrial fibrillation and atrial flutter. He reports some intermittent shortness of breath with exertion that has gradually been improving since discharge from the hospital.  He does not have orthopnea. He denies chest pain, palpitations, lightheadedness, edema, or syncope. Previous visit     Patient here for atrial fibrillation ablation. No change in H&P noted from previous clinic visit. Previous visit:     Patient is a 79-year-old male with a history of COPD, obstructive sleep apnea on CPAP, paroxysmal atrial fibrillation referred to us by Dr. Mckayla Javier for atrial fibrillation management. Patient reports that he has been having palpitations which can occur at any time and usually last up to 30 seconds to 1 minute. These are associated with shortness of breath but no chest pain. Symptoms ongoing for several months now. Patient denies any dizziness or syncope at rest but when standing suddenly can get dizzy at times. Patient also reports shortness of breath with mild to moderate exertion and this has been going on for few months. Patient feels tired and weak and has no energy. Patient has been on Multaq for atrial fibrillation control and it has worked but of late he has been having more symptoms. And also Multaq is becoming more expensive for him to be able to take any more    Patient here to discuss further options    Patient also reports weakness in the thigh muscle area. He was placed on statins.     Pastmedical history:   Past Medical History:   Diagnosis Date    A-fib (HCC)     Abnormal MRI, spine     per pt herniated disc    Angina at [facility])     Arthritis     COPD (chronic obstructive pulmonary disease) ([facility])     Gastritis     GERD (gastroesophageal reflux disease)     H/O 24 hour EKG monitoring 06/24/2002 06/24/2002 baseline rhythm appears to be normal with an average HR of 66 bpm, slowest HR recored at 51 bpm, fastest at 97 bpm only 10 isolated  PVC's and one couplet were recorded, supraventricular ectopic activity is present, but not clinically significant, no long pauses recognized. H/O cardiovascular stress test 03/16/1999, 12/28/2001,03/14/2006, 03/31/2008, 11/19/2008, 09/07/2011 09/07/2011 EF 61%, no angina or ischemic EKG changes, noted with Lexiscan, inferior wall reperfusion, global function is intact. resting /85, resting HR 70    H/O complete electrocardiogram 03/05/2012 03/05/2012 on chart    H/O CT scan of chest 11/19/2008 11/19/2008 no evidence of PE, no acute thoracic aortic pathology, incidental 5mm pleural based nodular density superior lateral right middle lobe. H/O Doppler ultrasound no anatomical abnormalities in the segment s studied on either side, doppler curves are normal in configuration and amplitude in those segments bilaterally, normal flow velocities and flow velocity ratios. normal antegrade flow in the vertebral arteries bilaterally    H/O Doppler ultrasound 08/08/2005 08/08/2005 no significant obstructive lesions noted in the extracranial carotid system,antegrade flow noted in the vertebral arteries. no significant atherosclerotic plaque noted in right or left  common arteryintimal thickening in right and left internal carotids, intimal thickening in left external carotid.      H/O echocardiogram 06/24/2002 EF 55-60% 06/19/2006 EF 50-55%,09/06/2011 EF 50-55%    09/06/2011 EF 50-55%, normal size left ventricle showing preserved global systolic  function and no regional wall motion abnormalities, left ventricle shows  moderate concentric hypertrophy and signs of diastolic dysfunction, mildly dilated atrium,, aortic valve is sclerotic but nonstenotic, trace mitral and tricuspid  regurg    H/O tilt table evaluation 07/05/2001 07/05/2001 head up tilt table test after one nitroglycerin pt became nauseous, diaphoretic, BP dropped systolic to 85 pulse in 05'N with complete loss of consciousness with  slight seizure activity before table brought to horizontal, happened within 8 minutes of nitro tablet utilization, pt. regained consciousness HR and BP  as soon as table was brought to horizontal, lopressor discontinued    Hiatal hernia     History of cardiac cath 10/05/2020    No significant CAD. LAD diffusely diseased as it is very small in caliber. LVEF is 50 to 55 %. History of nuclear stress test 09/30/2020    EF 62%, Large area of significant inferior wall ischemia    Hx of cardiac cath 03/17/1999, 11/19/2008 11/19/2008both the left main and circumflex are without significant disease, RCA is also without significant disease, LV gram shows normal size left ventricular cavity with normal global and regional left ventricular systolic function, no significant CAD, chest pain is probably non cardiac in etiology    Hyperlipidemia     Hypertension     NSVT (nonsustained ventricular tachycardia)     THOMAS on CPAP     Syncope and collapse     Unspecified sleep apnea     cpap    Wears glasses        Surgical history :   Past Surgical History:   Procedure Laterality Date    ABLATION OF DYSRHYTHMIC FOCUS  01/14/2021    Atrial fibrillation and Atrial flutter ablation    BACK SURGERY      DIAGNOSTIC CARDIAC CATH LAB PROCEDURE  03/17/1999,11/19/2008 11/19/2008, left main,circumflex, and RCA are without any significant disease, LV gram shows normal size left ventricular cavity with normal global and  regional left ventricular systolic function, pt has no significant CAD.     HERNIA REPAIR      LUMBAR FUSION Right 2/21/2022    L3/4 XLIF / L4/5 HARDWARE REMOVAL & REINSERTION,  LEFT L5/S1 TLIF / BILATERAL OPEN SI JOINT FUSION, POSTERIOR SPINAL L3-S1 POSSIBLE PLACEMENT S2 ALAR SCREWS performed by Sumeet Payton MD at [facility] 2/21/2022    LUMBAR INTERBODY FUSION LATERAL performed by Sumeet Payton MD at [place] [id number] Left 2/23/2022    LEFT L4 SCREW REMOVAL performed by Sumeet Payton MD at [place] [address] TONSILLECTOMY         Family history:   Family History   Problem Relation Age of Onset    Diabetes Mother     Heart Disease Mother     Other Mother         kidney stones    Cancer Mother         breast    Heart Disease Father     Diabetes Father        Social history :  reports that he quit smoking about 34 years ago. His smoking use included pipe and cigars. He has quit using smokeless tobacco.  His smokeless tobacco use included chew. He reports that he does not currently use alcohol. He reports that he does not use drugs. Allergies   Allergen Reactions    Ambien [Zolpidem] Other (See Comments)     Makes pt simon       Current Outpatient Medications on File Prior to Visit   Medication Sig Dispense Refill    apixaban (ELIQUIS) 5 MG TABS tablet Take 1 tablet by mouth 2 times daily 42 tablet 0    pravastatin (PRAVACHOL) 20 MG tablet Take 1 tablet by mouth in the morning. 30 tablet 5    metoprolol succinate (TOPROL XL) 25 MG extended release tablet Take 0.5 tablets by mouth daily 30 tablet 5    ferrous sulfate (FE TABS 325) 325 (65 Fe) MG EC tablet Take 325 mg by mouth daily      hydrALAZINE (APRESOLINE) 10 MG tablet Take 1 tablet by mouth 2 times daily 180 tablet 3    sennosides-docusate sodium (SENOKOT-S) 8.6-50 MG tablet Take 1 tablet by mouth 2 times daily as needed for Constipation 20 tablet 0    omeprazole (PRILOSEC) 20 MG delayed release capsule Take 40 mg by mouth Daily      finasteride (PROSCAR) 5 MG tablet Take 5 mg by mouth at bedtime      pantoprazole (PROTONIX) 40 MG tablet Take 1 tablet by mouth daily 30 tablet 0    gabapentin (NEURONTIN) 300 MG capsule Take 300 mg by mouth 3 times daily.       Lactobacillus (PROBIOTIC ACIDOPHILUS PO) Take by mouth daily      tamsulosin (FLOMAX) 0.4 MG capsule 1 tablet 2 times daily      aspirin 81 MG chewable tablet Take 1 tablet by mouth daily 30 tablet 0    citalopram (CELEXA) 20 MG tablet Take 10 mg by mouth daily        No current facility-administered medications on file prior to visit. Review of Systems:   Review of Systems   Constitutional:  Negative for activity change, chills, fatigue and fever. HENT:  Negative for congestion, sinus pressure and sinus pain. Eyes:  Negative for redness and itching. Respiratory:  Negative for cough, chest tightness, shortness of breath and wheezing. Cardiovascular:  Negative for chest pain, palpitations and leg swelling. Gastrointestinal:  Negative for abdominal distention, abdominal pain, blood in stool, constipation, diarrhea, nausea and vomiting. Endocrine: Negative for cold intolerance and heat intolerance. Genitourinary:  Negative for difficulty urinating, dysuria, flank pain and hematuria. Musculoskeletal:  Positive for arthralgias and back pain. Negative for gait problem, myalgias and neck stiffness. Skin:  Negative for color change, pallor, rash and wound. Allergic/Immunologic: Negative for food allergies. Neurological:  Negative for dizziness, syncope, light-headedness, numbness and headaches. Hematological:  Does not bruise/bleed easily. Psychiatric/Behavioral:  Negative for agitation, behavioral problems and confusion. The patient is not nervous/anxious. Examination:      Vitals:    10/25/22 0859   BP: 130/80   Pulse: 86   Weight: 242 lb 3.2 oz (109.9 kg)   Height: 6' 2\" (1.88 m)        Body mass index is 31.1 kg/m². Physical Exam  Constitutional:       General: He is not in acute distress. Appearance: Normal appearance. He is well-developed. He is not ill-appearing. HENT:      Head: Normocephalic and atraumatic. Right Ear: External ear normal.      Left Ear: External ear normal.      Nose: No congestion or rhinorrhea. Mouth/Throat:      Mouth: Mucous membranes are moist.      Pharynx: Oropharynx is clear. No oropharyngeal exudate or posterior oropharyngeal erythema. Eyes:      General:         Right eye: No discharge. Left eye: No discharge. Conjunctiva/sclera: Conjunctivae normal.      Pupils: Pupils are equal, round, and reactive to light. Neck:      Vascular: No carotid bruit or JVD. Cardiovascular:      Rate and Rhythm: Normal rate and regular rhythm. Pulses: Normal pulses. Heart sounds: No murmur heard. No friction rub. Pulmonary:      Effort: Pulmonary effort is normal.      Breath sounds: Normal breath sounds. No wheezing, rhonchi or rales. Abdominal:      General: Bowel sounds are normal. There is no distension. Palpations: Abdomen is soft. Tenderness: There is no abdominal tenderness. Musculoskeletal:      Cervical back: Normal range of motion. No muscular tenderness. Right lower leg: No edema. Left lower leg: No edema. Skin:     General: Skin is warm and dry. Neurological:      Mental Status: He is alert and oriented to person, place, and time. Psychiatric:         Mood and Affect: Mood normal.         Thought Content: Thought content normal.       CBC:   Lab Results   Component Value Date/Time    WBC 8.7 02/26/2022 05:45 PM    HGB 8.0 02/26/2022 05:45 PM    HCT 25.6 02/26/2022 05:45 PM     02/26/2022 05:45 PM     Lipids:  Lab Results   Component Value Date    CHOL 174 08/26/2020    TRIG 225 (H) 08/26/2020    HDL 44 08/26/2020    LDLDIRECT 104 (H) 08/26/2020     PT/INR:   Lab Results   Component Value Date/Time    INR 1.24 02/09/2022 11:58 AM        BMP:    Lab Results   Component Value Date     02/24/2022    K 4.0 02/24/2022     02/24/2022    CO2 23 02/24/2022    BUN 12 02/24/2022     CMP:   Lab Results   Component Value Date    AST 19 05/31/2021    PROT 6.7 05/31/2021    BILITOT 0.2 05/31/2021    ALKPHOS 70 05/31/2021     TSH:  No results found for: TSH, T4     EKG Interpretation:  Sinus rhythm heart rate 86    IMPRESSION / RECOMMENDATIONS:       Status post ablation atrial fibrillation  Status post  ablation atrial flutter  Hypercoagulable state  Dizziness  ? Bradycardia  Hypertension  THOMAS on CPAP  Obesity BMI 33        EKG reveals sinus rhythm. No arrhythmias noted  Patient reports overall has been doing well since last office visit. He denies any further episodes of arrhythmias  We will continue Toprol-XL 12.5 mg daily    CHADS2 score 3-hypertension, vascular disease, age. Patient denies issues with bleeding. We will continue Eliquis 5 mg twice daily    Vitals:    10/25/22 0859   BP: 130/80   Pulse: 86     Blood pressure stable  Apresoline 10 mg twice daily    THOMAS on CPAP patient is compliant  Obesity BMI 33-recommend regular exercise and decrease caloric intake    Overall patient is doing very well since ablation. Patient to follow-up with cardiology as scheduled  Patient to follow-up with EP as needed        Thanks again for allowing me to participate in care of this patient. Please call me if you have any questions. With best regards. Abhi Hale, APRN - CNP, 10/25/2022     Please note this report has been partially produced using speech recognition software and may contain errors related to that system including errors in grammar, punctuation, and spelling, as well as words and phrases that may be inappropriate. If there are any questions or concerns please feel free to contact the dictating provider for clarification.

## 2022-12-08 ENCOUNTER — OFFICE VISIT (OUTPATIENT)
Dept: CARDIOLOGY CLINIC | Age: 77
End: 2022-12-08
Payer: MEDICARE

## 2022-12-08 VITALS
SYSTOLIC BLOOD PRESSURE: 110 MMHG | DIASTOLIC BLOOD PRESSURE: 60 MMHG | WEIGHT: 246.8 LBS | HEIGHT: 74 IN | BODY MASS INDEX: 31.67 KG/M2 | HEART RATE: 76 BPM

## 2022-12-08 DIAGNOSIS — Z99.89 OSA ON CPAP: ICD-10-CM

## 2022-12-08 DIAGNOSIS — I10 ESSENTIAL HYPERTENSION: Primary | ICD-10-CM

## 2022-12-08 DIAGNOSIS — G47.33 OSA ON CPAP: ICD-10-CM

## 2022-12-08 DIAGNOSIS — I48.0 PAROXYSMAL ATRIAL FIBRILLATION (HCC): ICD-10-CM

## 2022-12-08 DIAGNOSIS — Z98.890 STATUS POST ABLATION OF ATRIAL FIBRILLATION: ICD-10-CM

## 2022-12-08 DIAGNOSIS — Z86.79 STATUS POST ABLATION OF ATRIAL FLUTTER: ICD-10-CM

## 2022-12-08 DIAGNOSIS — Z98.890 STATUS POST ABLATION OF ATRIAL FLUTTER: ICD-10-CM

## 2022-12-08 DIAGNOSIS — Z86.79 STATUS POST ABLATION OF ATRIAL FIBRILLATION: ICD-10-CM

## 2022-12-08 DIAGNOSIS — I73.9 PAD (PERIPHERAL ARTERY DISEASE) (HCC): ICD-10-CM

## 2022-12-08 DIAGNOSIS — I47.29 NSVT (NONSUSTAINED VENTRICULAR TACHYCARDIA): ICD-10-CM

## 2022-12-08 PROCEDURE — 1036F TOBACCO NON-USER: CPT | Performed by: INTERNAL MEDICINE

## 2022-12-08 PROCEDURE — 1123F ACP DISCUSS/DSCN MKR DOCD: CPT | Performed by: INTERNAL MEDICINE

## 2022-12-08 PROCEDURE — 3017F COLORECTAL CA SCREEN DOC REV: CPT | Performed by: INTERNAL MEDICINE

## 2022-12-08 PROCEDURE — G8417 CALC BMI ABV UP PARAM F/U: HCPCS | Performed by: INTERNAL MEDICINE

## 2022-12-08 PROCEDURE — 3078F DIAST BP <80 MM HG: CPT | Performed by: INTERNAL MEDICINE

## 2022-12-08 PROCEDURE — 99213 OFFICE O/P EST LOW 20 MIN: CPT | Performed by: INTERNAL MEDICINE

## 2022-12-08 PROCEDURE — G8484 FLU IMMUNIZE NO ADMIN: HCPCS | Performed by: INTERNAL MEDICINE

## 2022-12-08 PROCEDURE — 3074F SYST BP LT 130 MM HG: CPT | Performed by: INTERNAL MEDICINE

## 2022-12-08 PROCEDURE — G8427 DOCREV CUR MEDS BY ELIG CLIN: HCPCS | Performed by: INTERNAL MEDICINE

## 2022-12-08 NOTE — LETTER
[facility]  [address] [address]  Phone: [phone]  Fax: [phone]    Pao Saunders MD    December 8, 2022     Berta Bermudez [address] [facility] [id number]    Patient: Zana Vera   MR Number: [id number]   YOB: 1945   Date of Visit: 12/8/2022       Dear Berta Bermudez:    Thank you for referring Red Medeiros to me for evaluation/treatment. Below are the relevant portions of my assessment and plan of care. If you have questions, please do not hesitate to call me. I look forward to following Bhakti Feng along with you.     Sincerely,      Pao Saunders MD

## 2022-12-08 NOTE — PATIENT INSTRUCTIONS
CORONARY ARTERY DISEASE:None significant except LAD is small possibly diffusely diseased. Patient is on ASA  CATH 10/2020   1. No significant CAD. ? LAD diffusely diseased as it is very small in caliber. 2. Normal LV systolic function. LVEF is 50 to 55 %. HTN:well controlled on current medical regimen, see list above. - changes in  treatment:   no, Takes Toprol & Hydralazine. CARDIOMYOPATHY: None known   CONGESTIVE HEART FAILURE: NO KNOWN HISTORY.    VHD: No significant VHD noted  ECHO 8/2020   Left ventricular systolic function is normal.   Ejection fraction is visually estimated at 55%. Mild left ventricular hypertrophy. Mildly dilated left atrium. No evidence of any pericardial effusion. DYSLIPIDEMIA: Patient's profile is at / near Goal.no                                HDL is low                                Tolerating current medical regimen wellyes, on Pravachol                                Does not tolerate medications well due to side effects                                See most recent Lab values in Labs section above. ARRHYTHMIAS: known                                Patient has H/O P. Atrial fibrillation                                He is rate controlled & on Eliquis anticoagulation. Patient probably had a break through episode of PAF. TESTS ORDERED:none this visit     PREVIOUSLY ORDERED TESTS REVIEWED & DISCUSSED WITH THE PATIENT:     I personally reviewed & interpreted, all previously ordered tests as copied above. Latest Labs are pulled in to the note with dates. Labs, specially in Reference to Lipid profile, Cardiac testing in the form of Echo ( dated: ), stress tests ( dated: ) & other relevant cardiac testing reviewed with patient & recommendations made based on assessment of the results. Discussed role of Cardiac risk factors & effects + treatment of co morbidities with patient & advised accordingly.      MEDICATIONS: List of medications patient is currently taking is reviewed in detail with the patient. Discussed any side effects or problems taking the medication. Recommend Continue present management & medications as listed. AFFIRMATION: I spent at least 20 minutes of time reviewing patient's history, previous & current medical problems & all Labs + testing. This includes chart prep even prior to the vosit. Various goals are discussed and multiple questions answered. Relevant concelling performed. Office follow up in six months.

## 2022-12-08 NOTE — PROGRESS NOTES
OFFICE PROGRESS NOTES      Apolinar Soler is a 78 y.o. male who has    CHIEF COMPLAINT AS FOLLOWS:  CHEST PAIN: Patient denies any C/O chest pains at this time. SOB: No C/O SOB at this time. LEG EDEMA: No leg edema   PALPITATIONS: Denies any C/O Palpitations   DIZZINESS: C/O positional Dizziness but no black out spells. Probably due to Flomax. SYNCOPE: None   OTHER/ ADDITIONAL COMPLAINTS:                                     HPI: Patient is here for F/U on his A-fib/flutter  Arrhythmia, HTN & Dyslipidemia problems. Arrhythmia: Patient has known A-fib / Flutter S/P Ablations  HTN: Patient has known essential HTN. Has been treated with guideline recommended medical / physical/ diet therapy as stated below. Dyslipidemia: Patient has known mixed dyslipidemia. Has been treated with guideline recommended medical / physical/ diet therapy as stated below. Current Outpatient Medications   Medication Sig Dispense Refill    apixaban (ELIQUIS) 5 MG TABS tablet Take 1 tablet by mouth 2 times daily 28 tablet 0    pravastatin (PRAVACHOL) 20 MG tablet Take 1 tablet by mouth in the morning. 30 tablet 5    metoprolol succinate (TOPROL XL) 25 MG extended release tablet Take 0.5 tablets by mouth daily 30 tablet 5    ferrous sulfate (FE TABS 325) 325 (65 Fe) MG EC tablet Take 325 mg by mouth daily      hydrALAZINE (APRESOLINE) 10 MG tablet Take 1 tablet by mouth 2 times daily 180 tablet 3    omeprazole (PRILOSEC) 20 MG delayed release capsule Take 40 mg by mouth Daily      finasteride (PROSCAR) 5 MG tablet Take 5 mg by mouth at bedtime      gabapentin (NEURONTIN) 300 MG capsule Take 300 mg by mouth 3 times daily. tamsulosin (FLOMAX) 0.4 MG capsule 1 tablet 2 times daily      aspirin 81 MG chewable tablet Take 1 tablet by mouth daily 30 tablet 0    citalopram (CELEXA) 20 MG tablet Take 10 mg by mouth daily        No current facility-administered medications for this visit.      Allergies: Ambien [zolpidem]  Review of Systems:    Constitutional: Negative for diaphoresis and fatigue  Respiratory: Negative for shortness of breath  Cardiovascular: Negative for chest pain, dyspnea on exertion, claudication, edema, irregular heartbeat, murmur, palpitations or shortness of breath  Musculoskeletal: Negative for muscle pain, muscular weakness, negative for pain in arm and leg or swelling in foot and leg    Objective:  /60 (Site: Left Upper Arm, Position: Sitting, Cuff Size: Medium Adult)   Pulse 76   Ht 6' 2\" (1.88 m)   Wt 246 lb 12.8 oz (111.9 kg)   BMI 31.69 kg/m²   Wt Readings from Last 3 Encounters:   12/08/22 246 lb 12.8 oz (111.9 kg)   10/25/22 242 lb 3.2 oz (109.9 kg)   06/08/22 241 lb (109.3 kg)     Body mass index is 31.69 kg/m². GENERAL - Alert, oriented, pleasant, in no apparent distress. EYES: No jaundice, no conjunctival pallor. Neck - Supple. No jugular venous distention noted. No carotid bruits. Cardiovascular - Normal S1 and S2 without obvious murmur or gallop. Extremities - No cyanosis, clubbing, or significant edema. Pulmonary - No respiratory distress. No wheezes or rales.       MEDICAL DECISION MAKING & DATA REVIEW:    Lab Review   Lab Results   Component Value Date/Time    TROPONINT <0.010 05/31/2021 02:39 PM    TROPONINT <0.010 11/07/2020 01:59 PM     Lab Results   Component Value Date/Time    BNP 15 09/06/2011 08:41 AM    PROBNP 372.4 05/31/2021 02:39 PM    PROBNP 4,306 11/07/2020 06:30 AM     Lab Results   Component Value Date    INR 1.24 02/09/2022    INR 1.22 01/11/2021     Lab Results   Component Value Date    LABA1C 5.9 02/24/2022    LABA1C 5.7 02/23/2022     Lab Results   Component Value Date    WBC 8.7 02/26/2022    WBC 7.9 02/24/2022    HCT 25.6 (L) 02/26/2022    HCT 25.8 (L) 02/24/2022    MCV 94.1 02/26/2022    MCV 92.3 02/24/2022     02/26/2022     02/24/2022     Lab Results   Component Value Date    CHOL 174 08/26/2020    CHOL 181 05/08/2015 TRIG 225 (H) 08/26/2020    TRIG 222 (H) 05/08/2015    HDL 44 08/26/2020    HDL 39 (L) 05/08/2015    LDLDIRECT 104 (H) 08/26/2020    LDLDIRECT 121 (H) 05/08/2015     Lab Results   Component Value Date    ALT 20 05/31/2021    ALT 31 11/07/2020    AST 19 05/31/2021    AST 24 11/07/2020     BMP:    Lab Results   Component Value Date/Time     02/24/2022 04:20 AM     02/23/2022 01:46 PM    K 4.0 02/24/2022 04:20 AM    K 4.3 02/23/2022 01:46 PM     02/24/2022 04:20 AM     02/23/2022 01:46 PM    CO2 23 02/24/2022 04:20 AM    CO2 22 02/23/2022 01:46 PM    BUN 12 02/24/2022 04:20 AM    BUN 14 02/23/2022 01:46 PM    CREATININE 0.7 02/24/2022 04:20 AM    CREATININE 0.9 02/23/2022 01:46 PM     CMP:   Lab Results   Component Value Date/Time     02/24/2022 04:20 AM     02/23/2022 01:46 PM    K 4.0 02/24/2022 04:20 AM    K 4.3 02/23/2022 01:46 PM     02/24/2022 04:20 AM     02/23/2022 01:46 PM    CO2 23 02/24/2022 04:20 AM    CO2 22 02/23/2022 01:46 PM    BUN 12 02/24/2022 04:20 AM    BUN 14 02/23/2022 01:46 PM    CREATININE 0.7 02/24/2022 04:20 AM    CREATININE 0.9 02/23/2022 01:46 PM    PROT 6.7 05/31/2021 02:39 PM    PROT 7.6 11/07/2020 06:30 AM    PROT 7.1 02/05/2013 01:42 PM    PROT 6.9 02/10/2012 01:55 PM     Lab Results   Component Value Date/Time    TSH 3.210 05/31/2021 02:39 PM    TSHHS 3.230 11/07/2020 06:30 AM       QUALITY MEASURES REVIEWED:  1.CAD:Patient is taking anti platelet agent:Yes  Patient does not have Hx of documented CAD  2. DYSLIPIDEMIA: Patient is on cholesterol lowering medication:Yes   3. Beta-Blocker therapy for CAD, if prior Myocardial Infarction:Yes   4. Counselled regarding smoking cessation. No   Patient does not Smoke. 5.Anticoagulation therapy (for A.Fib) Yes   Does Not have A.Fib.  6.Discussed weight management strategies.     Assessment & Plan:  Primary / Secondary prevention is the goal by aggressive risk modification, healthy and therapeutic life style changes for cardiovascular risk reduction. CORONARY ARTERY DISEASE:None significant except LAD is small possibly diffusely diseased. Patient is on ASA  CATH 10/2020   1. No significant CAD. ? LAD diffusely diseased as it is very small in caliber. 2. Normal LV systolic function. LVEF is 50 to 55 %. HTN:well controlled on current medical regimen, see list above. - changes in  treatment:   no, Takes Toprol & Hydralazine. CARDIOMYOPATHY: None known   CONGESTIVE HEART FAILURE: NO KNOWN HISTORY.    VHD: No significant VHD noted  ECHO 8/2020   Left ventricular systolic function is normal.   Ejection fraction is visually estimated at 55%. Mild left ventricular hypertrophy. Mildly dilated left atrium. No evidence of any pericardial effusion. DYSLIPIDEMIA: Patient's profile is at / near Goal.no                                HDL is low                                Tolerating current medical regimen wellyes, on Pravachol                                Does not tolerate medications well due to side effects                                See most recent Lab values in Labs section above. ARRHYTHMIAS: known                                Patient has H/O P. Atrial fibrillation                                He is rate controlled & on Eliquis anticoagulation. Patient probably had a break through episode of PAF. TESTS ORDERED:none this visit     PREVIOUSLY ORDERED TESTS REVIEWED & DISCUSSED WITH THE PATIENT:     I personally reviewed & interpreted, all previously ordered tests as copied above. Latest Labs are pulled in to the note with dates. Labs, specially in Reference to Lipid profile, Cardiac testing in the form of Echo ( dated: ), stress tests ( dated: ) & other relevant cardiac testing reviewed with patient & recommendations made based on assessment of the results.     Discussed role of Cardiac risk factors & effects + treatment of co morbidities with patient & advised accordingly. MEDICATIONS: List of medications patient is currently taking is reviewed in detail with the patient. Discussed any side effects or problems taking the medication. Recommend Continue present management & medications as listed. AFFIRMATION: I spent at least 20 minutes of time reviewing patient's history, previous & current medical problems & all Labs + testing. This includes chart prep even prior to the vosit. Various goals are discussed and multiple questions answered. Relevant concelling performed. Office follow up in six months.

## 2022-12-28 NOTE — TELEPHONE ENCOUNTER
Patient called requesting samples of Eliquis, patient was advised that samples should be ready for  by tomorrow afternoon, please call with any problems at ph# [phone].

## 2023-02-01 RX ORDER — PRAVASTATIN SODIUM 20 MG
20 TABLET ORAL DAILY
Qty: 30 TABLET | Refills: 5 | Status: SHIPPED | OUTPATIENT
Start: 2023-02-01

## 2023-03-02 ENCOUNTER — TELEPHONE (OUTPATIENT)
Dept: CARDIOLOGY CLINIC | Age: 78
End: 2023-03-02

## 2023-03-02 NOTE — TELEPHONE ENCOUNTER
Cardiologist: Dr. Radha Forrest  Surgeon: Dr. Edmar Cui  Surgery: ON62, L155 ACDF  Anesthesia: General  Date: 3/31/2023  FAX# [phone]  # [phone]    Last OV 12/8/2022 w/Petey        CORONARY ARTERY DISEASE:None significant except LAD is small possibly diffusely diseased. Patient is on ASA    HTN:well controlled on current medical regimen, see list above. - changes in  treatment:   no, Takes Toprol & Hydralazine. CARDIOMYOPATHY: None known   CONGESTIVE HEART FAILURE: NO KNOWN HISTORY.    VHD: No significant VHD noted    DYSLIPIDEMIA: Patient's profile is at / near Goal.no                                HDL is low                                Tolerating current medical regimen wellyes, on Pravachol                                Does not tolerate medications well due to side effects                                See most recent Lab values in Labs section above. ARRHYTHMIAS: known                                Patient has H/O P. Atrial fibrillation                                He is rate controlled & on Eliquis anticoagulation. Patient probably had a break through episode of PAF. TESTS ORDERED:none this visit        Last EKG- 10/25/2022          NM- 9/30/2020  Abnormal Study. Large area of significant inferior wall Ischemia. Normal LV function. LVEF is 62 %       Echo- 8/26/2020   Left ventricular systolic function is normal.   Ejection fraction is visually estimated at 55%. Mild left ventricular hypertrophy. Mildly dilated left atrium. No evidence of any pericardial effusion. Cath- 10/5/2020  1. No significant CAD. ? LAD diffusely diseased as it is very small in caliber. 2. Normal LV systolic function. LVEF is 50 to 55 %.          Eliquis      Aspirin

## 2023-03-20 RX ORDER — HYDRALAZINE HYDROCHLORIDE 10 MG/1
10 TABLET, FILM COATED ORAL 2 TIMES DAILY
Qty: 180 TABLET | Refills: 3 | Status: SHIPPED | OUTPATIENT
Start: 2023-03-20

## 2023-03-22 DIAGNOSIS — I10 ESSENTIAL HYPERTENSION: ICD-10-CM

## 2023-03-23 RX ORDER — METOPROLOL SUCCINATE 25 MG/1
12.5 TABLET, EXTENDED RELEASE ORAL DAILY
Qty: 15 TABLET | Refills: 5 | Status: SHIPPED | OUTPATIENT
Start: 2023-03-23

## 2023-06-08 ENCOUNTER — OFFICE VISIT (OUTPATIENT)
Dept: CARDIOLOGY CLINIC | Age: 78
End: 2023-06-08
Payer: MEDICARE

## 2023-06-08 VITALS
HEIGHT: 74 IN | WEIGHT: 246 LBS | DIASTOLIC BLOOD PRESSURE: 70 MMHG | HEART RATE: 95 BPM | BODY MASS INDEX: 31.57 KG/M2 | SYSTOLIC BLOOD PRESSURE: 120 MMHG

## 2023-06-08 DIAGNOSIS — I73.9 PAD (PERIPHERAL ARTERY DISEASE) (HCC): ICD-10-CM

## 2023-06-08 DIAGNOSIS — I47.29 NSVT (NONSUSTAINED VENTRICULAR TACHYCARDIA) (HCC): ICD-10-CM

## 2023-06-08 DIAGNOSIS — G47.33 OSA ON CPAP: ICD-10-CM

## 2023-06-08 DIAGNOSIS — E66.9 OBESITY (BMI 30.0-34.9): ICD-10-CM

## 2023-06-08 DIAGNOSIS — I48.0 PAROXYSMAL ATRIAL FIBRILLATION (HCC): ICD-10-CM

## 2023-06-08 DIAGNOSIS — Z86.79 STATUS POST ABLATION OF ATRIAL FIBRILLATION: ICD-10-CM

## 2023-06-08 DIAGNOSIS — I10 ESSENTIAL HYPERTENSION: Primary | ICD-10-CM

## 2023-06-08 DIAGNOSIS — Z99.89 OSA ON CPAP: ICD-10-CM

## 2023-06-08 DIAGNOSIS — Z98.890 STATUS POST ABLATION OF ATRIAL FLUTTER: ICD-10-CM

## 2023-06-08 DIAGNOSIS — Z98.890 STATUS POST ABLATION OF ATRIAL FIBRILLATION: ICD-10-CM

## 2023-06-08 DIAGNOSIS — Z86.79 STATUS POST ABLATION OF ATRIAL FLUTTER: ICD-10-CM

## 2023-06-08 PROCEDURE — G8427 DOCREV CUR MEDS BY ELIG CLIN: HCPCS | Performed by: INTERNAL MEDICINE

## 2023-06-08 PROCEDURE — 93000 ELECTROCARDIOGRAM COMPLETE: CPT | Performed by: INTERNAL MEDICINE

## 2023-06-08 PROCEDURE — 1036F TOBACCO NON-USER: CPT | Performed by: INTERNAL MEDICINE

## 2023-06-08 PROCEDURE — 3074F SYST BP LT 130 MM HG: CPT | Performed by: INTERNAL MEDICINE

## 2023-06-08 PROCEDURE — 99214 OFFICE O/P EST MOD 30 MIN: CPT | Performed by: INTERNAL MEDICINE

## 2023-06-08 PROCEDURE — 3078F DIAST BP <80 MM HG: CPT | Performed by: INTERNAL MEDICINE

## 2023-06-08 PROCEDURE — 1123F ACP DISCUSS/DSCN MKR DOCD: CPT | Performed by: INTERNAL MEDICINE

## 2023-06-08 PROCEDURE — G8417 CALC BMI ABV UP PARAM F/U: HCPCS | Performed by: INTERNAL MEDICINE

## 2023-06-08 RX ORDER — METOPROLOL SUCCINATE 25 MG/1
25 TABLET, EXTENDED RELEASE ORAL DAILY
Qty: 30 TABLET | Refills: 5 | Status: SHIPPED | OUTPATIENT
Start: 2023-06-08

## 2023-06-08 NOTE — PROGRESS NOTES
Atrial Fibrillation CHADSVASC2 Score Stroke Risk:   70 y.o. > 76 - 2    male Male - 0   CHF HX: No - 0   HTN HX: Yes - 1   Stroke/TIA/Thromboembolism No - 0   Vascular Disease HX: No - 0   Diabetes Mellitus No - 0   CHADSVASC 2 Score 3      Annual Stroke Risk 3.2% - moderate-high

## 2023-06-08 NOTE — PATIENT INSTRUCTIONS
accordingly. MEDICATIONS: List of medications patient is currently taking is reviewed in detail with the patient. Discussed any side effects or problems taking the medication. Recommend : 1. Stop Hydralazine. 2. Increase Toprol XL to 25 mg daily. .     AFFIRMATION: I reviewed patient's history, previous & current medical problems & all Labs + testing. This includes chart prep even prior to the vosit. Various goals are discussed and multiple questions answered. Relevant concelling performed. Office follow up in six months.

## 2023-06-08 NOTE — PROGRESS NOTES
OFFICE PROGRESS NOTES      Case Scott is a 70 y.o. male who has    CHIEF COMPLAINT AS FOLLOWS:  CHEST PAIN:Patient denies any C/O chest pains at this time. SOB: No C/O SOB at this time. LEG EDEMA: No leg edema   PALPITATIONS: Denies any C/O Palpitations   DIZZINESS: C/O positional Dizziness but no black out spells. Probably due to Flomax. SYNCOPE: None   OTHER/ ADDITIONAL COMPLAINTS:                                     HPI: Patient is here for F/U on his  Arrhythmia, HTN & Dyslipidemia problems. Arrhythmia: Patient has known  A-fib / Flutter S/P Ablations  HTN: Patient has known essential HTN. Has been treated with guideline recommended medical / physical/ diet therapy as stated below. Dyslipidemia: Patient has known mixed dyslipidemia. Has been treated with guideline recommended medical / physical/ diet therapy as stated below. Current Outpatient Medications   Medication Sig Dispense Refill    apixaban (ELIQUIS) 5 MG TABS tablet Take 1 tablet by mouth 2 times daily 28 tablet 0    metoprolol succinate (TOPROL XL) 25 MG extended release tablet Take 0.5 tablets by mouth daily 15 tablet 5    hydrALAZINE (APRESOLINE) 10 MG tablet Take 1 tablet by mouth 2 times daily 180 tablet 3    pravastatin (PRAVACHOL) 20 MG tablet Take 1 tablet by mouth daily 30 tablet 5    finasteride (PROSCAR) 5 MG tablet Take 1 tablet by mouth at bedtime      gabapentin (NEURONTIN) 300 MG capsule Take 1 capsule by mouth 3 times daily.       tamsulosin (FLOMAX) 0.4 MG capsule 1 tablet 2 times daily      aspirin 81 MG chewable tablet Take 1 tablet by mouth daily 30 tablet 0    citalopram (CELEXA) 20 MG tablet Take 0.5 tablets by mouth daily      ferrous sulfate (FE TABS 325) 325 (65 Fe) MG EC tablet Take 325 mg by mouth daily (Patient not taking: Reported on 6/8/2023)      omeprazole (PRILOSEC) 20 MG delayed release capsule Take 40 mg by mouth Daily (Patient not taking: Reported on 6/8/2023)       No current

## 2023-08-07 RX ORDER — PRAVASTATIN SODIUM 20 MG
20 TABLET ORAL DAILY
Qty: 90 TABLET | Refills: 1 | Status: SHIPPED | OUTPATIENT
Start: 2023-08-07

## 2023-09-22 ENCOUNTER — OFFICE VISIT (OUTPATIENT)
Dept: CARDIOLOGY CLINIC | Age: 78
End: 2023-09-22
Payer: MEDICARE

## 2023-09-22 VITALS
SYSTOLIC BLOOD PRESSURE: 112 MMHG | WEIGHT: 247 LBS | DIASTOLIC BLOOD PRESSURE: 62 MMHG | HEIGHT: 74 IN | HEART RATE: 69 BPM | BODY MASS INDEX: 31.7 KG/M2

## 2023-09-22 DIAGNOSIS — I10 ESSENTIAL HYPERTENSION: ICD-10-CM

## 2023-09-22 DIAGNOSIS — I48.0 PAF (PAROXYSMAL ATRIAL FIBRILLATION) (HCC): Primary | ICD-10-CM

## 2023-09-22 PROCEDURE — 93000 ELECTROCARDIOGRAM COMPLETE: CPT | Performed by: INTERNAL MEDICINE

## 2023-09-22 PROCEDURE — 3074F SYST BP LT 130 MM HG: CPT | Performed by: INTERNAL MEDICINE

## 2023-09-22 PROCEDURE — 3078F DIAST BP <80 MM HG: CPT | Performed by: INTERNAL MEDICINE

## 2023-09-22 PROCEDURE — G8427 DOCREV CUR MEDS BY ELIG CLIN: HCPCS | Performed by: INTERNAL MEDICINE

## 2023-09-22 PROCEDURE — 1036F TOBACCO NON-USER: CPT | Performed by: INTERNAL MEDICINE

## 2023-09-22 PROCEDURE — G8417 CALC BMI ABV UP PARAM F/U: HCPCS | Performed by: INTERNAL MEDICINE

## 2023-09-22 PROCEDURE — 99215 OFFICE O/P EST HI 40 MIN: CPT | Performed by: INTERNAL MEDICINE

## 2023-09-22 PROCEDURE — 1123F ACP DISCUSS/DSCN MKR DOCD: CPT | Performed by: INTERNAL MEDICINE

## 2023-09-22 RX ORDER — OMEPRAZOLE 40 MG/1
40 CAPSULE, DELAYED RELEASE ORAL DAILY
COMMUNITY
Start: 2023-07-18 | End: 2023-09-22

## 2023-09-22 ASSESSMENT — ENCOUNTER SYMPTOMS
COLOR CHANGE: 0
WHEEZING: 0
VOMITING: 0
NAUSEA: 0
COUGH: 0
EYE PAIN: 0
CONSTIPATION: 0
CHEST TIGHTNESS: 0
BACK PAIN: 0
ABDOMINAL PAIN: 0
BLOOD IN STOOL: 0
DIARRHEA: 0
PHOTOPHOBIA: 0

## 2023-09-22 NOTE — PROGRESS NOTES
Saw patient in the office with Dr Sonya Abdi. My role as Watchman Coordinator explained. Watchman discussed, Brochure provided and [employer] shared. [employer] utilized and filled out. Patient to take the [employer] with them for shared decision making appointment. Explained to the patient:  Part of the FDA approval of the Watchman procedure in 2015 mandated that each procedure must participate in a national registry, this requires several follow up appointments and testing following the procedure. These expectations Include:      7-10 day follow up with the Nurse practitioner or Dr Sonya Abdi    45 day follow up lab work and MARIBEL to check positioning of the device. 6 month follow up telephone call     1 year follow up appointment, MARIBEL and lab work     2  year follow up appointment and lab work . Discussed the importance of blood thinners as prescribed and that the patient cannot come off those blood thinners until discontinued by the implanting physician. Patient has no surgery or procedures planned that would disrupt these needed blood thinners. All questions and concerns answered.   Will follow    Hayde Cid RN  Watchman Coordinator Electronically signed by Honorio Roca RN on 9/22/2023 at 2:25 PM [facility]

## 2023-09-22 NOTE — PATIENT INSTRUCTIONS
Please be informed that if you contact our office outside of normal business hours the physician on call cannot help with any scheduling or rescheduling issues, procedure instruction questions or any type of medication issue. We advise you for any urgent/emergency that you go to the nearest emergency room! PLEASE CALL OUR OFFICE DURING NORMAL BUSINESS HOURS    Monday - Friday   8 am to 5 pm    [place]: [address]: [phone]    [place]:  [phone]    **It is YOUR responsibilty to bring medication bottles and/or updated medication list to [facility]. This will allow us to better serve you and all your healthcare needs**    Thank you for allowing us to care for you today! We want to ensure we can follow your treatment plan and we strive to give you the best outcomes and experience possible. If you ever have a life threatening emergency and call 911 - for an ambulance (EMS)   Our providers can only care for you at:   [facility] or [facility]. Even if you have someone take you or you drive yourself we can only care for you in a [facility] facility. Our providers are not setup at the other healthcare locations! [facility] Laboratory Locations - No appointment necessary. Sites open Monday to Friday. Call your preferred location for test preparation, business   hours and other information you need. [employer] accepts [employer]. [address] [place] [address]. [address] [place], [address] [facility]  Phone: [phone]       We are committed to providing you the best care possible. If you receive a survey after visiting one of our offices, please take time to share your experience concerning your physician office visit. These surveys are confidential and no health information about you is shared. We are eager to improve for you and we are counting on your feedback to help make that happen.

## 2023-09-22 NOTE — PROGRESS NOTES
Electrophysiology FU Note      Reason for consultation: Atrial fibrillation    Chief complaint : Fluttering, shortness of breath    Referring physician:       Primary care physician: Kleber Valera MD      History of Present Illness: This visit (9/27/2023)    Patient here to discuss about watchman which he heard can help him come off eliquis  Patient has imbalance and frequent falls and he is concerned about bleeding  He has dizziness and he has issues with it  Denies chest pain, shortness of breath, palpitations, edema   Denies syncope its mostly imbalance   Does not drink alcohol or smoke    Previous visit:     Patient is a 72-year-old male with a history of COPD, obstructive sleep apnea on CPAP, paroxysmal atrial fibrillation referred to us by Dr. Jacey Isaac for atrial fibrillation management. Patient reports that he has been having palpitations which can occur at any time and usually last up to 30 seconds to 1 minute. These are associated with shortness of breath but no chest pain. Symptoms ongoing for several months now. Patient denies any dizziness or syncope at rest but when standing suddenly can get dizzy at times. Patient also reports shortness of breath with mild to moderate exertion and this has been going on for few months. Patient feels tired and weak and has no energy. Patient has been on Multaq for atrial fibrillation control and it has worked but of late he has been having more symptoms. And also Multaq is becoming more expensive for him to be able to take any more    Patient here to discuss further options    Patient also reports weakness in the thigh muscle area. He was placed on statins.     Pastmedical history:   Past Medical History:   Diagnosis Date    A-fib (HCC)     Abnormal MRI, spine     per pt herniated disc    Angina at rest [facility])     Arthritis     COPD (chronic obstructive pulmonary disease) ([facility])     Gastritis     GERD

## 2023-09-27 ENCOUNTER — TELEPHONE (OUTPATIENT)
Dept: CARDIOLOGY CLINIC | Age: 78
End: 2023-09-27

## 2023-09-27 NOTE — TELEPHONE ENCOUNTER
Watchman procedure scheduled. Patient notified of dates and times. No further questions verbalized. Will follow.   Electronically signed by Lyndle Halsted, RN on 9/27/2023 at 6:00 PM [address] [place] [address]

## 2023-10-09 ENCOUNTER — HOSPITAL ENCOUNTER (OUTPATIENT)
Age: 78
Discharge: HOME | DRG: 274 | End: 2023-10-09
Payer: MEDICARE

## 2023-10-09 ENCOUNTER — NURSE ONLY (OUTPATIENT)
Dept: CARDIOLOGY CLINIC | Age: 78
End: 2023-10-09

## 2023-10-09 ENCOUNTER — HOSPITAL ENCOUNTER (OUTPATIENT)
Dept: GENERAL RADIOLOGY | Age: 78
Discharge: HOME | DRG: 274 | End: 2023-10-09
Payer: MEDICARE

## 2023-10-09 DIAGNOSIS — Z01.818 PREOP EXAMINATION: ICD-10-CM

## 2023-10-09 DIAGNOSIS — I48.0 PAROXYSMAL ATRIAL FIBRILLATION (HCC): Primary | ICD-10-CM

## 2023-10-09 LAB
ANION GAP SERPL CALCULATED.3IONS-SCNC: 14 MMOL/L (ref 4–16)
APTT: 36 SECONDS (ref 25.1–37.1)
BASOPHILS ABSOLUTE: 0 K/CU MM
BASOPHILS RELATIVE PERCENT: 0.3 % (ref 0–1)
BUN SERPL-MCNC: 26 MG/DL (ref 6–23)
CALCIUM SERPL-MCNC: 9 MG/DL (ref 8.3–10.6)
CHLORIDE BLD-SCNC: 104 MMOL/L (ref 99–110)
CO2: 22 MMOL/L (ref 21–32)
CREAT SERPL-MCNC: 1 MG/DL (ref 0.9–1.3)
DIFFERENTIAL TYPE: ABNORMAL
EOSINOPHILS ABSOLUTE: 0.2 K/CU MM
EOSINOPHILS RELATIVE PERCENT: 4.1 % (ref 0–3)
GFR SERPL CREATININE-BSD FRML MDRD: >60 ML/MIN/1.73M2
GLUCOSE SERPL-MCNC: 100 MG/DL (ref 70–99)
HCT VFR BLD CALC: 40.9 % (ref 42–52)
HEMOGLOBIN: 13.1 GM/DL (ref 13.5–18)
IMMATURE NEUTROPHIL %: 0.5 % (ref 0–0.43)
INR BLD: 1.2 INDEX
LYMPHOCYTES ABSOLUTE: 0.8 K/CU MM
LYMPHOCYTES RELATIVE PERCENT: 14.1 % (ref 24–44)
MAGNESIUM: 2 MG/DL (ref 1.8–2.4)
MCH RBC QN AUTO: 29.7 PG (ref 27–31)
MCHC RBC AUTO-ENTMCNC: 32 % (ref 32–36)
MCV RBC AUTO: 92.7 FL (ref 78–100)
MONOCYTES ABSOLUTE: 0.5 K/CU MM
MONOCYTES RELATIVE PERCENT: 8.3 % (ref 0–4)
NUCLEATED RBC %: 0 %
PDW BLD-RTO: 13.6 % (ref 11.7–14.9)
PHOSPHORUS: 3.1 MG/DL (ref 2.5–4.9)
PLATELET # BLD: 198 K/CU MM (ref 140–440)
PMV BLD AUTO: 11.3 FL (ref 7.5–11.1)
POTASSIUM SERPL-SCNC: 4.6 MMOL/L (ref 3.5–5.1)
PROTHROMBIN TIME: 15.9 SECONDS (ref 11.7–14.5)
RBC # BLD: 4.41 M/CU MM (ref 4.6–6.2)
SEGMENTED NEUTROPHILS ABSOLUTE COUNT: 4.3 K/CU MM
SEGMENTED NEUTROPHILS RELATIVE PERCENT: 72.7 % (ref 36–66)
SODIUM BLD-SCNC: 140 MMOL/L (ref 135–145)
TOTAL IMMATURE NEUTOROPHIL: 0.03 K/CU MM
TOTAL NUCLEATED RBC: 0 K/CU MM
WBC # BLD: 5.9 K/CU MM (ref 4–10.5)

## 2023-10-09 PROCEDURE — 85610 PROTHROMBIN TIME: CPT

## 2023-10-09 PROCEDURE — 84100 ASSAY OF PHOSPHORUS: CPT

## 2023-10-09 PROCEDURE — 83735 ASSAY OF MAGNESIUM: CPT

## 2023-10-09 PROCEDURE — 86922 COMPATIBILITY TEST ANTIGLOB: CPT

## 2023-10-09 PROCEDURE — 71046 X-RAY EXAM CHEST 2 VIEWS: CPT

## 2023-10-09 PROCEDURE — 80048 BASIC METABOLIC PNL TOTAL CA: CPT

## 2023-10-09 PROCEDURE — 85025 COMPLETE CBC W/AUTO DIFF WBC: CPT

## 2023-10-09 PROCEDURE — 86901 BLOOD TYPING SEROLOGIC RH(D): CPT

## 2023-10-09 PROCEDURE — 86900 BLOOD TYPING SEROLOGIC ABO: CPT

## 2023-10-09 PROCEDURE — 86850 RBC ANTIBODY SCREEN: CPT

## 2023-10-09 PROCEDURE — 36415 COLL VENOUS BLD VENIPUNCTURE: CPT

## 2023-10-09 PROCEDURE — 85730 THROMBOPLASTIN TIME PARTIAL: CPT

## 2023-10-09 NOTE — PROGRESS NOTES
Patient here in office and educated on Watchman, schedule for 10/12/23 @ 0830, with arrival @ [phone], @ [facility]; risk explained; and consents signed. Also copy of orders given for labs and CXR due 10/9/23 at [facility]. Instruction given to patient to :  NPO after midnight the night before procedure; call hospital at [phone] to pre-register. May take rest of morning meds of procedure. Hold ALL blood pressure medications morning of procedure. Hold Eliquis the evening dose night before procedure and Morning dose of the procedure. Patient voiced understanding. Copies of consent & info scanned in chart.

## 2023-10-10 ENCOUNTER — TELEPHONE (OUTPATIENT)
Dept: CARDIAC CATH/INVASIVE PROCEDURES | Age: 78
End: 2023-10-10

## 2023-10-10 RX ORDER — CLOPIDOGREL BISULFATE 75 MG/1
75 TABLET ORAL DAILY
Qty: 90 TABLET | Refills: 1 | Status: SHIPPED | OUTPATIENT
Start: 2023-10-10

## 2023-10-10 RX ORDER — ASPIRIN 81 MG/1
81 TABLET ORAL DAILY
Qty: 90 TABLET | Refills: 1 | Status: SHIPPED | OUTPATIENT
Start: 2023-10-10

## 2023-10-10 ASSESSMENT — ENCOUNTER SYMPTOMS: SHORTNESS OF BREATH: 0

## 2023-10-10 NOTE — TELEPHONE ENCOUNTER
Allen pre procedure call done. Patient to start plavix pm 10/11/2023  Will take aspirin 10/12/23 am  Last dose of eliquis 10/11/2023 am.  No questions verbalized. Will follow instructions provided by the office. Will call if any other needs or questions.   Electronically signed by Avel Torres RN on 10/10/2023 at 3:34 PM [address] [place] [address]

## 2023-10-12 ENCOUNTER — ANESTHESIA EVENT (OUTPATIENT)
Dept: CARDIAC CATH/INVASIVE PROCEDURES | Age: 78
DRG: 274 | End: 2023-10-12
Payer: MEDICARE

## 2023-10-12 ENCOUNTER — ANESTHESIA (OUTPATIENT)
Dept: CARDIAC CATH/INVASIVE PROCEDURES | Age: 78
DRG: 274 | End: 2023-10-12
Payer: MEDICARE

## 2023-10-12 ENCOUNTER — HOSPITAL ENCOUNTER (INPATIENT)
Dept: CARDIAC CATH/INVASIVE PROCEDURES | Age: 78
LOS: 1 days | Discharge: HOME | DRG: 274 | End: 2023-10-12
Attending: INTERNAL MEDICINE | Admitting: INTERNAL MEDICINE
Payer: MEDICARE

## 2023-10-12 VITALS
SYSTOLIC BLOOD PRESSURE: 123 MMHG | HEIGHT: 74 IN | RESPIRATION RATE: 18 BRPM | OXYGEN SATURATION: 97 % | TEMPERATURE: 97.2 F | BODY MASS INDEX: 31.57 KG/M2 | DIASTOLIC BLOOD PRESSURE: 66 MMHG | WEIGHT: 246 LBS | HEART RATE: 73 BPM

## 2023-10-12 PROBLEM — Z95.818 PRESENCE OF WATCHMAN LEFT ATRIAL APPENDAGE CLOSURE DEVICE: Status: ACTIVE | Noted: 2023-10-12

## 2023-10-12 LAB
BASE EXCESS MIXED: 1.2 (ref 0–1.2)
BASE EXCESS: ABNORMAL (ref 0–3.3)
CO2: 25 MMOL/L (ref 21–32)
EGFR, POC: >60 ML/MIN/1.73M2
EKG ATRIAL RATE: 68 BPM
EKG DIAGNOSIS: NORMAL
EKG P AXIS: 90 DEGREES
EKG P-R INTERVAL: 218 MS
EKG Q-T INTERVAL: 390 MS
EKG QRS DURATION: 80 MS
EKG QTC CALCULATION (BAZETT): 414 MS
EKG R AXIS: -9 DEGREES
EKG T AXIS: 11 DEGREES
EKG VENTRICULAR RATE: 68 BPM
GLUCOSE BLD-MCNC: 106 MG/DL (ref 70–99)
HCO3 ARTERIAL: 24.1 MMOL/L (ref 18–23)
HCT VFR BLD CALC: 39 % (ref 42–52)
HEMOGLOBIN: 13.3 GM/DL (ref 13.5–18)
O2 SATURATION: 100 % (ref 96–97)
PCO2 ARTERIAL: 41.4 MMHG (ref 32–45)
PH BLOOD: 7.37 (ref 7.34–7.45)
PO2 ARTERIAL: 414.7 MMHG (ref 75–100)
POC CALCIUM: 1.17 MMOL/L (ref 1.12–1.32)
POC CHLORIDE: 109 MMOL/L (ref 98–109)
POC CREATININE: 0.7 MG/DL (ref 0.9–1.3)
POTASSIUM SERPL-SCNC: 3.9 MMOL/L (ref 3.5–4.5)
SODIUM BLD-SCNC: 144 MMOL/L (ref 138–146)
SOURCE, BLOOD GAS: ABNORMAL

## 2023-10-12 PROCEDURE — 2580000003 HC RX 258: Performed by: INTERNAL MEDICINE

## 2023-10-12 PROCEDURE — 93005 ELECTROCARDIOGRAM TRACING: CPT | Performed by: INTERNAL MEDICINE

## 2023-10-12 PROCEDURE — 93010 ELECTROCARDIOGRAM REPORT: CPT | Performed by: INTERNAL MEDICINE

## 2023-10-12 PROCEDURE — 6370000000 HC RX 637 (ALT 250 FOR IP): Performed by: INTERNAL MEDICINE

## 2023-10-12 PROCEDURE — 02L73DK OCCLUSION OF LEFT ATRIAL APPENDAGE WITH INTRALUMINAL DEVICE, PERCUTANEOUS APPROACH: ICD-10-PCS | Performed by: INTERNAL MEDICINE

## 2023-10-12 PROCEDURE — 93312 ECHO TRANSESOPHAGEAL: CPT

## 2023-10-12 PROCEDURE — 82962 GLUCOSE BLOOD TEST: CPT

## 2023-10-12 PROCEDURE — 33340 PERQ CLSR TCAT L ATR APNDGE: CPT

## 2023-10-12 PROCEDURE — 6360000002 HC RX W HCPCS

## 2023-10-12 PROCEDURE — 85014 HEMATOCRIT: CPT

## 2023-10-12 PROCEDURE — 82803 BLOOD GASES ANY COMBINATION: CPT

## 2023-10-12 PROCEDURE — 80051 ELECTROLYTE PANEL: CPT

## 2023-10-12 PROCEDURE — 2580000003 HC RX 258

## 2023-10-12 PROCEDURE — 2500000003 HC RX 250 WO HCPCS

## 2023-10-12 PROCEDURE — 6360000004 HC RX CONTRAST MEDICATION

## 2023-10-12 PROCEDURE — 85018 HEMOGLOBIN: CPT

## 2023-10-12 PROCEDURE — 2709999900 HC NON-CHARGEABLE SUPPLY

## 2023-10-12 PROCEDURE — C1760 CLOSURE DEV, VASC: HCPCS

## 2023-10-12 PROCEDURE — 3700000001 HC ADD 15 MINUTES (ANESTHESIA): Performed by: ANESTHESIOLOGY

## 2023-10-12 PROCEDURE — C1769 GUIDE WIRE: HCPCS

## 2023-10-12 PROCEDURE — 3700000000 HC ANESTHESIA ATTENDED CARE: Performed by: ANESTHESIOLOGY

## 2023-10-12 PROCEDURE — 2720000010 HC SURG SUPPLY STERILE

## 2023-10-12 PROCEDURE — C1894 INTRO/SHEATH, NON-LASER: HCPCS

## 2023-10-12 PROCEDURE — B24BZZ4 ULTRASONOGRAPHY OF HEART WITH AORTA, TRANSESOPHAGEAL: ICD-10-PCS | Performed by: INTERNAL MEDICINE

## 2023-10-12 PROCEDURE — 7100000000 HC PACU RECOVERY - FIRST 15 MIN: Performed by: ANESTHESIOLOGY

## 2023-10-12 PROCEDURE — 82565 ASSAY OF CREATININE: CPT

## 2023-10-12 PROCEDURE — C1889 IMPLANT/INSERT DEVICE, NOC: HCPCS

## 2023-10-12 PROCEDURE — 93308 TTE F-UP OR LMTD: CPT

## 2023-10-12 PROCEDURE — 7100000001 HC PACU RECOVERY - ADDTL 15 MIN: Performed by: ANESTHESIOLOGY

## 2023-10-12 PROCEDURE — 1200000000 HC SEMI PRIVATE

## 2023-10-12 RX ORDER — PROTAMINE SULFATE 10 MG/ML
INJECTION, SOLUTION INTRAVENOUS PRN
Status: DISCONTINUED | OUTPATIENT
Start: 2023-10-12 | End: 2023-10-12 | Stop reason: SDUPTHER

## 2023-10-12 RX ORDER — SODIUM CHLORIDE 0.9 % (FLUSH) 0.9 %
5-40 SYRINGE (ML) INJECTION EVERY 12 HOURS SCHEDULED
Status: ACTIVE | OUTPATIENT
Start: 2023-10-12

## 2023-10-12 RX ORDER — PRAVASTATIN SODIUM 20 MG
20 TABLET ORAL DAILY
Status: CANCELLED | OUTPATIENT
Start: 2023-10-13

## 2023-10-12 RX ORDER — METOPROLOL SUCCINATE 25 MG/1
25 TABLET, EXTENDED RELEASE ORAL DAILY
Status: CANCELLED | OUTPATIENT
Start: 2023-10-12

## 2023-10-12 RX ORDER — ACETAMINOPHEN 325 MG/1
650 TABLET ORAL EVERY 4 HOURS PRN
Status: ACTIVE | OUTPATIENT
Start: 2023-10-12

## 2023-10-12 RX ORDER — PROPOFOL 10 MG/ML
INJECTION, EMULSION INTRAVENOUS PRN
Status: DISCONTINUED | OUTPATIENT
Start: 2023-10-12 | End: 2023-10-12 | Stop reason: SDUPTHER

## 2023-10-12 RX ORDER — SODIUM CHLORIDE 9 MG/ML
INJECTION, SOLUTION INTRAVENOUS PRN
Status: COMPLETED | OUTPATIENT
Start: 2023-10-12 | End: 2023-10-12

## 2023-10-12 RX ORDER — HEPARIN SODIUM 1000 [USP'U]/ML
INJECTION, SOLUTION INTRAVENOUS; SUBCUTANEOUS PRN
Status: DISCONTINUED | OUTPATIENT
Start: 2023-10-12 | End: 2023-10-12 | Stop reason: SDUPTHER

## 2023-10-12 RX ORDER — CEFAZOLIN SODIUM 1 G/3ML
INJECTION, POWDER, FOR SOLUTION INTRAMUSCULAR; INTRAVENOUS PRN
Status: DISCONTINUED | OUTPATIENT
Start: 2023-10-12 | End: 2023-10-12 | Stop reason: SDUPTHER

## 2023-10-12 RX ORDER — ASPIRIN 81 MG/1
81 TABLET, CHEWABLE ORAL DAILY
Status: DISCONTINUED | OUTPATIENT
Start: 2023-10-12 | End: 2023-10-12 | Stop reason: HOSPADM

## 2023-10-12 RX ORDER — LIDOCAINE HYDROCHLORIDE 20 MG/ML
INJECTION, SOLUTION INTRAVENOUS PRN
Status: DISCONTINUED | OUTPATIENT
Start: 2023-10-12 | End: 2023-10-12 | Stop reason: SDUPTHER

## 2023-10-12 RX ORDER — GABAPENTIN 300 MG/1
300 CAPSULE ORAL 3 TIMES DAILY
Status: CANCELLED | OUTPATIENT
Start: 2023-10-12

## 2023-10-12 RX ORDER — LABETALOL HYDROCHLORIDE 5 MG/ML
INJECTION, SOLUTION INTRAVENOUS PRN
Status: DISCONTINUED | OUTPATIENT
Start: 2023-10-12 | End: 2023-10-12 | Stop reason: SDUPTHER

## 2023-10-12 RX ORDER — CITALOPRAM 20 MG/1
10 TABLET ORAL DAILY
Status: CANCELLED | OUTPATIENT
Start: 2023-10-13

## 2023-10-12 RX ORDER — SODIUM CHLORIDE 0.9 % (FLUSH) 0.9 %
5-40 SYRINGE (ML) INJECTION PRN
Status: ACTIVE | OUTPATIENT
Start: 2023-10-12

## 2023-10-12 RX ORDER — ASPIRIN 81 MG/1
81 TABLET, CHEWABLE ORAL DAILY
Status: CANCELLED | OUTPATIENT
Start: 2023-10-13

## 2023-10-12 RX ORDER — ONDANSETRON 2 MG/ML
INJECTION INTRAMUSCULAR; INTRAVENOUS PRN
Status: DISCONTINUED | OUTPATIENT
Start: 2023-10-12 | End: 2023-10-12 | Stop reason: SDUPTHER

## 2023-10-12 RX ORDER — ROCURONIUM BROMIDE 10 MG/ML
INJECTION, SOLUTION INTRAVENOUS PRN
Status: DISCONTINUED | OUTPATIENT
Start: 2023-10-12 | End: 2023-10-12 | Stop reason: SDUPTHER

## 2023-10-12 RX ORDER — TAMSULOSIN HYDROCHLORIDE 0.4 MG/1
0.4 CAPSULE ORAL 2 TIMES DAILY
Status: CANCELLED | OUTPATIENT
Start: 2023-10-13

## 2023-10-12 RX ORDER — FINASTERIDE 5 MG/1
5 TABLET, FILM COATED ORAL NIGHTLY
Status: CANCELLED | OUTPATIENT
Start: 2023-10-12

## 2023-10-12 RX ORDER — SODIUM CHLORIDE 9 MG/ML
INJECTION, SOLUTION INTRAVENOUS PRN
Status: ACTIVE | OUTPATIENT
Start: 2023-10-12

## 2023-10-12 RX ORDER — CLOPIDOGREL BISULFATE 75 MG/1
75 TABLET ORAL DAILY
Status: CANCELLED | OUTPATIENT
Start: 2023-10-12

## 2023-10-12 RX ADMIN — SODIUM CHLORIDE: 9 INJECTION, SOLUTION INTRAVENOUS at 09:17

## 2023-10-12 RX ADMIN — HEPARIN SODIUM 7000 UNITS: 1000 INJECTION INTRAVENOUS; SUBCUTANEOUS at 09:47

## 2023-10-12 RX ADMIN — SUGAMMADEX 200 MG: 100 INJECTION, SOLUTION INTRAVENOUS at 10:08

## 2023-10-12 RX ADMIN — CEFAZOLIN 2 G: 1 INJECTION, POWDER, FOR SOLUTION INTRAMUSCULAR; INTRAVENOUS; PARENTERAL at 09:32

## 2023-10-12 RX ADMIN — ASPIRIN 81 MG CHEWABLE TABLET 81 MG: 81 TABLET CHEWABLE at 07:35

## 2023-10-12 RX ADMIN — ROCURONIUM BROMIDE 50 MG: 10 INJECTION INTRAVENOUS at 09:26

## 2023-10-12 RX ADMIN — HEPARIN SODIUM 7000 UNITS: 1000 INJECTION INTRAVENOUS; SUBCUTANEOUS at 09:51

## 2023-10-12 RX ADMIN — PROPOFOL 150 MG: 10 INJECTION, EMULSION INTRAVENOUS at 09:26

## 2023-10-12 RX ADMIN — LIDOCAINE HYDROCHLORIDE 40 MG: 20 INJECTION, SOLUTION INTRAVENOUS at 09:26

## 2023-10-12 RX ADMIN — ONDANSETRON 4 MG: 2 INJECTION INTRAMUSCULAR; INTRAVENOUS at 10:08

## 2023-10-12 RX ADMIN — PROTAMINE SULFATE 30 MG: 10 INJECTION, SOLUTION INTRAVENOUS at 10:06

## 2023-10-12 RX ADMIN — LABETALOL HYDROCHLORIDE 5 MG: 5 INJECTION, SOLUTION INTRAVENOUS at 10:13

## 2023-10-12 ASSESSMENT — ENCOUNTER SYMPTOMS
CHEST TIGHTNESS: 0
SHORTNESS OF BREATH: 0
BACK PAIN: 0
CONSTIPATION: 0
BLOOD IN STOOL: 0
EYE PAIN: 0
DIARRHEA: 0
COUGH: 0
PHOTOPHOBIA: 0
NAUSEA: 0
VOMITING: 0
WHEEZING: 0
ABDOMINAL PAIN: 0
COLOR CHANGE: 0

## 2023-10-12 ASSESSMENT — PAIN SCALES - GENERAL: PAINLEVEL_OUTOF10: 0

## 2023-10-12 NOTE — DISCHARGE INSTRUCTIONS
[phone] if antibiotics are needed! More information about the procedure and Watchman device can be found at: [facility] or call The [address] [place], [place] [address] at [phone]. We are looking forward to being your Watchman team.  Please call anytime with questions or concerns.          [facility] [address] Coordinator  [phone]

## 2023-10-12 NOTE — OP NOTE
[facility]    Procedure Note      Procedure Performed by: Kelsie Jefferson MD       Procedures performed:     Percutaneous transcatheter closure of the left atrial appendage with endocardial implant   Transeptal Puncture  MARIBEL      Indication of procedure:      Patient with hx of COPD, obstructive sleep apnea on CPAP, paroxysmal atrial fibrillation, htn, GI bleed, risk of falls, CAD with CHADS-VAS score of 4 and Has bled score of 3 with high risk of bleeding with anticoagulation and high risk of stroke with out anticoagulation here for TIMUR closure device watchman implantation    Patient has been seen by non implanting physician and shared decision making has been made for pursuing TIMUR closure. Patient here for TIMUR closure with Watchman device. Arterial line placed by ME    Sedation : General anesthesia      Details of procedure: The patient was brought to the electrophysiology laboratory in stable condition. The patient was in a fasting and non-sedated state. The risks, benefits and alternatives of the procedure were discussed with the patient. The risks including, but not limited to, the risks of vascular injury, bleeding, infection, device malfunction, lead dislodgement, radiation exposure, injury to cardiac and surrounding structures (including pneumothorax), stroke, myocardial infarction and death were discussed in detail. The patient opted to proceed with the device implantation. Written informed consent was signed and placed in the chart. Prophylactic antibiotic was given. The patient was prepped and draped in a sterile fashion. A timeout protocol was completed to identify the patient and the procedure being performed.  Patient underwent general anesthesia by anesthesiology team.       Transesophageal echocardiography was performed and measurements of left

## 2023-10-12 NOTE — FLOWSHEET NOTE
Discharge instructions reviewed with patient and spouse per Sindi mcdaniel. Ambulated without difficulty.

## 2023-10-12 NOTE — PATIENT INSTRUCTIONS
CORONARY ARTERY DISEASE:None significant except LAD is small possibly diffusely diseased. Patient is on ASA  CATH 10/2020   1. No significant CAD.   ? LAD diffusely diseased as it is very small in caliber.   2. Normal LV systolic function. LVEF is 50 to 55 %.     HTN:well controlled on current medical regimen, see list above.              - changes in  treatment:   no, Takes Toprol & Hydralazine.  CARDIOMYOPATHY: None known   CONGESTIVE HEART FAILURE: NO KNOWN HISTORY.    VHD: No significant VHD noted  ECHO 8/2020   Left ventricular systolic function is normal.   Ejection fraction is visually estimated at 55%.   Mild left ventricular hypertrophy.   Mildly dilated left atrium.   No evidence of any pericardial effusion. DYSLIPIDEMIA: Patient's profile is at / near [facility] is low                                Tolerating current medical regimen wellyes, on Pravachol                                Does not tolerate medications well due to side effects                                See most recent Lab values in Labs section above. ARRHYTHMIAS: known                                ROCKY has H/O P. Atrial fibrillation                                He is rate controlled & on Eliquis anticoagulation.                                Patient probably had a break through episode of PAF. TESTS ORDERED:     PREVIOUSLY ORDERED TESTS REVIEWED & DISCUSSED WITH THE PATIENT:     I personally reviewed & interpreted, all previously ordered tests as copied above. Latest Labs are pulled in to the note with dates. Labs, specially in Reference to Lipid profile, Cardiac testing in the form of Echo ( dated: ), stress tests ( dated: ) & other relevant cardiac testing reviewed with patient & recommendations made based on assessment of the results. Discussed role of Cardiac risk factors & effects + treatment of co morbidities with patient & advised accordingly.      MEDICATIONS: List of Subjective:    Upon arrival, patient wife present.  Patient and wife familiar to this therapist from previous plan of care.  Patient wife states that patient recently had follow up with MD who ordered PT to improve patient strength and function.    Assessment:    PT OASIS SOC complete.  Patient presents with decreased gait, activity tolerance, lower extremity strength, and balance.  He is appropriate and will benefit from skilled PT services to improve on these deficits and to achieve maximum functional benefit. medications patient is currently taking is reviewed in detail with the patient. Discussed any side effects or problems taking the medication. Recommend Continue present management & medications as listed. AFFIRMATION: I spent at least 20 minutes of time reviewing patient's history, previous & current medical problems & all Labs + testing. This includes chart prep even prior to the vosit. Various goals are discussed and multiple questions answered. Relevant concelling performed. Office follow up in six months.

## 2023-10-12 NOTE — ANESTHESIA POSTPROCEDURE EVALUATION
Department of Anesthesiology  Postprocedure Note    Patient: Ольга Souza  MRN: [id number]  YOB: 1945  Date of evaluation: 10/12/2023      Procedure Summary     Date: 10/12/23 Room / Location: [facility] Cath Lab    Anesthesia Start: 6264 Anesthesia Stop: 9205    Procedure: WATCHMAN Diagnosis:       Unspecified atrial fibrillation      Presence of Watchman left atrial appendage closure device    Scheduled Providers:  Responsible Provider: Lourdes Howard MD    Anesthesia Type: General ASA Status: Not recorded          Anesthesia Type: General    Olivier Phase I: Olivier Score: 10    Olivier Phase II:        Anesthesia Post Evaluation    Patient location during evaluation: PACU  Patient participation: complete - patient participated  Level of consciousness: awake and alert  Airway patency: patent  Nausea & Vomiting: no nausea and no vomiting  Complications: no  Cardiovascular status: hemodynamically stable  Respiratory status: spontaneous ventilation and room air  Hydration status: stable  Pain management: adequate

## 2023-10-13 ENCOUNTER — TELEPHONE (OUTPATIENT)
Dept: CARDIOLOGY CLINIC | Age: 78
End: 2023-10-13

## 2023-10-13 NOTE — TELEPHONE ENCOUNTER
Post watchman day 1 call done. Patient states he is doing well. .   Groin has no bruising or hematoma present. Denies c/o or needs. All questions answered. Will call me if questions arise.     Electronically signed by Rita Hernandez RN on 10/13/2023 at 12:35 PM [address] [place] [address]

## 2023-10-14 LAB
ABO/RH: NORMAL
ANTIBODY SCREEN: NEGATIVE
COMMENT: NORMAL
COMPONENT: NORMAL
CROSSMATCH RESULT: NORMAL
STATUS: NORMAL
TRANSFUSION STATUS: NORMAL
UNIT DIVISION: 0
UNIT NUMBER: NORMAL

## 2023-10-19 ENCOUNTER — OFFICE VISIT (OUTPATIENT)
Dept: CARDIOLOGY CLINIC | Age: 78
End: 2023-10-19

## 2023-10-19 VITALS
WEIGHT: 247 LBS | HEART RATE: 72 BPM | BODY MASS INDEX: 31.71 KG/M2 | SYSTOLIC BLOOD PRESSURE: 132 MMHG | DIASTOLIC BLOOD PRESSURE: 74 MMHG

## 2023-10-19 DIAGNOSIS — I48.0 PAROXYSMAL ATRIAL FIBRILLATION (HCC): ICD-10-CM

## 2023-10-19 DIAGNOSIS — Z87.19 HISTORY OF GI BLEED: ICD-10-CM

## 2023-10-19 DIAGNOSIS — Z95.818 PRESENCE OF WATCHMAN LEFT ATRIAL APPENDAGE CLOSURE DEVICE: Primary | ICD-10-CM

## 2023-10-19 DIAGNOSIS — D68.69 HYPERCOAGULABLE STATE DUE TO PAROXYSMAL ATRIAL FIBRILLATION (HCC): ICD-10-CM

## 2023-10-19 DIAGNOSIS — R29.6 FALLS FREQUENTLY: ICD-10-CM

## 2023-10-19 DIAGNOSIS — I48.0 HYPERCOAGULABLE STATE DUE TO PAROXYSMAL ATRIAL FIBRILLATION (HCC): ICD-10-CM

## 2023-10-19 ASSESSMENT — ENCOUNTER SYMPTOMS
BLOOD IN STOOL: 0
BACK PAIN: 0
SINUS PRESSURE: 0
SINUS PAIN: 0
PHOTOPHOBIA: 0
SHORTNESS OF BREATH: 0
COUGH: 0
ABDOMINAL DISTENTION: 0
COLOR CHANGE: 0

## 2023-10-19 NOTE — PROGRESS NOTES
RECOMMENDATIONS:     S/p watchman  Frequent falls  1. Sp ablation of atrial fibrillation and flutter  Hypercoagulable due to atrial fibrillation  2. Cad  3. Copd  5. Obesity BMI 31  6. History of back surgery - cervical surgery  7. History of GI bleed      S/p watchman 1 week ago  Groin has healed well  He denies issues with bleeding  Continue plavix 75 mg daily and ASA 81 mg daily for the duration of 6 months s/p watchman  Patient will need to have a MARIBEL 45-59 days after watchman is placed. EKG reveals sinus rhythm. No atrial fibrillation or atrial flutter noted on EKG  Continue topro xl 25 mg daily    Patient to follow up after MARIBEL. Tavon Horowitz, MÓNICA - CNP, 10/19/2023 12:38 PM     Please note this report has been partially produced using speech recognition software and may contain errors related to that system including errors in grammar, punctuation, and spelling, as well as words and phrases that may be inappropriate. If there are any questions or concerns please feel free to contact the dictating provider for clarification.

## 2023-11-22 ENCOUNTER — NURSE ONLY (OUTPATIENT)
Dept: CARDIOLOGY CLINIC | Age: 78
End: 2023-11-22

## 2023-11-22 DIAGNOSIS — Z95.818 PRESENCE OF WATCHMAN LEFT ATRIAL APPENDAGE CLOSURE DEVICE: Primary | ICD-10-CM

## 2023-11-22 NOTE — PROGRESS NOTES
Patient here in office and educated on MARIBEL, schedule for 11/28/23 @ 1000, with arrival @ 0900, @ [facility]; risk explained; and consents signed. Also copy of orders given for labs and CXR due STAT at [facility]. Instruction given to patient to :  NPO after midnight the night before procedure; call hospital at [phone] to pre-register. May take rest of morning meds of procedure. Hold Metoprolol and Flomax the morning before procedure. Patient voiced understanding. Copies of consent & info scanned in chart.

## 2023-11-28 ENCOUNTER — HOSPITAL ENCOUNTER (OUTPATIENT)
Dept: NON INVASIVE DIAGNOSTICS | Age: 78
Discharge: HOME | End: 2023-11-30
Payer: MEDICARE

## 2023-11-28 ENCOUNTER — HOSPITAL ENCOUNTER (OUTPATIENT)
Age: 78
Discharge: HOME | End: 2023-11-28
Payer: MEDICARE

## 2023-11-28 VITALS
SYSTOLIC BLOOD PRESSURE: 126 MMHG | RESPIRATION RATE: 12 BRPM | HEIGHT: 74 IN | WEIGHT: 247 LBS | OXYGEN SATURATION: 97 % | BODY MASS INDEX: 31.7 KG/M2 | DIASTOLIC BLOOD PRESSURE: 74 MMHG | HEART RATE: 67 BPM

## 2023-11-28 DIAGNOSIS — I48.19 PERSISTENT ATRIAL FIBRILLATION (HCC): ICD-10-CM

## 2023-11-28 LAB
ANION GAP SERPL CALCULATED.3IONS-SCNC: 12 MMOL/L (ref 4–16)
BUN SERPL-MCNC: 23 MG/DL (ref 6–23)
CALCIUM SERPL-MCNC: 9.4 MG/DL (ref 8.3–10.6)
CHLORIDE BLD-SCNC: 104 MMOL/L (ref 99–110)
CO2: 25 MMOL/L (ref 21–32)
CREAT SERPL-MCNC: 0.9 MG/DL (ref 0.9–1.3)
ECHO BSA: 2.42 M2
GFR SERPL CREATININE-BSD FRML MDRD: >60 ML/MIN/1.73M2
GLUCOSE SERPL-MCNC: 105 MG/DL (ref 70–99)
HCT VFR BLD CALC: 41.3 % (ref 42–52)
HEMOGLOBIN: 13.2 GM/DL (ref 13.5–18)
MAGNESIUM: 1.9 MG/DL (ref 1.8–2.4)
MCH RBC QN AUTO: 29.2 PG (ref 27–31)
MCHC RBC AUTO-ENTMCNC: 32 % (ref 32–36)
MCV RBC AUTO: 91.4 FL (ref 78–100)
PDW BLD-RTO: 13.2 % (ref 11.7–14.9)
PHOSPHORUS: 3.3 MG/DL (ref 2.5–4.9)
PLATELET # BLD: 223 K/CU MM (ref 140–440)
PMV BLD AUTO: 10.7 FL (ref 7.5–11.1)
POTASSIUM SERPL-SCNC: 4.3 MMOL/L (ref 3.5–5.1)
RBC # BLD: 4.52 M/CU MM (ref 4.6–6.2)
SODIUM BLD-SCNC: 141 MMOL/L (ref 135–145)
WBC # BLD: 5.7 K/CU MM (ref 4–10.5)

## 2023-11-28 PROCEDURE — 93325 DOPPLER ECHO COLOR FLOW MAPG: CPT

## 2023-11-28 PROCEDURE — 7100000011 HC PHASE II RECOVERY - ADDTL 15 MIN: Performed by: INTERNAL MEDICINE

## 2023-11-28 PROCEDURE — 7100000010 HC PHASE II RECOVERY - FIRST 15 MIN: Performed by: INTERNAL MEDICINE

## 2023-11-28 PROCEDURE — 6370000000 HC RX 637 (ALT 250 FOR IP): Performed by: INTERNAL MEDICINE

## 2023-11-28 PROCEDURE — 99153 MOD SED SAME PHYS/QHP EA: CPT | Performed by: INTERNAL MEDICINE

## 2023-11-28 PROCEDURE — 85027 COMPLETE CBC AUTOMATED: CPT

## 2023-11-28 PROCEDURE — 84100 ASSAY OF PHOSPHORUS: CPT

## 2023-11-28 PROCEDURE — 6360000002 HC RX W HCPCS: Performed by: INTERNAL MEDICINE

## 2023-11-28 PROCEDURE — 83735 ASSAY OF MAGNESIUM: CPT

## 2023-11-28 PROCEDURE — 80048 BASIC METABOLIC PNL TOTAL CA: CPT

## 2023-11-28 PROCEDURE — 99152 MOD SED SAME PHYS/QHP 5/>YRS: CPT | Performed by: INTERNAL MEDICINE

## 2023-11-28 RX ORDER — LIDOCAINE HYDROCHLORIDE 20 MG/ML
SOLUTION OROPHARYNGEAL PRN
Status: COMPLETED | OUTPATIENT
Start: 2023-11-28 | End: 2023-11-28

## 2023-11-28 RX ORDER — MIDAZOLAM HYDROCHLORIDE 1 MG/ML
INJECTION INTRAMUSCULAR; INTRAVENOUS PRN
Status: COMPLETED | OUTPATIENT
Start: 2023-11-28 | End: 2023-11-28

## 2023-11-28 RX ORDER — FENTANYL CITRATE 50 UG/ML
INJECTION, SOLUTION INTRAMUSCULAR; INTRAVENOUS PRN
Status: COMPLETED | OUTPATIENT
Start: 2023-11-28 | End: 2023-11-28

## 2023-11-28 RX ADMIN — MIDAZOLAM 1 MG: 1 INJECTION INTRAMUSCULAR; INTRAVENOUS at 10:07

## 2023-11-28 RX ADMIN — FENTANYL CITRATE 25 MCG: 50 INJECTION, SOLUTION INTRAMUSCULAR; INTRAVENOUS at 10:07

## 2023-11-28 RX ADMIN — LIDOCAINE HYDROCHLORIDE 15 ML: 20 SOLUTION ORAL; TOPICAL at 10:05

## 2023-11-28 ASSESSMENT — ENCOUNTER SYMPTOMS
COUGH: 0
PHOTOPHOBIA: 0
COLOR CHANGE: 0
SINUS PRESSURE: 0
ABDOMINAL DISTENTION: 0
SHORTNESS OF BREATH: 0
BACK PAIN: 0
BLOOD IN STOOL: 0
SINUS PAIN: 0

## 2023-12-12 RX ORDER — PRAVASTATIN SODIUM 20 MG
20 TABLET ORAL DAILY
Qty: 90 TABLET | Refills: 3 | Status: SHIPPED | OUTPATIENT
Start: 2023-12-12

## 2024-01-05 ENCOUNTER — OFFICE VISIT (OUTPATIENT)
Dept: CARDIOLOGY CLINIC | Age: 79
End: 2024-01-05
Payer: MEDICARE

## 2024-01-05 VITALS
WEIGHT: 249 LBS | HEART RATE: 74 BPM | DIASTOLIC BLOOD PRESSURE: 78 MMHG | SYSTOLIC BLOOD PRESSURE: 120 MMHG | BODY MASS INDEX: 31.95 KG/M2 | HEIGHT: 74 IN

## 2024-01-05 DIAGNOSIS — E66.9 OBESITY (BMI 30.0-34.9): ICD-10-CM

## 2024-01-05 DIAGNOSIS — Z98.890 STATUS POST ABLATION OF ATRIAL FLUTTER: ICD-10-CM

## 2024-01-05 DIAGNOSIS — Z98.890 STATUS POST ABLATION OF ATRIAL FIBRILLATION: ICD-10-CM

## 2024-01-05 DIAGNOSIS — I73.9 PAD (PERIPHERAL ARTERY DISEASE) (HCC): ICD-10-CM

## 2024-01-05 DIAGNOSIS — Z86.79 STATUS POST ABLATION OF ATRIAL FIBRILLATION: ICD-10-CM

## 2024-01-05 DIAGNOSIS — I48.0 PAROXYSMAL ATRIAL FIBRILLATION (HCC): Primary | ICD-10-CM

## 2024-01-05 DIAGNOSIS — Z86.79 STATUS POST ABLATION OF ATRIAL FLUTTER: ICD-10-CM

## 2024-01-05 DIAGNOSIS — Z95.818 PRESENCE OF WATCHMAN LEFT ATRIAL APPENDAGE CLOSURE DEVICE: ICD-10-CM

## 2024-01-05 DIAGNOSIS — I10 ESSENTIAL HYPERTENSION: ICD-10-CM

## 2024-01-05 DIAGNOSIS — G47.33 OSA ON CPAP: ICD-10-CM

## 2024-01-05 PROCEDURE — 3074F SYST BP LT 130 MM HG: CPT | Performed by: INTERNAL MEDICINE

## 2024-01-05 PROCEDURE — 3078F DIAST BP <80 MM HG: CPT | Performed by: INTERNAL MEDICINE

## 2024-01-05 PROCEDURE — 1036F TOBACCO NON-USER: CPT | Performed by: INTERNAL MEDICINE

## 2024-01-05 PROCEDURE — G8484 FLU IMMUNIZE NO ADMIN: HCPCS | Performed by: INTERNAL MEDICINE

## 2024-01-05 PROCEDURE — 1124F ACP DISCUSS-NO DSCNMKR DOCD: CPT | Performed by: INTERNAL MEDICINE

## 2024-01-05 PROCEDURE — G8417 CALC BMI ABV UP PARAM F/U: HCPCS | Performed by: INTERNAL MEDICINE

## 2024-01-05 PROCEDURE — G8427 DOCREV CUR MEDS BY ELIG CLIN: HCPCS | Performed by: INTERNAL MEDICINE

## 2024-01-05 PROCEDURE — 99213 OFFICE O/P EST LOW 20 MIN: CPT | Performed by: INTERNAL MEDICINE

## 2024-01-05 RX ORDER — METOPROLOL SUCCINATE 25 MG/1
25 TABLET, EXTENDED RELEASE ORAL DAILY
Qty: 30 TABLET | Refills: 5 | Status: SHIPPED | OUTPATIENT
Start: 2024-01-05

## 2024-01-05 NOTE — PATIENT INSTRUCTIONS
CORONARY ARTERY DISEASE:None significant except LAD is small possibly diffusely diseased. Patient is on ASA  CATH 10/2020   1. No significant CAD.   ? LAD diffusely diseased as it is very small in caliber.   2. Normal LV systolic function. LVEF is 50 to 55 %.     HTN:well controlled on current medical regimen, see list above.              - changes in  treatment:   no, Takes Toprol & Hydralazine.   CARDIOMYOPATHY: None known   CONGESTIVE HEART FAILURE: NO KNOWN HISTORY.    VHD: No significant VHD noted  ECHO 8/2020   Left ventricular systolic function is normal.   Ejection fraction is visually estimated at 55%.   Mild left ventricular hypertrophy.   Mildly dilated left atrium.   No evidence of any pericardial effusion.     DYSLIPIDEMIA: Patient's profile is at / near Goal.no                                HDL is low                                Tolerating current medical regimen wellyes, on Pravachol                                Does not tolerate medications well due to side effects                                See most recent Lab values in Labs section above.  ARRHYTHMIAS: known                                Patient has H/O P. Atrial fibrillation                                He is rate controlled & on Eliquis anticoagulation.                                  Patient has Watchman device now.  EKG: SINUS RHYTHM 95/min     TESTS ORDERED:Echo     PREVIOUSLY ORDERED TESTS REVIEWED & DISCUSSED WITH THE PATIENT:     I personally reviewed & interpreted, all previously ordered tests as copied above. Latest Labs are pulled in to the note with dates.   Labs, specially in Reference to Lipid profile, Cardiac testing in the form of Echo ( dated: ), stress tests ( dated: ) & other relevant cardiac testing reviewed with patient & recommendations made based on assessment of the results.    Discussed role of Cardiac risk factors & effects + treatment of co morbidities with patient & advised accordingly.     MEDICATIONS:

## 2024-01-05 NOTE — PROGRESS NOTES
Patient is on cholesterol lowering medication:Yes   3.Beta-Blocker therapy for CAD, if prior Myocardial Infarction:Yes   4.Counselled regarding smoking cessation. No   Patient does not Smoke.  5.Anticoagulation therapy (for A.Fib) No   Has Watchman  6.Discussed weight management strategies.    Assessment & Plan:  Primary / Secondary prevention is the goal by aggressive risk modification, healthy and therapeutic life style changes for cardiovascular risk reduction.     CORONARY ARTERY DISEASE:None significant except LAD is small possibly diffusely diseased. Patient is on ASA  CATH 10/2020   1. No significant CAD.   ? LAD diffusely diseased as it is very small in caliber.   2. Normal LV systolic function. LVEF is 50 to 55 %.     HTN:well controlled on current medical regimen, see list above.              - changes in  treatment:   no, Takes Toprol & Hydralazine.   CARDIOMYOPATHY: None known   CONGESTIVE HEART FAILURE: NO KNOWN HISTORY.    VHD: No significant VHD noted  ECHO 8/2020   Left ventricular systolic function is normal.   Ejection fraction is visually estimated at 55%.   Mild left ventricular hypertrophy.   Mildly dilated left atrium.   No evidence of any pericardial effusion.     DYSLIPIDEMIA: Patient's profile is at / near Goal.no                                HDL is low                                Tolerating current medical regimen wellyes, on Pravachol                                Does not tolerate medications well due to side effects                                See most recent Lab values in Labs section above.  ARRHYTHMIAS: known                                Patient has H/O P. Atrial fibrillation                                He is rate controlled & on Eliquis anticoagulation.                                  Patient has Watchman device now.  EKG: SINUS RHYTHM 95/min     TESTS ORDERED:Echo     PREVIOUSLY ORDERED TESTS REVIEWED & DISCUSSED WITH THE PATIENT:     I personally reviewed &

## 2024-01-08 RX ORDER — CLOPIDOGREL BISULFATE 75 MG/1
75 TABLET ORAL DAILY
Qty: 90 TABLET | Refills: 1 | Status: SHIPPED | OUTPATIENT
Start: 2024-01-08

## 2024-01-17 ENCOUNTER — TELEPHONE (OUTPATIENT)
Dept: CARDIOLOGY CLINIC | Age: 79
End: 2024-01-17

## 2024-01-17 NOTE — TELEPHONE ENCOUNTER
Called to advise of MARIBEL results :     Left Ventricle: Normal left ventricular systolic function with a visually estimated EF of 55 - 60%. Left ventricle size is normal. Mildly increased wall thickness. Normal wall motion. Grade I diastolic dysfunction with normal LAP.    No significant valvular disease or regurgitation noted.    Pericardium: No pericardial effusion.    Image quality is good.

## 2024-04-12 ENCOUNTER — OFFICE VISIT (OUTPATIENT)
Dept: CARDIOLOGY CLINIC | Age: 79
End: 2024-04-12

## 2024-04-12 VITALS
HEIGHT: 74 IN | HEART RATE: 73 BPM | DIASTOLIC BLOOD PRESSURE: 72 MMHG | OXYGEN SATURATION: 96 % | WEIGHT: 254.05 LBS | SYSTOLIC BLOOD PRESSURE: 136 MMHG | BODY MASS INDEX: 32.61 KG/M2

## 2024-04-12 DIAGNOSIS — Z95.818 PRESENCE OF WATCHMAN LEFT ATRIAL APPENDAGE CLOSURE DEVICE: Primary | ICD-10-CM

## 2024-04-12 DIAGNOSIS — Z98.890 STATUS POST ABLATION OF ATRIAL FLUTTER: ICD-10-CM

## 2024-04-12 DIAGNOSIS — Z98.890 STATUS POST ABLATION OF ATRIAL FIBRILLATION: ICD-10-CM

## 2024-04-12 DIAGNOSIS — I10 ESSENTIAL HYPERTENSION: ICD-10-CM

## 2024-04-12 DIAGNOSIS — E66.9 OBESITY (BMI 30.0-34.9): ICD-10-CM

## 2024-04-12 DIAGNOSIS — Z86.79 STATUS POST ABLATION OF ATRIAL FLUTTER: ICD-10-CM

## 2024-04-12 DIAGNOSIS — Z86.79 STATUS POST ABLATION OF ATRIAL FIBRILLATION: ICD-10-CM

## 2024-04-12 ASSESSMENT — ENCOUNTER SYMPTOMS
PHOTOPHOBIA: 0
BACK PAIN: 0
SHORTNESS OF BREATH: 0
COUGH: 0
COLOR CHANGE: 0
SINUS PRESSURE: 0
SINUS PAIN: 0
BLOOD IN STOOL: 0
ABDOMINAL DISTENTION: 0

## 2024-04-12 NOTE — PATIENT INSTRUCTIONS
**It is YOUR responsibilty to bring medication bottles and/or updated medication list to EACH APPOINTMENT. This will allow us to better serve you and all your healthcare needs**  Thank you for allowing us to care for you today!   We want to ensure we can follow your treatment plan and we strive to give you the best outcomes and experience possible.   If you ever have a life threatening emergency and call 911 - for an ambulance (EMS)   Our providers can only care for you at:   [facility] or [facility].   Even if you have someone take you or you drive yourself we can only care for you in a [facility]. Our providers are not setup at the other healthcare locations!   Please be informed that if you contact our office outside of normal business hours the physician on call cannot help with any scheduling or rescheduling issues, procedure instruction questions or any type of medication issue.    We advise you for any urgent/emergency that you go to the nearest emergency room!    PLEASE CALL OUR OFFICE DURING NORMAL BUSINESS HOURS    Monday - Friday   8 am to 5 pm    [place]: [phone]    [place]: [phone]    [place]:  [phone]  We are committed to providing you the best care possible.    If you receive a survey after visiting one of our offices, please take time to share your experience concerning your physician office visit.  These surveys are confidential and no health information about you is shared.    We are eager to improve for you and we are counting on your feedback to help make that happen.

## 2024-04-12 NOTE — PROGRESS NOTES
NSVT (nonsustained ventricular tachycardia) (HCC)     THOMAS on CPAP     Syncope and collapse     Unspecified sleep apnea     cpap    Wears glasses        Surgical history :   Past Surgical History:   Procedure Laterality Date    ABLATION OF DYSRHYTHMIC FOCUS  01/14/2021    Atrial fibrillation and Atrial flutter ablation    BACK SURGERY      DIAGNOSTIC CARDIAC CATH LAB PROCEDURE  03/17/1999,11/19/2008 11/19/2008, left main,circumflex, and RCA are without any significant disease, LV gram shows normal size left ventricular cavity with normal global and  regional left ventricular systolic function, pt has no significant CAD.    HERNIA REPAIR      LUMBAR FUSION Right 02/21/2022    L3/4 XLIF / L4/5 HARDWARE REMOVAL & REINSERTION,  LEFT L5/S1 TLIF / BILATERAL OPEN SI JOINT FUSION, POSTERIOR SPINAL L3-S1 POSSIBLE PLACEMENT S2 ALAR SCREWS performed by Ilan Marroquin MD at [facility] OR    LUMBAR FUSION N/A 02/21/2022    LUMBAR INTERBODY FUSION LATERAL performed by Ilan Marroquin MD at [facility] OR    LUMBAR LAMINECTOMY Left 02/23/2022    LEFT L4 SCREW REMOVAL performed by Ilan Marroquin MD at [facility] OR    OTHER SURGICAL HISTORY Left 10/12/2023    Watchman: 24mm Left Atrial Appendage closure Device    TONSILLECTOMY         Family history:   Family History   Problem Relation Age of Onset    Diabetes Mother     Heart Disease Mother     Other Mother         kidney stones    Cancer Mother         breast    Heart Disease Father     Diabetes Father        Social history :  reports that he quit smoking about 36 years ago. His smoking use included pipe and cigars. He has quit using smokeless tobacco.  His smokeless tobacco use included chew. He reports that he does not currently use alcohol. He reports that he does not use drugs.    Allergies   Allergen Reactions    Ambien [Zolpidem] Other (See Comments)     Makes pt loopy       Current Outpatient Medications on File Prior to Visit   Medication Sig Dispense Refill    metoprolol succinate

## 2024-06-21 ENCOUNTER — OFFICE VISIT (OUTPATIENT)
Dept: NEUROLOGY | Age: 79
End: 2024-06-21

## 2024-06-21 ENCOUNTER — TELEPHONE (OUTPATIENT)
Dept: NEUROLOGY | Age: 79
End: 2024-06-21

## 2024-06-21 VITALS
SYSTOLIC BLOOD PRESSURE: 126 MMHG | DIASTOLIC BLOOD PRESSURE: 64 MMHG | WEIGHT: 248.6 LBS | OXYGEN SATURATION: 98 % | BODY MASS INDEX: 31.92 KG/M2 | HEART RATE: 74 BPM

## 2024-06-21 DIAGNOSIS — M54.12 CERVICAL RADICULOPATHY: Primary | ICD-10-CM

## 2024-06-21 DIAGNOSIS — M54.16 LUMBAR RADICULOPATHY: ICD-10-CM

## 2024-06-21 DIAGNOSIS — M62.81 MUSCLE WEAKNESS: ICD-10-CM

## 2024-06-21 DIAGNOSIS — R25.1 TREMORS OF NERVOUS SYSTEM: ICD-10-CM

## 2024-06-21 DIAGNOSIS — G25.5 MUSCLE, JERKY MOVEMENTS (UNCONTROLLED): ICD-10-CM

## 2024-06-21 DIAGNOSIS — Z98.890 HISTORY OF LUMBOSACRAL SPINE SURGERY: ICD-10-CM

## 2024-06-21 DIAGNOSIS — Z98.890 HISTORY OF CERVICAL SPINAL SURGERY: ICD-10-CM

## 2024-06-21 RX ORDER — NORTRIPTYLINE HYDROCHLORIDE 25 MG/1
75 CAPSULE ORAL NIGHTLY
COMMUNITY

## 2024-06-21 RX ORDER — TRAMADOL HYDROCHLORIDE 50 MG/1
50 TABLET ORAL 2 TIMES DAILY
COMMUNITY

## 2024-06-21 RX ORDER — BACLOFEN 20 MG/1
20 TABLET ORAL 2 TIMES DAILY
COMMUNITY

## 2024-06-21 RX ORDER — CELECOXIB 200 MG/1
200 CAPSULE ORAL DAILY
COMMUNITY

## 2024-06-21 RX ORDER — OMEPRAZOLE 40 MG/1
20 CAPSULE, DELAYED RELEASE ORAL DAILY
COMMUNITY

## 2024-06-21 RX ORDER — PRIMIDONE 50 MG/1
50 TABLET ORAL NIGHTLY
Qty: 60 TABLET | Refills: 5 | Status: SHIPPED | OUTPATIENT
Start: 2024-06-21

## 2024-06-21 RX ORDER — PRIMIDONE 50 MG/1
50 TABLET ORAL DAILY
COMMUNITY
End: 2024-06-21 | Stop reason: DRUGHIGH

## 2024-06-21 RX ORDER — DICYCLOMINE HCL 20 MG
20 TABLET ORAL EVERY 6 HOURS
COMMUNITY

## 2024-06-21 RX ORDER — TRIAMTERENE AND HYDROCHLOROTHIAZIDE 37.5; 25 MG/1; MG/1
1 TABLET ORAL DAILY
COMMUNITY

## 2024-06-21 NOTE — TELEPHONE ENCOUNTER
I called and requested the latest MRI records from [facility] per Dr. Koroma and had them faxed over. Breana stated images will also be pushed through.

## 2024-06-21 NOTE — PROGRESS NOTES
6/21/24    Vaibhav Dong  1945    Chief Complaint   Patient presents with    New Patient     Here for evaluation of tremor.        History of Present Illness  Vaibhav is a 79 y.o. male presenting today for evaluation of:      Vaibhav presents today at our [place] office accompanied by his wife.  He has been having tremors and extremity jerking since about 2022 but has been progressively getting worse.  He is tells me that it started first in his arms but now is progressed to his legs to the point where is causing him to become unstable and he may fall.  He tells me he notices it more if his arms are in a raise position.  He will also note the jerking if he is trying to stretch his legs forward from his body.  He does feel like his muscles are weak.  He does have diabetic peripheral neuropathy and notices some numbness and tingling in his lower extremities and fingers.  He does have a history of a low back surgery in 2017 which did not help his symptoms of low back pain and leg numbness.  He did have a another low back surgery in 2022 which did help with the leg numbness and his muscle strength.  He was able to stand and walk after the back surgery.  He had another spine surgery in his neck in 2023 due to what he tells me was \"bone pressure on his spinal cord\".  His PCP recently started him on primidone 50 mg daily and he tells me it has decreased the extremity jerking about 30 to 35%.  He has been taking 50 mg in the morning.  He tells me about noon or 1:00 he starts to feel somewhat tired and wants to take a nap which was not the case previous to being on the medication.  He has not tried taking the medication at night.  He denies any liver, kidney, or thyroid disease.      Subjective    Review of Symptoms:  Neurologic   Symptoms: memory loss, sensory disturbances, weakness, difficulty with gait or walking, no bowel symptoms, no vertigo, no confusion, no speech disorder, no visual loss, no double vision, no

## 2024-07-09 ENCOUNTER — OFFICE VISIT (OUTPATIENT)
Dept: CARDIOLOGY CLINIC | Age: 79
End: 2024-07-09
Payer: MEDICARE

## 2024-07-09 VITALS
SYSTOLIC BLOOD PRESSURE: 118 MMHG | HEART RATE: 76 BPM | DIASTOLIC BLOOD PRESSURE: 64 MMHG | HEIGHT: 74 IN | WEIGHT: 255.2 LBS | BODY MASS INDEX: 32.75 KG/M2

## 2024-07-09 DIAGNOSIS — I73.9 PAD (PERIPHERAL ARTERY DISEASE) (HCC): ICD-10-CM

## 2024-07-09 DIAGNOSIS — I47.29 NSVT (NONSUSTAINED VENTRICULAR TACHYCARDIA) (HCC): ICD-10-CM

## 2024-07-09 DIAGNOSIS — I48.0 PAROXYSMAL ATRIAL FIBRILLATION (HCC): ICD-10-CM

## 2024-07-09 DIAGNOSIS — Z95.818 PRESENCE OF WATCHMAN LEFT ATRIAL APPENDAGE CLOSURE DEVICE: ICD-10-CM

## 2024-07-09 DIAGNOSIS — Z86.79 STATUS POST ABLATION OF ATRIAL FIBRILLATION: ICD-10-CM

## 2024-07-09 DIAGNOSIS — Z98.890 STATUS POST ABLATION OF ATRIAL FLUTTER: ICD-10-CM

## 2024-07-09 DIAGNOSIS — G47.33 OSA ON CPAP: ICD-10-CM

## 2024-07-09 DIAGNOSIS — Z98.890 STATUS POST ABLATION OF ATRIAL FIBRILLATION: ICD-10-CM

## 2024-07-09 DIAGNOSIS — Z86.79 STATUS POST ABLATION OF ATRIAL FLUTTER: ICD-10-CM

## 2024-07-09 DIAGNOSIS — I10 ESSENTIAL HYPERTENSION: Primary | ICD-10-CM

## 2024-07-09 PROCEDURE — G8417 CALC BMI ABV UP PARAM F/U: HCPCS | Performed by: INTERNAL MEDICINE

## 2024-07-09 PROCEDURE — 99213 OFFICE O/P EST LOW 20 MIN: CPT | Performed by: INTERNAL MEDICINE

## 2024-07-09 PROCEDURE — G8427 DOCREV CUR MEDS BY ELIG CLIN: HCPCS | Performed by: INTERNAL MEDICINE

## 2024-07-09 PROCEDURE — 3074F SYST BP LT 130 MM HG: CPT | Performed by: INTERNAL MEDICINE

## 2024-07-09 PROCEDURE — 1036F TOBACCO NON-USER: CPT | Performed by: INTERNAL MEDICINE

## 2024-07-09 PROCEDURE — 3078F DIAST BP <80 MM HG: CPT | Performed by: INTERNAL MEDICINE

## 2024-07-09 PROCEDURE — 1124F ACP DISCUSS-NO DSCNMKR DOCD: CPT | Performed by: INTERNAL MEDICINE

## 2024-07-09 NOTE — PROGRESS NOTES
OFFICE PROGRESS NOTES      Vaibhav is a 79 y.o. male who has    CHIEF COMPLAINT AS FOLLOWS:  CHEST PAIN:  Patient denies any C/O chest pains at this time.      SOB:  No C/O SOB at this time.                 LEG EDEMA: No leg edema   PALPITATIONS: Denies any C/O Palpitations   DIZZINESS: C/O positional Dizziness but no black out spells. Probably due to Flomax.   SYNCOPE: None   OTHER/ ADDITIONAL COMPLAINTS:                                     HPI: Patient is here for F/U on his Arrhythmia, HTN & Dyslipidemia problems.     Arrhythmia: Patient has known  A-fib / flutter S/P Ablations S/P Watchman device.   HTN: Patient has known essential HTN. Has been treated with guideline recommended medical / physical/ diet therapy as stated below.  Dyslipidemia: Patient has known mixed dyslipidemia. Has been treated with guideline recommended medical / physical/ diet therapy as stated below.                Current Outpatient Medications   Medication Sig Dispense Refill    vitamin D 25 MCG (1000 UT) CAPS Take 2 capsules by mouth      celecoxib (CELEBREX) 200 MG capsule Take 1 capsule by mouth daily      dicyclomine (BENTYL) 20 MG tablet Take 1 tablet by mouth every 6 hours      triamterene-hydroCHLOROthiazide (MAXZIDE-25) 37.5-25 MG per tablet Take 1 tablet by mouth daily      omeprazole (PRILOSEC) 40 MG delayed release capsule Take 20 mg by mouth daily      traMADol (ULTRAM) 50 MG tablet Take 1 tablet by mouth 2 times daily.      baclofen (LIORESAL) 20 MG tablet Take 1 tablet by mouth 2 times daily      nortriptyline (PAMELOR) 25 MG capsule Take 3 capsules by mouth nightly      primidone (MYSOLINE) 50 MG tablet Take 1 tablet by mouth nightly Take 1 tab let nightly for 2 wks then increase to 2 tablets at bedtime if needed thereafter 60 tablet 5    metoprolol succinate (TOPROL XL) 25 MG extended release tablet Take 1 tablet by mouth daily 30 tablet 5    pravastatin (PRAVACHOL) 20 MG tablet TAKE 1 TABLET BY MOUTH DAILY 90 tablet 3

## 2024-07-09 NOTE — PATIENT INSTRUCTIONS
results.    Discussed role of Cardiac risk factors & effects + treatment of co morbidities with patient & advised accordingly.     MEDICATIONS: List of medications patient is currently taking is reviewed in detail with the patient. Discussed any side effects or problems taking the medication.     Recommend Continue present management & medications as listed.     AFFIRMATION: I spent at least 20 minutes of time reviewing patient's history, previous & current medical problems & all Labs + testing. This includes chart prep even prior to the vosit. Various goals are discussed and multiple questions answered.Relevant concelling performed.     Office follow up in six months.

## 2024-09-01 DIAGNOSIS — I10 ESSENTIAL HYPERTENSION: ICD-10-CM

## 2024-09-03 RX ORDER — METOPROLOL SUCCINATE 25 MG/1
25 TABLET, EXTENDED RELEASE ORAL DAILY
Qty: 30 TABLET | Refills: 5 | Status: SHIPPED | OUTPATIENT
Start: 2024-09-03

## 2024-10-07 ENCOUNTER — PROCEDURE VISIT (OUTPATIENT)
Dept: NEUROLOGY | Age: 79
End: 2024-10-07

## 2024-10-07 DIAGNOSIS — G60.9 PERIPHERAL NEUROPATHY, IDIOPATHIC: Primary | ICD-10-CM

## 2024-10-07 NOTE — PROGRESS NOTES
EMG    Risks and benefits of study discussed.    Specific and common risks of pain and bleeding as well as uncommon side effects of infection, hematoma and vasovagal episodes    Patient agreeable to testing and consents to such.    Clinical: 1 year of bilateral foot numbness, weakness    Motor NCS:  Tibial amplitudes moderate to severely depressed on the right severely depressed on the left, distal latencies are normal and conduction velocities are mildly slow.  Peroneal responses are absent bilaterally    Sensory NCS:  Sural and peroneal sensory responses are absent bilaterally    Needle EMG: Mild enlargement of distal motor units bilaterally    Impression:  #1.  Mild to moderate, chronic generalized peripheral neuropathy affecting the lower limbs.  #2.  No prominent suggestions of a focal neurogenic lesion such as radiculopathy, plexopathy or mononeuropathy.

## 2024-10-14 ENCOUNTER — PROCEDURE VISIT (OUTPATIENT)
Dept: NEUROLOGY | Age: 79
End: 2024-10-14

## 2024-10-14 DIAGNOSIS — G56.03 CARPAL TUNNEL SYNDROME, BILATERAL UPPER LIMBS: Primary | ICD-10-CM

## 2024-10-14 NOTE — PROGRESS NOTES
EMG    Risks and benefits of study discussed.    Specific and common risks of pain and bleeding as well as uncommon side effects of infection, hematoma and vasovagal episodes    Patient agreeable to testing and consents to such.    Clinical: 1-1/2 years of bilateral hand numbness, slightly worse on the right.    Motor NCS: Median amplitudes are normal, latencies are moderately prolonged, velocities are both borderline  Ulnar amplitudes, latencies and velocities normal bilaterally    Sensory NCS:  Median response absent on the right moderate to severely depressed on the left with a moderately slow latency  Ulnar amplitudes and latencies normal bilateral  Radial amplitudes are low normal with borderline latencies on both sides.    Needle EMG: Normal findings throughout both upper limbs.    Impression:  #1.  Moderate median neuropathies at the wrists are identified bilaterally, slightly worse on the right.  Clinical correlate of carpal tunnel syndrome symptoms bilaterally.  #2.  No evidence of generalized peripheral neuropathy, radiculopathy, additional mononeuropathy or plexopathy affecting upper limbs.

## 2024-10-15 ENCOUNTER — LAB (OUTPATIENT)
Dept: CARDIOLOGY CLINIC | Age: 79
End: 2024-10-15
Payer: MEDICARE

## 2024-10-15 ENCOUNTER — OFFICE VISIT (OUTPATIENT)
Dept: CARDIOLOGY CLINIC | Age: 79
End: 2024-10-15

## 2024-10-15 VITALS
SYSTOLIC BLOOD PRESSURE: 126 MMHG | WEIGHT: 255 LBS | HEART RATE: 74 BPM | DIASTOLIC BLOOD PRESSURE: 68 MMHG | BODY MASS INDEX: 32.73 KG/M2 | HEIGHT: 74 IN

## 2024-10-15 DIAGNOSIS — Z98.890 STATUS POST ABLATION OF ATRIAL FLUTTER: ICD-10-CM

## 2024-10-15 DIAGNOSIS — Z86.79 STATUS POST ABLATION OF ATRIAL FIBRILLATION: ICD-10-CM

## 2024-10-15 DIAGNOSIS — Z95.818 PRESENCE OF WATCHMAN LEFT ATRIAL APPENDAGE CLOSURE DEVICE: Primary | ICD-10-CM

## 2024-10-15 DIAGNOSIS — I10 ESSENTIAL HYPERTENSION: ICD-10-CM

## 2024-10-15 DIAGNOSIS — Z86.79 STATUS POST ABLATION OF ATRIAL FLUTTER: ICD-10-CM

## 2024-10-15 DIAGNOSIS — Z98.890 STATUS POST ABLATION OF ATRIAL FIBRILLATION: ICD-10-CM

## 2024-10-15 DIAGNOSIS — D50.8 OTHER IRON DEFICIENCY ANEMIA: Primary | ICD-10-CM

## 2024-10-15 DIAGNOSIS — Z87.19 HISTORY OF GI BLEED: ICD-10-CM

## 2024-10-15 DIAGNOSIS — D50.8 OTHER IRON DEFICIENCY ANEMIA: ICD-10-CM

## 2024-10-15 PROBLEM — D64.9 ABSOLUTE ANEMIA: Status: ACTIVE | Noted: 2024-10-15

## 2024-10-15 PROCEDURE — 36415 COLL VENOUS BLD VENIPUNCTURE: CPT | Performed by: NURSE PRACTITIONER

## 2024-10-15 ASSESSMENT — ENCOUNTER SYMPTOMS
PHOTOPHOBIA: 0
COLOR CHANGE: 0
SINUS PRESSURE: 0
BACK PAIN: 0
ABDOMINAL DISTENTION: 0
BLOOD IN STOOL: 0
SINUS PAIN: 0
SHORTNESS OF BREATH: 0
COUGH: 0

## 2024-10-15 NOTE — PROGRESS NOTES
Electrophysiology Follow up Note      Reason for consultation: Atrial fibrillation    Chief complaint :  follow up on watchman    Referring physician:       Primary care physician: Sai Knight MD      History of Present Illness:     This visit 4/12/2024  Patient is here today for 6-month follow-up status post implantation of Watchman device.  Patient states that he had a cortisone shot to his knee on Monday.  He states that Monday night he woke up feeling his heart racing and he states that his heart rate was consistently over 100 bpm.  He states it lasted for an hour or so and then resolved.  He has not had any other symptoms.  He denies chest pain, shortness of breath, lightheadedness, dizziness, edema or syncope.    Previous visit 10/19/2023  Patient is here today for 1 week follow up on watchman implantation. He reports that he has been feeling well. He denies chest pain, palpitations, shortness of breath, lightheadedness, dizziness, edema or syncope.   He reports that his groin site has healed well. He denies issues with bleeding. He is taking his medications as prescribed.     Previous visit (9/27/2023)    Patient here to discuss about watchman which he heard can help him come off eliquis  Patient has imbalance and frequent falls and he is concerned about bleeding  He has dizziness and he has issues with it  Denies chest pain, shortness of breath, palpitations, edema   Denies syncope its mostly imbalance   Does not drink alcohol or smoke    Previous visit:     Patient is a 75-year-old male with a history of COPD, obstructive sleep apnea on CPAP, paroxysmal atrial fibrillation referred to us by Dr. Driscoll for atrial fibrillation management.  Patient reports that he has been having palpitations which can occur at any time and usually last up to 30 seconds to 1 minute.  These are associated with shortness of breath but no chest pain. Symptoms ongoing for 
APRN - CNP, 10/15/2024 9:51 AM     Please note this report has been partially produced using speech recognition software and may contain errors related to that system including errors in grammar, punctuation, and spelling, as well as words and phrases that may be inappropriate. If there are any questions or concerns please feel free to contact the dictating provider for clarification.

## 2024-10-16 LAB
HCT VFR BLD AUTO: 41.1 % (ref 40.5–52.5)
HGB BLD-MCNC: 13.9 G/DL (ref 13.5–17.5)

## 2024-12-02 DIAGNOSIS — R25.1 TREMORS OF NERVOUS SYSTEM: ICD-10-CM

## 2024-12-02 NOTE — TELEPHONE ENCOUNTER
Last ov 6/21/24, next ov 12/9/24. Rx pending.     Requested Prescriptions     Pending Prescriptions Disp Refills    primidone (MYSOLINE) 50 MG tablet [Pharmacy Med Name: PRIMIDONE 50 MG TABS 50 Tablet] 60 tablet 5     Sig: TAKE 2 TABLETS BY MOUTH EVERY DAY AT BEDTIME AS NEEDED

## 2024-12-03 DIAGNOSIS — I10 ESSENTIAL HYPERTENSION: Primary | ICD-10-CM

## 2024-12-03 RX ORDER — PRAVASTATIN SODIUM 20 MG
20 TABLET ORAL DAILY
Qty: 90 TABLET | Refills: 3 | Status: SHIPPED | OUTPATIENT
Start: 2024-12-03

## 2024-12-03 RX ORDER — PRAVASTATIN SODIUM 20 MG
20 TABLET ORAL DAILY
Qty: 90 TABLET | Refills: 3 | OUTPATIENT
Start: 2024-12-03

## 2024-12-04 ENCOUNTER — HOSPITAL ENCOUNTER (OUTPATIENT)
Age: 79
Discharge: HOME | End: 2024-12-04
Payer: MEDICARE

## 2024-12-04 DIAGNOSIS — I10 ESSENTIAL HYPERTENSION: ICD-10-CM

## 2024-12-04 LAB
CHOLEST SERPL-MCNC: 145 MG/DL (ref 125–199)
HDLC SERPL-MCNC: 45 MG/DL
LDLC SERPL CALC-MCNC: 60 MG/DL
TRIGL SERPL-MCNC: 202 MG/DL

## 2024-12-04 PROCEDURE — 80061 LIPID PANEL: CPT

## 2024-12-04 PROCEDURE — 36415 COLL VENOUS BLD VENIPUNCTURE: CPT

## 2024-12-09 ENCOUNTER — OFFICE VISIT (OUTPATIENT)
Dept: NEUROLOGY | Age: 79
End: 2024-12-09
Payer: MEDICARE

## 2024-12-09 VITALS
BODY MASS INDEX: 32.12 KG/M2 | DIASTOLIC BLOOD PRESSURE: 70 MMHG | HEART RATE: 69 BPM | OXYGEN SATURATION: 98 % | SYSTOLIC BLOOD PRESSURE: 136 MMHG | WEIGHT: 250.2 LBS

## 2024-12-09 DIAGNOSIS — G56.03 CARPAL TUNNEL SYNDROME, BILATERAL UPPER LIMBS: ICD-10-CM

## 2024-12-09 DIAGNOSIS — R25.1 TREMORS OF NERVOUS SYSTEM: Primary | ICD-10-CM

## 2024-12-09 DIAGNOSIS — G25.5 MUSCLE, JERKY MOVEMENTS (UNCONTROLLED): ICD-10-CM

## 2024-12-09 DIAGNOSIS — G60.9 PERIPHERAL NEUROPATHY, IDIOPATHIC: ICD-10-CM

## 2024-12-09 PROCEDURE — 99214 OFFICE O/P EST MOD 30 MIN: CPT | Performed by: PSYCHIATRY & NEUROLOGY

## 2024-12-09 PROCEDURE — 1036F TOBACCO NON-USER: CPT | Performed by: PSYCHIATRY & NEUROLOGY

## 2024-12-09 PROCEDURE — 3078F DIAST BP <80 MM HG: CPT | Performed by: PSYCHIATRY & NEUROLOGY

## 2024-12-09 PROCEDURE — 1159F MED LIST DOCD IN RCRD: CPT | Performed by: PSYCHIATRY & NEUROLOGY

## 2024-12-09 PROCEDURE — 3075F SYST BP GE 130 - 139MM HG: CPT | Performed by: PSYCHIATRY & NEUROLOGY

## 2024-12-09 PROCEDURE — G8427 DOCREV CUR MEDS BY ELIG CLIN: HCPCS | Performed by: PSYCHIATRY & NEUROLOGY

## 2024-12-09 PROCEDURE — G8417 CALC BMI ABV UP PARAM F/U: HCPCS | Performed by: PSYCHIATRY & NEUROLOGY

## 2024-12-09 PROCEDURE — G8484 FLU IMMUNIZE NO ADMIN: HCPCS | Performed by: PSYCHIATRY & NEUROLOGY

## 2024-12-09 PROCEDURE — 1124F ACP DISCUSS-NO DSCNMKR DOCD: CPT | Performed by: PSYCHIATRY & NEUROLOGY

## 2024-12-09 RX ORDER — PRIMIDONE 50 MG/1
100 TABLET ORAL NIGHTLY
Qty: 60 TABLET | Refills: 5 | Status: SHIPPED | OUTPATIENT
Start: 2024-12-09

## 2024-12-09 RX ORDER — PRIMIDONE 50 MG/1
TABLET ORAL
Qty: 60 TABLET | Refills: 5 | OUTPATIENT
Start: 2024-12-09

## 2024-12-09 NOTE — PROGRESS NOTES
12/9/24    Vaibhav Dong  1945    Chief Complaint   Patient presents with    Follow-up     3m follow up for tremor and EMG results.        History of Present Illness  Vaibhav is a 79 y.o. male presenting today for follow-up of:  Essential tremor, peripheral neuropathy, carpal tunnel syndrome    He is accompanied by his wife today.    He continues on primidone 100 mg at bedtime for essential tremor.  He states that since starting to take the primidone all at night instead of splitting the dose twice daily he is not having as much daytime sleepiness.  He still notices some tremor but feels that he is doing okay on the 100 mg at bedtime.    He continues to have extremity spasms.  He describes symptoms as quick twitches or muscle jerks.  They are in the upper extremities and lower extremities.  He did have some labs performed including CK, myasthenia gravis antibody panel, TSH, and CMP but we do not have these results as they are not an outstanding lab.  He had an EMG of the lower extremities which revealed a mild peripheral neuropathy.  No radiculopathy was present.  There is no history of diabetes.  There is no history of gastric bypass surgery.  There is no history of the use of chemotherapeutic drugs.  There is no history of alcoholism or heavy drinking.    The EMG of his upper extremities showed moderate carpal tunnel syndrome bilaterally worse on the right than the left.  No radiculopathy was present.  He states that he had carpal tunnel surgery on the right about 15 to 20 years ago.  He continues to have some bilateral hand numbness.  He notices it more with sustained .      Current Outpatient Medications   Medication Sig Dispense Refill    primidone (MYSOLINE) 50 MG tablet Take 2 tablets by mouth nightly 60 tablet 5    pravastatin (PRAVACHOL) 20 MG tablet Take 1 tablet by mouth daily 90 tablet 3    metoprolol succinate (TOPROL XL) 25 MG extended release tablet Take 1 tablet by mouth daily 30 tablet 5

## 2025-01-21 ENCOUNTER — OFFICE VISIT (OUTPATIENT)
Dept: CARDIOLOGY CLINIC | Age: 80
End: 2025-01-21
Payer: MEDICARE

## 2025-01-21 VITALS
DIASTOLIC BLOOD PRESSURE: 84 MMHG | HEIGHT: 74 IN | WEIGHT: 253 LBS | SYSTOLIC BLOOD PRESSURE: 110 MMHG | BODY MASS INDEX: 32.47 KG/M2 | HEART RATE: 72 BPM

## 2025-01-21 DIAGNOSIS — I48.0 PAROXYSMAL ATRIAL FIBRILLATION (HCC): ICD-10-CM

## 2025-01-21 DIAGNOSIS — Z95.818 PRESENCE OF WATCHMAN LEFT ATRIAL APPENDAGE CLOSURE DEVICE: ICD-10-CM

## 2025-01-21 DIAGNOSIS — I73.9 PAD (PERIPHERAL ARTERY DISEASE) (HCC): ICD-10-CM

## 2025-01-21 DIAGNOSIS — Z86.79 STATUS POST ABLATION OF ATRIAL FLUTTER: ICD-10-CM

## 2025-01-21 DIAGNOSIS — I83.893 VARICOSE VEINS OF BOTH LEGS WITH EDEMA: ICD-10-CM

## 2025-01-21 DIAGNOSIS — I10 ESSENTIAL HYPERTENSION: Primary | ICD-10-CM

## 2025-01-21 DIAGNOSIS — Z98.890 STATUS POST ABLATION OF ATRIAL FLUTTER: ICD-10-CM

## 2025-01-21 PROCEDURE — G8417 CALC BMI ABV UP PARAM F/U: HCPCS | Performed by: INTERNAL MEDICINE

## 2025-01-21 PROCEDURE — 99214 OFFICE O/P EST MOD 30 MIN: CPT | Performed by: INTERNAL MEDICINE

## 2025-01-21 PROCEDURE — 1036F TOBACCO NON-USER: CPT | Performed by: INTERNAL MEDICINE

## 2025-01-21 PROCEDURE — 1159F MED LIST DOCD IN RCRD: CPT | Performed by: INTERNAL MEDICINE

## 2025-01-21 PROCEDURE — 1160F RVW MEDS BY RX/DR IN RCRD: CPT | Performed by: INTERNAL MEDICINE

## 2025-01-21 PROCEDURE — 3074F SYST BP LT 130 MM HG: CPT | Performed by: INTERNAL MEDICINE

## 2025-01-21 PROCEDURE — 1124F ACP DISCUSS-NO DSCNMKR DOCD: CPT | Performed by: INTERNAL MEDICINE

## 2025-01-21 PROCEDURE — 3079F DIAST BP 80-89 MM HG: CPT | Performed by: INTERNAL MEDICINE

## 2025-01-21 PROCEDURE — G8427 DOCREV CUR MEDS BY ELIG CLIN: HCPCS | Performed by: INTERNAL MEDICINE

## 2025-01-21 NOTE — PATIENT INSTRUCTIONS
CORONARY ARTERY DISEASE:None significant except LAD is small possibly diffusely diseased. Patient is on ASA  CATH 10/2020   1. No significant CAD.   ? LAD diffusely diseased as it is very small in caliber.   2. Normal LV systolic function. LVEF is 50 to 55 %.     HTN:well controlled on current medical regimen, see list above.              - changes in  treatment:   no, Takes Toprol & Hydralazine.   CARDIOMYOPATHY: None known   CONGESTIVE HEART FAILURE: NO KNOWN HISTORY.    VHD: No significant VHD noted  ECHO 1/2024    Left Ventricle: Normal left ventricular systolic function with a visually estimated EF of 55 - 60%. Left ventricle size is normal. Mildly increased wall thickness. Normal wall motion. Grade I diastolic dysfunction with normal LAP.    No significant valvular disease or regurgitation noted.    Pericardium: No pericardial effusion.     DYSLIPIDEMIA: Patient's profile is at / near Goal.no                                HDL is low                                Tolerating current medical regimen wellyes, on Pravachol                                Does not tolerate medications well due to side effects                                See most recent Lab values in Labs section above.  ARRHYTHMIAS: known                                Patient has H/O P. Atrial fibrillation                                He is rate controlled & on Eliquis anticoagulation.                                  Patient has Watchman device now.  EKG: SINUS RHYTHM 95/min     CHRONIC VENOUS INSUFFICIENCY:yes,    Patient has symptomatic C4 disease significantly affecting activities of daily life.  Check venous ultrasound.   Will obtain ABIs for documentation of arterial circulation due to history of PAD.    Advised patient to continue to wear compression stockings.  Need to Diet Exercise & Loose weight.  Stop smoking.  Increase walking while wearing compression stockings.  Keep feet propped up while seated.    TESTS ORDERED: Venous

## 2025-01-31 ENCOUNTER — TELEPHONE (OUTPATIENT)
Dept: CARDIOLOGY CLINIC | Age: 80
End: 2025-01-31

## 2025-02-05 ENCOUNTER — TELEPHONE (OUTPATIENT)
Dept: CARDIOLOGY CLINIC | Age: 80
End: 2025-02-05

## 2025-02-05 NOTE — TELEPHONE ENCOUNTER
Notified and understood       Vascular US Ankle Brachial Index (REECE)   Right side findings: Resting REECE is 1.16. This is within the normal range.    Left side findings: Resting REECE is 1.20. This is within the normal range.

## 2025-02-12 NOTE — PROGRESS NOTES
land therapy to address strength and balance/gait deficits. He would benefit from ongoing skilled physical therapy to address deficits, return to PLOF, and reduce risk for further functional decline/fall risk.    Body Structures, Functions, Activity Limitations Requiring Skilled Therapeutic Intervention: Decreased functional mobility , Decreased ADL status, Decreased body mechanics, Decreased tolerance to work activity, Decreased sensation, Decreased endurance, Decreased strength, Decreased balance, Vestibular Impairment, Decreased posture, Increased pain, Decreased high-level IADLs, Decreased coordination, Decreased ROM    Statement of Medical Necessity: Physical Therapy is both indicated and medically necessary as outlined in the POC to increase the likelihood of meeting the functionally related goals stated below.     Patient's Activity Tolerance: Patient tolerated evaluation without incident      Patient's rehabilitation potential/prognosis is considered to be: Good    Factors which may impact rehabilitation potential include: Medical co-morbidities  Measures taken to address barrier(s): See Patient Education     GOALS   Patient Goal(s): \"my goal is to work on my balance and strengthen my legs\"      Long Term Goals Completed by 6 weeks Goal Status   Patinet will demonstrate independence with HEP New   Patinent will improve 5STS from 27s with bilateral UE use to <25s with least assist possible to demonstrate improved trasnfer ability. New   Patient will improve DGI from 13/24 to >17/24 to demonstrate improved dynamic gait requried for safe community negotiation. New   Patient will report >50% overall improvement to demo subjective improvement. New   Patient will improve oswestry from 23/50 to <15/50 to demonstrate improve functional activity tolerance. New                                      TREATMENT PLAN   Other Activities  Comment: 5STS: 27s with BUE use   DGI: 13/24   Requires PT Follow-Up: Yes  Treatment

## 2025-02-12 NOTE — FLOWSHEET NOTE
Rating)    Patient is a 79 year old male who presents to physical therapy with reports of mild low back, right hip, and leg LE pain. He reports recent injections in low back and right hip, which seemed to have helped manage pain some. He reports two recent falls this past month. He reports his balance has worsened over the past month as well. He is also wanting to trial aquatic therapy. Upon assessment, he demonstrates decreased global LE weakness. 5STS reveal difficulties with transfers. DGI reveals moderate fall risk at this time as well. Discussed with patient trialing half aquatic/half land therapy to address strength and balance/gait deficits. He would benefit from ongoing skilled physical therapy to address deficits, return to PLOF, and reduce risk for further functional decline/fall risk.      Plan for Next Session: progress as tolerable   Specific Instructions for Next Treatment: aquatic therapy and land therapy    Time In / Time Out:    8643-4531             Timed Code/Total Treatment Minutes:  10/40: 1 low eval 1 TE      Next Progress Note due:  30 days or 10 visits       Plan of Care Interventions:  [x] Therapeutic Exercise  [x] Modalities:  [x] Therapeutic Activity     [] Ultrasound  [] Estim  [] Gait Training      [] Cervical Traction [] Lumbar Traction  [x] Neuromuscular Re-education    [x] Cold/hotpack [] Iontophoresis   [x] Instruction in HEP      [x] Vasopneumatic   [] Dry Needling    [x] Manual Therapy               [] Aquatic Therapy              Electronically signed by:  Zahira Campbell PT, DPT 2/14/2025, 10:42 AM

## 2025-02-12 NOTE — PLAN OF CARE
Outpatient Physical Therapy           [place]           [x] Phone: [phone]   Fax: [phone]  [place]           [] Phone: [phone]   Fax: [phone]     To: Ilan Marroquin MD Molina, Domingo   From: Zahira Campbell, PT, DPT     Patient: Vaibhav Dong       : 1945  Diagnosis: Radiculopathy, lumbar region [M54.16]  Spinal stenosis, lumbar region with neurogenic claudication [M48.062] Diagnosis: lumbar radiculopathy  Treatment Diagnosis: decreased B LE strength, decreased balance, gait deficit  Date: 2025    Physical Therapy Certification/Re-Certification Form  Dear Dr. Marroquin,   The following patient has been evaluated for physical therapy services and for therapy to continue, insurance requires physician review of the treatment plan initially and every 90 days. Please review the attached evaluation and/or summary of the patient's plan of care, and verify that you agree therapy should continue by signing the attached document and sending it back to our office.    Assessment:     Patient is a 79 year old male who presents to physical therapy with reports of mild low back, right hip, and leg LE pain. He reports recent injections in low back and right hip, which seemed to have helped manage pain some. He reports two recent falls this past month. He reports his balance has worsened over the past month as well. He is also wanting to trial aquatic therapy. Upon assessment, he demonstrates decreased global LE weakness. 5STS reveal difficulties with transfers. DGI reveals moderate fall risk at this time as well. Discussed with patient trialing half aquatic/half land therapy to address strength and balance/gait deficits. He would benefit from ongoing skilled physical therapy to address deficits, return to PLOF, and reduce risk for further functional decline/fall risk.    Plan of Care/Treatment to date:  [x] Therapeutic Exercise  [x] Modalities:  [x] Therapeutic Activity     [] Ultrasound  []

## 2025-02-14 ENCOUNTER — HOSPITAL ENCOUNTER (OUTPATIENT)
Dept: PHYSICAL THERAPY | Age: 80
Setting detail: THERAPIES SERIES
Discharge: HOME | End: 2025-02-14
Payer: MEDICARE

## 2025-02-14 PROCEDURE — 97110 THERAPEUTIC EXERCISES: CPT

## 2025-02-14 PROCEDURE — 97161 PT EVAL LOW COMPLEX 20 MIN: CPT

## 2025-02-14 ASSESSMENT — PAIN DESCRIPTION - ORIENTATION: ORIENTATION: RIGHT;LEFT

## 2025-02-14 ASSESSMENT — PAIN DESCRIPTION - PAIN TYPE: TYPE: CHRONIC PAIN

## 2025-02-14 ASSESSMENT — PAIN DESCRIPTION - DESCRIPTORS: DESCRIPTORS: ACHING

## 2025-02-14 ASSESSMENT — PAIN SCALES - GENERAL: PAINLEVEL_OUTOF10: 7

## 2025-02-14 ASSESSMENT — PAIN DESCRIPTION - LOCATION: LOCATION: LEG

## 2025-02-18 ENCOUNTER — HOSPITAL ENCOUNTER (OUTPATIENT)
Dept: PHYSICAL THERAPY | Age: 80
Setting detail: THERAPIES SERIES
Discharge: HOME | End: 2025-02-18
Payer: MEDICARE

## 2025-02-18 PROCEDURE — 97113 AQUATIC THERAPY/EXERCISES: CPT

## 2025-02-18 NOTE — FLOWSHEET NOTE
No    Plan:   [x] Continue per plan of care [] Alter current plan (see comments)  [] Plan of care initiated [] Hold pending MD visit [] Discharge    See Weekly Progress Note:    [] Yes [] No Next due:        Electronically signed by:  Myla Maurice PTA, [id number]  2/18/2025, 2:13 PM

## 2025-02-20 ENCOUNTER — HOSPITAL ENCOUNTER (OUTPATIENT)
Dept: PHYSICAL THERAPY | Age: 80
Setting detail: THERAPIES SERIES
Discharge: HOME | End: 2025-02-20
Payer: MEDICARE

## 2025-02-20 PROCEDURE — 97110 THERAPEUTIC EXERCISES: CPT

## 2025-02-20 PROCEDURE — 97112 NEUROMUSCULAR REEDUCATION: CPT

## 2025-02-20 PROCEDURE — 97530 THERAPEUTIC ACTIVITIES: CPT

## 2025-02-20 NOTE — FLOWSHEET NOTE
Outpatient Physical Therapy  [place]           [x] Phone: [phone]   Fax: [phone]  [place]           [] Phone: [phone]   Fax: [phone]        Physical Therapy Daily Treatment Note  Date:  2025    Patient Name:  Vaibhav Dong    :  1945  MRN: [id number]  Restrictions/Precautions: No data recorded   Diagnosis:   Radiculopathy, lumbar region [M54.16]  Spinal stenosis, lumbar region with neurogenic claudication [M48.062] Diagnosis: lumbar radiculopathy  Date of Injury/Surgery:   Treatment Diagnosis:  decreased B LE strength, decreased balance, gait deficit  Insurance/Certification information: Medicare  Referring Physician:  Ilan Marroquin MD Molina, Domingo   PCP: Sai Knight MD  Next Doctor Visit:    Plan of care signed (Y/N):  sent day of eval   Outcome Measure: oswestry 5STS, DGI  Visit# / total visits:  3 /12    bomn  Pain level:     7/10    Goals:     Patient goals: \"my goal is to work on my balance and strengthen my legs\"     Long Term Goals  Time Frame for Long Term Goals: 6 weeks  Patinet will demonstrate independence with HEP  Patinent will improve 5STS from 27s with bilateral UE use to <25s with least assist possible to demonstrate improved trasnfer ability.  Patient will improve DGI from  to >17/ to demonstrate improved dynamic gait requried for safe community negotiation.  Patient will report >50% overall improvement to demo subjective improvement.  Patient will improve oswestry from 23/50 to <15/50 to demonstrate improve functional activity tolerance.        Summary of Evaluation:  Patient is a 79 year old male who presents to physical therapy with reports of mild low back, right hip, and leg LE pain. He reports recent injections in low back and right hip, which seemed to have helped manage pain some. He reports two recent falls this past month. He reports his balance has worsened over the past month as well. He is also wanting to trial aquatic therapy.

## 2025-02-25 ENCOUNTER — HOSPITAL ENCOUNTER (OUTPATIENT)
Dept: PHYSICAL THERAPY | Age: 80
Setting detail: THERAPIES SERIES
Discharge: HOME | End: 2025-02-25
Payer: MEDICARE

## 2025-02-25 PROCEDURE — 97113 AQUATIC THERAPY/EXERCISES: CPT

## 2025-02-25 NOTE — FLOWSHEET NOTE
Physical Therapy Aquatic Flow Sheet   Date:  2025    Patient Name:  Vaibhav Dong                        :  1945                     MRN: [id number]  Restrictions/Precautions: No data recorded   Diagnosis:   Radiculopathy, lumbar region [M54.16]  Spinal stenosis, lumbar region with neurogenic claudication [M48.062] Diagnosis: lumbar radiculopathy  Date of Injury/Surgery:   Treatment Diagnosis:  decreased B LE strength, decreased balance, gait deficit  Insurance/Certification information: Medicare  Referring Physician:  Ilan Marroquin MD Molina, Domingo   PCP: Sai Knight MD  Next Doctor Visit:    Plan of care signed (Y/N):  sent day of eval   Outcome Measure: oswestry 5STS, DGI    Visit# / total visits:        Pain level: rates as hip hurts a little but back is not bothering me to bad right now /10       Electronically signed by:  Myla Maurice PTA, [id number]    Subjective: states he feels he is walking better, wants to continue with pool      Key  B= Belt DB= Dumbells T= Theratube   H= Hydrotone N= Noodles W= Weights   P= Paddles S= Speedo equipment K= Kickboard     Exercises/Activities       Date:    Visit:       Warm-up/Amb    Exercises       Forward/retro   X 3 lengths ea with very close SBA, no assist device. Education for posture and core corrections. Hip abd/add, ext, marching flexion  Standing with BUE assist, SBA for safety, education for posture and core corrections x 10 ea.    sideways  X 3 lengths ea with very close SBA, no assist device. Education for posture and core corrections. Ankle PF/DF  Standing with BUE assist, SBA for safety, education for posture and core corrections x 10 ea.      Shoulder flex/ext, abd/add, horizontal abd/add, and rows.  Seated on pool bench, feet on pool floor, back unsupported off wall. Completed x 10 ea with education for posture and corrections and close SBA for balance safety. Some struggles with buoyancy      LAQ   X

## 2025-02-27 NOTE — PROGRESS NOTES
Patient Name: Vaibhav Dong  : 1945  MRN# [id number]    REASON FOR VISIT: Results of Testing  Patient Active Problem List    Diagnosis Date Noted    Varicose veins of both legs with edema 2025    Absolute anemia 10/15/2024    History of GI bleed 10/15/2024    Presence of Watchman left atrial appendage closure device 10/12/2023    S/P lumbar fusion 2022    Status post ablation of atrial fibrillation 2021    Dizziness 2021    PAD (peripheral artery disease) 2021    Leg pain 2021    Obesity (BMI 30.0-34.9) 2021    Status post ablation of atrial flutter 2021    Essential hypertension 2020    Other fatigue 2020    SOB (shortness of breath) 2020    Abnormal nuclear stress test 2020    NSVT (nonsustained ventricular tachycardia) ([facility]) 2020    Atrial fibrillation (HCC) 2020    Postural dizziness with near syncope 2020    Acute neck pain 2020    Unstable angina (HCC) 2015    COPD (chronic obstructive pulmonary disease) (HCC) 2014    THOMAS on CPAP 2014     CURRENT SX:     LABS:  Lab Results   Component Value Date    CHOL 145 2024    TRIG 202 (H) 2024    HDL 45 2024    LDLDIRECT 104 (H) 2020     Hemoglobin A1C   Date Value Ref Range Status   2022 5.9 4.2 - 6.3 % Final      ABLATION DATE:

## 2025-02-28 ENCOUNTER — HOSPITAL ENCOUNTER (OUTPATIENT)
Dept: PHYSICAL THERAPY | Age: 80
Setting detail: THERAPIES SERIES
Discharge: HOME | End: 2025-02-28
Payer: MEDICARE

## 2025-02-28 PROCEDURE — 97530 THERAPEUTIC ACTIVITIES: CPT

## 2025-02-28 PROCEDURE — 97110 THERAPEUTIC EXERCISES: CPT

## 2025-02-28 NOTE — FLOWSHEET NOTE
benefit from ongoing skilled physical therapy to address deficits, return to PLOF, and reduce risk for further functional decline/fall risk.      Plan for Next Session: progress as tolerable   Specific Instructions for Next Treatment: aquatic therapy and land therapy    Time In / Time Out:  1030 / 1108    Timed Code/Total Treatment Minutes:   38/38   2 TA   1 TE      Next Progress Note due:  30 days or 10 visits       Plan of Care Interventions:  [x] Therapeutic Exercise  [x] Modalities:  [x] Therapeutic Activity     [] Ultrasound  [] Estim  [] Gait Training      [] Cervical Traction [] Lumbar Traction  [x] Neuromuscular Re-education    [x] Cold/hotpack [] Iontophoresis   [x] Instruction in HEP      [x] Vasopneumatic   [] Dry Needling    [x] Manual Therapy               [] Aquatic Therapy              Electronically signed by:  Flori Alan PTA, BSPTA  2/28/2025, 10:31 AM

## 2025-03-04 ENCOUNTER — HOSPITAL ENCOUNTER (OUTPATIENT)
Dept: PHYSICAL THERAPY | Age: 80
Setting detail: THERAPIES SERIES
Discharge: HOME | End: 2025-03-04
Payer: MEDICARE

## 2025-03-04 PROCEDURE — 97113 AQUATIC THERAPY/EXERCISES: CPT

## 2025-03-04 NOTE — FLOWSHEET NOTE
functional activity tolerance.    Objective Findings:   no jerking noted during session, pt reports it happened a little with his arms in corner dangle position.  Required close SBA in water with gait and SLS but self corrects, sometimes with rail assist.     Communication with other providers:x    Education to Patient: systemic benefits of aquatic therapy.    Adverse Reaction to Treatment: none    Assessment: Vaibhav tolerated session with intermittent rest breaks but reported feeling \" really good\" after session. He would benefit from continued skilled aquatic sessions to further progress and develop IND HEP for USS. He is benefiting from de-weighting from buoyancy of water and water viscosity is facilitating balance training.     Treatment/Activity Tolerance:  [x] Patient tolerated treatment well [x] Patient limited by fatique  [] Patient limited by pain [] Patient limited by other medical complications  [] Other:     Prognosis: [x] Good [] Fair  [] Poor    Patient Requires Follow-up: [x] Yes  [] No    Plan:   [x] Continue per plan of care [] Alter current plan (see comments)  [] Plan of care initiated [] Hold pending MD visit [] Discharge    See Weekly Progress Note:    [] Yes [] No Next due:        Electronically signed by:  Myla Maurice PTA, [id number]  3/4/2025, 1:21 PM

## 2025-03-06 ENCOUNTER — OFFICE VISIT (OUTPATIENT)
Dept: CARDIOLOGY CLINIC | Age: 80
End: 2025-03-06
Payer: MEDICARE

## 2025-03-06 VITALS
SYSTOLIC BLOOD PRESSURE: 122 MMHG | BODY MASS INDEX: 31.88 KG/M2 | DIASTOLIC BLOOD PRESSURE: 62 MMHG | HEART RATE: 72 BPM | HEIGHT: 74 IN | WEIGHT: 248.4 LBS

## 2025-03-06 DIAGNOSIS — I10 ESSENTIAL HYPERTENSION: Primary | ICD-10-CM

## 2025-03-06 DIAGNOSIS — Z86.79 STATUS POST ABLATION OF ATRIAL FIBRILLATION: ICD-10-CM

## 2025-03-06 DIAGNOSIS — Z98.890 STATUS POST ABLATION OF ATRIAL FIBRILLATION: ICD-10-CM

## 2025-03-06 DIAGNOSIS — I83.893 VARICOSE VEINS OF BOTH LEGS WITH EDEMA: ICD-10-CM

## 2025-03-06 DIAGNOSIS — I48.0 PAROXYSMAL ATRIAL FIBRILLATION (HCC): ICD-10-CM

## 2025-03-06 DIAGNOSIS — I73.9 PAD (PERIPHERAL ARTERY DISEASE): ICD-10-CM

## 2025-03-06 DIAGNOSIS — E66.811 OBESITY (BMI 30.0-34.9): ICD-10-CM

## 2025-03-06 DIAGNOSIS — G47.33 OSA ON CPAP: ICD-10-CM

## 2025-03-06 DIAGNOSIS — Z95.818 PRESENCE OF WATCHMAN LEFT ATRIAL APPENDAGE CLOSURE DEVICE: ICD-10-CM

## 2025-03-06 PROCEDURE — 1159F MED LIST DOCD IN RCRD: CPT | Performed by: INTERNAL MEDICINE

## 2025-03-06 PROCEDURE — 3078F DIAST BP <80 MM HG: CPT | Performed by: INTERNAL MEDICINE

## 2025-03-06 PROCEDURE — 99214 OFFICE O/P EST MOD 30 MIN: CPT | Performed by: INTERNAL MEDICINE

## 2025-03-06 PROCEDURE — G8417 CALC BMI ABV UP PARAM F/U: HCPCS | Performed by: INTERNAL MEDICINE

## 2025-03-06 PROCEDURE — 1036F TOBACCO NON-USER: CPT | Performed by: INTERNAL MEDICINE

## 2025-03-06 PROCEDURE — 1160F RVW MEDS BY RX/DR IN RCRD: CPT | Performed by: INTERNAL MEDICINE

## 2025-03-06 PROCEDURE — 1124F ACP DISCUSS-NO DSCNMKR DOCD: CPT | Performed by: INTERNAL MEDICINE

## 2025-03-06 PROCEDURE — 3074F SYST BP LT 130 MM HG: CPT | Performed by: INTERNAL MEDICINE

## 2025-03-06 PROCEDURE — G8427 DOCREV CUR MEDS BY ELIG CLIN: HCPCS | Performed by: INTERNAL MEDICINE

## 2025-03-06 NOTE — PROGRESS NOTES
OFFICE PROGRESS NOTES      Vaibhav is a 79 y.o. male who has    CHIEF COMPLAINT AS FOLLOWS:  CHEST PAIN: Patient denies any C/O chest pains at this time.      SOB:  No C/O SOB at this time.                 LEG EDEMA: No leg edema but C/O Jerking movements.   PALPITATIONS: Denies any C/O Palpitations   DIZZINESS: No C/O positional Dizziness not on Flomax ay more.   SYNCOPE: None   OTHER/ ADDITIONAL COMPLAINTS:                                     HPI: Patient is here for F/U on his Arrhythmia, HTN & Dyslipidemia problems.     Arrhythmia: Patient has known A-fib / flutter S/P Ablations S/P Watchman device.   HTN: Patient has known essential HTN. Has been treated with guideline recommended medical / physical/ diet therapy as stated below.  Dyslipidemia: Patient has known mixed dyslipidemia. Has been treated with guideline recommended medical / physical/ diet therapy as stated below.                Current Outpatient Medications   Medication Sig Dispense Refill    primidone (MYSOLINE) 50 MG tablet Take 2 tablets by mouth nightly (Patient taking differently: Take 2 tablets by mouth in the morning, at noon, in the evening, and at bedtime) 60 tablet 5    pravastatin (PRAVACHOL) 20 MG tablet Take 1 tablet by mouth daily 90 tablet 3    metoprolol succinate (TOPROL XL) 25 MG extended release tablet Take 1 tablet by mouth daily 30 tablet 5    vitamin D 25 MCG (1000 UT) CAPS Take 2 capsules by mouth      celecoxib (CELEBREX) 200 MG capsule Take 1 capsule by mouth daily      dicyclomine (BENTYL) 20 MG tablet Take 1 tablet by mouth every 6 hours      triamterene-hydroCHLOROthiazide (MAXZIDE-25) 37.5-25 MG per tablet Take 1 tablet by mouth daily      omeprazole (PRILOSEC) 40 MG delayed release capsule Take 20 mg by mouth daily      traMADol (ULTRAM) 50 MG tablet Take 1 tablet by mouth 2 times daily.      baclofen (LIORESAL) 20 MG tablet Take 1 tablet by mouth 2 times daily      nortriptyline (PAMELOR) 25 MG capsule Take 3 capsules by

## 2025-03-06 NOTE — PATIENT INSTRUCTIONS
TESTS ORDERED:none this visit     PREVIOUSLY ORDERED TESTS REVIEWED & DISCUSSED WITH THE PATIENT:     I personally reviewed & interpreted, all previously ordered tests as copied above. Latest Labs are pulled in to the note with dates.   Labs, specially in Reference to Lipid profile, Cardiac testing in the form of Echo ( dated: ), stress tests ( dated: ) & other relevant cardiac testing reviewed with patient & recommendations made based on assessment of the results.    Discussed role of Cardiac risk factors & effects + treatment of co morbidities with patient & advised accordingly.     MEDICATIONS: List of medications patient is currently taking is reviewed in detail with the patient. Discussed any side effects or problems taking the medication.     Recommend Continue present management & medications as listed.     AFFIRMATION: I reviewed patient's history, previous & current medical problems & all Labs + testing. This includes chart prep even prior to the vosit. Various goals are discussed and multiple questions answered.Relevant concelling performed.     Office follow up in one year.

## 2025-03-06 NOTE — PROGRESS NOTES
CLINICAL STAFF DOCUMENTATION    Dr. Arcadio Dong  1945  [id number]    Pt denies any cardiac symptoms       When did you have your last labs drawn 12/04/2024  What doctor ordered   Do we have the labs in their chart Yes  If we do not have the labs, ask where they were drawn     If we do not have these labs, you are retrieve these labs for the provider!    Do you have a surgery or procedure scheduled in the near future - No          Check medication list thoroughly!!! AND RECONCILE OUTSIDE MEDICATIONS  If dose has changed change the entire order not just the MG  BE SURE TO ASK PATIENT IF THEY NEED MEDICATION REFILLS  Verify Pharmacy and update if incorrect    Add to every patient's \"wrap up\" the following dot phrase AFTERVISITCARDIOHEARTHOUSE and ensure we explain this to our patients

## 2025-03-11 ENCOUNTER — HOSPITAL ENCOUNTER (OUTPATIENT)
Dept: PHYSICAL THERAPY | Age: 80
Setting detail: THERAPIES SERIES
Discharge: HOME | End: 2025-03-11
Payer: MEDICARE

## 2025-03-11 PROCEDURE — 97113 AQUATIC THERAPY/EXERCISES: CPT

## 2025-03-11 NOTE — FLOWSHEET NOTE
Physical Therapy Aquatic Flow Sheet   Date:  3/11/2025    Patient Name:  Vaibhav Dong                        :  1945                     MRN: [id number]  Restrictions/Precautions: No data recorded   Diagnosis:   Radiculopathy, lumbar region [M54.16]  Spinal stenosis, lumbar region with neurogenic claudication [M48.062] Diagnosis: lumbar radiculopathy  Date of Injury/Surgery:   Treatment Diagnosis:  decreased B LE strength, decreased balance, gait deficit  Insurance/Certification information: Medicare  Referring Physician:  Ilan Marroquin MD Molina, Domingo   PCP: Sai Knight MD  Next Doctor Visit:    Plan of care signed (Y/N):  sent day of eval   Outcome Measure: oswestry 5STS, DGI    Visit# / total visits:        Pain level: does not  provide numerical rating with question.  Reports as jerking is increased today. States when \" I'm in the water my thigh doesn't hurt like it normally does\"        Electronically signed by:  Myla Maurice, [facility], [id number]    Subjective: states he has an appointment with his doctor to review his meds.  He feels the medicine is increasing the jerking instead of decreasing it.   States he is also going to ask for a CT scan of his head since he has had frequent falls. States he jerks less in the pool and would like for the remainder of his visits to just be in the pool.      Key   DO NOT DOUBLE, NEEDS VERY CLOSE SBA   B= Belt DB= Dumbells T= Theratube   H= Hydrotone N= Noodles W= Weights   P= Paddles S= Speedo equipment K= Kickboard     Exercises/Activities       Date: 3-11 -25   Visit:       Warm-up/Amb    Exercises       Forward/retro   X 3 lengths ea with very close SBA, no assist device. Education for posture and core corrections. Hip abd/add, ext, marching flexion  Standing with BUE assist, SBA for safety, education for posture and core corrections x 15 ea.    sideways  X 3 lengths ea with very close SBA, no assist device. Education for posture and core

## 2025-03-18 ENCOUNTER — TELEPHONE (OUTPATIENT)
Age: 80
End: 2025-03-18

## 2025-03-18 NOTE — TELEPHONE ENCOUNTER
Pt is scheduled 3/21/25 with Manolo Suarez stated he has not drove since last visit with Dr. Knight.

## 2025-03-18 NOTE — TELEPHONE ENCOUNTER
Spoke with Dr Knight regarding increased myoclonus. Primidone not helping. Possible LOC episodes. Office to reach out to have pt schedule visit with me, no driving

## 2025-03-21 ENCOUNTER — HOSPITAL ENCOUNTER (OUTPATIENT)
Age: 80
Discharge: HOME | End: 2025-03-21
Payer: MEDICARE

## 2025-03-21 ENCOUNTER — OFFICE VISIT (OUTPATIENT)
Age: 80
End: 2025-03-21
Payer: MEDICARE

## 2025-03-21 VITALS
WEIGHT: 248.4 LBS | HEART RATE: 83 BPM | SYSTOLIC BLOOD PRESSURE: 132 MMHG | DIASTOLIC BLOOD PRESSURE: 70 MMHG | OXYGEN SATURATION: 95 % | BODY MASS INDEX: 31.89 KG/M2

## 2025-03-21 DIAGNOSIS — G25.5 MUSCLE, JERKY MOVEMENTS (UNCONTROLLED): Primary | ICD-10-CM

## 2025-03-21 DIAGNOSIS — G25.5 MUSCLE, JERKY MOVEMENTS (UNCONTROLLED): ICD-10-CM

## 2025-03-21 DIAGNOSIS — Z98.890 HISTORY OF LUMBOSACRAL SPINE SURGERY: ICD-10-CM

## 2025-03-21 DIAGNOSIS — G60.9 PERIPHERAL NEUROPATHY, IDIOPATHIC: ICD-10-CM

## 2025-03-21 DIAGNOSIS — R29.818 TRANSIENT NEUROLOGICAL SYMPTOMS: ICD-10-CM

## 2025-03-21 DIAGNOSIS — Z98.890 HISTORY OF CERVICAL SPINAL SURGERY: ICD-10-CM

## 2025-03-21 LAB
25(OH)D3 SERPL-MCNC: 30.7 NG/ML (ref 30–150)
ALBUMIN PERCENT: NORMAL %
ALBUMIN SERPL-MCNC: NORMAL G/DL
ALPHA 2 PERCENT: NORMAL %
ALPHA1 GLOB SERPL ELPH-MCNC: NORMAL %
ALPHA1 GLOB SERPL ELPH-MCNC: NORMAL G/DL
ALPHA2 GLOB SERPL ELPH-MCNC: NORMAL G/DL
B-GLOBULIN SERPL ELPH-MCNC: NORMAL %
B-GLOBULIN SERPL ELPH-MCNC: NORMAL G/DL
EST. AVERAGE GLUCOSE BLD GHB EST-MCNC: 136 MG/DL
FERRITIN SERPL-MCNC: 62 NG/ML (ref 30–400)
FOLATE SERPL-MCNC: 8.6 NG/ML (ref 4.8–24.2)
GAMMA GLOB SERPL ELPH-MCNC: NORMAL G/DL
GAMMA GLOBULIN %: NORMAL %
HBA1C MFR BLD: 6.4 % (ref 4.2–6.3)
IRON SATN MFR SERPL: 26 % (ref 15–50)
IRON SERPL-MCNC: 84 UG/DL (ref 59–158)
M PROTEIN 2 SERPL ELPH-MCNC: NORMAL G/DL
M PROTEIN SERPL ELPH-MCNC: NORMAL G/DL
MAGNESIUM SERPL-MCNC: 2.2 MG/DL (ref 1.8–2.4)
PATHOLOGIST: NORMAL
PROT PATTERN SERPL ELPH-IMP: NORMAL
PROT SERPL-MCNC: 7 G/DL
TIBC SERPL-MCNC: 321 UG/DL (ref 260–445)
TOTAL PROT. SUM,%: NORMAL %
TOTAL PROT. SUM: NORMAL G/DL
UNSATURATED IRON BINDING CAPACITY: 237 UG/DL (ref 110–370)
VIT B12 SERPL-MCNC: 331 PG/ML (ref 211–911)

## 2025-03-21 PROCEDURE — 1124F ACP DISCUSS-NO DSCNMKR DOCD: CPT | Performed by: NURSE PRACTITIONER

## 2025-03-21 PROCEDURE — 84155 ASSAY OF PROTEIN SERUM: CPT

## 2025-03-21 PROCEDURE — 82306 VITAMIN D 25 HYDROXY: CPT

## 2025-03-21 PROCEDURE — 83735 ASSAY OF MAGNESIUM: CPT

## 2025-03-21 PROCEDURE — G8417 CALC BMI ABV UP PARAM F/U: HCPCS | Performed by: NURSE PRACTITIONER

## 2025-03-21 PROCEDURE — 3075F SYST BP GE 130 - 139MM HG: CPT | Performed by: NURSE PRACTITIONER

## 2025-03-21 PROCEDURE — 3078F DIAST BP <80 MM HG: CPT | Performed by: NURSE PRACTITIONER

## 2025-03-21 PROCEDURE — 1036F TOBACCO NON-USER: CPT | Performed by: NURSE PRACTITIONER

## 2025-03-21 PROCEDURE — 83550 IRON BINDING TEST: CPT

## 2025-03-21 PROCEDURE — 99214 OFFICE O/P EST MOD 30 MIN: CPT | Performed by: NURSE PRACTITIONER

## 2025-03-21 PROCEDURE — 84165 PROTEIN E-PHORESIS SERUM: CPT

## 2025-03-21 PROCEDURE — 99213 OFFICE O/P EST LOW 20 MIN: CPT | Performed by: NURSE PRACTITIONER

## 2025-03-21 PROCEDURE — 83540 ASSAY OF IRON: CPT

## 2025-03-21 PROCEDURE — G8427 DOCREV CUR MEDS BY ELIG CLIN: HCPCS | Performed by: NURSE PRACTITIONER

## 2025-03-21 PROCEDURE — 82746 ASSAY OF FOLIC ACID SERUM: CPT

## 2025-03-21 PROCEDURE — 1159F MED LIST DOCD IN RCRD: CPT | Performed by: NURSE PRACTITIONER

## 2025-03-21 PROCEDURE — 82728 ASSAY OF FERRITIN: CPT

## 2025-03-21 PROCEDURE — 83036 HEMOGLOBIN GLYCOSYLATED A1C: CPT

## 2025-03-21 PROCEDURE — 82607 VITAMIN B-12: CPT

## 2025-03-21 NOTE — PROGRESS NOTES
(uncontrolled)  EEG    EEG 48 Hour Ambulatory      2. Peripheral neuropathy, idiopathic        3. History of lumbosacral spine surgery        4. History of cervical spinal surgery        5. Transient neurological symptoms  EEG    EEG 48 Hour Ambulatory      Vaibhav was seen in neurological follow up in regards to myoclonus, peripheral neuropathy, carpal tunnel syndrome  -Will get routine and ambulatory EEG to evaluate his transient neurological symptoms he had during physical therapy and when he lost awareness pulling into his garage when he hit the freezer.  He is currently not driving.  -He was given labs to complete including TSH, CMP, CK, myasthenia gravis, magnesium, iron studies to look for underlying causes such as myopathy or autoimmune neuromuscular junction disorders-not completed.   -Since discontinuing primidone,  He is no longer having brain fog and difficulty getting words out. the jerking in his extremities are more consistent with myoclonus less likely essential tremor.   - Discussed recent MRI brain which showed normal age-related findings.    Return in about 3 months (around 6/21/2025).    Erick John, MÓNICA - CNP

## 2025-03-23 DIAGNOSIS — I10 ESSENTIAL HYPERTENSION: ICD-10-CM

## 2025-03-24 RX ORDER — METOPROLOL SUCCINATE 25 MG/1
25 TABLET, EXTENDED RELEASE ORAL DAILY
Qty: 30 TABLET | Refills: 5 | Status: SHIPPED | OUTPATIENT
Start: 2025-03-24

## 2025-03-26 ENCOUNTER — TELEPHONE (OUTPATIENT)
Age: 80
End: 2025-03-26

## 2025-03-26 NOTE — TELEPHONE ENCOUNTER
EEG not scheduled. Notified pt and provided contact information for [facility] central scheduling.

## 2025-03-27 LAB
MISCELLANEOUS LAB TEST RESULT: NORMAL
TEST NAME: NORMAL

## 2025-03-31 ENCOUNTER — HOSPITAL ENCOUNTER (OUTPATIENT)
Dept: SLEEP CENTER | Age: 80
Discharge: HOME | End: 2025-03-31
Payer: MEDICARE

## 2025-03-31 DIAGNOSIS — G25.5 MUSCLE, JERKY MOVEMENTS (UNCONTROLLED): ICD-10-CM

## 2025-03-31 DIAGNOSIS — R29.818 TRANSIENT NEUROLOGICAL SYMPTOMS: ICD-10-CM

## 2025-03-31 PROCEDURE — 95816 EEG AWAKE AND DROWSY: CPT | Performed by: STUDENT IN AN ORGANIZED HEALTH CARE EDUCATION/TRAINING PROGRAM

## 2025-03-31 PROCEDURE — 95819 EEG AWAKE AND ASLEEP: CPT

## 2025-03-31 NOTE — PROGRESS NOTES
Routine outpatient EEG completed and awaiting interpretation.       Electronically signed by Fabiana Pardo on 3/31/2025 at 11:00 AM

## 2025-04-01 NOTE — PROCEDURES
ROUTINE ELECTROENCEPHALOGRAM    Identifying Information:  Name: Vaibhav Dong  MRN: [id number]  : 1945  Interpreting Physician: Halima Storey DO  Referring Provider: Erick John APRN - CNP  Date of EEG: 3/31/25  Procedure Location: Outpatient     Clinical History:  Vaibhav Dong is a 79 y.o. male with concerns for seizure like activity.     Current Medications:     Current Outpatient Medications   Medication Instructions    aspirin 81 mg, Oral, DAILY    baclofen (LIORESAL) 20 mg, 2 TIMES DAILY    celecoxib (CELEBREX) 200 mg, DAILY    citalopram (CELEXA) 20 mg, DAILY    dicyclomine (BENTYL) 20 mg, EVERY 6 HOURS    finasteride (PROSCAR) 5 mg, Nightly    gabapentin (NEURONTIN) 300 mg, 3 TIMES DAILY    metoprolol succinate (TOPROL XL) 25 mg, Oral, DAILY    nortriptyline (PAMELOR) 75 mg, NIGHTLY    omeprazole (PRILOSEC) 20 mg, DAILY    pravastatin (PRAVACHOL) 20 mg, Oral, DAILY    primidone (MYSOLINE) 100 mg, Oral, NIGHTLY    silver sulfADIAZINE (SILVADENE) 1 % cream Apply topically daily.    traMADol (ULTRAM) 50 mg, 2 TIMES DAILY    triamterene-hydroCHLOROthiazide (MAXZIDE-25) 37.5-25 MG per tablet 1 tablet, DAILY    vitamin D 25 MCG (1000 UT) CAPS 2 capsules          Indication:  Rule out seizure/seizure disorder     Technical Summary:  28 channels of EEG were recorded in a digital format on a patient who was reported to be awake and drowsy during the recording. The patient was not sleep deprived prior to the EEG.     The PDR consisted of well-developed, well-regulated 8-9 Hz alpha activity, maximal over the posterior head regions and reactive to eye opening and closure.     Photic stimulation was performed and did not produce any abnormalities. During the recording stage II sleep was not seen.     The EKG lead revealed no rhythm abnormalities.       EEG Interpretation:   This EEG was within normal limits for a patient of this age in the awake and drowsy states. No focal, lateralizing, or

## 2025-04-10 ENCOUNTER — TELEPHONE (OUTPATIENT)
Age: 80
End: 2025-04-10

## 2025-04-10 NOTE — TELEPHONE ENCOUNTER
Patient came in office asked for all results to be reviewed . Patient declined second EEG but wants to see if he is clear to return to work

## 2025-04-14 ENCOUNTER — PATIENT MESSAGE (OUTPATIENT)
Age: 80
End: 2025-04-14

## 2025-04-15 ENCOUNTER — HOSPITAL ENCOUNTER (OUTPATIENT)
Dept: PHYSICAL THERAPY | Age: 80
Setting detail: THERAPIES SERIES
Discharge: HOME | End: 2025-04-15
Payer: MEDICARE

## 2025-04-15 PROCEDURE — 97113 AQUATIC THERAPY/EXERCISES: CPT

## 2025-04-15 NOTE — FLOWSHEET NOTE
Physical Therapy Aquatic Flow Sheet   Date:  4/15/2025    Patient Name:  Vaibhav Dong                        :  1945                     MRN: [id number]  Restrictions/Precautions: No data recorded   Diagnosis:   Radiculopathy, lumbar region [M54.16]  Spinal stenosis, lumbar region with neurogenic claudication [M48.062] Diagnosis: lumbar radiculopathy  Date of Injury/Surgery:   Treatment Diagnosis:  decreased B LE strength, decreased balance, gait deficit  Insurance/Certification information: Medicare  Referring Physician:  Ilan Marroquin MD Molina, Domingo   PCP: Sai Knight MD  Next Doctor Visit:    Plan of care signed (Y/N):  sent day of eval   Outcome Measure: oswestry 5STS, DGI    Visit# / total visits:        Pain level: 1-2/10 \"standing\"; 7/10 \"walking\"       Electronically signed by:  Taylor Yeh, PTA,     Subjective: Patient states that he finally has improved enough to return to therapy. Has reviewed all testing with his doctor and can now return to driving. He is doing much better since off meds and not \"jerking\" as much; maybe occasionally his hands but not \"falling asleep\" or unaware of what's happening around him now. Patient states pain is in R glut and into upper thigh; denies any pain with L side and plans to get another cortisone shot here end of April sometime.      Key   B= Belt DB= Dumbells T= Theratube   H= Hydrotone N= Noodles W= Weights   P= Paddles S= Speedo equipment K= Kickboard     Exercises/Activities       Date: 4/15/2025    Visit:       Warm-up/Amb    Exercises       Forward/retro   X 3 lengths ea with  SBA, no assist device. Education for posture and core engagements Hip abd/add, ext, marching flexion      3-way SLR performed all together; one UE support; 10x ea R/L; cues for core and posture    Marching x 10 with light BUE touch; cues for core and posture.      sideways  X 3 lengths ea with SBA, no assist device. Education for posture and core

## 2025-04-16 ENCOUNTER — HOSPITAL ENCOUNTER (OUTPATIENT)
Dept: PHYSICAL THERAPY | Age: 80
Setting detail: THERAPIES SERIES
Discharge: HOME | End: 2025-04-16
Payer: MEDICARE

## 2025-04-16 PROCEDURE — 97530 THERAPEUTIC ACTIVITIES: CPT

## 2025-04-16 PROCEDURE — 97110 THERAPEUTIC EXERCISES: CPT

## 2025-04-16 PROCEDURE — 97112 NEUROMUSCULAR REEDUCATION: CPT

## 2025-04-16 NOTE — TELEPHONE ENCOUNTER
Spoke with patient and asked if he has had any more episodes, patient denies any more episodes.  Stated he will stop in this afternoon when he goes to PT to see if we have an answer.

## 2025-04-16 NOTE — PROGRESS NOTES
Outpatient Physical Therapy           [place]           [x] Phone: [phone]   Fax: [phone]  [place]           [] Phone: [phone]   Fax: [phone]      To: Ilan Marroquin MD     From: Zahira Campbell, PT, DPT     Patient: Vaibhav Dong                    : 1945  Diagnosis:  Radiculopathy, lumbar region [M54.16]  Spinal stenosis, lumbar region with neurogenic claudication [M48.062]        Treatment Diagnosis:     decreased B LE strength, decreased balance, gait deficit   Date: 2025  [x]  Progress Note                []  Discharge Note    Evaluation Date:  25   Total Visits to date:  9  Cancels/No-shows to date:  2    Subjective:      \"The pool has been great, I would like to continue with the water therapy twice a week. I have gone down to a pain in my right buttocks. I got a shot in my right hip, it helped a couple days, but not too much. When I am in the water it helps with that pain.  The tremors have slowed down now that they changed my medication. I have  not passed out or anything since the last time here. The neurologist has been keeping up, I don't have appointment yet but I talked to the neurologist this morning, so hopefully I can drive again soon. She is going to call me today or tomorrow about the tests. I have been doing the exercises you gave  me at the  house. It have not had any falls, but the balance is still pretty bad. I almost fell backwards yesterday. Lets try the aquatic therapy for 3-4 weeks and if my balance is still real bad we can try land again. \"     Pain: -5/10    Plan of Care/Treatment to date:  [x] Therapeutic Exercise    [x] Modalities:  [x] Therapeutic Activity     [] Ultrasound  [] Electrical Stimulation  [] Gait Training      [] Cervical Traction   [] Lumbar Traction  [x] Neuromuscular Re-education  [x] Cold/hotpack [] Iontophoresis  [x] Instruction in HEP      Other:  [x] Manual Therapy       []  Vasopneumatic  [x] Aquatic Therapy       []

## 2025-04-16 NOTE — FLOWSHEET NOTE
Outpatient Physical Therapy  [place]           [x] Phone: [phone]   Fax: [phone]  [place]           [] Phone: [phone]   Fax: [phone]        Physical Therapy Daily Treatment Note  Date:  2025    Patient Name:  Vaibhav Dong    :  1945  MRN: [id number]  Restrictions/Precautions: No data recorded   Diagnosis:   Radiculopathy, lumbar region [M54.16]  Spinal stenosis, lumbar region with neurogenic claudication [M48.062] Diagnosis: lumbar radiculopathy  Date of Injury/Surgery:   Treatment Diagnosis:  decreased B LE strength, decreased balance, gait deficit  Insurance/Certification information: Medicare  Referring Physician:  Ilan Marroquin MD Molina, Domingo   PCP: Sai Knight MD  Next Doctor Visit:    Plan of care signed (Y/N):  sent day of eval   Outcome Measure: oswestry 5STS, DGI  Visit# / total visits:      bomn  Pain level:     7/10    Goals:     Patient goals: \"my goal is to work on my balance and strengthen my legs\" PROGRESSING 25     Long Term Goals  Time Frame for Long Term Goals: 6 weeks  Patinet will demonstrate independence with HEP MET 25  Patinent will improve 5STS from 27s with bilateral UE use to <25s with least assist possible to demonstrate improved trasnfer ability. PROGRESSING 25  Patient will improve DGI from  to >17/ to demonstrate improved dynamic gait requried for safe community negotiation. PROGRESSING 25  Patient will report >50% overall improvement to demo subjective improvement. PROGRESSING 25  Patient will improve oswestry from 23/50 to <15/50 to demonstrate improve functional activity tolerance. PROGRESSING 25        Summary of Evaluation:  Patient is a 79 year old male who presents to physical therapy with reports of mild low back, right hip, and leg LE pain. He reports recent injections in low back and right hip, which seemed to have helped manage pain some. He reports two recent falls

## 2025-04-22 ENCOUNTER — HOSPITAL ENCOUNTER (OUTPATIENT)
Dept: PHYSICAL THERAPY | Age: 80
Setting detail: THERAPIES SERIES
Discharge: HOME | End: 2025-04-22
Payer: MEDICARE

## 2025-04-22 PROCEDURE — 97113 AQUATIC THERAPY/EXERCISES: CPT

## 2025-04-23 NOTE — FLOWSHEET NOTE
Physical Therapy Aquatic Flow Sheet   Date:  25    Patient Name:  Vaibhav Dong                        :  1945                     MRN: [id number]  Restrictions/Precautions: No data recorded   Diagnosis:   Radiculopathy, lumbar region [M54.16]  Spinal stenosis, lumbar region with neurogenic claudication [M48.062] Diagnosis: lumbar radiculopathy  Date of Injury/Surgery:   Treatment Diagnosis:  decreased B LE strength, decreased balance, gait deficit  Insurance/Certification information: Medicare  Referring Physician:  Ilan Marroquin MD Molina, Domingo   PCP: Sai Knight MD  Next Doctor Visit:    Plan of care signed (Y/N):  sent day of eval   Outcome Measure: oswestry 5STS, DGI    Assessment:   25  Today is a progress check for Vaibhav. Vaibhav returns to therapy after nearly a one month break while working with his doctors to better manage his medication side effects of tremors. He reports his tremors have greatly decreased with adjusting his medications. He does continue to relate R sided low back pain radiating into his right thigh, but relates this has been improving with the aquatic therapy. He also relates balance is still a struggle, but denies any recent falls. He is currently ambulating without AD at this time, but does continue to demonstrate gait deficits (small base of support, decreased step length/LE clearance, decreased rene), and is moderate fall risk per DGI testing this date. Global LE strength is slowly improving at this time. Discussed returning to land therapy for formal balance training, as he does demonstrate moderate deficits in VSR per GANSOP testing. Vaibhav would like to continue with 3-4 weeks of aquatic therapy to  manage his LBP and return to land therapy if he continues to demonstrate balance deficits/high fall risk at time of  next progress check. He would benefit from ongoing skilled physical therapy to address deficits, return to PLOF, and reduce risk for further

## 2025-04-24 ENCOUNTER — HOSPITAL ENCOUNTER (OUTPATIENT)
Dept: PHYSICAL THERAPY | Age: 80
Setting detail: THERAPIES SERIES
Discharge: HOME | End: 2025-04-24
Payer: MEDICARE

## 2025-04-24 PROCEDURE — 97113 AQUATIC THERAPY/EXERCISES: CPT

## 2025-04-24 NOTE — FLOWSHEET NOTE
Physical Therapy Aquatic Flow Sheet   Date:  25    Patient Name:  Vaibhav Dong                        :  1945                     MRN: [id number]  Restrictions/Precautions: No data recorded   Diagnosis:   Radiculopathy, lumbar region [M54.16]  Spinal stenosis, lumbar region with neurogenic claudication [M48.062] Diagnosis: lumbar radiculopathy  Date of Injury/Surgery:   Treatment Diagnosis:  decreased B LE strength, decreased balance, gait deficit  Insurance/Certification information: Medicare  Referring Physician:  Ilan Marroquin MD Molina, Domingo   PCP: Sai Knight MD  Next Doctor Visit:    Plan of care signed (Y/N):  sent day of eval   Outcome Measure: oswestry 5STS, DGI    Assessment:   25  Today is a progress check for Vaibhav. Vaibhav returns to therapy after nearly a one month break while working with his doctors to better manage his medication side effects of tremors. He reports his tremors have greatly decreased with adjusting his medications. He does continue to relate R sided low back pain radiating into his right thigh, but relates this has been improving with the aquatic therapy. He also relates balance is still a struggle, but denies any recent falls. He is currently ambulating without AD at this time, but does continue to demonstrate gait deficits (small base of support, decreased step length/LE clearance, decreased rene), and is moderate fall risk per DGI testing this date. Global LE strength is slowly improving at this time. Discussed returning to land therapy for formal balance training, as he does demonstrate moderate deficits in VSR per GANSOP testing. Vaibhav would like to continue with 3-4 weeks of aquatic therapy to  manage his LBP and return to land therapy if he continues to demonstrate balance deficits/high fall risk at time of  next progress check. He would benefit from ongoing skilled physical therapy to address deficits, return to PLOF, and reduce risk for further

## 2025-04-29 ENCOUNTER — HOSPITAL ENCOUNTER (OUTPATIENT)
Dept: PHYSICAL THERAPY | Age: 80
Setting detail: THERAPIES SERIES
Discharge: HOME | End: 2025-04-29
Payer: MEDICARE

## 2025-04-29 PROCEDURE — 97113 AQUATIC THERAPY/EXERCISES: CPT

## 2025-04-29 NOTE — FLOWSHEET NOTE
memory and recall of HEP.  Reports pain as still 9/10 with walking.   Treatment/Activity Tolerance:  [x] Patient tolerated treatment well [] Patient limited by fatique  [] Patient limited by pain [] Patient limited by other medical complications  [] Other:     Prognosis: [x] Good [] Fair  [] Poor    Patient Requires Follow-up: [x] Yes  [] No    Plan:   [x] Continue per plan of care [] Alter current plan (see comments)  [] Plan of care initiated [] Hold pending MD visit [] Discharge    See Weekly Progress Note:    [] Yes [] No Next due:        Electronically signed by:  Myla Maurice PTA,   [id number]   4/29/2025, 11:23 AM

## 2025-05-01 ENCOUNTER — HOSPITAL ENCOUNTER (OUTPATIENT)
Dept: PHYSICAL THERAPY | Age: 80
Setting detail: THERAPIES SERIES
Discharge: HOME | End: 2025-05-01
Payer: MEDICARE

## 2025-05-01 PROCEDURE — 97113 AQUATIC THERAPY/EXERCISES: CPT

## 2025-05-01 NOTE — FLOWSHEET NOTE
Exercise   [] Instruction in HEP  [] Group   [] Therapeutic Activity      [] Neuromuscular Re-education [x] Aquatic   [] Gait             [] Manual  Other:    Time In/ Time Out:   0160-1146    Timed Code Treatment Minutes:  45 min    Total Treatment Minutes:  3 aquatic    Goal Status:               [] Achieved    [x] Partially Achieved   [] Not Achieved              Patient goals: \"my goal is to work on my balance and strengthen my legs\" PROGRESSING 4/16/25     Long Term Goals  Time Frame for Long Term Goals: 6 weeks  Patinet will demonstrate independence with HEP MET 4/16/25  Patinent will improve 5STS from 27s with bilateral UE use to <25s with least assist possible to demonstrate improved trasnfer ability. PROGRESSING 4/16/25  Patient will improve DGI from 13/24 to >17/24 to demonstrate improved dynamic gait requried for safe community negotiation. PROGRESSING 4/16/25 14/24  Patient will report >50% overall improvement to demo subjective improvement. PROGRESSING 4/16/25  Patient will improve oswestry from 23/50 to <15/50 to demonstrate improve functional activity tolerance. PROGRESSING 4/16/25 16/50    Objective Findings: Patient independent ambulation in/out of therapy with pain reported in SLS with gait. Session was modified to address pain reports. Pain in LB with extension, pain in right thigh with WB.  He required seated recovery upon exiting pool to monitor symptoms of vertigo for 3 mins and was non symptomatic upon leaving pool area.       Communication with other providers:x    Education to Patient: Review benefits of aquatic therapy. Carry over of posture and core engagement to land.     Adverse Reaction to Treatment: none    Assessment:  session modified to decrease focus on SLS and dyn balance activities due to continued reports of 9-1010 pain. He reports feeling better in pool and seated but as soon as he puts weight on RLE the pain in thigh starts again.  He had an xray in the fall but he has had

## 2025-05-06 ENCOUNTER — HOSPITAL ENCOUNTER (OUTPATIENT)
Dept: PHYSICAL THERAPY | Age: 80
Setting detail: THERAPIES SERIES
Discharge: HOME | End: 2025-05-06
Payer: MEDICARE

## 2025-05-06 PROCEDURE — 97112 NEUROMUSCULAR REEDUCATION: CPT

## 2025-05-06 PROCEDURE — 97530 THERAPEUTIC ACTIVITIES: CPT

## 2025-05-06 NOTE — FLOWSHEET NOTE
Outpatient Physical Therapy  [place]           [x] Phone: [phone]   Fax: [phone]  [place]           [] Phone: [phone]   Fax: [phone]        Physical Therapy Daily Treatment Note  Date:  2025    Patient Name:  Vaibhav Dong    :  1945  MRN: [id number]  Restrictions/Precautions: No data recorded   Diagnosis:   Radiculopathy, lumbar region [M54.16]  Spinal stenosis, lumbar region with neurogenic claudication [M48.062] Diagnosis: lumbar radiculopathy  Date of Injury/Surgery:   Treatment Diagnosis:  decreased B LE strength, decreased balance, gait deficit  Insurance/Certification information: Medicare  Referring Physician:  Ilan Marroquin MD Molina, Domingo   PCP: Sai Knight MD  Next Doctor Visit:    Plan of care signed (Y/N):  sent day of eval   Outcome Measure: oswestry 5STS, DGI  Visit# / total visits:      bomn  Pain level:     see subjective/10    Goals:     Patient goals: \"my goal is to work on my balance and strengthen my legs\" PROGRESSING 25     Long Term Goals  Time Frame for Long Term Goals: 6 weeks   Patinet will demonstrate independence with HEP MET 25  Patinent will improve 5STS from 27s with bilateral UE use to <25s with least assist possible to demonstrate improved trasnfer ability. MET 25  Patient will improve DGI from 13 to >17 to demonstrate improved dynamic gait requried for safe community negotiation. PROGRESSING 25  Patient will report >50% overall improvement to demo subjective improvement. PROGRESSING 25       Summary of Evaluation:  Patient is a 79 year old male who presents to physical therapy with reports of mild low back, right hip, and leg LE pain. He reports recent injections in low back and right hip, which seemed to have helped manage pain some. He reports two recent falls this past month. He reports his balance has worsened over the past month as well. He is also wanting to trial aquatic therapy. Upon

## 2025-05-06 NOTE — PROGRESS NOTES
Outpatient Physical Therapy           [place]           [x] Phone: [phone]   Fax: [phone]  [place]           [] Phone: [phone]   Fax: [phone]      To: Ilan Marroquin MD     From: Zahira Campbell, PT, DPT     Patient: Vaibhav Dong                    : 1945  Diagnosis:  Radiculopathy, lumbar region [M54.16]  Spinal stenosis, lumbar region with neurogenic claudication [M48.062]        Treatment Diagnosis:     decreased B LE strength, decreased balance, gait deficit   Date: 2025  [x]  Progress Note                []  Discharge Note    Evaluation Date:  25   Total Visits to date:   13 Cancels/No-shows to date:  2    Subjective:      \" I am due for a shot, and next week I go get it. My right leg has been pretty bothersome. Have not done anything out of the usual. But my right leg has been painful. I do feel like the aquatic therapy is still helping.  I would like to continue with the aquatic therapy still but maybe once a week and once a week to work on my balance here. My balance has still been off. I almost feel this morning and scared my wife. It is especially bad when I first stand up, I have to stand for a minute to get my bearings. \"     Pain: 0/10 resting, up to 9/10 when walking today and yesterday      Plan of Care/Treatment to date:  [x] Therapeutic Exercise    [x] Modalities:  [x] Therapeutic Activity     [] Ultrasound  [] Electrical Stimulation  [x] Gait Training      [] Cervical Traction   [] Lumbar Traction  [x] Neuromuscular Re-education  [x] Cold/hotpack [] Iontophoresis  [x] Instruction in HEP      Other:  [x] Manual Therapy       []  Vasopneumatic  [x] Aquatic Therapy       []   Dry Needle Therapy                      Objective/Significant Findings At Last Visit/Comments:    25      5STS: 27s (no UE!) ;UE use trial for all 5: 18s  DGI:        Oswestry: 1450     LE MMT  Hip Flx: B 4+/5  Hip ABD: B 4+/5  Hip ADD: B 5/5  Knee flx: R 4+/5 L 5/5  Knee ext:

## 2025-05-15 ENCOUNTER — HOSPITAL ENCOUNTER (OUTPATIENT)
Dept: PHYSICAL THERAPY | Age: 80
Setting detail: THERAPIES SERIES
Discharge: HOME | End: 2025-05-15
Payer: MEDICARE

## 2025-05-15 PROCEDURE — 97113 AQUATIC THERAPY/EXERCISES: CPT

## 2025-05-15 NOTE — FLOWSHEET NOTE
has had several falls since then, none of them within the time frame of pain increasing. He benefits from viscosity properties of the water to elicit reaction strategies. He admits he struggles with memory and recall of HEP.  Reports pain as still 9/10 with walking.       Treatment/Activity Tolerance:  [x] Patient tolerated treatment well [] Patient limited by fatique  [] Patient limited by pain [] Patient limited by other medical complications  [] Other:     Prognosis: [x] Good [] Fair  [] Poor    Patient Requires Follow-up: [x] Yes  [] No    Plan:   [x] Continue per plan of care [] Alter current plan (see comments)  [] Plan of care initiated [] Hold pending MD visit [] Discharge    See Weekly Progress Note:    [] Yes [] No Next due:        Electronically signed by:  Myla Maurice PTA,   [id number]   5/15/2025, 4:17 PM

## 2025-05-20 ENCOUNTER — HOSPITAL ENCOUNTER (OUTPATIENT)
Dept: PHYSICAL THERAPY | Age: 80
Setting detail: THERAPIES SERIES
Discharge: HOME | End: 2025-05-20
Payer: MEDICARE

## 2025-05-20 PROCEDURE — 97113 AQUATIC THERAPY/EXERCISES: CPT

## 2025-05-20 NOTE — FLOWSHEET NOTE
Therapeutic Activity      [] Neuromuscular Re-education [x] Aquatic   [] Gait             [] Manual  Other:    Time In/ Time Out:   [id number]=40    Timed Code Treatment Minutes:  40 min    Total Treatment Minutes:  3 aquatic    Goal Status:               [] Achieved    [x] Partially Achieved   [] Not Achieved           Patient goals: \"my goal is to work on my balance and strengthen my legs\" PROGRESSING 5/6/25     Long Term Goals  Time Frame for Long Term Goals: 6 weeks   Patinet will demonstrate independence with HEP MET 5/6/25  Patinent will improve 5STS from 27s with bilateral UE use to <25s with least assist possible to demonstrate improved trasnfer ability. MET 5/6/25  Patient will improve DGI from 13/24 to >17/24 to demonstrate improved dynamic gait requried for safe community negotiation. PROGRESSING 5/6/25  Patient will report >50% overall improvement to demo subjective improvement. PROGRESSING 5/6/2      Objective Findings: Patient presents without cane today and leg buckled once in pool.  Pt has atrophied hip musculature making it difficult to discern hip pain /weakness vs back pain.  He reports no numbness but the pain shoots down to his ankle at times when walking.    He has not has an x ray since his last fall and is considering asking for MRI.        Communication with other providers:x    Education to Patient: Review benefits of aquatic therapy. Carry over of posture and core engagement to land.     Adverse Reaction to Treatment: none    Assessment:  session modified to decrease focus on SLS and dyn balance activities due to continued reports of 8-9/10 pain. He reports feeling better in pool and seated but as soon as he puts weight on RLE the pain in thigh starts again.  He had an xray in the fall but he has had several falls since then, none of them within the time frame of pain increasing. He benefits from viscosity properties of the water to elicit reaction strategies. Reports pain as still 9/10 with

## 2025-05-23 ENCOUNTER — HOSPITAL ENCOUNTER (OUTPATIENT)
Dept: PHYSICAL THERAPY | Age: 80
Setting detail: THERAPIES SERIES
Discharge: HOME | End: 2025-05-23
Payer: MEDICARE

## 2025-05-23 PROCEDURE — 97110 THERAPEUTIC EXERCISES: CPT

## 2025-05-23 PROCEDURE — 97112 NEUROMUSCULAR REEDUCATION: CPT

## 2025-05-23 NOTE — FLOWSHEET NOTE
Outpatient Physical Therapy  [place]           [x] Phone: [phone]   Fax: [phone]  [place]           [] Phone: [phone]   Fax: [phone]        Physical Therapy Daily Treatment Note  Date:  2025    Patient Name:  Vaibhav Dong    :  1945  MRN: [id number]  Restrictions/Precautions: No data recorded   Diagnosis:   Radiculopathy, lumbar region [M54.16]  Spinal stenosis, lumbar region with neurogenic claudication [M48.062] Diagnosis: lumbar radiculopathy  Date of Injury/Surgery:   Treatment Diagnosis:  decreased B LE strength, decreased balance, gait deficit  Insurance/Certification information: Medicare  Referring Physician:  Ilan Marroquin MD Molina, Domingo   PCP: Sai Knight MD  Next Doctor Visit:    Plan of care signed (Y/N):  sent day of eval   Outcome Measure: oswestry 5STS, DGI  Visit# / total visits:      bomn  Pain level:     see subjective/10    Goals:     Patient goals: \"my goal is to work on my balance and strengthen my legs\" PROGRESSING 25     Long Term Goals  Time Frame for Long Term Goals: 6 weeks   Patinet will demonstrate independence with HEP MET 25  Patinent will improve 5STS from 27s with bilateral UE use to <25s with least assist possible to demonstrate improved trasnfer ability. MET 25  Patient will improve DGI from 13 to >17/24 to demonstrate improved dynamic gait requried for safe community negotiation. PROGRESSING 25  Patient will report >50% overall improvement to demo subjective improvement. PROGRESSING 25       Summary of Evaluation:  Patient is a 79 year old male who presents to physical therapy with reports of mild low back, right hip, and leg LE pain. He reports recent injections in low back and right hip, which seemed to have helped manage pain some. He reports two recent falls this past month. He reports his balance has worsened over the past month as well. He is also wanting to trial aquatic therapy. Upon

## 2025-05-27 ENCOUNTER — HOSPITAL ENCOUNTER (OUTPATIENT)
Dept: PHYSICAL THERAPY | Age: 80
Setting detail: THERAPIES SERIES
Discharge: HOME | End: 2025-05-27
Payer: MEDICARE

## 2025-05-27 PROCEDURE — 97113 AQUATIC THERAPY/EXERCISES: CPT

## 2025-05-27 NOTE — FLOWSHEET NOTE
Physical Therapy Aquatic Flow Sheet   Date:  25    Patient Name:  Vaibhav Dong                        :  1945                     MRN: [id number]  Restrictions/Precautions: No data recorded   Diagnosis:   Radiculopathy, lumbar region [M54.16]  Spinal stenosis, lumbar region with neurogenic claudication [M48.062] Diagnosis: lumbar radiculopathy  Date of Injury/Surgery:   Treatment Diagnosis:  decreased B LE strength, decreased balance, gait deficit  Insurance/Certification information: Medicare  Referring Physician:  Ilna Marroquin MD Molina, Domingo   PCP: Sai Knight MD  Next Doctor Visit:    Plan of care signed (Y/N):  sent day of eval   Outcome Measure: oswestry 5STS, DGI    Assessment:   25  Today is a progress check for Vaibhav. Vaibhav returns to therapy after nearly a one month break while working with his doctors to better manage his medication side effects of tremors. He reports his tremors have greatly decreased with adjusting his medications. He does continue to relate R sided low back pain radiating into his right thigh, but relates this has been improving with the aquatic therapy. He also relates balance is still a struggle, but denies any recent falls. He is currently ambulating without AD at this time, but does continue to demonstrate gait deficits (small base of support, decreased step length/LE clearance, decreased rene), and is moderate fall risk per DGI testing this date. Global LE strength is slowly improving at this time. Discussed returning to land therapy for formal balance training, as he does demonstrate moderate deficits in VSR per GANSOP testing. Vaibhav would like to continue with 3-4 weeks of aquatic therapy to  manage his LBP and return to land therapy if he continues to demonstrate balance deficits/high fall risk at time of  next progress check. He would benefit from ongoing skilled physical therapy to address deficits, return to PLOF, and reduce risk for further

## 2025-05-29 ENCOUNTER — HOSPITAL ENCOUNTER (OUTPATIENT)
Dept: PHYSICAL THERAPY | Age: 80
Setting detail: THERAPIES SERIES
Discharge: HOME | End: 2025-05-29
Payer: MEDICARE

## 2025-05-29 PROCEDURE — 97140 MANUAL THERAPY 1/> REGIONS: CPT

## 2025-05-29 PROCEDURE — 97110 THERAPEUTIC EXERCISES: CPT

## 2025-05-29 NOTE — FLOWSHEET NOTE
Outpatient Physical Therapy  [place]           [x] Phone: [phone]   Fax: [phone]  [place]           [] Phone: [phone]   Fax: [phone]        Physical Therapy Daily Treatment Note  Date:  2025    Patient Name:  Vaibhav Dong    :  1945  MRN: [id number]  Restrictions/Precautions: No data recorded   Diagnosis:   Radiculopathy, lumbar region [M54.16]  Spinal stenosis, lumbar region with neurogenic claudication [M48.062] Diagnosis: lumbar radiculopathy  Date of Injury/Surgery:   Treatment Diagnosis:  decreased B LE strength, decreased balance, gait deficit  Insurance/Certification information: Medicare  Referring Physician:  Ilan Marroquin MD Molina, Domingo   PCP: Sai Knight MD  Next Doctor Visit:    Plan of care signed (Y/N):  sent day of eval   Outcome Measure: oswestry 5STS, DGI  Visit# / total visits:      bomn  Pain level:     see subjective/10    Goals:     Patient goals: \"my goal is to work on my balance and strengthen my legs\" PROGRESSING 25     Long Term Goals  Time Frame for Long Term Goals: 6 weeks   Patinet will demonstrate independence with HEP MET 25  Patinent will improve 5STS from 27s with bilateral UE use to <25s with least assist possible to demonstrate improved trasnfer ability. MET 25  Patient will improve DGI from 13 to >17/24 to demonstrate improved dynamic gait requried for safe community negotiation. PROGRESSING 25  Patient will report >50% overall improvement to demo subjective improvement. PROGRESSING 25       Summary of Evaluation:  Patient is a 79 year old male who presents to physical therapy with reports of mild low back, right hip, and leg LE pain. He reports recent injections in low back and right hip, which seemed to have helped manage pain some. He reports two recent falls this past month. He reports his balance has worsened over the past month as well. He is also wanting to trial aquatic therapy. Upon

## 2025-06-03 ENCOUNTER — HOSPITAL ENCOUNTER (OUTPATIENT)
Dept: PHYSICAL THERAPY | Age: 80
Setting detail: THERAPIES SERIES
Discharge: HOME | End: 2025-06-03
Payer: MEDICARE

## 2025-06-03 PROCEDURE — 97113 AQUATIC THERAPY/EXERCISES: CPT

## 2025-06-03 NOTE — FLOWSHEET NOTE
Physical Therapy Aquatic Flow Sheet   Date:  6-3-25    Patient Name:  Vaibhav Dong                        :  1945                     MRN: [id number]  Restrictions/Precautions: No data recorded   Diagnosis:   Radiculopathy, lumbar region [M54.16]  Spinal stenosis, lumbar region with neurogenic claudication [M48.062] Diagnosis: lumbar radiculopathy  Date of Injury/Surgery:   Treatment Diagnosis:  decreased B LE strength, decreased balance, gait deficit  Insurance/Certification information: Medicare  Referring Physician:  Ilan Marroquin MD Molina, Domingo   PCP: Sai Knight MD  Next Doctor Visit:    Plan of care signed (Y/N):  sent day of eval   Outcome Measure: oswestry 5STS, DGI    Assessment:   25  Today is a progress check for Vaibhav. Vaibhav returns to therapy after nearly a one month break while working with his doctors to better manage his medication side effects of tremors. He reports his tremors have greatly decreased with adjusting his medications. He does continue to relate R sided low back pain radiating into his right thigh, but relates this has been improving with the aquatic therapy. He also relates balance is still a struggle, but denies any recent falls. He is currently ambulating without AD at this time, but does continue to demonstrate gait deficits (small base of support, decreased step length/LE clearance, decreased rene), and is moderate fall risk per DGI testing this date. Global LE strength is slowly improving at this time. Discussed returning to land therapy for formal balance training, as he does demonstrate moderate deficits in VSR per GANSOP testing. Vaibhav would like to continue with 3-4 weeks of aquatic therapy to  manage his LBP and return to land therapy if he continues to demonstrate balance deficits/high fall risk at time of  next progress check. He would benefit from ongoing skilled physical therapy to address deficits, return to PLOF, and reduce risk for further

## 2025-06-09 ENCOUNTER — HOSPITAL ENCOUNTER (OUTPATIENT)
Dept: PHYSICAL THERAPY | Age: 80
Setting detail: THERAPIES SERIES
Discharge: HOME | End: 2025-06-09
Payer: MEDICARE

## 2025-06-09 PROCEDURE — 97140 MANUAL THERAPY 1/> REGIONS: CPT

## 2025-06-09 PROCEDURE — 97530 THERAPEUTIC ACTIVITIES: CPT

## 2025-06-09 PROCEDURE — 97112 NEUROMUSCULAR REEDUCATION: CPT

## 2025-06-09 NOTE — FLOWSHEET NOTE
Outpatient Physical Therapy  [place]           [x] Phone: [phone]   Fax: [phone]  [place]           [] Phone: [phone]   Fax: [phone]        Physical Therapy Daily Treatment Note  Date:  2025    Patient Name:  Vaibhav Dong    :  1945  MRN: [id number]  Restrictions/Precautions: No data recorded   Diagnosis:   Radiculopathy, lumbar region [M54.16]  Spinal stenosis, lumbar region with neurogenic claudication [M48.062] Diagnosis: lumbar radiculopathy  Date of Injury/Surgery:   Treatment Diagnosis:  decreased B LE strength, decreased balance, gait deficit  Insurance/Certification information: Medicare  Referring Physician:  Ilan Marroquin MD Molina, Domingo   PCP: Sai Knight MD  Next Doctor Visit:  25   Plan of care signed (Y/N):  sent day of eval   Outcome Measure: oswestry 5STS, DGI  Visit# / total visits:      bomn  Pain level:     see subjective/10    Goals:     Patient goals: \"my goal is to work on my balance and strengthen my legs\" PROGRESSING 25     Long Term Goals  Time Frame for Long Term Goals: 6 weeks   Patinet will demonstrate independence with HEP MET  25  Patinent will improve 5STS from 27s with bilateral UE use to <25s with least assist possible to demonstrate improved trasnfer ability. MET  25  Patient will improve DGI from 13 to >1724 to demonstrate improved dynamic gait requried for safe community negotiation. PROGRESSING  25  Patient will report >50% overall improvement to demo subjective improvement. PROGRESSING  25       Summary of Evaluation:  Patient is a 79 year old male who presents to physical therapy with reports of mild low back, right hip, and leg LE pain. He reports recent injections in low back and right hip, which seemed to have helped manage pain some. He reports two recent falls this past month. He reports his balance has worsened over the past month as well. He is also wanting to trial aquatic therapy.

## 2025-06-09 NOTE — PROGRESS NOTES
Outpatient Physical Therapy           [place]           [x] Phone: [phone]   Fax: [phone]  [place]           [] Phone: [phone]   Fax: [phone]      To: Ilan Marroquin MD     From: Zahira Campbell, PT, DPT     Patient: Vaibhav Dong                    : 1945  Diagnosis:  No admission diagnoses are documented for this encounter.        Treatment Diagnosis:   decreased B LE strength, decreased balance, gait deficit      Date: 2025  [x]  Progress Note                []  Discharge Note    Evaluation Date:  25   Total Visits to date:   21 Cancels/No-shows to date:  2    Subjective:     \" I was out for nearly 9 hours on Saturday and it hurt real bad. I can feel it when I drive too now. It is radiating down to my shin bone as well. It feels like a lot of it is my lower leg now rather than my thigh. The water therapy helps when I am in the pool and when I get out. But when I was walking to the car it was really hurting still again. The shot did not  help either that I got. He thinks it is the SI joint.   \"      Low back pain:  7-9 low back and right anterior leg     Plan of Care/Treatment to date:  [x] Therapeutic Exercise    [x] Modalities:  [x] Therapeutic Activity     [] Ultrasound  [] Electrical Stimulation  [x] Gait Training      [] Cervical Traction   [] Lumbar Traction  [x] Neuromuscular Re-education  [x] Cold/hotpack [] Iontophoresis  [x] Instruction in HEP      Other:  [x] Manual Therapy       []  Vasopneumatic  [x] Aquatic Therapy       []   Dry Needle Therapy                      Objective/Significant Findings At Last Visit/Comments:      -hypertonicity with R piriformis and hip flexors   -WFL pelvic alignment  5STS: 26s (no UE!); UE use trial for all 5: 16s  DGI:     Mod increased postural sway on foam in all stances, min/mod assist in semi tandem RLE posterior , less RLE stance time than LLE during march      Oswestry:      LE MMT  Hip Flx: B 4+/5  Hip ABD:

## 2025-06-10 ENCOUNTER — HOSPITAL ENCOUNTER (OUTPATIENT)
Dept: PHYSICAL THERAPY | Age: 80
Setting detail: THERAPIES SERIES
Discharge: HOME | End: 2025-06-10
Payer: MEDICARE

## 2025-06-10 PROCEDURE — 97113 AQUATIC THERAPY/EXERCISES: CPT

## 2025-06-10 NOTE — FLOWSHEET NOTE
Patient goals: \"my goal is to work on my balance and strengthen my legs\" PROGRESSING 6/9/25     Long Term Goals  Time Frame for Long Term Goals: 6 weeks   Patinet will demonstrate independence with HEP MET  6/9/25  Patinent will improve 5STS from 27s with bilateral UE use to <25s with least assist possible to demonstrate improved trasnfer ability. MET  6/9/25  Patient will improve DGI from 13/24 to >17/24 to demonstrate improved dynamic gait requried for safe community negotiation. PROGRESSING  6/9/25  Patient will report >50% overall improvement to demo subjective improvement. PROGRESSING  6/9/25      Objective Findings: pt has continued episodes of RLE buckling but self recovers with buoyancy assist of water.       Communication with other providers:x    Education to Patient: Review benefits of aquatic therapy. Carry over of posture and core engagement to land.     Adverse Reaction to Treatment: pain and leg catching or buckling.     Assessment:  Session was modified to tolerance of standing and resistance work depending on pain reports and when leg catches or noah. He benefits from viscosity properties of the water to elicit reaction strategies. Reports pain as better in the pool and decreased to 4/10 at end of session and still feels aquatic is helping.       Treatment/Activity Tolerance:  [x] Patient tolerated treatment well [] Patient limited by fatique  [] Patient limited by pain [] Patient limited by other medical complications  [] Other:     Prognosis: [x] Good [] Fair  [] Poor    Patient Requires Follow-up: [x] Yes  [] No    Plan:   [x] Continue per plan of care [] Alter current plan (see comments)  [] Plan of care initiated [] Hold pending MD visit [] Discharge    See Weekly Progress Note:    [] Yes [] No Next due:        Electronically signed by:  Myla Maurice PTA,   [id number]   6/10/2025, 4:56 PM

## 2025-06-12 ENCOUNTER — HOSPITAL ENCOUNTER (OUTPATIENT)
Dept: PHYSICAL THERAPY | Age: 80
Setting detail: THERAPIES SERIES
Discharge: HOME | End: 2025-06-12
Payer: MEDICARE

## 2025-06-12 PROCEDURE — 97113 AQUATIC THERAPY/EXERCISES: CPT

## 2025-06-17 ENCOUNTER — HOSPITAL ENCOUNTER (OUTPATIENT)
Dept: PHYSICAL THERAPY | Age: 80
Setting detail: THERAPIES SERIES
Discharge: HOME | End: 2025-06-17
Payer: MEDICARE

## 2025-06-17 PROCEDURE — 97113 AQUATIC THERAPY/EXERCISES: CPT

## 2025-06-17 NOTE — FLOWSHEET NOTE
Physical Therapy Aquatic Flow Sheet   Date:  25    Patient Name:  Vaibhav Dong                        :  1945                     MRN: [id number]  Restrictions/Precautions: No data recorded   Diagnosis:   Radiculopathy, lumbar region [M54.16]  Spinal stenosis, lumbar region with neurogenic claudication [M48.062] Diagnosis: lumbar radiculopathy  Date of Injury/Surgery:   Treatment Diagnosis:  decreased B LE strength, decreased balance, gait deficit  Insurance/Certification information: Medicare  Referring Physician:  Ilan Marroquin MD Molina, Domingo   PCP: Sai Knight MD  Next Doctor Visit:    Plan of care signed (Y/N):  sent day of eval   Outcome Measure: oswestry 5STS, DGI    Assessment:   25 updated poc  Today is another progress check for Vaibhav. Vaibhav reports continues lower back and right hip discomfort. He is currently ambulating with SPC at this time, and demonstrates gait deficits (small base of support, decreased step length/LE clearance, decreased rene), and is moderate fall risk per DGI testing this date. Additionally, he demonstrates hypertonicity along R piriformis with mod TTP. There is moderate hypertonicity along R>L hip flexors as well. Plan to continue with aquatic therapy for a couple more weeks as Vaibhav reports relief post aquatic sessions. Future sessions will focus on regaining strength and stability to allow Vaibhav to navigate safely with better managed symptoms. He would benefit from ongoing skilled physical therapy to address deficits, return to PLOF, and reduce risk for further functional decline/fall risk.    Visit# / total visits:   24    Pain level:   6/10  front of right thigh, mid way down leg. It's usually an 8-9/10 when I get in the morning, I have a hard time just walking to the bathroom but it wasn't to bad this morning.      Electronically signed by:  Myla Maurice, VELASQUEZ, [id number]    Subjective: Patient states he has been using heat like Zahira said to and the

## 2025-06-24 ENCOUNTER — HOSPITAL ENCOUNTER (OUTPATIENT)
Dept: PHYSICAL THERAPY | Age: 80
Setting detail: THERAPIES SERIES
Discharge: HOME | End: 2025-06-24
Payer: MEDICARE

## 2025-06-24 PROCEDURE — 97113 AQUATIC THERAPY/EXERCISES: CPT

## 2025-06-24 NOTE — FLOWSHEET NOTE
Physical Therapy Aquatic Flow Sheet   Date:      Patient Name:  Vaibhav Dong                        :  1945                     MRN: [id number]  Restrictions/Precautions: No data recorded   Diagnosis:   Radiculopathy, lumbar region [M54.16]  Spinal stenosis, lumbar region with neurogenic claudication [M48.062] Diagnosis: lumbar radiculopathy  Date of Injury/Surgery:   Treatment Diagnosis:  decreased B LE strength, decreased balance, gait deficit  Insurance/Certification information: Medicare  Referring Physician:  Ilan Marroquin MD Molina, Domingo   PCP: Sai Knight MD  Next Doctor Visit:    Plan of care signed (Y/N):  sent day of eval   Outcome Measure: oswestry 5STS, DGI    Assessment:   25 updated poc  Today is another progress check for Vaibhav. Vaibhav reports continues lower back and right hip discomfort. He is currently ambulating with SPC at this time, and demonstrates gait deficits (small base of support, decreased step length/LE clearance, decreased rene), and is moderate fall risk per DGI testing this date. Additionally, he demonstrates hypertonicity along R piriformis with mod TTP. There is moderate hypertonicity along R>L hip flexors as well. Plan to continue with aquatic therapy for a couple more weeks as Vaibhav reports relief post aquatic sessions. Future sessions will focus on regaining strength and stability to allow Vaibhav to navigate safely with better managed symptoms. He would benefit from ongoing skilled physical therapy to address deficits, return to PLOF, and reduce risk for further functional decline/fall risk.    Visit# / total visits:   25    Pain level:   6/10  front of right thigh, mid way down leg.       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  \" I still think pool is helping but I can't get it to stay long\"  last week it was hurting by the time I walked out to the car.

## 2025-06-26 ENCOUNTER — HOSPITAL ENCOUNTER (OUTPATIENT)
Dept: PHYSICAL THERAPY | Age: 80
Setting detail: THERAPIES SERIES
Discharge: HOME | End: 2025-06-26
Payer: MEDICARE

## 2025-06-26 PROCEDURE — 97113 AQUATIC THERAPY/EXERCISES: CPT

## 2025-06-26 NOTE — FLOWSHEET NOTE
Physical Therapy Aquatic Flow Sheet   Date:      Patient Name:  Vaibhav Dong                        :  1945                     MRN: [id number]  Restrictions/Precautions: No data recorded   Diagnosis:   Radiculopathy, lumbar region [M54.16]  Spinal stenosis, lumbar region with neurogenic claudication [M48.062] Diagnosis: lumbar radiculopathy  Date of Injury/Surgery:   Treatment Diagnosis:  decreased B LE strength, decreased balance, gait deficit  Insurance/Certification information: Medicare  Referring Physician:  Ilan Marroquin MD Molina, Domingo   PCP: Sai Knight MD  Next Doctor Visit:    Plan of care signed (Y/N):  sent day of eval   Outcome Measure: oswestry 5STS, DGI    Assessment:   25 updated poc  Today is another progress check for Vaibhav. Vaibhav reports continues lower back and right hip discomfort. He is currently ambulating with SPC at this time, and demonstrates gait deficits (small base of support, decreased step length/LE clearance, decreased rene), and is moderate fall risk per DGI testing this date. Additionally, he demonstrates hypertonicity along R piriformis with mod TTP. There is moderate hypertonicity along R>L hip flexors as well. Plan to continue with aquatic therapy for a couple more weeks as Vaibhav reports relief post aquatic sessions. Future sessions will focus on regaining strength and stability to allow Vaibhav to navigate safely with better managed symptoms. He would benefit from ongoing skilled physical therapy to address deficits, return to PLOF, and reduce risk for further functional decline/fall risk.    Visit# / total visits:   26    Pain level:   6/10  front of right thigh, mid way down leg.       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  \" I still think pool is helping but I can't get it to stay long\"  last week it was hurting by the time I walked out to the car.

## 2025-07-01 ENCOUNTER — HOSPITAL ENCOUNTER (OUTPATIENT)
Dept: PHYSICAL THERAPY | Age: 80
Setting detail: THERAPIES SERIES
Discharge: HOME | End: 2025-07-01
Payer: MEDICARE

## 2025-07-01 PROCEDURE — 97113 AQUATIC THERAPY/EXERCISES: CPT

## 2025-07-01 NOTE — FLOWSHEET NOTE
53/53=4      Total Treatment Minutes:  4 aquatic    Goal Status:               [] Achieved    [x] Partially Achieved   [] Not Achieved           Patient goals: \"my goal is to work on my balance and strengthen my legs\" PROGRESSING 6/9/25     Long Term Goals  Time Frame for Long Term Goals: 6 weeks   Patinet will demonstrate independence with HEP MET  6/9/25  Patinent will improve 5STS from 27s with bilateral UE use to <25s with least assist possible to demonstrate improved trasnfer ability. MET  6/9/25  Patient will improve DGI from 13/24 to >17/24 to demonstrate improved dynamic gait requried for safe community negotiation. PROGRESSING  6/9/25  Patient will report >50% overall improvement to demo subjective improvement. PROGRESSING  6/9/25      Objective Findings: significant struggle with balance in modified bird dog indicating core weakness.    Communication with other providers:x    Education to Patient:  have wife try leg pull at home.    Adverse Reaction to Treatment:  none    Assessment:  Vaibhav is limited by hip and leg pain but he able to accomplish aquatic therapy with a steady progression and gets temporary relief.  He benefits from viscosity properties of the water to elicit reaction strategies. Reports pain as better in the pool and decreased to \" better\"  /10 at end of session and still feels aquatic is helping. He could benefit from continued skilled therapy but he is approaching his medicare KX modifier.  We discussed possibly getting results from ortho before continuing more therapy to determine if pain is back or hip.  He agrees and hope to continue on his own but admits it might be to difficult.        Treatment/Activity Tolerance:  [x] Patient tolerated treatment well [] Patient limited by fatique  [] Patient limited by pain [] Patient limited by other medical complications  [] Other:     Prognosis: [x] Good [] Fair  [] Poor    Patient Requires Follow-up: [x] Yes  [] No    Plan:   [x] Continue per

## 2025-07-07 ENCOUNTER — HOSPITAL ENCOUNTER (OUTPATIENT)
Dept: PHYSICAL THERAPY | Age: 80
Setting detail: THERAPIES SERIES
Discharge: HOME | End: 2025-07-07
Payer: MEDICARE

## 2025-07-07 PROCEDURE — 97112 NEUROMUSCULAR REEDUCATION: CPT

## 2025-07-07 PROCEDURE — 97530 THERAPEUTIC ACTIVITIES: CPT

## 2025-07-07 NOTE — DISCHARGE SUMMARY
Outpatient Physical Therapy           [place]           [x] Phone: [phone]   Fax: [phone]  [place]           [] Phone: [phone]   Fax: [phone]      To: Ilan Marroquin MD     From: Zahira Campbell, PT, DPT     Patient: Vaibhav Dong                    : 1945  Diagnosis:  No admission diagnoses are documented for this encounter.        Treatment Diagnosis:    decreased B LE strength, decreased balance, gait deficit    Date: 2025  []  Progress Note                [x]  Discharge Note    Evaluation Date:   25               Total Visits to date:   28 Cancels/No-shows to date:  0    Subjective:     \" I got another shot last  but I am alsready feeling better. I can sometimes feel it running down my righ tleg still. I go back at the end of July and if I still have hip pain she will put a shot in my hip instead.  It has really changed my attitude with how I am doing. I do still stumble and have to get my balance, especially when I turn .  \"      Low back pain:  3/10 low back and right anterior leg     Plan of Care/Treatment to date:  [x] Therapeutic Exercise    [x] Modalities:  [x] Therapeutic Activity     [] Ultrasound  [] Electrical Stimulation  [x] Gait Training      [] Cervical Traction   [] Lumbar Traction  [x] Neuromuscular Re-education  [x] Cold/hotpack [] Iontophoresis  [x] Instruction in HEP      Other:  [x] Manual Therapy       []  Vasopneumatic  [x] Aquatic Therapy       []   Dry Needle Therapy                      Objective/Significant Findings At Last Visit/Comments:       5STS: 20s (no UE!); UE use trial for all 5: 15s   DGI:   , no AD today , CGA for all tasks   Reciprocal pattern stair negotiation with B handrail assist   Mod increased postural sway on foam in all stances, min/mod assist in semi tandem RLE posterior , less RLE stance time than LLE during march especially with eyes closed      Oswestry:       LE MMT  Hip Flx: B 4+/5  Hip ABD: B

## 2025-07-07 NOTE — FLOWSHEET NOTE
aquatic therapy. Upon assessment, he demonstrates decreased global LE weakness. 5STS reveal difficulties with transfers. DGI reveals moderate fall risk at this time as well. Discussed with patient trialing half aquatic/half land therapy to address strength and balance/gait deficits. He would benefit from ongoing skilled physical therapy to address deficits, return to PLOF, and reduce risk for further functional decline/fall risk.            Subjective:      \" I got another shot last teusday but I am alsready feeling better. I can sometimes feel it running down my righ tleg still. I go back at the end of July and if I still have hip pain she will put a shot in my hip instead.  It has really changed my attitude with how I am doing. I do still stumble and have to get my balance, especially when I turn .  \"     Low back pain:  3/10 low back and right anterior leg        Any changes in Ambulatory Summary Sheet?  None      Objective:       7/7     5STS: 20s (no UE!); UE use trial for all 5: 15s     DGI: 20/24  , no AD today , CGA for all tasks   Reciprocal pattern stair negotiation with B handrail assist     Mod increased postural sway on foam in all stances, min/mod assist in semi tandem RLE posterior , less RLE stance time than LLE during march especially with eyes closed     Oswestry:  12 /50     LE MMT  Hip Flx: B 4+/5  Hip ABD: B 4+/5  Hip ADD: B 5/5  Knee flx: R 5/5 L 5/5  Knee ext:  R 4+/5 L 5/5  Ankle DF: R 5/5, L 5/5  Ankle PF: R 5/5 L 5/5      Exercises: (No more than 4 columns)   Exercise/Equipment 5/23/25 5/29/25 #19 6/9/25 #21 7/7 #27            WARM UP          Nustep  L5 6' L5 6' --           TABLE   DGI  DGI    *STS   5STS 5STS   *seated clamshell        Seated piriformis stretch  Manual  Manual  -   Add squeeze       LAQ                                      STANDING       *standing march  X10 ea  X10 ea      *heel/toe raise  x20      FW walking with horizontal head turns  1 lap ea 2x10ft 2x10ft    FW  131

## (undated) DEVICE — AGENT HEMSTAT 8ML FLX TIP MTRX + DISP SURGIFLO

## (undated) DEVICE — INTENDED FOR TISSUE SEPARATION, AND OTHER PROCEDURES THAT REQUIRE A SHARP SURGICAL BLADE TO PUNCTURE OR CUT.: Brand: BARD-PARKER ® STAINLESS STEEL BLADES

## (undated) DEVICE — KIT JACK TBL PT CARE

## (undated) DEVICE — GLOVE ORANGE PI 7   MSG9070

## (undated) DEVICE — SYRINGE IRRIG 60ML SFT PLIABLE BLB EZ TO GRP 1 HND USE W/

## (undated) DEVICE — DRAPE,ABDOMINAL,MAJOR,STERILE: Brand: MEDLINE

## (undated) DEVICE — CUSTOM PACK: Brand: UNBRANDED

## (undated) DEVICE — INTEGRA® JARIT® TIP ONLY FOR # 323-930: Brand: INTEGRA® JARIT®

## (undated) DEVICE — SUTURE VCRL SZ 2-0 L18IN ABSRB UD CT-1 L36MM 1/2 CIR J839D

## (undated) DEVICE — CARBIDE MATCH HEAD

## (undated) DEVICE — SSC BONE WAX: Brand: SSC BONE WAX

## (undated) DEVICE — CORD,CAUTERY,BIPOLAR,STERILE: Brand: MEDLINE

## (undated) DEVICE — GOWN,SIRUS,POLYRNF,BRTHSLV,XLN/XL,20/CS: Brand: MEDLINE

## (undated) DEVICE — 11CM IQ APEX KNIFE: Brand: SONOPET IQ

## (undated) DEVICE — SPONGE,NEURO,0.5"X0.5",XR,STRL,10/PK: Brand: MEDLINE

## (undated) DEVICE — BLADE SCALPEL STERILE NO 10

## (undated) DEVICE — C-ARMOR C-ARM EQUIPMENT COVERS CLEAR STERILE UNIVERSAL FIT 12 PER CASE: Brand: C-ARMOR

## (undated) DEVICE — DRESSING TRNSPAR W2XL2.75IN FLM SHT SEMIPERMEABLE WIND

## (undated) DEVICE — TOWEL,OR,DSP,ST,BLUE,STD,6/PK,12PK/CS: Brand: MEDLINE

## (undated) DEVICE — ADHESIVE LIQ 2OZ ADJUNCT FOR DSG MASTISOL

## (undated) DEVICE — TELFA NON-ADHERENT ABSORBENT DRESSING: Brand: TELFA

## (undated) DEVICE — ELECTRODE ES L4IN PTFE INSUL BLDE W/ SFTY SL DISP EDGE

## (undated) DEVICE — DISSECTOR LAP DIA5MM BLNT TIP ENDOPATH

## (undated) DEVICE — GLOVE SURG SZ 8 L12IN THK75MIL DK GRN LTX FREE

## (undated) DEVICE — SUTURE VCRL SZ 3-0 L18IN ABSRB VLT L26MM SH 1/2 CIR J774D

## (undated) DEVICE — BLADE SURG NO 11 CONVENTIONAL STD UNPROTECTED W/O HNDL S

## (undated) DEVICE — SPINAL NDL 22GA

## (undated) DEVICE — Device

## (undated) DEVICE — SUTURE ABSORBABLE BRAIDED 0 CT-1 8X27 IN UD VICRYL JJ41G

## (undated) DEVICE — SUTURE VCRL SZ 0 L18IN ABSRB UD L36MM CT-1 1/2 CIR J840D

## (undated) DEVICE — SUTURE STRATAFIX SYMMETRIC SZ 1 L18IN ABSRB VLT CT1 L36CM SXPP1A404

## (undated) DEVICE — 6619 2 PTNT ISO SYS INCISE AREA&LT;(&GT;&&LT;)&GT;P: Brand: STERI-DRAPE™ IOBAN™ 2

## (undated) DEVICE — GAUZE,SPONGE,4"X4",16PLY,XRAY,STRL,LF: Brand: MEDLINE

## (undated) DEVICE — GLOVE SURG SZ 85 L1185IN FNGR THK75MIL STRW LTX POLYMER

## (undated) DEVICE — CHLORAPREP 26ML ORANGE

## (undated) DEVICE — STERILE-Z STAND AND BACK TABLE COVER 66IN X 95IN: Brand: STERILE-Z

## (undated) DEVICE — 3M™ IOBAN™ 2 ANTIMICROBIAL INCISE DRAPE 6648EZ: Brand: IOBAN™ 2

## (undated) DEVICE — DRAPE C-ARM 267CM X 152CM

## (undated) DEVICE — BIPOLAR IRRIGATOR INTEGRATED TUBING AND BIPOLAR CORD SET, DISPOSABLE

## (undated) DEVICE — PACK,BASIC,SIRUS,V: Brand: MEDLINE

## (undated) DEVICE — SPONGE GZ W4XL8IN COT WVN 12 PLY

## (undated) DEVICE — SUTURE PROL SZ 2-0 L30IN NONABSORBABLE BLU L26MM SH 1/2 CIR 8833H

## (undated) DEVICE — PACKING 8004000 NEURAY 200PK 13X13MM: Brand: NEURAY ®

## (undated) DEVICE — DRAPE,U/ SHT,SPLIT,PLAS,STERIL: Brand: MEDLINE

## (undated) DEVICE — SOLUTION IV IRRIG 1000ML POUR BTL 2F7114

## (undated) DEVICE — GLOVE ORANGE PI 8   MSG9080

## (undated) DEVICE — 3M™ IOBAN™ 2 ANTIMICROBIAL INCISE DRAPE 6650EZ: Brand: IOBAN™ 2

## (undated) DEVICE — IRRIGATION SUCTION CASSETTE: Brand: SONOPET IQ

## (undated) DEVICE — APPLICATOR MEDICATED 26 CC SOLUTION HI LT ORNG CHLORAPREP

## (undated) DEVICE — DRESSING WND ISLAND STD 4X10 IN MULT LAYR STRL SILVERLON

## (undated) DEVICE — INSERT CUSH STD PRONEVIEW

## (undated) DEVICE — ISLAND DRESSING 4IN X 10IN: Brand: SILVERLON ANTIMICROBIAL WOUND DRESSING

## (undated) DEVICE — PENCIL ES CRD L10FT HND SWCHING ROCK SWCH W/ EDGE COAT BLDE

## (undated) DEVICE — DRAPE,UTILITY,XL,4/PK,STERILE: Brand: MEDLINE

## (undated) DEVICE — GLOVE SURG SZ 75 CRM LTX FREE POLYISOPRENE POLYMER BEAD ANTI

## (undated) DEVICE — ALCOHOL 70% RUBBING 16OZ

## (undated) DEVICE — ELECTRODE ES L2.75IN S STL INSUL BLDE W/ SL EDGE

## (undated) DEVICE — MARKER SKIN 2 TIP UTIL W/ RULER REG LF

## (undated) DEVICE — SUTURE MCRYL SZ 3-0 L27IN ABSRB UD L24MM PS-1 3/8 CIR PRIM Y936H

## (undated) DEVICE — Z DISCONTINUED USE 2272117 DRAPE SURG 3 QTR N INVASIVE 2 LAYR DISP

## (undated) DEVICE — NEURO SPONGES: Brand: DEROYAL

## (undated) DEVICE — AEGIS 1" DISK 4MM HOLE, PEEL OPEN: Brand: MEDLINE

## (undated) DEVICE — TOTAL TRAY, DB, 100% SILI FOLEY, 16FR 10: Brand: MEDLINE

## (undated) DEVICE — CONTAINER,SPECIMEN,OR STERILE,4OZ: Brand: MEDLINE

## (undated) DEVICE — SYSTEM SKIN CLSR 22CM DERMBND PRINEO

## (undated) DEVICE — YANKAUER,FLEXIBLE HANDLE,REGLR CAPACITY: Brand: MEDLINE INDUSTRIES, INC.

## (undated) DEVICE — SUTURE SZ 0 27IN 5/8 CIR UR-6  TAPER PT VIOLET ABSRB VICRYL J603H

## (undated) DEVICE — ELECTRODE ES AD CRDLSS PT RET REM POLYHESIVE

## (undated) DEVICE — APPLICATOR PREP 26ML 0.7% IOD POVACRYLEX 74% ISO ALC ST

## (undated) DEVICE — SUTURE VCRL SZ 0 L18IN ABSRB VLT L36MM CT-1 1/2 CIR J740D

## (undated) DEVICE — NEEDLE SPNL 20GA L3.5IN YEL HUB S STL REG WALL FIT STYL W/

## (undated) DEVICE — GLOVE ORANGE PI 7 1/2   MSG9075

## (undated) DEVICE — KNIFE SURG L134MM SHT BAYNT

## (undated) DEVICE — DRAPE SHEET ULTRAGARD: Brand: MEDLINE

## (undated) DEVICE — UNDERGLOVE SURG SZ 8.5 FNGR THK0.21MIL GRN LTX BEAD CUF

## (undated) DEVICE — COVER,MAYO STAND,STERILE: Brand: MEDLINE

## (undated) DEVICE — ADHESIVE SKIN CLSR 0.7ML TOP DERMBND ADV

## (undated) DEVICE — COVER,TABLE,44X90,STERILE: Brand: MEDLINE

## (undated) DEVICE — 3M™ STERI-DRAPE™ INSTRUMENT POUCH 1018: Brand: STERI-DRAPE™

## (undated) DEVICE — KIT SPNL ACC MAXCESS IV

## (undated) DEVICE — 1010 S-DRAPE TOWEL DRAPE 10/BX: Brand: STERI-DRAPE™

## (undated) DEVICE — SPONGE LAP W18XL18IN WHT COT 4 PLY FLD STRUNG RADPQ DISP ST

## (undated) DEVICE — AGENT HEMOSTATIC SURGIFLOW MATRIX KIT W/THROMBIN

## (undated) DEVICE — SUTURE MCRYL SZ 4-0 L18IN ABSRB UD L19MM PS-2 3/8 CIR PRIM Y496G

## (undated) DEVICE — 10CM IQ BROAD KNIFE: Brand: SONOPET IQ

## (undated) DEVICE — SYRINGE MED 30ML STD CLR PLAS LUERLOCK TIP N CTRL DISP

## (undated) DEVICE — TUBING, SUCTION, 9/32" X 10', STRAIGHT: Brand: MEDLINE

## (undated) DEVICE — ELECTRODE BLDE L6.5IN CAUT EXT DISP

## (undated) DEVICE — DRESSING TRNSPAR W5XL4.5IN FLM SHT SEMIPERMEABLE WIND

## (undated) DEVICE — PACKING 8004007 NEURAY 200PK 13X76MM: Brand: NEURAY ®

## (undated) DEVICE — STERILE LATEX POWDER-FREE SURGICAL GLOVESWITH NITRILE COATING: Brand: PROTEXIS

## (undated) DEVICE — COUNTER NDL 30 COUNT FOAM STRP SGL MAG

## (undated) DEVICE — KIT DIL PRB K WIRE DISP FOR EXTRM LUM INTBDY FUS

## (undated) DEVICE — TRAY CATH URINE METER 16 FR FOLEY

## (undated) DEVICE — DRAPE,LAP,CHOLE,W/TROUGHS,STERILE: Brand: MEDLINE

## (undated) DEVICE — SUTURE 1 STRATAFIX SYMMETRIC PDS + 30CM CT-1 SXPP1A435

## (undated) DEVICE — C-ARM: Brand: UNBRANDED

## (undated) DEVICE — GOWN,ECLIPSE,POLYRNF,BRTHSLV,L,30/CS: Brand: MEDLINE

## (undated) DEVICE — POUCH INSTRUMENT SELF SEAL 3.5X5.2

## (undated) DEVICE — TUBING, SUCTION, 1/4" X 10', STRAIGHT: Brand: MEDLINE

## (undated) DEVICE — GOWN,ECLIPSE,POLYRNF,BRTHSLV,XL,30/CS: Brand: MEDLINE